# Patient Record
Sex: MALE | Race: BLACK OR AFRICAN AMERICAN | NOT HISPANIC OR LATINO | Employment: UNEMPLOYED | URBAN - METROPOLITAN AREA
[De-identification: names, ages, dates, MRNs, and addresses within clinical notes are randomized per-mention and may not be internally consistent; named-entity substitution may affect disease eponyms.]

---

## 2017-07-11 LAB
CHOLEST SERPL-MSCNC: 223 MG/DL (ref 0–200)
HDLC SERPL-MCNC: 32 MG/DL
LDLC SERPL CALC-MCNC: 162 MG/DL
NON HDL CHOL (CALC): 191
TRIGL SERPL-MCNC: 143 MG/DL

## 2019-02-16 LAB — HEP C VIRUS AB: NORMAL

## 2019-03-04 ENCOUNTER — HOSPITAL ENCOUNTER (EMERGENCY)
Facility: HOSPITAL | Age: 39
Discharge: HOME | End: 2019-03-04
Attending: EMERGENCY MEDICINE
Payer: MEDICAID

## 2019-03-04 VITALS
BODY MASS INDEX: 49.44 KG/M2 | HEIGHT: 67 IN | HEART RATE: 127 BPM | DIASTOLIC BLOOD PRESSURE: 76 MMHG | WEIGHT: 315 LBS | RESPIRATION RATE: 20 BRPM | OXYGEN SATURATION: 95 % | TEMPERATURE: 99 F | SYSTOLIC BLOOD PRESSURE: 119 MMHG

## 2019-03-04 DIAGNOSIS — I50.9 ACUTE CONGESTIVE HEART FAILURE, UNSPECIFIED HEART FAILURE TYPE: Primary | ICD-10-CM

## 2019-03-04 DIAGNOSIS — R06.02 SOB (SHORTNESS OF BREATH): ICD-10-CM

## 2019-03-04 LAB
ANION GAP SERPL CALC-SCNC: 9 MMOL/L
BASOPHILS # BLD AUTO: 0.01 K/UL
BASOPHILS NFR BLD: 0.2 %
BNP SERPL-MCNC: 393 PG/ML
BUN SERPL-MCNC: 31 MG/DL
CALCIUM SERPL-MCNC: 8.6 MG/DL
CHLORIDE SERPL-SCNC: 105 MMOL/L
CO2 SERPL-SCNC: 26 MMOL/L
CREAT SERPL-MCNC: 2.1 MG/DL
DIFFERENTIAL METHOD: ABNORMAL
EOSINOPHIL # BLD AUTO: 0.1 K/UL
EOSINOPHIL NFR BLD: 1.7 %
ERYTHROCYTE [DISTWIDTH] IN BLOOD BY AUTOMATED COUNT: 17.9 %
EST. GFR  (AFRICAN AMERICAN): 44 ML/MIN/1.73 M^2
EST. GFR  (NON AFRICAN AMERICAN): 38 ML/MIN/1.73 M^2
GLUCOSE SERPL-MCNC: 125 MG/DL
HCT VFR BLD AUTO: 36.3 %
HGB BLD-MCNC: 11.7 G/DL
HIV 1+2 AB+HIV1 P24 AG SERPL QL IA: NEGATIVE
LYMPHOCYTES # BLD AUTO: 2 K/UL
LYMPHOCYTES NFR BLD: 31.3 %
MCH RBC QN AUTO: 30.3 PG
MCHC RBC AUTO-ENTMCNC: 32.2 G/DL
MCV RBC AUTO: 94 FL
MONOCYTES # BLD AUTO: 0.5 K/UL
MONOCYTES NFR BLD: 8.3 %
NEUTROPHILS # BLD AUTO: 3.8 K/UL
NEUTROPHILS NFR BLD: 58.5 %
PLATELET # BLD AUTO: 243 K/UL
PMV BLD AUTO: 10.1 FL
POTASSIUM SERPL-SCNC: 3.4 MMOL/L
RBC # BLD AUTO: 3.86 M/UL
SODIUM SERPL-SCNC: 140 MMOL/L
TROPONIN I SERPL DL<=0.01 NG/ML-MCNC: 0.03 NG/ML
WBC # BLD AUTO: 6.51 K/UL

## 2019-03-04 PROCEDURE — 27000190 HC CPAP FULL FACE MASK W/VALVE

## 2019-03-04 PROCEDURE — 94660 CPAP INITIATION&MGMT: CPT

## 2019-03-04 PROCEDURE — 84484 ASSAY OF TROPONIN QUANT: CPT

## 2019-03-04 PROCEDURE — 63600175 PHARM REV CODE 636 W HCPCS: Performed by: EMERGENCY MEDICINE

## 2019-03-04 PROCEDURE — 96374 THER/PROPH/DIAG INJ IV PUSH: CPT

## 2019-03-04 PROCEDURE — 93010 ELECTROCARDIOGRAM REPORT: CPT | Mod: ,,, | Performed by: INTERNAL MEDICINE

## 2019-03-04 PROCEDURE — 83880 ASSAY OF NATRIURETIC PEPTIDE: CPT

## 2019-03-04 PROCEDURE — 86703 HIV-1/HIV-2 1 RESULT ANTBDY: CPT

## 2019-03-04 PROCEDURE — 93010 EKG 12-LEAD: ICD-10-PCS | Mod: ,,, | Performed by: INTERNAL MEDICINE

## 2019-03-04 PROCEDURE — 93005 ELECTROCARDIOGRAM TRACING: CPT

## 2019-03-04 PROCEDURE — 36415 COLL VENOUS BLD VENIPUNCTURE: CPT

## 2019-03-04 PROCEDURE — 99285 EMERGENCY DEPT VISIT HI MDM: CPT | Mod: 25

## 2019-03-04 PROCEDURE — 85025 COMPLETE CBC W/AUTO DIFF WBC: CPT

## 2019-03-04 PROCEDURE — 99900035 HC TECH TIME PER 15 MIN (STAT)

## 2019-03-04 PROCEDURE — 80048 BASIC METABOLIC PNL TOTAL CA: CPT

## 2019-03-04 RX ORDER — FUROSEMIDE 10 MG/ML
100 INJECTION INTRAMUSCULAR; INTRAVENOUS
Status: COMPLETED | OUTPATIENT
Start: 2019-03-04 | End: 2019-03-04

## 2019-03-04 RX ORDER — FUROSEMIDE 80 MG/1
80 TABLET ORAL 2 TIMES DAILY
COMMUNITY
End: 2019-03-04 | Stop reason: SDUPTHER

## 2019-03-04 RX ORDER — AMIODARONE HYDROCHLORIDE 200 MG/1
TABLET ORAL DAILY
COMMUNITY
End: 2019-04-05 | Stop reason: SDUPTHER

## 2019-03-04 RX ORDER — CARVEDILOL 12.5 MG/1
12.5 TABLET ORAL 2 TIMES DAILY WITH MEALS
Status: ON HOLD | COMMUNITY
End: 2019-03-19 | Stop reason: HOSPADM

## 2019-03-04 RX ORDER — ATORVASTATIN CALCIUM 20 MG/1
20 TABLET, FILM COATED ORAL DAILY
COMMUNITY
End: 2019-04-05 | Stop reason: SDUPTHER

## 2019-03-04 RX ORDER — FUROSEMIDE 80 MG/1
80 TABLET ORAL 3 TIMES DAILY
Qty: 21 TABLET | Refills: 0 | Status: ON HOLD | OUTPATIENT
Start: 2019-03-04 | End: 2019-03-19 | Stop reason: HOSPADM

## 2019-03-04 RX ADMIN — FUROSEMIDE 100 MG: 10 INJECTION, SOLUTION INTRAMUSCULAR; INTRAVENOUS at 02:03

## 2019-03-04 NOTE — ED PROVIDER NOTES
SCRIBE #1 NOTE: I, Zainachino Fulton, am scribing for, and in the presence of, Nguyễn Rios MD. I have scribed the entire note.      History      Chief Complaint   Patient presents with    Shortness of Breath       Review of patient's allergies indicates:  No Known Allergies     HPI   HPI    3/4/2019, 1:46 AM   History obtained from the patient      History of Present Illness: Chacha Zendejas is a 38 y.o. male patient with a PMHx of CHF, HTN who presents to the Emergency Department for SOB which onset gradually yesterday. Symptoms are worsening and moderate in severity. No mitigating or exacerbating factors reported. Associated sxs include abd distention. Patient denies any fever, chills, CP, cough, extremity weakness/numbness, abd pain, recent travel/long car rides, and all other sxs at this time. Pt takes 80mg Lasix qd. No further complaints or concerns at this time.       Arrival mode: EMS    PCP: Zara Olson NP       Past Medical History:  Past Medical History:   Diagnosis Date    Cellulitis     RLE    CHF (congestive heart failure)     Hypertension        Past Surgical History:  History reviewed. No pertinent surgical history.      Family History:  History reviewed. No pertinent family history.    Social History:  Social History     Tobacco Use    Smoking status: Former Smoker     Types: Cigarettes    Smokeless tobacco: Never Used   Substance and Sexual Activity    Alcohol use: Yes     Comment: socially    Drug use: No    Sexual activity: unknown       ROS   Review of Systems   Constitutional: Negative for chills, diaphoresis and fever.   HENT: Negative for congestion and sore throat.    Eyes: Negative for visual disturbance.   Respiratory: Positive for shortness of breath. Negative for cough.    Cardiovascular: Positive for leg swelling (BLE). Negative for chest pain and palpitations.   Gastrointestinal: Positive for abdominal distention. Negative for nausea and vomiting.   Genitourinary:  Negative for dysuria.   Musculoskeletal: Negative for back pain and myalgias.   Skin: Negative for rash.   Neurological: Negative for dizziness, weakness and numbness.   Hematological: Does not bruise/bleed easily.   All other systems reviewed and are negative.    Physical Exam      Initial Vitals [03/04/19 0140]   BP Pulse Resp Temp SpO2   130/78 (!) 128 20 98.6 °F (37 °C) 98 %      MAP       --          Physical Exam  Nursing Notes and Vital Signs Reviewed.  Constitutional: Patient is in moderate distress. Well-developed and well-nourished.  Head: Atraumatic. Normocephalic.  Eyes: PERRL. EOM intact. Conjunctivae are not pale. No scleral icterus.  ENT: Mucous membranes are moist. Oropharynx is clear and symmetric.    Neck: Supple. Full ROM. No lymphadenopathy.  Cardiovascular: Tachycardic. Regular rhythm. No murmurs, rubs, or gallops. Distal pulses are 2+ and symmetric.  Pulmonary/Chest: Moderate respiratory distress. Diffuse rales bilaterally. Tachypneic.   Abdominal: Distended.  There is no tenderness.  No rebound, guarding, or rigidity.  Musculoskeletal: Moves all extremities. No obvious deformities.  No calf tenderness. Diffuse anasarca with fluid to BLE and abdomen.   Skin: Warm and dry.  Neurological:  Alert, awake, and appropriate.  Normal speech.  No acute focal neurological deficits are appreciated.  Psychiatric: Normal affect. Good eye contact. Appropriate in content.    ED Course    Critical Care  Date/Time: 3/4/2019 4:43 AM  Performed by: Nguyễn Rios MD  Authorized by: Nguyễn Rios MD   Direct patient critical care time: 15 minutes  Additional history critical care time: 5 minutes  Ordering / reviewing critical care time: 5 minutes  Documentation critical care time: 5 minutes  Total critical care time (exclusive of procedural time) : 30 minutes  Critical care time was exclusive of teaching time and separately billable procedures and treating other patients.  Critical care was necessary to  "treat or prevent imminent or life-threatening deterioration of the following conditions: CHF.  Critical care was time spent personally by me on the following activities: blood draw for specimens, development of treatment plan with patient or surrogate, interpretation of cardiac output measurements, evaluation of patient's response to treatment, examination of patient, obtaining history from patient or surrogate, ordering and performing treatments and interventions, ordering and review of radiographic studies, ordering and review of laboratory studies, re-evaluation of patient's condition, pulse oximetry and review of old charts.        ED Vital Signs:  Vitals:    03/04/19 0140 03/04/19 0151 03/04/19 0200 03/04/19 0314   BP: 130/78  127/86 131/81   Pulse: (!) 128 (!) 127 (!) 126 (!) 119   Resp: 20  (!) 26 (!) 26   Temp: 98.6 °F (37 °C)      TempSrc: Oral      SpO2: 98%  96% 100%   Weight: (!) 215.1 kg (474 lb 4.8 oz)      Height: 5' 7" (1.702 m)       03/04/19 0401 03/04/19 0450   BP: 132/80 119/76   Pulse: (!) 126 (!) 127   Resp: (!) 23 20   Temp: 98.4 °F (36.9 °C) 98.5 °F (36.9 °C)   TempSrc: Oral Oral   SpO2: 95% 95%   Weight:     Height:         Abnormal Lab Results:  Labs Reviewed   CBC W/ AUTO DIFFERENTIAL - Abnormal; Notable for the following components:       Result Value    RBC 3.86 (*)     Hemoglobin 11.7 (*)     Hematocrit 36.3 (*)     RDW 17.9 (*)     All other components within normal limits   B-TYPE NATRIURETIC PEPTIDE - Abnormal; Notable for the following components:     (*)     All other components within normal limits   BASIC METABOLIC PANEL - Abnormal; Notable for the following components:    Potassium 3.4 (*)     Glucose 125 (*)     BUN, Bld 31 (*)     Creatinine 2.1 (*)     Calcium 8.6 (*)     eGFR if  44 (*)     eGFR if non  38 (*)     All other components within normal limits   TROPONIN I   HIV 1 / 2 ANTIBODY        All Lab Results:  Results for orders " placed or performed during the hospital encounter of 03/04/19   CBC auto differential   Result Value Ref Range    WBC 6.51 3.90 - 12.70 K/uL    RBC 3.86 (L) 4.60 - 6.20 M/uL    Hemoglobin 11.7 (L) 14.0 - 18.0 g/dL    Hematocrit 36.3 (L) 40.0 - 54.0 %    MCV 94 82 - 98 fL    MCH 30.3 27.0 - 31.0 pg    MCHC 32.2 32.0 - 36.0 g/dL    RDW 17.9 (H) 11.5 - 14.5 %    Platelets 243 150 - 350 K/uL    MPV 10.1 9.2 - 12.9 fL    Gran # (ANC) 3.8 1.8 - 7.7 K/uL    Lymph # 2.0 1.0 - 4.8 K/uL    Mono # 0.5 0.3 - 1.0 K/uL    Eos # 0.1 0.0 - 0.5 K/uL    Baso # 0.01 0.00 - 0.20 K/uL    Gran% 58.5 38.0 - 73.0 %    Lymph% 31.3 18.0 - 48.0 %    Mono% 8.3 4.0 - 15.0 %    Eosinophil% 1.7 0.0 - 8.0 %    Basophil% 0.2 0.0 - 1.9 %    Differential Method Automated    Troponin I   Result Value Ref Range    Troponin I 0.026 0.000 - 0.026 ng/mL   Brain natriuretic peptide   Result Value Ref Range     (H) 0 - 99 pg/mL   Basic metabolic panel   Result Value Ref Range    Sodium 140 136 - 145 mmol/L    Potassium 3.4 (L) 3.5 - 5.1 mmol/L    Chloride 105 95 - 110 mmol/L    CO2 26 23 - 29 mmol/L    Glucose 125 (H) 70 - 110 mg/dL    BUN, Bld 31 (H) 6 - 20 mg/dL    Creatinine 2.1 (H) 0.5 - 1.4 mg/dL    Calcium 8.6 (L) 8.7 - 10.5 mg/dL    Anion Gap 9 8 - 16 mmol/L    eGFR if African American 44 (A) >60 mL/min/1.73 m^2    eGFR if non African American 38 (A) >60 mL/min/1.73 m^2       Imaging Results:  Imaging Results          X-Ray Chest AP Portable (In process)                4:21 AM: Per ED physician, pt's CXR results show: Mild pulmonary vascular congestion.     The EKG was ordered, reviewed, and independently interpreted by the ED provider.  Interpretation time: 0146  Rate: 129 BPM  Rhythm: atrial flutter with 2:1 AV conduction  Interpretation: RAD. Pulmonary disease pattern. T wave abnormality. No STEMI.         The Emergency Provider reviewed the vital signs and test results, which are outlined above.    ED Discussion     4:38 AM: Reassessed pt  at this time. Offered admission, patient prefers discharge. Discharged to start doing CPAP as prescribed and take Lasix 80 mg PO TID for 1 week. Pt states his condition has improved at this time. Pt is AAOx3, in NAD, and VSS. Discussed with pt all pertinent ED information and results. Discussed pt dx and plan of tx. Gave pt all f/u and return to the ED instructions. All questions and concerns were addressed at this time. Pt expresses understanding of information and instructions, and is comfortable with plan to discharge. Pt is stable for discharge.    I discussed with patient and/or family/caretaker that evaluation in the ED does not suggest any emergent or life threatening medical conditions requiring immediate intervention beyond what was provided in the ED, and I believe patient is safe for discharge.  Regardless, an unremarkable evaluation in the ED does not preclude the development or presence of a serious of life threatening condition. As such, patient was instructed to return immediately for any worsening or change in current symptoms.    ED Medication(s):  Medications   furosemide injection 100 mg (100 mg Intravenous Given 3/4/19 0213)     Discharge Medication List as of 3/4/2019  4:38 AM                  Medical Decision Making    Medical Decision Making:   Clinical Tests:   Lab Tests: Ordered and Reviewed  Radiological Study: Ordered and Reviewed  Medical Tests: Ordered and Reviewed           Scribe Attestation:   Scribe #1: I performed the above scribed service and the documentation accurately describes the services I performed. I attest to the accuracy of the note.    Attending:   Physician Attestation Statement for Scribe #1: I, Nguyễn Rios MD, personally performed the services described in this documentation, as scribed by Zaina Fulton, in my presence, and it is both accurate and complete.          Clinical Impression       ICD-10-CM ICD-9-CM   1. Acute congestive heart failure, unspecified  heart failure type I50.9 428.0   2. SOB (shortness of breath) R06.02 786.05       Disposition:   Disposition: Discharged  Condition: Stable         Nguyễn Rios MD  03/04/19 0717

## 2019-03-13 ENCOUNTER — HOSPITAL ENCOUNTER (INPATIENT)
Facility: HOSPITAL | Age: 39
LOS: 6 days | Discharge: HOME | DRG: 291 | End: 2019-03-19
Attending: EMERGENCY MEDICINE | Admitting: HOSPITALIST
Payer: MEDICAID

## 2019-03-13 DIAGNOSIS — I48.91 AF (ATRIAL FIBRILLATION): ICD-10-CM

## 2019-03-13 DIAGNOSIS — I48.92 ATRIAL FLUTTER: ICD-10-CM

## 2019-03-13 DIAGNOSIS — R79.89 ELEVATED TROPONIN: ICD-10-CM

## 2019-03-13 DIAGNOSIS — I10 ESSENTIAL HYPERTENSION: ICD-10-CM

## 2019-03-13 DIAGNOSIS — R06.02 SHORTNESS OF BREATH: ICD-10-CM

## 2019-03-13 DIAGNOSIS — I48.91 ATRIAL FIBRILLATION STATUS POST CARDIOVERSION: ICD-10-CM

## 2019-03-13 DIAGNOSIS — I50.9 CHF (CONGESTIVE HEART FAILURE): ICD-10-CM

## 2019-03-13 DIAGNOSIS — R07.9 CHEST PAIN: ICD-10-CM

## 2019-03-13 DIAGNOSIS — I48.92 ATRIAL FLUTTER, UNSPECIFIED TYPE: Primary | ICD-10-CM

## 2019-03-13 DIAGNOSIS — I50.9 CHF EXACERBATION: ICD-10-CM

## 2019-03-13 PROCEDURE — 11000001 HC ACUTE MED/SURG PRIVATE ROOM

## 2019-03-13 PROCEDURE — 63600175 PHARM REV CODE 636 W HCPCS: Performed by: EMERGENCY MEDICINE

## 2019-03-13 PROCEDURE — 83735 ASSAY OF MAGNESIUM: CPT

## 2019-03-13 PROCEDURE — 93010 EKG 12-LEAD: ICD-10-PCS | Mod: ,,, | Performed by: INTERNAL MEDICINE

## 2019-03-13 PROCEDURE — 83880 ASSAY OF NATRIURETIC PEPTIDE: CPT

## 2019-03-13 PROCEDURE — 93010 ELECTROCARDIOGRAM REPORT: CPT | Mod: ,,, | Performed by: INTERNAL MEDICINE

## 2019-03-13 PROCEDURE — 84443 ASSAY THYROID STIM HORMONE: CPT

## 2019-03-13 PROCEDURE — 80053 COMPREHEN METABOLIC PANEL: CPT

## 2019-03-13 PROCEDURE — 85025 COMPLETE CBC W/AUTO DIFF WBC: CPT

## 2019-03-13 PROCEDURE — 99291 CRITICAL CARE FIRST HOUR: CPT | Mod: 25

## 2019-03-13 PROCEDURE — 84439 ASSAY OF FREE THYROXINE: CPT

## 2019-03-13 PROCEDURE — 96374 THER/PROPH/DIAG INJ IV PUSH: CPT

## 2019-03-13 PROCEDURE — 84484 ASSAY OF TROPONIN QUANT: CPT

## 2019-03-13 RX ORDER — DILTIAZEM HYDROCHLORIDE 5 MG/ML
20 INJECTION INTRAVENOUS
Status: DISCONTINUED | OUTPATIENT
Start: 2019-03-13 | End: 2019-03-13

## 2019-03-13 RX ORDER — FUROSEMIDE 10 MG/ML
80 INJECTION INTRAMUSCULAR; INTRAVENOUS
Status: COMPLETED | OUTPATIENT
Start: 2019-03-13 | End: 2019-03-13

## 2019-03-13 RX ADMIN — FUROSEMIDE 80 MG: 10 INJECTION, SOLUTION INTRAMUSCULAR; INTRAVENOUS at 10:03

## 2019-03-14 PROBLEM — I48.92 ATRIAL FLUTTER: Status: ACTIVE | Noted: 2019-03-14

## 2019-03-14 PROBLEM — I10 ESSENTIAL HYPERTENSION: Status: ACTIVE | Noted: 2019-03-14

## 2019-03-14 PROBLEM — R74.01 HIGH TRANSAMINASE LEVELS: Status: ACTIVE | Noted: 2019-03-14

## 2019-03-14 PROBLEM — G47.33 OSA ON CPAP: Status: ACTIVE | Noted: 2019-03-14

## 2019-03-14 PROBLEM — N18.30 CKD (CHRONIC KIDNEY DISEASE) STAGE 3, GFR 30-59 ML/MIN: Status: ACTIVE | Noted: 2019-03-14

## 2019-03-14 PROBLEM — I50.9 CHF EXACERBATION: Status: ACTIVE | Noted: 2019-03-14

## 2019-03-14 PROBLEM — I89.0 LYMPHEDEMA: Status: ACTIVE | Noted: 2019-03-14

## 2019-03-14 LAB
ALBUMIN SERPL BCP-MCNC: 2.8 G/DL
ALBUMIN SERPL BCP-MCNC: 2.9 G/DL
ALP SERPL-CCNC: 171 U/L
ALP SERPL-CCNC: 174 U/L
ALT SERPL W/O P-5'-P-CCNC: 59 U/L
ALT SERPL W/O P-5'-P-CCNC: 60 U/L
ANION GAP SERPL CALC-SCNC: 12 MMOL/L
ANION GAP SERPL CALC-SCNC: 13 MMOL/L
AST SERPL-CCNC: 42 U/L
AST SERPL-CCNC: 47 U/L
BACTERIA #/AREA URNS HPF: NORMAL /HPF
BASOPHILS # BLD AUTO: 0.01 K/UL
BASOPHILS # BLD AUTO: 0.03 K/UL
BASOPHILS NFR BLD: 0.1 %
BASOPHILS NFR BLD: 0.4 %
BILIRUB SERPL-MCNC: 1.8 MG/DL
BILIRUB SERPL-MCNC: 2 MG/DL
BILIRUB UR QL STRIP: NEGATIVE
BNP SERPL-MCNC: 445 PG/ML
BUN SERPL-MCNC: 30 MG/DL
BUN SERPL-MCNC: 31 MG/DL
CALCIUM SERPL-MCNC: 8.5 MG/DL
CALCIUM SERPL-MCNC: 8.6 MG/DL
CHLORIDE SERPL-SCNC: 99 MMOL/L
CHLORIDE SERPL-SCNC: 99 MMOL/L
CLARITY UR: CLEAR
CO2 SERPL-SCNC: 24 MMOL/L
CO2 SERPL-SCNC: 27 MMOL/L
COLOR UR: YELLOW
CREAT SERPL-MCNC: 2 MG/DL
CREAT SERPL-MCNC: 2 MG/DL
DIFFERENTIAL METHOD: ABNORMAL
DIFFERENTIAL METHOD: ABNORMAL
EOSINOPHIL # BLD AUTO: 0.1 K/UL
EOSINOPHIL # BLD AUTO: 0.1 K/UL
EOSINOPHIL NFR BLD: 1.4 %
EOSINOPHIL NFR BLD: 1.6 %
ERYTHROCYTE [DISTWIDTH] IN BLOOD BY AUTOMATED COUNT: 17.8 %
ERYTHROCYTE [DISTWIDTH] IN BLOOD BY AUTOMATED COUNT: 17.8 %
EST. GFR  (AFRICAN AMERICAN): 47 ML/MIN/1.73 M^2
EST. GFR  (AFRICAN AMERICAN): 47 ML/MIN/1.73 M^2
EST. GFR  (NON AFRICAN AMERICAN): 41 ML/MIN/1.73 M^2
EST. GFR  (NON AFRICAN AMERICAN): 41 ML/MIN/1.73 M^2
ESTIMATED PA SYSTOLIC PRESSURE: 38.44
GLUCOSE SERPL-MCNC: 123 MG/DL
GLUCOSE SERPL-MCNC: 97 MG/DL
GLUCOSE UR QL STRIP: NEGATIVE
HCT VFR BLD AUTO: 36.2 %
HCT VFR BLD AUTO: 36.2 %
HGB BLD-MCNC: 11.7 G/DL
HGB BLD-MCNC: 11.9 G/DL
HGB UR QL STRIP: NEGATIVE
KETONES UR QL STRIP: NEGATIVE
LEUKOCYTE ESTERASE UR QL STRIP: ABNORMAL
LYMPHOCYTES # BLD AUTO: 2.3 K/UL
LYMPHOCYTES # BLD AUTO: 2.6 K/UL
LYMPHOCYTES NFR BLD: 34.2 %
LYMPHOCYTES NFR BLD: 37 %
MAGNESIUM SERPL-MCNC: 1.5 MG/DL
MAGNESIUM SERPL-MCNC: 1.7 MG/DL
MAGNESIUM SERPL-MCNC: 1.9 MG/DL
MCH RBC QN AUTO: 29.8 PG
MCH RBC QN AUTO: 30.3 PG
MCHC RBC AUTO-ENTMCNC: 32.3 G/DL
MCHC RBC AUTO-ENTMCNC: 32.9 G/DL
MCV RBC AUTO: 92 FL
MCV RBC AUTO: 92 FL
MICROSCOPIC COMMENT: NORMAL
MITRAL VALVE REGURGITATION: ABNORMAL
MONOCYTES # BLD AUTO: 0.9 K/UL
MONOCYTES # BLD AUTO: 0.9 K/UL
MONOCYTES NFR BLD: 12.8 %
MONOCYTES NFR BLD: 12.8 %
NEUTROPHILS # BLD AUTO: 3.4 K/UL
NEUTROPHILS # BLD AUTO: 3.4 K/UL
NEUTROPHILS NFR BLD: 48.4 %
NEUTROPHILS NFR BLD: 51.3 %
NITRITE UR QL STRIP: NEGATIVE
PH UR STRIP: 6 [PH] (ref 5–8)
PHOSPHATE SERPL-MCNC: 4.2 MG/DL
PLATELET # BLD AUTO: 211 K/UL
PLATELET # BLD AUTO: 216 K/UL
PMV BLD AUTO: 10.1 FL
PMV BLD AUTO: 10.3 FL
POTASSIUM SERPL-SCNC: 3.3 MMOL/L
PROT SERPL-MCNC: 7.3 G/DL
PROT SERPL-MCNC: 7.3 G/DL
PROT UR QL STRIP: NEGATIVE
RBC # BLD AUTO: 3.93 M/UL
RBC # BLD AUTO: 3.93 M/UL
RBC #/AREA URNS HPF: 0 /HPF (ref 0–4)
RETIRED EF AND QEF - SEE NOTES: 10 (ref 55–65)
SODIUM SERPL-SCNC: 136 MMOL/L
SODIUM SERPL-SCNC: 138 MMOL/L
SP GR UR STRIP: 1.01 (ref 1–1.03)
SQUAMOUS #/AREA URNS HPF: 0 /HPF
T4 FREE SERPL-MCNC: 1 NG/DL
TRICUSPID VALVE REGURGITATION: ABNORMAL
TROPONIN I SERPL DL<=0.01 NG/ML-MCNC: 0.02 NG/ML
TROPONIN I SERPL DL<=0.01 NG/ML-MCNC: 0.03 NG/ML
TSH SERPL DL<=0.005 MIU/L-ACNC: 5.15 UIU/ML
URN SPEC COLLECT METH UR: ABNORMAL
UROBILINOGEN UR STRIP-ACNC: NEGATIVE EU/DL
WBC # BLD AUTO: 6.7 K/UL
WBC # BLD AUTO: 6.98 K/UL
WBC #/AREA URNS HPF: 4 /HPF (ref 0–5)

## 2019-03-14 PROCEDURE — 21400001 HC TELEMETRY ROOM

## 2019-03-14 PROCEDURE — 63600175 PHARM REV CODE 636 W HCPCS: Performed by: HOSPITALIST

## 2019-03-14 PROCEDURE — 25000003 PHARM REV CODE 250: Performed by: INTERNAL MEDICINE

## 2019-03-14 PROCEDURE — 99233 PR SUBSEQUENT HOSPITAL CARE,LEVL III: ICD-10-PCS | Mod: 25,,, | Performed by: INTERNAL MEDICINE

## 2019-03-14 PROCEDURE — 27000190 HC CPAP FULL FACE MASK W/VALVE

## 2019-03-14 PROCEDURE — 25000003 PHARM REV CODE 250: Performed by: EMERGENCY MEDICINE

## 2019-03-14 PROCEDURE — S0171 BUMETANIDE 0.5 MG: HCPCS | Performed by: INTERNAL MEDICINE

## 2019-03-14 PROCEDURE — 81000 URINALYSIS NONAUTO W/SCOPE: CPT

## 2019-03-14 PROCEDURE — 63600175 PHARM REV CODE 636 W HCPCS: Performed by: INTERNAL MEDICINE

## 2019-03-14 PROCEDURE — 25000003 PHARM REV CODE 250: Performed by: HOSPITALIST

## 2019-03-14 PROCEDURE — 99233 SBSQ HOSP IP/OBS HIGH 50: CPT | Mod: 25,,, | Performed by: INTERNAL MEDICINE

## 2019-03-14 PROCEDURE — 84484 ASSAY OF TROPONIN QUANT: CPT

## 2019-03-14 PROCEDURE — 84132 ASSAY OF SERUM POTASSIUM: CPT

## 2019-03-14 PROCEDURE — 93010 EKG 12-LEAD: ICD-10-PCS | Mod: ,,, | Performed by: INTERNAL MEDICINE

## 2019-03-14 PROCEDURE — 85025 COMPLETE CBC W/AUTO DIFF WBC: CPT

## 2019-03-14 PROCEDURE — 83735 ASSAY OF MAGNESIUM: CPT

## 2019-03-14 PROCEDURE — 93010 ELECTROCARDIOGRAM REPORT: CPT | Mod: ,,, | Performed by: INTERNAL MEDICINE

## 2019-03-14 PROCEDURE — 84100 ASSAY OF PHOSPHORUS: CPT

## 2019-03-14 PROCEDURE — 80053 COMPREHEN METABOLIC PANEL: CPT

## 2019-03-14 PROCEDURE — 25000003 PHARM REV CODE 250: Performed by: NURSE PRACTITIONER

## 2019-03-14 PROCEDURE — 93306 2D ECHO WITH COLOR FLOW DOPPLER: ICD-10-PCS | Mod: 26,,, | Performed by: INTERNAL MEDICINE

## 2019-03-14 PROCEDURE — C8929 TTE W OR WO FOL WCON,DOPPLER: HCPCS

## 2019-03-14 PROCEDURE — 36415 COLL VENOUS BLD VENIPUNCTURE: CPT

## 2019-03-14 PROCEDURE — 99900035 HC TECH TIME PER 15 MIN (STAT)

## 2019-03-14 PROCEDURE — 83735 ASSAY OF MAGNESIUM: CPT | Mod: 91

## 2019-03-14 PROCEDURE — 93306 TTE W/DOPPLER COMPLETE: CPT | Mod: 26,,, | Performed by: INTERNAL MEDICINE

## 2019-03-14 PROCEDURE — 94660 CPAP INITIATION&MGMT: CPT

## 2019-03-14 PROCEDURE — 96376 TX/PRO/DX INJ SAME DRUG ADON: CPT

## 2019-03-14 RX ORDER — FUROSEMIDE 10 MG/ML
80 INJECTION INTRAMUSCULAR; INTRAVENOUS 3 TIMES DAILY
Status: DISCONTINUED | OUTPATIENT
Start: 2019-03-14 | End: 2019-03-14

## 2019-03-14 RX ORDER — BUMETANIDE 0.25 MG/ML
2 INJECTION INTRAMUSCULAR; INTRAVENOUS 3 TIMES DAILY
Status: DISCONTINUED | OUTPATIENT
Start: 2019-03-14 | End: 2019-03-19 | Stop reason: HOSPADM

## 2019-03-14 RX ORDER — AMIODARONE HYDROCHLORIDE 200 MG/1
200 TABLET ORAL DAILY
Status: DISCONTINUED | OUTPATIENT
Start: 2019-03-14 | End: 2019-03-14

## 2019-03-14 RX ORDER — LISINOPRIL 20 MG/1
20 TABLET ORAL DAILY
Status: ON HOLD | COMMUNITY
End: 2019-03-19 | Stop reason: HOSPADM

## 2019-03-14 RX ORDER — POTASSIUM CHLORIDE 20 MEQ/1
40 TABLET, EXTENDED RELEASE ORAL ONCE
Status: COMPLETED | OUTPATIENT
Start: 2019-03-14 | End: 2019-03-14

## 2019-03-14 RX ORDER — METOLAZONE 5 MG/1
5 TABLET ORAL ONCE
Status: COMPLETED | OUTPATIENT
Start: 2019-03-14 | End: 2019-03-14

## 2019-03-14 RX ORDER — DIGOXIN 0.25 MG/ML
250 INJECTION INTRAMUSCULAR; INTRAVENOUS ONCE
Status: COMPLETED | OUTPATIENT
Start: 2019-03-14 | End: 2019-03-14

## 2019-03-14 RX ORDER — METOLAZONE 5 MG/1
5 TABLET ORAL DAILY
Status: DISCONTINUED | OUTPATIENT
Start: 2019-03-15 | End: 2019-03-19 | Stop reason: HOSPADM

## 2019-03-14 RX ORDER — CARVEDILOL 12.5 MG/1
25 TABLET ORAL 2 TIMES DAILY WITH MEALS
Status: DISCONTINUED | OUTPATIENT
Start: 2019-03-14 | End: 2019-03-14

## 2019-03-14 RX ORDER — SPIRONOLACTONE 25 MG/1
25 TABLET ORAL DAILY
Status: DISCONTINUED | OUTPATIENT
Start: 2019-03-14 | End: 2019-03-16

## 2019-03-14 RX ORDER — HYDRALAZINE HYDROCHLORIDE 25 MG/1
25 TABLET, FILM COATED ORAL EVERY 8 HOURS
Status: ON HOLD | COMMUNITY
End: 2019-03-19 | Stop reason: HOSPADM

## 2019-03-14 RX ORDER — CARVEDILOL 12.5 MG/1
12.5 TABLET ORAL ONCE
Status: COMPLETED | OUTPATIENT
Start: 2019-03-14 | End: 2019-03-14

## 2019-03-14 RX ORDER — SPIRONOLACTONE 25 MG/1
25 TABLET ORAL 2 TIMES DAILY
Status: ON HOLD | COMMUNITY
End: 2019-03-19 | Stop reason: HOSPADM

## 2019-03-14 RX ORDER — AMIODARONE HYDROCHLORIDE 200 MG/1
200 TABLET ORAL ONCE
Status: COMPLETED | OUTPATIENT
Start: 2019-03-14 | End: 2019-03-14

## 2019-03-14 RX ORDER — POTASSIUM CHLORIDE 750 MG/1
50 TABLET, EXTENDED RELEASE ORAL ONCE
Status: COMPLETED | OUTPATIENT
Start: 2019-03-14 | End: 2019-03-14

## 2019-03-14 RX ORDER — NITROGLYCERIN 0.4 MG/1
0.4 TABLET SUBLINGUAL EVERY 5 MIN PRN
Status: DISCONTINUED | OUTPATIENT
Start: 2019-03-14 | End: 2019-03-19 | Stop reason: HOSPADM

## 2019-03-14 RX ORDER — POTASSIUM CHLORIDE 20 MEQ/1
40 TABLET, EXTENDED RELEASE ORAL EVERY 4 HOURS
Status: COMPLETED | OUTPATIENT
Start: 2019-03-15 | End: 2019-03-15

## 2019-03-14 RX ORDER — MAGNESIUM SULFATE 1 G/100ML
3 INJECTION INTRAVENOUS ONCE
Status: COMPLETED | OUTPATIENT
Start: 2019-03-15 | End: 2019-03-15

## 2019-03-14 RX ORDER — DIGOXIN 125 MCG
0.12 TABLET ORAL DAILY
Status: DISCONTINUED | OUTPATIENT
Start: 2019-03-15 | End: 2019-03-19 | Stop reason: HOSPADM

## 2019-03-14 RX ORDER — IBUPROFEN 200 MG
24 TABLET ORAL
Status: DISCONTINUED | OUTPATIENT
Start: 2019-03-14 | End: 2019-03-19 | Stop reason: HOSPADM

## 2019-03-14 RX ORDER — DIGOXIN 0.25 MG/ML
500 INJECTION INTRAMUSCULAR; INTRAVENOUS ONCE
Status: COMPLETED | OUTPATIENT
Start: 2019-03-14 | End: 2019-03-14

## 2019-03-14 RX ORDER — AMIODARONE HYDROCHLORIDE 200 MG/1
200 TABLET ORAL 2 TIMES DAILY
Status: DISCONTINUED | OUTPATIENT
Start: 2019-03-14 | End: 2019-03-19 | Stop reason: HOSPADM

## 2019-03-14 RX ORDER — IBUPROFEN 200 MG
16 TABLET ORAL
Status: DISCONTINUED | OUTPATIENT
Start: 2019-03-14 | End: 2019-03-19 | Stop reason: HOSPADM

## 2019-03-14 RX ORDER — ISOSORBIDE DINITRATE 20 MG/1
20 TABLET ORAL 3 TIMES DAILY
Status: ON HOLD | COMMUNITY
End: 2019-03-19 | Stop reason: HOSPADM

## 2019-03-14 RX ORDER — CARVEDILOL 12.5 MG/1
12.5 TABLET ORAL 2 TIMES DAILY WITH MEALS
Status: DISCONTINUED | OUTPATIENT
Start: 2019-03-14 | End: 2019-03-16

## 2019-03-14 RX ORDER — GLUCAGON 1 MG
1 KIT INJECTION
Status: DISCONTINUED | OUTPATIENT
Start: 2019-03-14 | End: 2019-03-19 | Stop reason: HOSPADM

## 2019-03-14 RX ORDER — SODIUM CHLORIDE 0.9 % (FLUSH) 0.9 %
5 SYRINGE (ML) INJECTION
Status: DISCONTINUED | OUTPATIENT
Start: 2019-03-14 | End: 2019-03-19 | Stop reason: HOSPADM

## 2019-03-14 RX ADMIN — BUMETANIDE 2 MG: 0.25 INJECTION INTRAMUSCULAR; INTRAVENOUS at 10:03

## 2019-03-14 RX ADMIN — CARVEDILOL 12.5 MG: 12.5 TABLET, FILM COATED ORAL at 04:03

## 2019-03-14 RX ADMIN — DIGOXIN 500 MCG: 0.25 INJECTION INTRAMUSCULAR; INTRAVENOUS at 09:03

## 2019-03-14 RX ADMIN — AMIODARONE HYDROCHLORIDE 200 MG: 200 TABLET ORAL at 08:03

## 2019-03-14 RX ADMIN — CARVEDILOL 12.5 MG: 12.5 TABLET, FILM COATED ORAL at 01:03

## 2019-03-14 RX ADMIN — DIGOXIN 250 MCG: 0.25 INJECTION INTRAMUSCULAR; INTRAVENOUS at 12:03

## 2019-03-14 RX ADMIN — APIXABAN 5 MG: 2.5 TABLET, FILM COATED ORAL at 08:03

## 2019-03-14 RX ADMIN — FUROSEMIDE 80 MG: 10 INJECTION, SOLUTION INTRAMUSCULAR; INTRAVENOUS at 01:03

## 2019-03-14 RX ADMIN — CARVEDILOL 12.5 MG: 12.5 TABLET, FILM COATED ORAL at 08:03

## 2019-03-14 RX ADMIN — BUMETANIDE 2 MG: 0.25 INJECTION INTRAMUSCULAR; INTRAVENOUS at 04:03

## 2019-03-14 RX ADMIN — MAGNESIUM SULFATE HEPTAHYDRATE 3 G: 1 INJECTION, SOLUTION INTRAVENOUS at 11:03

## 2019-03-14 RX ADMIN — POTASSIUM CHLORIDE 40 MEQ: 1500 TABLET, EXTENDED RELEASE ORAL at 08:03

## 2019-03-14 RX ADMIN — METOLAZONE 5 MG: 5 TABLET ORAL at 09:03

## 2019-03-14 RX ADMIN — AMIODARONE HYDROCHLORIDE 200 MG: 200 TABLET ORAL at 01:03

## 2019-03-14 RX ADMIN — BUMETANIDE 2 MG: 0.25 INJECTION INTRAMUSCULAR; INTRAVENOUS at 08:03

## 2019-03-14 RX ADMIN — APIXABAN 5 MG: 2.5 TABLET, FILM COATED ORAL at 10:03

## 2019-03-14 RX ADMIN — POTASSIUM CHLORIDE 50 MEQ: 750 TABLET, EXTENDED RELEASE ORAL at 09:03

## 2019-03-14 RX ADMIN — SPIRONOLACTONE 25 MG: 25 TABLET ORAL at 09:03

## 2019-03-14 NOTE — ED NOTES
Verified with Dr. Garza about patients heart rate, and was checking to see if there would be any order to medications. Dr. Garza informed me that he would put something in

## 2019-03-14 NOTE — ED NOTES
Patient placed on supplemental O2 at this time because patient reported feeling short of breath at this time. Patient reports the O2 helps a little bit

## 2019-03-14 NOTE — PLAN OF CARE
Assessment completed.  Met with the patient/ family. CM explained and left info in blue transition of care folder regarding Advance Directives, Living Will ( FULL CODE ) ,Pamphlet on D/C planning on admission and Pharmacy bedside delivery.  The role of CM explained for ICU transitions of care/ discharge planning. Per EMR,   h/o HFrEF, atrial flutter s/p ablation, HLD, and CKD presents with pedal edema.  Worse than baseline.  Associated with SOB, CREWS, orthopnea and increased abdominal girth x 2 weeks.  Presented to ER 2 weeks ago and was told to increase lasix 80mg PO BID to TID.  Patient reports compliance with medication change without improvement in symptoms.  Also c/o decrease activity, fatigue, weakness, daytime sleepiness.  Also report being noncompliant with CPAP at night due to discomfort. In ER, O2 sat 92% on 2L.  He was given 80mg IV lasix with 300cc urine output. HR 130s, EKG show atrial flutter 2:1 block.  Patient has not followed up with cardiology outpatient for possible repeat ablation or other options.  Patient has family support and lives with his mother. Patient has Medicaid insurance. Patient plans to return home post d/c.  Patient has no needs at this time. CM to f/u for safe transition    Zara Olson NP   No Pharmacies Listed       03/14/19 1400   Discharge Assessment   Assessment Type Discharge Planning Assessment   Confirmed/corrected address and phone number on facesheet? Yes   Assessment information obtained from? Patient   Communicated expected length of stay with patient/caregiver yes   Prior to hospitilization cognitive status: Alert/Oriented   Prior to hospitalization functional status: Independent   Current cognitive status: Alert/Oriented   Current Functional Status: Independent   Lives With parent(s)   Able to Return to Prior Arrangements other (see comments)   Is patient able to care for self after discharge? Yes   Patient's perception of discharge disposition home or selfcare    Readmission Within the Last 30 Days no previous admission in last 30 days   Patient currently being followed by outpatient case management? No   Patient currently receives any other outside agency services? No   Equipment Currently Used at Home none   Do you have any problems affording any of your prescribed medications? No   Is the patient taking medications as prescribed? yes   Does the patient have transportation home? Yes   Transportation Anticipated family or friend will provide   Does the patient receive services at the [facility]? No   Discharge Plan A Home with family   Discharge Plan B Home with family   DME Needed Upon Discharge  none   Patient/Family in Agreement with Plan yes

## 2019-03-14 NOTE — ED NOTES
Called lab to verify the status on the patients lab work and was told that they were working on it

## 2019-03-14 NOTE — PLAN OF CARE
Problem: Adult Inpatient Plan of Care  Goal: Plan of Care Review  Outcome: Ongoing (interventions implemented as appropriate)  Patient transferred from ICU to telemetry on this shift. Plan of care reviewed with the patient and family, all verbalized understanding. Pt remains free from falls, call light within reach and encouraged to call for assistance ambulating. Patient receiving IV diuretics. Patient reports a decrease in shortness of breath.

## 2019-03-14 NOTE — ED NOTES
Pt lying in bed in NAD, VSS, RR equal and unlabored on O2 by NC. Bed is low, locked, and call light in reach. Side rail up. Pt family at bs. Pt and family updated on POC, no further needs at this time.

## 2019-03-14 NOTE — PROGRESS NOTES
Pt seen ad examined, chart reviewed. Pt placed on Bipap and given IV lasix- he has put out about 3 L of urine and feels much better, sitting up in chair. He came off Bipap this am. He also has Pressure dressing applied to both LE up to the thighs. Orthopnea/ SOB much improved.  Will continue with aggressive diuresis and use BIPAP at night.

## 2019-03-14 NOTE — NURSING TRANSFER
Nursing Transfer Note      3/14/2019     Transfer To: [id number]    Transfer via wheelchair    Transfer with cardiac monitoring    Transported by RAJI Avila RN    Medicines sent: None    Chart send with patient: Yes    Notified: Family at bedside    Bedside report given to Madiha

## 2019-03-14 NOTE — ASSESSMENT & PLAN NOTE
- EKG 2:1 atrial flutter HR 110s-120s  - continue amiodarone, coreg and apixaban at home dose  - TTE in AM  - consult cardiology in AM for possible ablation

## 2019-03-14 NOTE — SUBJECTIVE & OBJECTIVE
Past Medical History:   Diagnosis Date    Atrial flutter     Cellulitis     RLE    CHF (congestive heart failure)     Hypertension        History reviewed. No pertinent surgical history.    Review of patient's allergies indicates:   Allergen Reactions    Iodine and iodide containing products Itching and Swelling       No current facility-administered medications on file prior to encounter.      Current Outpatient Medications on File Prior to Encounter   Medication Sig    amiodarone (PACERONE) 200 MG Tab Take by mouth once daily.    apixaban (ELIQUIS) 5 mg Tab Take 1 tablet (5 mg total) by mouth once daily.    atorvastatin (LIPITOR) 20 MG tablet Take 20 mg by mouth once daily.    carvedilol (COREG) 12.5 MG tablet Take 12.5 mg by mouth 2 (two) times daily with meals.    furosemide (LASIX) 80 MG tablet Take 1 tablet (80 mg total) by mouth 3 (three) times daily. for 7 days    hydrALAZINE (APRESOLINE) 25 MG tablet Take 25 mg by mouth every 8 (eight) hours.    isosorbide dinitrate (ISORDIL) 20 MG tablet Take 20 mg by mouth 3 (three) times daily.    lisinopril (PRINIVIL,ZESTRIL) 20 MG tablet Take 20 mg by mouth once daily.    spironolactone (ALDACTONE) 25 MG tablet Take 25 mg by mouth 2 (two) times daily.     Family History     None        Tobacco Use    Smoking status: Former Smoker     Types: Cigarettes    Smokeless tobacco: Never Used   Substance and Sexual Activity    Alcohol use: Yes     Comment: socially    Drug use: No    Sexual activity: Not on file     Review of Systems   Constitution: Positive for malaise/fatigue and weight gain. Negative for diaphoresis, weakness and weight loss.   HENT: Negative for congestion and nosebleeds.    Cardiovascular: Positive for dyspnea on exertion, leg swelling, orthopnea and paroxysmal nocturnal dyspnea. Negative for chest pain, claudication, cyanosis, irregular heartbeat, near-syncope, palpitations and syncope.   Respiratory: Positive for shortness of breath.  Negative for cough, hemoptysis, sleep disturbances due to breathing, snoring, sputum production and wheezing.         Uses CPAP    Hematologic/Lymphatic: Negative for bleeding problem. Does not bruise/bleed easily.   Skin: Negative for rash.   Musculoskeletal: Negative for arthritis, back pain, falls, joint pain, muscle cramps and muscle weakness.   Gastrointestinal: Negative for abdominal pain, constipation, diarrhea, heartburn, hematemesis, hematochezia, melena and nausea.   Genitourinary: Negative for dysuria, hematuria and nocturia.   Neurological: Negative for excessive daytime sleepiness, dizziness, headaches, light-headedness, loss of balance, numbness and vertigo.     Objective:     Vital Signs (Most Recent):  Temp: 97.6 °F (36.4 °C) (03/14/19 0900)  Pulse: 80 (03/14/19 1200)  Resp: (!) 30 (03/14/19 1200)  BP: (!) 150/101 (03/14/19 1200)  SpO2: 100 % (03/14/19 1200) Vital Signs (24h Range):  Temp:  [97.6 °F (36.4 °C)-98.2 °F (36.8 °C)] 97.6 °F (36.4 °C)  Pulse:  [] 80  Resp:  [18-30] 30  SpO2:  [89 %-100 %] 100 %  BP: (102-157)/() 150/101     Weight: (!) 229.8 kg (506 lb 9.9 oz)  Body mass index is 79.35 kg/m².    SpO2: 100 %  O2 Device (Oxygen Therapy): CPAP      Intake/Output Summary (Last 24 hours) at 3/14/2019 1231  Last data filed at 3/14/2019 1200  Gross per 24 hour   Intake 360 ml   Output 2485 ml   Net -2125 ml       Lines/Drains/Airways     Peripheral Intravenous Line                 Peripheral IV - Single Lumen 03/13/19 2205 Right Antecubital less than 1 day                Physical Exam   Constitutional: He is oriented to person, place, and time. He appears well-developed and well-nourished.   Neck: Neck supple. JVD present.   Cardiovascular: Normal heart sounds and normal pulses. A regularly irregular rhythm present. Tachycardia present. Exam reveals no friction rub.   No murmur heard.  Pulmonary/Chest: Effort normal. No respiratory distress. He has decreased breath sounds. He has no  wheezes. He has rales.   Abdominal: Soft. Bowel sounds are normal. He exhibits no distension.   Morbidly obese      Musculoskeletal: He exhibits edema ( hard edema to bilateral lower extremities that extends to sacrum). He exhibits no tenderness.   Neurological: He is alert and oriented to person, place, and time.   Skin: Skin is warm and dry. No rash noted.   Stasis dermatitis to BLE   Psychiatric: He has a normal mood and affect. His behavior is normal.   Nursing note and vitals reviewed.      Significant Labs:   All pertinent lab results from the last 24 hours have been reviewed. and   Recent Lab Results       03/14/19  0559   03/14/19  0301   03/13/19  2221        Albumin 2.9   2.8     Alkaline Phosphatase 171   174     ALT 59   60     Anion Gap 12   13     Appearance, UA   Clear       AST 42   47     Bacteria, UA   Rare       Baso # 0.03   0.01     Basophil% 0.4   0.1     Bilirubin (UA)   Negative       Total Bilirubin 2.0  Comment:  For infants and newborns, interpretation of results should be based  on gestational age, weight and in agreement with clinical  observations.  Premature Infant recommended reference ranges:  Up to 24 hours.............<8.0 mg/dL  Up to 48 hours............<12.0 mg/dL  3-5 days..................<15.0 mg/dL  6-29 days.................<15.0 mg/dL     1.8  Comment:  For infants and newborns, interpretation of results should be based  on gestational age, weight and in agreement with clinical  observations.  Premature Infant recommended reference ranges:  Up to 24 hours.............<8.0 mg/dL  Up to 48 hours............<12.0 mg/dL  3-5 days..................<15.0 mg/dL  6-29 days.................<15.0 mg/dL       BNP     445  Comment:  Values of less than 100 pg/ml are consistent with non-CHF populations.     BUN, Bld 30   31     Calcium 8.6   8.5     Chloride 99   99     CO2 27   24     Color, UA   Yellow       Creatinine 2.0   2.0     Differential Method Automated   Automated     eGFR  if  47   47     eGFR if non  41  Comment:  Calculation used to obtain the estimated glomerular filtration  rate (eGFR) is the CKD-EPI equation.      41  Comment:  Calculation used to obtain the estimated glomerular filtration  rate (eGFR) is the CKD-EPI equation.        Eos # 0.1   0.1     Eosinophil% 1.4   1.6     Free T4     1.00     Glucose 97   123     Glucose, UA   Negative       Gran # (ANC) 3.4   3.4     Gran% 48.4   51.3     Hematocrit 36.2   36.2     Hemoglobin 11.7   11.9     Ketones, UA   Negative       Leukocytes, UA   Trace       Lymph # 2.6   2.3     Lymph% 37.0   34.2     Magnesium 1.7   1.9     MCH 29.8   30.3     MCHC 32.3   32.9     MCV 92   92     Microscopic Comment   SEE COMMENT  Comment:  Other formed elements not mentioned in the report are not   present in the microscopic examination.          Mono # 0.9   0.9     Mono% 12.8   12.8     MPV 10.1   10.3     Nitrite, UA   Negative       Occult Blood UA   Negative       pH, UA   6.0       Phosphorus 4.2         Platelets 211   216     Potassium 3.3   3.3     Total Protein 7.3   7.3     Protein, UA   Negative  Comment:  Recommend a 24 hour urine protein or a urine   protein/creatinine ratio if globulin induced proteinuria is  clinically suspected.         RBC 3.93   3.93     RBC, UA   0       RDW 17.8   17.8     Sodium 138   136     Specific [place], UA   1.010       Specimen UA   Urine, Clean Catch       Squam Epithel, UA   0       Troponin I     0.029  Comment:  The reference interval for Troponin I represents the 99th percentile   cutoff   for our facility and is consistent with 3rd generation assay   performance.       TSH     5.151     Urobilinogen, UA   Negative       WBC, UA   4       WBC 6.98   6.70           Significant Imaging:

## 2019-03-14 NOTE — SUBJECTIVE & OBJECTIVE
Past Medical History:   Diagnosis Date    Atrial flutter     Cellulitis     RLE    CHF (congestive heart failure)     Hypertension        History reviewed. No pertinent surgical history.    Review of patient's allergies indicates:   Allergen Reactions    Iodine and iodide containing products Itching and Swelling       No current facility-administered medications on file prior to encounter.      Current Outpatient Medications on File Prior to Encounter   Medication Sig    amiodarone (PACERONE) 200 MG Tab Take by mouth once daily.    apixaban (ELIQUIS) 5 mg Tab Take 1 tablet (5 mg total) by mouth once daily.    atorvastatin (LIPITOR) 20 MG tablet Take 20 mg by mouth once daily.    carvedilol (COREG) 12.5 MG tablet Take 12.5 mg by mouth 2 (two) times daily with meals.    furosemide (LASIX) 80 MG tablet Take 1 tablet (80 mg total) by mouth 3 (three) times daily. for 7 days     Family History     None        Tobacco Use    Smoking status: Former Smoker     Types: Cigarettes    Smokeless tobacco: Never Used   Substance and Sexual Activity    Alcohol use: Yes     Comment: socially    Drug use: No    Sexual activity: Not on file     Review of Systems   Constitutional: Positive for fatigue. Negative for activity change, appetite change, chills, diaphoresis and fever.   HENT: Negative for facial swelling, sore throat, tinnitus and trouble swallowing.    Eyes: Negative for photophobia and visual disturbance.   Respiratory: Positive for shortness of breath. Negative for apnea, cough, chest tightness and wheezing.    Cardiovascular: Positive for leg swelling. Negative for chest pain and palpitations.   Gastrointestinal: Positive for abdominal distention. Negative for abdominal pain, constipation, diarrhea, nausea and vomiting.   Endocrine: Negative for polydipsia, polyphagia and polyuria.   Genitourinary: Negative for decreased urine volume, dysuria, flank pain, frequency and hematuria.   Musculoskeletal: Negative  for arthralgias, back pain, joint swelling, myalgias and neck stiffness.   Skin: Negative for pallor and rash.   Allergic/Immunologic: Negative for immunocompromised state.   Neurological: Positive for weakness. Negative for dizziness, seizures, syncope, numbness and headaches.   Psychiatric/Behavioral: Negative for confusion, hallucinations and suicidal ideas. The patient is not nervous/anxious.    All other systems reviewed and are negative.    Objective:     Vital Signs (Most Recent):  Temp: 98.2 °F (36.8 °C) (03/13/19 2313)  Pulse: (!) 124 (03/14/19 0232)  Resp: (!) 26 (03/13/19 2313)  BP: 126/87 (03/14/19 0154)  SpO2: 100 % (03/14/19 0232) Vital Signs (24h Range):  Temp:  [98.2 °F (36.8 °C)] 98.2 °F (36.8 °C)  Pulse:  [114-132] 124  Resp:  [26-28] 26  SpO2:  [91 %-100 %] 100 %  BP: (123-148)/(71-91) 126/87     Weight: (!) 229.8 kg (506 lb 9.9 oz)  Body mass index is 79.35 kg/m².    Physical Exam   Constitutional: He is oriented to person, place, and time. He appears well-developed and well-nourished. He appears distressed.   Appear uncomfortable   HENT:   Head: Normocephalic and atraumatic.   Mouth/Throat: Oropharynx is clear and moist.   Eyes: Conjunctivae and EOM are normal. Pupils are equal, round, and reactive to light. No scleral icterus.   Neck: Normal range of motion. Neck supple. No JVD present. No thyromegaly present.   Unable to visualize JVD with thick neck tissue   Cardiovascular: Regular rhythm. Exam reveals no gallop and no friction rub.   No murmur heard.  tachycardia   Pulmonary/Chest: He is in respiratory distress. He has no wheezes. He has rales.   Abdominal: Soft. Bowel sounds are normal. He exhibits no distension. There is no tenderness. There is no guarding.   Musculoskeletal: Normal range of motion. He exhibits edema.   Stasis dermatitis of both lower extremity, 4+ pitting edema bilateral lower extremity up to mid-thigh/groin   Neurological: He is alert and oriented to person, place, and  time. No cranial nerve deficit.   Skin: Skin is warm. Capillary refill takes less than 2 seconds. He is not diaphoretic. No erythema.   Psychiatric: He has a normal mood and affect.   Nursing note and vitals reviewed.        CRANIAL NERVES     CN III, IV, VI   Pupils are equal, round, and reactive to light.  Extraocular motions are normal.        Significant Labs:   CBC:   Recent Labs   Lab 03/13/19  2221   WBC 6.70   HGB 11.9*   HCT 36.2*        CMP:   Recent Labs   Lab 03/13/19  2221      K 3.3*   CL 99   CO2 24   *   BUN 31*   CREATININE 2.0*   CALCIUM 8.5*   PROT 7.3   ALBUMIN 2.8*   BILITOT 1.8*   ALKPHOS 174*   AST 47*   ALT 60*   ANIONGAP 13   EGFRNONAA 41*     Magnesium:   Recent Labs   Lab 03/13/19  2221   MG 1.9     Troponin:   Recent Labs   Lab 03/13/19  2221   TROPONINI 0.029*     Urine Studies:   Recent Labs   Lab 03/14/19  0301   COLORU Yellow   APPEARANCEUA Clear   PHUR 6.0   SPECGRAV 1.010   PROTEINUA Negative   GLUCUA Negative   KETONESU Negative   BILIRUBINUA Negative   OCCULTUA Negative   NITRITE Negative   UROBILINOGEN Negative   LEUKOCYTESUR Trace*   RBCUA 0   WBCUA 4   BACTERIA Rare   SQUAMEPITHEL 0     All pertinent labs within the past 24 hours have been reviewed.    Significant Imaging: I have reviewed all pertinent imaging results/findings within the past 24 hours.   Imaging Results          X-Ray Chest PA And Lateral (Final result)  Result time 03/13/19 22:36:45    Final result by Ayaan Hooper MD (03/13/19 22:36:45)                 Impression:      Please see above.      Electronically signed by: Ayaan Hooper MD  Date:    03/13/2019  Time:    22:36             Narrative:    EXAMINATION:  XR CHEST PA AND LATERAL    CLINICAL HISTORY  Shortness of breath.,    COMPARISON:  03/04/2019    FINDINGS:  Multiple wire leads overlie the chest.  The cardiac size appears enlarged.  There appears to be vascular prominence.  Possible CHF/pulmonary edema.

## 2019-03-14 NOTE — ED NOTES
Called report to MARIE Arthur on ICU. ICU stated she was getting another pt at this time and stated she would call back when she was ready.

## 2019-03-14 NOTE — ASSESSMENT & PLAN NOTE
Patient needs aggressive diuresis.   Recommend Bumex 2mg TID  Will given 1 dose of Metolazone 5mg and see how he responds  Continue Coreg and Aldactone   Will add Digoxin for rate control and CHF  Check 2D echo Care everywhere tab mentions that patient completed LHC at [facility] in 2018 and has  unknown etiology of his cardiomyopathy.   Heart healthy diet  Limit fluid intake 50-60 oz   Patient has been counseled on importance of med and diet compliance

## 2019-03-14 NOTE — CONSULTS
"Consulted on this 37 y/o M patient due to chronic lymphedema to BLE.  He is found sitting up in cardiac chair at bedside.  Awaiting delivery of bariatric bed for room. BLE assessed.  Edema and chronic lymphedema changes noted, no open ulcerations.  Cleansed with bath wipes.  sween 24 cream applied, then BLE wrapped with 4" and 6" ACE. He needs to be fitted for therapeutic compression after arterial studies completed, and is followed by [facility] wound clinic OP. Recommend remove ACE daily for 30 min - 1 hour, then reapply. Keep BLE elevated as much as possible.  Will follow.   "

## 2019-03-14 NOTE — ASSESSMENT & PLAN NOTE
- h/o combined systolic/diastolic heart failure, left and right sided heart failure, unclear etiology, appears LHC was done at [facility] but no documentation available  - last TTE 2/2019 at [facility] with severe dilated LV with LVEF 20-25% with global hypokinesis, severe concentric LVH, moderately dilated RV with moderate RV systolic dysfunction, elevated CVP  - given 80mg IV lasix in ER    - hold PO lasix, spironolactone, lisinopril  - continue 80mg IV lasix TID  - continue carvedilol 12.5mg PO bid  - strict I/O, daily weights, fluid restriction diet  - check TTE after rate controlled  - consult cardiology in AM for medication optimization, possible AICD/lifevest   Statement Selected

## 2019-03-14 NOTE — NURSING
Pt admitted to ICU 5 via stretcher from ER, mild shortness of breath noted, pt able to walk from stretcher to bed. Pt is very edamatous up to mid back/flanks.  Pt unable to lay in bed because of size- specialty bed ordered.  Pt states his base weigth is 420lbs and has gained this weight over a couple months.

## 2019-03-14 NOTE — HPI
Chacha Zendejas is a morbidly obese 38-year-old -American male with past medical history of paroxysmal A. fib on Eliquis, HTN, BRINDA, HFrEF (30-35% TTE 7/2018), CKD 2. Patient admitted to [facility] in Feb 2019 with decompensated A-flutter 2:1. Also admitted just a few weeks prior for sepsis secondary to right lower extremity cellulitis.   He presented to [facility] ED with complaints of weight gain, SOB, CREWS, orthopnea and increased abrominal girth over the last 2 weeks. He has a history of noncompliance with his low-sodium diet. Started on Amio during admission in Feb.     Hasn't been taking all DC meds from [facility]. Has been taking his Eliquis once daily. He states that his weight is up 80 lbs from his baseline. Has orthopnea,SOB, worsening edema. Admits that he has been taking Lasix 80mg TID and has been compliant with CPAP use. In ER, O2 sat 92% on 2L.  He was given 80mg IV lasix with 300cc urine output. HR 130s, EKG show atrial flutter 2:1 block.  Patient has not followed up with cardiology after leaving AMA from [facility]. Plans were in place to discuss possible ablation. According to care everywhere there is mention of patient undergoing LHC at [facility] in 2018 and has unknown etiology of CHF.

## 2019-03-14 NOTE — HPI
38M h/o HFrEF, atrial flutter s/p ablation, HLD, and CKD presents with pedal edema.  Worse than baseline.  Associated with SOB, CREWS, orthopnea and increased abdominal girth x 2 weeks.  Presented to ER 2 weeks ago and was told to increase lasix 80mg PO BID to TID.  Patient reports compliance with medication change without improvement in symptoms.  Also c/o decrease activity, fatigue, weakness, daytime sleepiness.  Also report being noncompliant with CPAP at night due to discomfort. Denies fevers, chills, chest pain, HA, hemoptysis, long car rides, difficulty/decrease urinating, constipation or diarrhea.  Reports compliance on low sodium diet.  In ER, O2 sat 92% on 2L.  He was given 80mg IV lasix with 300cc urine output. HR 130s, EKG show atrial flutter 2:1 block.  Patient has not followed up with cardiology outpatient for possible repeat ablation or other options.  Patient restarted on home dose of amiodarone and carvedilol with improvement in HR.  Hospital medicine called for admission.

## 2019-03-14 NOTE — ASSESSMENT & PLAN NOTE
- hold lisinopril due to possible LALY  - hold spironolactone/hydralazine/isordil since patient reportedly doesn't take at home and BP currently controlled  - continue coreg 12.5mg BID   - IV diuresis for CHF exacerbation

## 2019-03-14 NOTE — ASSESSMENT & PLAN NOTE
Patient noncompliant on CPAP machine  Consult RT for CPAP mask fitting to improve compliance at home

## 2019-03-14 NOTE — ASSESSMENT & PLAN NOTE
Recommend adding Digoxin for rate control  Increase Amio to 200mg BID  Continue Eliqius 5mg BID, patient has been taking once daily  Resume Coreg    CPAP compliance strongly encouraged to help with A-fib rate control

## 2019-03-14 NOTE — ASSESSMENT & PLAN NOTE
- likely acute on chronic kidney failure due to chf exacerbation  - unclear baseline Cr  - hold lisinopril, spironolactone  - check UA,  IV diuresis for CHF  - monitor I/O  - replete electrolytes prn

## 2019-03-14 NOTE — H&P
[facility] Hospital Medicine  History & Physical    Patient Name: Chacha Zendejas  MRN: [id number]  Admission Date: 3/13/2019  Attending Physician: No att. providers found   Primary Care Provider: Zara Olson NP         Patient information was obtained from patient, relative(s) and ER records.     Subjective:     Principal Problem:CHF exacerbation    Chief Complaint:   Chief Complaint   Patient presents with    Shortness of Breath     shortness of breath and fluid leaking from both legs started several days ago        HPI: 38M h/o HFrEF, atrial flutter s/p ablation, HLD, and CKD presents with pedal edema.  Worse than baseline.  Associated with SOB, CREWS, orthopnea and increased abdominal girth x 2 weeks.  Presented to ER 2 weeks ago and was told to increase lasix 80mg PO BID to TID.  Patient reports compliance with medication change without improvement in symptoms.  Also c/o decrease activity, fatigue, weakness, daytime sleepiness.  Also report being noncompliant with CPAP at night due to discomfort. Denies fevers, chills, chest pain, HA, hemoptysis, long car rides, difficulty/decrease urinating, constipation or diarrhea.  Reports compliance on low sodium diet.  In ER, O2 sat 92% on 2L.  He was given 80mg IV lasix with 300cc urine output. HR 130s, EKG show atrial flutter 2:1 block.  Patient has not followed up with cardiology outpatient for possible repeat ablation or other options.  Patient restarted on home dose of amiodarone and carvedilol with improvement in HR.  Hospital medicine called for admission.         Past Medical History:   Diagnosis Date    Atrial flutter     Cellulitis     RLE    CHF (congestive heart failure)     Hypertension        History reviewed. No pertinent surgical history.    Review of patient's allergies indicates:   Allergen Reactions    Iodine and iodide containing products Itching and Swelling       No current facility-administered medications on file prior  to encounter.      Current Outpatient Medications on File Prior to Encounter   Medication Sig    amiodarone (PACERONE) 200 MG Tab Take by mouth once daily.    apixaban (ELIQUIS) 5 mg Tab Take 1 tablet (5 mg total) by mouth once daily.    atorvastatin (LIPITOR) 20 MG tablet Take 20 mg by mouth once daily.    carvedilol (COREG) 12.5 MG tablet Take 12.5 mg by mouth 2 (two) times daily with meals.    furosemide (LASIX) 80 MG tablet Take 1 tablet (80 mg total) by mouth 3 (three) times daily. for 7 days     Family History     Reviewed and not Pertinent           Tobacco Use    Smoking status: Former Smoker     Types: Cigarettes    Smokeless tobacco: Never Used   Substance and Sexual Activity    Alcohol use: Yes     Comment: socially    Drug use: No    Sexual activity: Not on file     Review of Systems   Constitutional: Positive for fatigue. Negative for activity change, appetite change, chills, diaphoresis and fever.   HENT: Negative for facial swelling, sore throat, tinnitus and trouble swallowing.    Eyes: Negative for photophobia and visual disturbance.   Respiratory: Positive for shortness of breath. Negative for apnea, cough, chest tightness and wheezing.    Cardiovascular: Positive for leg swelling. Negative for chest pain and palpitations.   Gastrointestinal: Positive for abdominal distention. Negative for abdominal pain, constipation, diarrhea, nausea and vomiting.   Endocrine: Negative for polydipsia, polyphagia and polyuria.   Genitourinary: Negative for decreased urine volume, dysuria, flank pain, frequency and hematuria.   Musculoskeletal: Negative for arthralgias, back pain, joint swelling, myalgias and neck stiffness.   Skin: Negative for pallor and rash.   Allergic/Immunologic: Negative for immunocompromised state.   Neurological: Positive for weakness. Negative for dizziness, seizures, syncope, numbness and headaches.   Psychiatric/Behavioral: Negative for confusion, hallucinations and suicidal  ideas. The patient is not nervous/anxious.    All other systems reviewed and are negative.    Objective:     Vital Signs (Most Recent):  Temp: 98.2 °F (36.8 °C) (03/13/19 2313)  Pulse: (!) 124 (03/14/19 0232)  Resp: (!) 26 (03/13/19 2313)  BP: 126/87 (03/14/19 0154)  SpO2: 100 % (03/14/19 0232) Vital Signs (24h Range):  Temp:  [98.2 °F (36.8 °C)] 98.2 °F (36.8 °C)  Pulse:  [114-132] 124  Resp:  [26-28] 26  SpO2:  [91 %-100 %] 100 %  BP: (123-148)/(71-91) 126/87     Weight: (!) 229.8 kg (506 lb 9.9 oz)  Body mass index is 79.35 kg/m².    Physical Exam   Constitutional: He is oriented to person, place, and time. He appears well-developed and well-nourished. He appears distressed.   Appear uncomfortable   HENT:   Head: Normocephalic and atraumatic.   Mouth/Throat: Oropharynx is clear and moist.   Eyes: Conjunctivae and EOM are normal. Pupils are equal, round, and reactive to light. No scleral icterus.   Neck: Normal range of motion. Neck supple. No JVD present. No thyromegaly present.   Unable to visualize JVD with thick neck tissue   Cardiovascular: Regular rhythm. Exam reveals no gallop and no friction rub.   No murmur heard.  tachycardia   Pulmonary/Chest: He is in respiratory distress. He has no wheezes. He has rales.   Abdominal: Soft. Bowel sounds are normal. He exhibits no distension. There is no tenderness. There is no guarding.   Musculoskeletal: Normal range of motion. He exhibits edema.   Stasis dermatitis of both lower extremity, 4+ pitting edema bilateral lower extremity up to mid-thigh/groin   Neurological: He is alert and oriented to person, place, and time. No cranial nerve deficit.   Skin: Skin is warm. Capillary refill takes less than 2 seconds. He is not diaphoretic. No erythema.   Psychiatric: He has a normal mood and affect.   Nursing note and vitals reviewed.        CRANIAL NERVES     CN III, IV, VI   Pupils are equal, round, and reactive to light.  Extraocular motions are normal.         Significant Labs:   CBC:   Recent Labs   Lab 03/13/19  2221   WBC 6.70   HGB 11.9*   HCT 36.2*        CMP:   Recent Labs   Lab 03/13/19  2221      K 3.3*   CL 99   CO2 24   *   BUN 31*   CREATININE 2.0*   CALCIUM 8.5*   PROT 7.3   ALBUMIN 2.8*   BILITOT 1.8*   ALKPHOS 174*   AST 47*   ALT 60*   ANIONGAP 13   EGFRNONAA 41*     Magnesium:   Recent Labs   Lab 03/13/19  2221   MG 1.9     Troponin:   Recent Labs   Lab 03/13/19  2221   TROPONINI 0.029*     Urine Studies:   Recent Labs   Lab 03/14/19  0301   COLORU Yellow   APPEARANCEUA Clear   PHUR 6.0   SPECGRAV 1.010   PROTEINUA Negative   GLUCUA Negative   KETONESU Negative   BILIRUBINUA Negative   OCCULTUA Negative   NITRITE Negative   UROBILINOGEN Negative   LEUKOCYTESUR Trace*   RBCUA 0   WBCUA 4   BACTERIA Rare   SQUAMEPITHEL 0     All pertinent labs within the past 24 hours have been reviewed.    Significant Imaging: I have reviewed all pertinent imaging results/findings within the past 24 hours.   Imaging Results          X-Ray Chest PA And Lateral (Final result)  Result time 03/13/19 22:36:45    Final result by Ayaan Hooper MD (03/13/19 22:36:45)                 Impression:      Please see above.      Electronically signed by: Ayaan Hooper MD  Date:    03/13/2019  Time:    22:36             Narrative:    EXAMINATION:  XR CHEST PA AND LATERAL    CLINICAL HISTORY  Shortness of breath.,    COMPARISON:  03/04/2019    FINDINGS:  Multiple wire leads overlie the chest.  The cardiac size appears enlarged.  There appears to be vascular prominence.  Possible CHF/pulmonary edema.                                  Assessment/Plan:     * CHF exacerbation    - h/o combined systolic/diastolic heart failure, left and right sided heart failure, unclear etiology, appears LHC was done at [facility] but no documentation available  - last TTE 2/2019 at [facility] with severe dilated LV with LVEF 20-25% with global hypokinesis, severe concentric LVH, moderately  dilated RV with moderate RV systolic dysfunction, elevated CVP  - given 80mg IV lasix in ER    - hold PO lasix, spironolactone, lisinopril  - continue 80mg IV lasix TID  - continue carvedilol 12.5mg PO bid  - strict I/O, daily weights, fluid restriction diet  - check TTE after rate controlled  - consult cardiology in AM for medication optimization, possible AICD/lifevest     Atrial flutter    - EKG 2:1 atrial flutter HR 110s-120s  - continue amiodarone, coreg and apixaban at home dose  - TTE in AM  - consult cardiology in AM for possible ablation       BRINDA on CPAP    Patient noncompliant on CPAP machine  Consult RT for CPAP mask fitting to improve compliance at home       High transaminase levels    - hold statin  - likely due to hepatic congestion due to CHF exacerbation  - treat CHF  - monitor daily CMP, if not improved after improvement of exacerbation then will evaluate for other causes         CKD (chronic kidney disease) stage 3, GFR 30-59 ml/min    - likely acute on chronic kidney failure due to chf exacerbation  - unclear baseline Cr  - hold lisinopril, spironolactone  - check UA,  IV diuresis for CHF  - monitor I/O  - replete electrolytes prn       Essential hypertension    - hold lisinopril due to possible LALY  - hold spironolactone/hydralazine/isordil since patient reportedly doesn't take at home and BP currently controlled  - continue coreg 12.5mg BID   - IV diuresis for CHF exacerbation            VTE Risk Mitigation (From admission, onward)        Ordered     apixaban tablet 5 mg  Daily      03/14/19 0230     Place SRUTHI hose  Until discontinued      03/14/19 0140     Place sequential compression device  Until discontinued      03/14/19 0140     Reason for No Pharmacological VTE Prophylaxis  Once      03/14/19 0140     IP VTE HIGH RISK PATIENT  Once      03/14/19 0140             Vance Shaffer MD  Department of Hospital Medicine   [facility]

## 2019-03-14 NOTE — ED PROVIDER NOTES
"SCRIBE #1 NOTE: I, Nilsa Tyler, am scribing for, and in the presence of, Jayy Garza MD. I have scribed the entire note.       History     Chief Complaint   Patient presents with    Shortness of Breath     shortness of breath and fluid leaking from both legs started several days ago     Review of patient's allergies indicates:   Allergen Reactions    Iodine and iodide containing products Itching and Swelling         History of Present Illness     HPI    3/13/2019, 10:24 PM  History obtained from the patient      History of Present Illness: Chacha Zendejas is a 38 y.o. male patient with a PMHx of CHF, cellulitis, atrial flutter, and HTN who presents to the Emergency Department for evaluation of SOB which onset gradually over the past several days. Pt was seen at this ED approximately 2 weeks ago for the same complaint. Pt's Lasix was increased to TID at that time, but pt denies any symptomatic relief. Symptoms are constant and moderate in severity. No mitigating or exacerbating factors reported. Associated sxs include abdominal distention, edema, and "fluid leaking from legs". Pt is on Eliquis for anticoagulation.  Patient is on amiodarone and carvedilol as well for Aflutter.  Patient denies any CP, HA, lightheadedness, dizziness, n/v/d, abdominal pain, recent travel, cough, dysuria, and all other sxs at this time. No further complaints or concerns at this time.     Arrival mode: Personal vehicle      PCP: Zara Olson NP        Past Medical History:  Past Medical History:   Diagnosis Date    Atrial flutter     Cellulitis     RLE    CHF (congestive heart failure)     Hypertension        Past Surgical History:  History reviewed. No pertinent surgical history.    Family History:  History reviewed. No pertinent family history.    Social History:  Social History     Tobacco Use    Smoking status: Former Smoker     Types: Cigarettes    Smokeless tobacco: Never Used   Substance and Sexual Activity    " Alcohol use: Yes     Comment: socially    Drug use: No    Sexual activity: Unknown        Review of Systems     Review of Systems   Constitutional: Positive for unexpected weight change (Increase). Negative for chills and fever.   HENT: Negative for sore throat.    Respiratory: Positive for shortness of breath. Negative for cough.    Cardiovascular: Positive for leg swelling. Negative for chest pain and palpitations.   Gastrointestinal: Positive for abdominal distention. Negative for abdominal pain, diarrhea, nausea and vomiting.   Genitourinary: Negative for dysuria.   Musculoskeletal: Negative for back pain.   Skin: Negative for rash.   Neurological: Negative for dizziness, weakness, light-headedness and headaches.   Hematological: Does not bruise/bleed easily.   All other systems reviewed and are negative.     Physical Exam     Initial Vitals [03/13/19 2135]   BP Pulse Resp Temp SpO2   (!) 148/71 (!) 130 (!) 28 98.2 °F (36.8 °C) (!) 91 %      MAP       --          Physical Exam  Nursing Notes and Vital Signs Reviewed.  Constitutional: Patient is in no apparent distress. Morbidly obese.  Head: Atraumatic. Normocephalic.  Eyes: PERRL. EOM intact. Conjunctivae are not pale. No scleral icterus.  ENT: Mucous membranes are moist. Oropharynx is clear and symmetric.    Neck: Supple. Full ROM. No lymphadenopathy.  Cardiovascular: Tachycardia Regular rhythm. No murmurs, rubs, or gallops. Distal pulses are 2+ and symmetric.  Pulmonary/Chest: No severe respiratory distress. Sitting up in bed for comfort. Auscultation difficult 2/2 body habitus.  Abdominal: Soft and distended.  There is no tenderness.  No rebound, guarding, or rigidity. Good bowel sounds.  Genitourinary: No CVA tenderness  Musculoskeletal: Moves all extremities. No obvious deformities. No calf tenderness. 3+ pitting edema bilaterally.  Skin: Warm and dry.  Neurological:  Alert, awake, and appropriate.  Normal speech.  No acute focal neurological deficits  "are appreciated.  Psychiatric: Normal affect. Good eye contact. Appropriate in content.     ED Course   Critical Care  Date/Time: 3/14/2019 1:48 AM  Performed by: Jayy Garza MD  Authorized by: Jayy Garza MD   Direct patient critical care time: 15 (15) minutes  Additional history critical care time: 5 minutes  Ordering / reviewing critical care time: 5 minutes  Documentation critical care time: 5 minutes  Consulting other physicians critical care time: 5 minutes  Total critical care time (exclusive of procedural time) : 35 minutes  Critical care time was exclusive of separately billable procedures and treating other patients and teaching time.  Critical care was necessary to treat or prevent imminent or life-threatening deterioration of the following conditions: CHF exacerbation requiring IV Lasix and atrial flutter with RVR requiring rate controlling and rhythm controlling medications.  Critical care was time spent personally by me on the following activities: blood draw for specimens, development of treatment plan with patient or surrogate, discussions with consultants, interpretation of cardiac output measurements, evaluation of patient's response to treatment, examination of patient, obtaining history from patient or surrogate, ordering and performing treatments and interventions, ordering and review of laboratory studies, ordering and review of radiographic studies, pulse oximetry, re-evaluation of patient's condition and review of old charts.        ED Vital Signs:  Vitals:    03/13/19 2135 03/13/19 2221 03/13/19 2313   BP: (!) 148/71  123/89   Pulse: (!) 130 (!) 130 (!) 132   Resp: (!) 28  (!) 26   Temp: 98.2 °F (36.8 °C)  98.2 °F (36.8 °C)   TempSrc: Oral  Oral   SpO2: (!) 91%  95%   Weight: (!) 229.8 kg (506 lb 9.9 oz)     Height: 5' 7" (1.702 m)         Abnormal Lab Results:  Labs Reviewed   CBC W/ AUTO DIFFERENTIAL - Abnormal; Notable for the following components:       Result Value    RBC 3.93 " (*)     Hemoglobin 11.9 (*)     Hematocrit 36.2 (*)     RDW 17.8 (*)     All other components within normal limits   COMPREHENSIVE METABOLIC PANEL - Abnormal; Notable for the following components:    Potassium 3.3 (*)     Glucose 123 (*)     BUN, Bld 31 (*)     Creatinine 2.0 (*)     Calcium 8.5 (*)     Albumin 2.8 (*)     Total Bilirubin 1.8 (*)     Alkaline Phosphatase 174 (*)     AST 47 (*)     ALT 60 (*)     eGFR if  47 (*)     eGFR if non  41 (*)     All other components within normal limits   B-TYPE NATRIURETIC PEPTIDE - Abnormal; Notable for the following components:     (*)     All other components within normal limits   TROPONIN I - Abnormal; Notable for the following components:    Troponin I 0.029 (*)     All other components within normal limits   TSH - Abnormal; Notable for the following components:    TSH 5.151 (*)     All other components within normal limits   MAGNESIUM   T4, FREE   URINALYSIS, REFLEX TO URINE CULTURE        All Lab Results:  Results for orders placed or performed during the hospital encounter of 03/13/19   CBC auto differential   Result Value Ref Range    WBC 6.70 3.90 - 12.70 K/uL    RBC 3.93 (L) 4.60 - 6.20 M/uL    Hemoglobin 11.9 (L) 14.0 - 18.0 g/dL    Hematocrit 36.2 (L) 40.0 - 54.0 %    MCV 92 82 - 98 fL    MCH 30.3 27.0 - 31.0 pg    MCHC 32.9 32.0 - 36.0 g/dL    RDW 17.8 (H) 11.5 - 14.5 %    Platelets 216 150 - 350 K/uL    MPV 10.3 9.2 - 12.9 fL    Gran # (ANC) 3.4 1.8 - 7.7 K/uL    Lymph # 2.3 1.0 - 4.8 K/uL    Mono # 0.9 0.3 - 1.0 K/uL    Eos # 0.1 0.0 - 0.5 K/uL    Baso # 0.01 0.00 - 0.20 K/uL    Gran% 51.3 38.0 - 73.0 %    Lymph% 34.2 18.0 - 48.0 %    Mono% 12.8 4.0 - 15.0 %    Eosinophil% 1.6 0.0 - 8.0 %    Basophil% 0.1 0.0 - 1.9 %    Differential Method Automated    Comprehensive metabolic panel   Result Value Ref Range    Sodium 136 136 - 145 mmol/L    Potassium 3.3 (L) 3.5 - 5.1 mmol/L    Chloride 99 95 - 110 mmol/L    CO2 24 23  - 29 mmol/L    Glucose 123 (H) 70 - 110 mg/dL    BUN, Bld 31 (H) 6 - 20 mg/dL    Creatinine 2.0 (H) 0.5 - 1.4 mg/dL    Calcium 8.5 (L) 8.7 - 10.5 mg/dL    Total Protein 7.3 6.0 - 8.4 g/dL    Albumin 2.8 (L) 3.5 - 5.2 g/dL    Total Bilirubin 1.8 (H) 0.1 - 1.0 mg/dL    Alkaline Phosphatase 174 (H) 55 - 135 U/L    AST 47 (H) 10 - 40 U/L    ALT 60 (H) 10 - 44 U/L    Anion Gap 13 8 - 16 mmol/L    eGFR if African American 47 (A) >60 mL/min/1.73 m^2    eGFR if non African American 41 (A) >60 mL/min/1.73 m^2   Brain natriuretic peptide   Result Value Ref Range     (H) 0 - 99 pg/mL   Troponin I   Result Value Ref Range    Troponin I 0.029 (H) 0.000 - 0.026 ng/mL   Magnesium   Result Value Ref Range    Magnesium 1.9 1.6 - 2.6 mg/dL   TSH   Result Value Ref Range    TSH 5.151 (H) 0.400 - 4.000 uIU/mL   T4, free   Result Value Ref Range    Free T4 1.00 0.71 - 1.51 ng/dL         Imaging Results:  Imaging Results          X-Ray Chest PA And Lateral (Final result)  Result time 03/13/19 22:36:45    Final result by Ayaan Hooper MD (03/13/19 22:36:45)                 Impression:      Please see above.      Electronically signed by: Ayaan Hooper MD  Date:    03/13/2019  Time:    22:36             Narrative:    EXAMINATION:  XR CHEST PA AND LATERAL    CLINICAL HISTORY  Shortness of breath.,    COMPARISON:  03/04/2019    FINDINGS:  Multiple wire leads overlie the chest.  The cardiac size appears enlarged.  There appears to be vascular prominence.  Possible CHF/pulmonary edema.                                 The EKG was ordered, reviewed, and independently interpreted by the ED provider.  Interpretation time: 21:46  Rate: 130 BPM  Rhythm: Atrial flutter with 2:1 AV conduction  Interpretation: No significant ST depression. No significant ST elevation. Normal QRS duration. No STEMI.           The Emergency Provider reviewed the vital signs and test results, which are outlined above.     ED Discussion     1:22 AM:  Re-evaluated pt. I have discussed test results, shared treatment plan, and the need for admission with patient and family at bedside. Pt and family express understanding at this time and agree with all information. All questions answered. Pt and family have no further questions or concerns at this time. Pt is ready for admit.    1:22 AM: Discussed case with Dr. Shaffer ([facility]). Dr. Shaffer agrees with current care and management of pt and accepts admission.   Admitting Service: Hospital Medicine  Admitting Physician: Dr. Shaffer  Admit to: Obs-Tele        ED Medication(s):  Medications   amiodarone tablet 200 mg (not administered)   carvedilol tablet 12.5 mg (not administered)   furosemide injection 80 mg (not administered)   sodium chloride 0.9% flush 5 mL (not administered)   glucose chewable tablet 16 g (not administered)   glucose chewable tablet 24 g (not administered)   dextrose 50% injection 12.5 g (not administered)   dextrose 50% injection 25 g (not administered)   glucagon (human recombinant) injection 1 mg (not administered)   furosemide injection 80 mg (80 mg Intravenous Given 3/13/19 2227)       New Prescriptions    No medications on file                 Medical Decision Making:   Clinical Tests:   Lab Tests: Ordered and Reviewed  Radiological Study: Ordered and Reviewed  Medical Tests: Ordered and Reviewed  Patient presenting with 50 lb weight gain, orthopnea, pedal edema. Patient has known history of CHF.  Patient was seen here last week and increased Lasix dosing at home, but has had no relief.  80 mg of Lasix given here in the emergency department with only 300 mls of urinary output.  Chest x-ray suspicious for vascular congestion/pulmonary edema. Not hypoxic or in respiratory distress at this time.  Given that patient has failed increased dosing of Lasix at home, believe patient needs to come in for inpatient diuresis.  Patient also noted to be in Aflutter with RVR.  Consult to Cardiology for  recommendations of treatment of Aflutter with RVR in the setting of acute CHF exacerbation; did not want to give calcium channel blockers or beta blockers in the setting of acute congestive heart failure without for speaking with Cardiology; unable however to get in touch with Cardiology at this time; instead discussed the case with Hospital Medicine Dr. Shaffer, who advised p.o. home meds of carvedilol and amiodarone; troponin was mildly elevated, however patient is on Eliquis and reports compliance; no STEMI in no chest pain; patient placed in observation              Scribe Attestation:   Scribe #1: I performed the above scribed service and the documentation accurately describes the services I performed. I attest to the accuracy of the note.     Attending:   Physician Attestation Statement for Scribe #1: I, Jayy Garza MD, personally performed the services described in this documentation, as scribed by Nilsa Tyler, in my presence, and it is both accurate and complete.           Clinical Impression       ICD-10-CM ICD-9-CM   1. Atrial flutter, unspecified type I48.92 427.32   2. Shortness of breath R06.02 786.05   3. CHF exacerbation I50.9 428.0   4. Elevated troponin R74.8 790.6       Disposition:   Disposition: Placed in Observation  Condition: Fair         Jayy Garza MD  03/14/19 0149

## 2019-03-15 LAB
ALBUMIN SERPL BCP-MCNC: 2.8 G/DL
ALP SERPL-CCNC: 165 U/L
ALT SERPL W/O P-5'-P-CCNC: 50 U/L
ANION GAP SERPL CALC-SCNC: 10 MMOL/L
AST SERPL-CCNC: 34 U/L
BASOPHILS # BLD AUTO: 0.01 K/UL
BASOPHILS NFR BLD: 0.1 %
BILIRUB SERPL-MCNC: 1.7 MG/DL
BUN SERPL-MCNC: 30 MG/DL
CALCIUM SERPL-MCNC: 9.1 MG/DL
CHLORIDE SERPL-SCNC: 96 MMOL/L
CO2 SERPL-SCNC: 33 MMOL/L
CREAT SERPL-MCNC: 1.9 MG/DL
DIFFERENTIAL METHOD: ABNORMAL
EOSINOPHIL # BLD AUTO: 0.2 K/UL
EOSINOPHIL NFR BLD: 2.5 %
ERYTHROCYTE [DISTWIDTH] IN BLOOD BY AUTOMATED COUNT: 17.7 %
EST. GFR  (AFRICAN AMERICAN): 50 ML/MIN/1.73 M^2
EST. GFR  (NON AFRICAN AMERICAN): 43 ML/MIN/1.73 M^2
GLUCOSE SERPL-MCNC: 103 MG/DL
HCT VFR BLD AUTO: 37.7 %
HGB BLD-MCNC: 12 G/DL
LYMPHOCYTES # BLD AUTO: 2.2 K/UL
LYMPHOCYTES NFR BLD: 31.5 %
MAGNESIUM SERPL-MCNC: 2.1 MG/DL
MCH RBC QN AUTO: 29.6 PG
MCHC RBC AUTO-ENTMCNC: 31.8 G/DL
MCV RBC AUTO: 93 FL
MONOCYTES # BLD AUTO: 0.8 K/UL
MONOCYTES NFR BLD: 11.3 %
NEUTROPHILS # BLD AUTO: 3.8 K/UL
NEUTROPHILS NFR BLD: 54.6 %
PHOSPHATE SERPL-MCNC: 4.3 MG/DL
PLATELET # BLD AUTO: 206 K/UL
PMV BLD AUTO: 10.7 FL
POTASSIUM SERPL-SCNC: 3.5 MMOL/L
PROT SERPL-MCNC: 7.2 G/DL
RBC # BLD AUTO: 4.05 M/UL
SODIUM SERPL-SCNC: 139 MMOL/L
WBC # BLD AUTO: 6.91 K/UL

## 2019-03-15 PROCEDURE — 63600175 PHARM REV CODE 636 W HCPCS: Performed by: NURSE PRACTITIONER

## 2019-03-15 PROCEDURE — 21400001 HC TELEMETRY ROOM

## 2019-03-15 PROCEDURE — 99232 SBSQ HOSP IP/OBS MODERATE 35: CPT | Mod: ,,, | Performed by: INTERNAL MEDICINE

## 2019-03-15 PROCEDURE — 80053 COMPREHEN METABOLIC PANEL: CPT

## 2019-03-15 PROCEDURE — 85025 COMPLETE CBC W/AUTO DIFF WBC: CPT

## 2019-03-15 PROCEDURE — 99232 PR SUBSEQUENT HOSPITAL CARE,LEVL II: ICD-10-PCS | Mod: ,,, | Performed by: INTERNAL MEDICINE

## 2019-03-15 PROCEDURE — 25000003 PHARM REV CODE 250: Performed by: NURSE PRACTITIONER

## 2019-03-15 PROCEDURE — 83735 ASSAY OF MAGNESIUM: CPT

## 2019-03-15 PROCEDURE — 25000003 PHARM REV CODE 250: Performed by: EMERGENCY MEDICINE

## 2019-03-15 PROCEDURE — 36415 COLL VENOUS BLD VENIPUNCTURE: CPT

## 2019-03-15 PROCEDURE — 99900035 HC TECH TIME PER 15 MIN (STAT)

## 2019-03-15 PROCEDURE — 84100 ASSAY OF PHOSPHORUS: CPT

## 2019-03-15 PROCEDURE — 25000003 PHARM REV CODE 250: Performed by: HOSPITALIST

## 2019-03-15 PROCEDURE — S0171 BUMETANIDE 0.5 MG: HCPCS | Performed by: INTERNAL MEDICINE

## 2019-03-15 PROCEDURE — 25000003 PHARM REV CODE 250: Performed by: INTERNAL MEDICINE

## 2019-03-15 RX ORDER — LEVOTHYROXINE SODIUM 50 UG/1
50 TABLET ORAL
Status: DISCONTINUED | OUTPATIENT
Start: 2019-03-15 | End: 2019-03-19 | Stop reason: HOSPADM

## 2019-03-15 RX ORDER — LOSARTAN POTASSIUM 25 MG/1
25 TABLET ORAL DAILY
Status: DISCONTINUED | OUTPATIENT
Start: 2019-03-15 | End: 2019-03-17

## 2019-03-15 RX ORDER — ACETAMINOPHEN 325 MG/1
650 TABLET ORAL EVERY 6 HOURS PRN
Status: DISCONTINUED | OUTPATIENT
Start: 2019-03-15 | End: 2019-03-15

## 2019-03-15 RX ORDER — POTASSIUM CHLORIDE 20 MEQ/1
40 TABLET, EXTENDED RELEASE ORAL DAILY
Status: DISCONTINUED | OUTPATIENT
Start: 2019-03-15 | End: 2019-03-16

## 2019-03-15 RX ORDER — ACETAMINOPHEN 325 MG/1
650 TABLET ORAL EVERY 6 HOURS PRN
Status: DISCONTINUED | OUTPATIENT
Start: 2019-03-15 | End: 2019-03-19 | Stop reason: HOSPADM

## 2019-03-15 RX ADMIN — METOLAZONE 5 MG: 5 TABLET ORAL at 08:03

## 2019-03-15 RX ADMIN — BUMETANIDE 2 MG: 0.25 INJECTION INTRAMUSCULAR; INTRAVENOUS at 08:03

## 2019-03-15 RX ADMIN — LOSARTAN POTASSIUM 25 MG: 25 TABLET, FILM COATED ORAL at 03:03

## 2019-03-15 RX ADMIN — CARVEDILOL 12.5 MG: 12.5 TABLET, FILM COATED ORAL at 08:03

## 2019-03-15 RX ADMIN — APIXABAN 5 MG: 2.5 TABLET, FILM COATED ORAL at 08:03

## 2019-03-15 RX ADMIN — BUMETANIDE 2 MG: 0.25 INJECTION INTRAMUSCULAR; INTRAVENOUS at 09:03

## 2019-03-15 RX ADMIN — APIXABAN 5 MG: 2.5 TABLET, FILM COATED ORAL at 09:03

## 2019-03-15 RX ADMIN — AMIODARONE HYDROCHLORIDE 200 MG: 200 TABLET ORAL at 09:03

## 2019-03-15 RX ADMIN — BUMETANIDE 2 MG: 0.25 INJECTION INTRAMUSCULAR; INTRAVENOUS at 03:03

## 2019-03-15 RX ADMIN — SPIRONOLACTONE 25 MG: 25 TABLET ORAL at 08:03

## 2019-03-15 RX ADMIN — CARVEDILOL 12.5 MG: 12.5 TABLET, FILM COATED ORAL at 05:03

## 2019-03-15 RX ADMIN — POTASSIUM CHLORIDE 40 MEQ: 1500 TABLET, EXTENDED RELEASE ORAL at 05:03

## 2019-03-15 RX ADMIN — DIGOXIN 0.12 MG: 125 TABLET ORAL at 08:03

## 2019-03-15 RX ADMIN — ACETAMINOPHEN 650 MG: 325 TABLET ORAL at 10:03

## 2019-03-15 RX ADMIN — AMIODARONE HYDROCHLORIDE 200 MG: 200 TABLET ORAL at 08:03

## 2019-03-15 RX ADMIN — POTASSIUM CHLORIDE 40 MEQ: 1500 TABLET, EXTENDED RELEASE ORAL at 03:03

## 2019-03-15 RX ADMIN — POTASSIUM CHLORIDE 40 MEQ: 1500 TABLET, EXTENDED RELEASE ORAL at 02:03

## 2019-03-15 NOTE — HOSPITAL COURSE
3/15/19- Echo shows BiV failure with LVEF 10%, bi atrial enlargement, PA pressure 38mmHg,moderate to severe MR and TR. Has diuresed 6.2 liters since admit. Transferred to [facility] and feels much better this morning. A-flutter rate controlled. Labs and vitals stable     3/16/19-Patient seen and examined, sitting up in chair. Feeling better. SOB improving. Remains in aflutter with variable rate, meds further adjusted. Labs reviewed.     3/17/19-Patient seen and examined today, sitting up in chair. Continues to feel better. SOB and edema improving. Remains in aflutter. HR controlled at time of exam. Labs reviewed. Creatinine 1.8, K 3.0.    3/18/19-Patient seen and examined today, sitting in chair. Feels well. SOB and edema continue to improve. No chest pain. Remains in aflutter. CAROL/DCCV planned for today. Needs LifeVest prior to d/c.     3/19/19-Patient seen and examined today, resting in bed. Feels well. No complaints. SOB greatly improved. No other complaints. Remains in SR s/p DCCV yesterday. Labs reviewed, stable. LifeVest ordered but did not fit due to patient's body habitus.

## 2019-03-15 NOTE — HOSPITAL COURSE
3/15- looks and feels much better, leg swelling down, has lost about 6.2 L since admit. Remains in C Aflutter 2:1 @ 139, echo shows severe LV Dysfunction with EF 10%. Pt is totally non compliant with diet and meds. He and his family are obviously oblivious of his severe condition and continue too enjoy unlimited amount of salt and calories as well as water intake. This is his 3rd hospital admission in last 2 months and he was also admitted at [facility]. Cardiology plans  - will need to be fit for lifevest when closer to discharge   -Patient reports having LHC at [facility] in Nov 2018 that showed normal coronaries. Will request report  -Dr. Nash plans to cardiovert patient when he is more compensated. Hopefully this will help with his CHF. Will also need OP referral to EP for ICD implant and possible RFA   -Continue Coreg, Losartan and Aldactone, given a dose of Zaroxolyn earlier.    3/16- he was seen having breakfast.     Cardiac echo- 3/14   Severely depressed left ventricular systolic function (EF 10-15%).     6 - Right ventricular enlargement with moderately depressed systolic function.  3/17- family is in the room, he is scheduled for cardioversion in AM, remains on diuresis .  3/18 - pt not in respiratory distress. Has combined respiratory failure. Receiving spironolactone, metolazone and bumex. Being treated for A flutter and severe systolic heart failure. Awaiting CAROL/DCCV and placement of Lifevest per Cardiology. Pt awakens and denies any complaints.   3/19 - CAROL was negative for thrombus. DCCV was performed. A Lifevest could not be fitted due to patient's body habitus. SOB greatly improved. Home oxygen was ordered as patient qualified. Leg swelling decreased. Pt was cleared for discharge by Cardiology. He was seen and examined and determined to be safe and stable for discharge. Pt was advised to follow up with his PCP and Cardiologist.

## 2019-03-15 NOTE — PROGRESS NOTES
[facility] -   Cardiology  Progress Note    Patient Name: Chacha Zendejas  MRN: [id number]  Admission Date: 3/13/2019  Hospital Length of Stay: 1 days  Code Status: Full Code   Attending Physician: Alexia Ray MD   Primary Care Physician: Zara Olson NP  Expected Discharge Date:   Principal Problem:CHF exacerbation    Subjective:   Brief HPI:  Chacha Zendejas is a morbidly obese 38-year-old -American male with past medical history of paroxysmal A. fib on Eliquis, HTN, BRINDA, HFrEF (30-35% TTE 7/2018), CKD 2. Patient admitted to [facility] in Feb 2019 with decompensated A-flutter 2:1. Also admitted just a few weeks prior for sepsis secondary to right lower extremity cellulitis.   [place] [facility] [place] with complaints of weight gain, SOB, CREWS, orthopnea and increased abrominal girth over the last 2 weeks. He has a history of noncompliance with his low-sodium diet. Started on Amio during admission in Feb.     Hasn't been taking all DC meds from [facility]. Has been taking his Eliquis once daily. He states that his weight is up 80 lbs from his baseline. Has orthopnea,SOB, worsening edema. Admits that he has been taking Lasix 80mg TID and has been compliant with CPAP use. In ER, O2 sat 92% on 2L.  He was given 80mg IV lasix with 300cc urine output. HR 130s, EKG show atrial flutter 2:1 block.  Patient has not followed up with cardiology after leaving AMA from [facility]. Plans were in place to discuss possible ablation. According to care everywhere there is mention of patient undergoing LHC at [facility] in 2018 and has unknown etiology of CHF.     Hospital Course:   3/15/19- Echo shows BiV failure with LVEF 10%, bi atrial enlargement, PA pressure 38mmHg,moderate to severe MR and TR. Has diuresed 6.2 liters since admit. Transferred to [facility] and feels much better this morning. A-flutter rate controlled. Labs and vitals stable             Review of Systems   Constitution: Positive for malaise/fatigue and weight gain.  Negative for diaphoresis, weakness and weight loss.   HENT: Negative for congestion and nosebleeds.    Cardiovascular: Positive for dyspnea on exertion ( improving), leg swelling ( improving), orthopnea ( improving) and paroxysmal nocturnal dyspnea. Negative for chest pain, claudication, cyanosis, irregular heartbeat, near-syncope, palpitations and syncope.   Respiratory: Positive for shortness of breath ( improved ). Negative for cough, hemoptysis, sleep disturbances due to breathing, snoring, sputum production and wheezing.         Uses CPAP    Hematologic/Lymphatic: Negative for bleeding problem. Does not bruise/bleed easily.   Skin: Negative for rash.   Musculoskeletal: Negative for arthritis, back pain, falls, joint pain, muscle cramps and muscle weakness.   Gastrointestinal: Negative for abdominal pain, constipation, diarrhea, heartburn, hematemesis, hematochezia, melena and nausea.   Genitourinary: Negative for dysuria, hematuria and nocturia.   Neurological: Negative for excessive daytime sleepiness, dizziness, headaches, light-headedness, loss of balance, numbness and vertigo.     Objective:     Vital Signs (Most Recent):  Temp: 97 °F (36.1 °C) (03/15/19 0745)  Pulse: 104 (03/15/19 1328)  Resp: 18 (03/15/19 1200)  BP: 114/73 (03/15/19 0745)  SpO2: 96 % (03/15/19 1200) Vital Signs (24h Range):  Temp:  [97 °F (36.1 °C)-98.2 °F (36.8 °C)] 97 °F (36.1 °C)  Pulse:  [] 104  Resp:  [18-20] 18  SpO2:  [90 %-96 %] 96 %  BP: (103-144)/(60-89) 114/73     Weight: (!) 229.8 kg (506 lb 9.9 oz)  Body mass index is 79.35 kg/m².     SpO2: 96 %  O2 Device (Oxygen Therapy): room air      Intake/Output Summary (Last 24 hours) at 3/15/2019 1532  Last data filed at 3/15/2019 1512  Gross per 24 hour   Intake 900 ml   Output 4671 ml   Net -3771 ml       Lines/Drains/Airways     Peripheral Intravenous Line                 Peripheral IV - Single Lumen 03/13/19 2205 Right Antecubital 1 day                Physical Exam    Constitutional: He is oriented to person, place, and time. He appears well-developed and well-nourished.   Neck: Neck supple. JVD present.   Cardiovascular: Normal heart sounds and normal pulses. A regularly irregular rhythm present. Tachycardia present. Exam reveals no friction rub.   No murmur heard.  Pulmonary/Chest: Effort normal. No respiratory distress. He has decreased breath sounds. He has no wheezes. He has rales.   Abdominal: Soft. Bowel sounds are normal. He exhibits no distension.   Morbidly obese      Musculoskeletal: He exhibits edema ( hard edema to bilateral lower extremities that extends to sacrum). He exhibits no tenderness.   Neurological: He is alert and oriented to person, place, and time.   Skin: Skin is warm and dry. No rash noted.   Stasis dermatitis to BLE   Psychiatric: He has a normal mood and affect. His behavior is normal.   Nursing note and vitals reviewed.      Significant Labs:   All pertinent lab results from the last 24 hours have been reviewed. and   Recent Lab Results       03/15/19  0358   03/14/19  2101        Albumin 2.8       Alkaline Phosphatase 165       ALT 50       Anion Gap 10       AST 34       Baso # 0.01       Basophil% 0.1       Total Bilirubin 1.7  Comment:  For infants and newborns, interpretation of results should be based  on gestational age, weight and in agreement with clinical  observations.  Premature Infant recommended reference ranges:  Up to 24 hours.............<8.0 mg/dL  Up to 48 hours............<12.0 mg/dL  3-5 days..................<15.0 mg/dL  6-29 days.................<15.0 mg/dL         BUN, Bld 30       Calcium 9.1       Chloride 96       CO2 33       Creatinine 1.9       Differential Method Automated       eGFR if  50       eGFR if non  43  Comment:  Calculation used to obtain the estimated glomerular filtration  rate (eGFR) is the CKD-EPI equation.          Eos # 0.2       Eosinophil% 2.5       Glucose 103        Gran # (ANC) 3.8       Gran% 54.6       Hematocrit 37.7       Hemoglobin 12.0       Lymph # 2.2       Lymph% 31.5       Magnesium 2.1 1.5     MCH 29.6       MCHC 31.8       MCV 93       Mono # 0.8       Mono% 11.3       MPV 10.7       Phosphorus 4.3       Platelets 206       Potassium 3.5 3.3     Total Protein 7.2       RBC 4.05       RDW 17.7       Sodium 139       Troponin I   0.017  Comment:  The reference interval for Troponin I represents the 99th percentile   cutoff   for our facility and is consistent with 3rd generation assay   performance.       WBC 6.91             Significant Imaging: Echocardiogram:   2D echo with color flow doppler:   Results for orders placed or performed during the hospital encounter of 03/13/19   2D echo with color flow doppler   Result Value Ref Range    QEF 10 (A) 55 - 65    Mitral Valve Regurgitation MODERATE TO SEVERE (A)     Est. PA Systolic Pressure 38.44     Tricuspid Valve Regurgitation MILD TO MODERATE     and CXR     Results for orders placed or performed during the hospital encounter of 03/13/19   X-Ray Chest PA And Lateral    Narrative    EXAMINATION:  XR CHEST PA AND LATERAL    CLINICAL HISTORY  Shortness of breath.,    COMPARISON:  03/04/2019    FINDINGS:  Multiple wire leads overlie the chest.  The cardiac size appears enlarged.  There appears to be vascular prominence.  Possible CHF/pulmonary edema.      Impression    Please see above.      Electronically signed by: Ayaan Hooper MD  Date:    03/13/2019  Time:    22:36     Assessment and Plan:       * CHF exacerbation    Patient needs aggressive diuresis.   Recommend Bumex 2mg TID  Will given 1 dose of Metolazone 5mg and see how he responds  Continue Coreg and Aldactone   Will add Digoxin for rate control and CHF  Check 2D echo Care everywhere tab mentions that patient completed LHC at [facility] in 2018 and has  unknown etiology of his cardiomyopathy.   Heart healthy diet  Limit fluid intake 50-60 oz   Patient has been  counseled on importance of med and diet compliance       3/15/19  -Echo shows biventricular failure with EF 10%  -continue aggressive diuresis with Bumex 2mg TID and Metolazone 5mg daily   -Start KCL 40 meq daily   -Keep Mg >2.0  -will need to be fit for lifevest when closer to discharge   -Patient reports having LHC at [facility] in Nov 2018 that showed normal coronaries. Will request report  -Dr. Nash plans to cardiovert patient when he is more compensated. Hopefully this will help with his CHF. Will also need OP referral to EP for ICD implant and possible RFA  -Continue Coreg, Losartan and Aldactone            BRINDA on CPAP    Continue nightly CPAP use      High transaminase levels    Improving with diuresis      Essential hypertension    Continue coreg, Losartan and Aldactone      Atrial flutter    Recommend adding Digoxin for rate control  Increase Amio to 200mg BID  Continue Eliqius 5mg BID, patient has been taking once daily  Resume Coreg    CPAP compliance strongly encouraged to help with A-fib rate control     3/15/19  -Will plan to diurese and cardiovert when more compensated   -continue Amio, Digoxin, Coreg and Eliquis for now          VTE Risk Mitigation (From admission, onward)        Ordered     apixaban tablet 5 mg  2 times daily      03/14/19 1021     Place SRUTHI hose  Until discontinued      03/14/19 0140     Place sequential compression device  Until discontinued      03/14/19 0140     Reason for No Pharmacological VTE Prophylaxis  Once      03/14/19 0140     IP VTE HIGH RISK PATIENT  Once      03/14/19 0140        Chart reviewed. Patient examined by Dr. Nash and agrees with plan that has been outlined.     SUNITAH Bowling  Cardiology  [facility] -

## 2019-03-15 NOTE — ASSESSMENT & PLAN NOTE
- EKG 2:1 atrial flutter HR 110s-120s  - continue amiodarone, coreg and apixaban at home dose  - TTE in AM  - consult cardiology in AM for possible ablation    Persists  Added dig to control the rate

## 2019-03-15 NOTE — SUBJECTIVE & OBJECTIVE
Interval History: looks and feels much better, leg swelling down, has lost about 6.2 L since admit. Remains in C Aflutter 2:1 @ 139, echo shows severe LV Dysfunction with EF 10%. Pt is totally non compliant with diet and meds. He and his family are obviously oblivious of his severe condition and continue too enjoy unlimited amount of salt and calories as well as water intake.    Review of Systems   Constitutional: Positive for fatigue. Negative for activity change, appetite change, chills, diaphoresis and fever.   HENT: Negative for facial swelling, sore throat, tinnitus and trouble swallowing.    Eyes: Negative for photophobia and visual disturbance.   Respiratory: Positive for shortness of breath. Negative for apnea, cough, chest tightness and wheezing.    Cardiovascular: Positive for leg swelling. Negative for chest pain and palpitations.   Gastrointestinal: Positive for abdominal distention. Negative for abdominal pain, constipation, diarrhea, nausea and vomiting.   Endocrine: Negative for polydipsia, polyphagia and polyuria.   Genitourinary: Negative for decreased urine volume, dysuria, flank pain, frequency and hematuria.   Musculoskeletal: Negative for arthralgias, back pain, joint swelling, myalgias and neck stiffness.   Skin: Negative for pallor and rash.   Allergic/Immunologic: Negative for immunocompromised state.   Neurological: Positive for weakness. Negative for dizziness, seizures, syncope, numbness and headaches.   Psychiatric/Behavioral: Negative for confusion, hallucinations and suicidal ideas. The patient is not nervous/anxious.    All other systems reviewed and are negative.    Objective:     Vital Signs (Most Recent):  Temp: 97 °F (36.1 °C) (03/15/19 0745)  Pulse: (!) 122 (03/15/19 1717)  Resp: 18 (03/15/19 1200)  BP: (!) 167/91 (03/15/19 1640)  SpO2: 96 % (03/15/19 1200) Vital Signs (24h Range):  Temp:  [97 °F (36.1 °C)-98.2 °F (36.8 °C)] 97 °F (36.1 °C)  Pulse:  [] 122  Resp:  [18-20]  18  SpO2:  [90 %-96 %] 96 %  BP: (103-167)/(60-91) 167/91     Weight: (!) 229.8 kg (506 lb 9.9 oz)  Body mass index is 79.35 kg/m².    Intake/Output Summary (Last 24 hours) at 3/15/2019 1729  Last data filed at 3/15/2019 1718  Gross per 24 hour   Intake 1406 ml   Output 4851 ml   Net -3445 ml      Physical Exam   Constitutional: He is oriented to person, place, and time. He appears well-developed and well-nourished. He appears distressed.   Appear uncomfortable   HENT:   Head: Normocephalic and atraumatic.   Mouth/Throat: Oropharynx is clear and moist.   Eyes: Conjunctivae and EOM are normal. Pupils are equal, round, and reactive to light. No scleral icterus.   Neck: Normal range of motion. Neck supple. No JVD present. No thyromegaly present.   Unable to visualize JVD with thick neck tissue   Cardiovascular: Regular rhythm. Exam reveals no gallop and no friction rub.   No murmur heard.  tachycardia   Pulmonary/Chest: He is in respiratory distress. He has no wheezes. He has rales.   Abdominal: Soft. Bowel sounds are normal. He exhibits no distension. There is no tenderness. There is no guarding.   Musculoskeletal: Normal range of motion. He exhibits edema.   Stasis dermatitis of both lower extremity, 4+ pitting edema bilateral lower extremity up to mid-thigh/groin   Neurological: He is alert and oriented to person, place, and time. No cranial nerve deficit.   Skin: Skin is warm. Capillary refill takes less than 2 seconds. He is not diaphoretic. No erythema.   Psychiatric: He has a normal mood and affect.   Nursing note and vitals reviewed.      Significant Labs:   BMP:   Recent Labs   Lab 03/15/19  0358         K 3.5   CL 96   CO2 33*   BUN 30*   CREATININE 1.9*   CALCIUM 9.1   MG 2.1     CBC:   Recent Labs   Lab 03/13/19  2221 03/14/19  0559 03/15/19  0358   WBC 6.70 6.98 6.91   HGB 11.9* 11.7* 12.0*   HCT 36.2* 36.2* 37.7*    211 206     CMP:   Recent Labs   Lab 03/13/19  2221 03/14/19  0559  03/14/19  2101 03/15/19  0358    138  --  139   K 3.3* 3.3* 3.3* 3.5   CL 99 99  --  96   CO2 24 27  --  33*   * 97  --  103   BUN 31* 30*  --  30*   CREATININE 2.0* 2.0*  --  1.9*   CALCIUM 8.5* 8.6*  --  9.1   PROT 7.3 7.3  --  7.2   ALBUMIN 2.8* 2.9*  --  2.8*   BILITOT 1.8* 2.0*  --  1.7*   ALKPHOS 174* 171*  --  165*   AST 47* 42*  --  34   ALT 60* 59*  --  50*   ANIONGAP 13 12  --  10   EGFRNONAA 41* 41*  --  43*     Magnesium:   Recent Labs   Lab 03/14/19  0559 03/14/19  2101 03/15/19  0358   MG 1.7 1.5* 2.1     TSH:   Recent Labs   Lab 03/13/19  2221   TSH 5.151*     All pertinent labs within the past 24 hours have been reviewed.    Significant Imaging: I have reviewed all pertinent imaging results/findings within the past 24 hours.

## 2019-03-15 NOTE — PROGRESS NOTES
[facility] Medicine  Progress Note    Patient Name: Chacha Zendejas  MRN: [id number]  Patient Class: IP- Inpatient   Admission Date: 3/13/2019  Length of Stay: 1 days  Attending Physician: Alexia Ray MD  Primary Care Provider: Zara Olson NP        Subjective:     Principal Problem:CHF exacerbation    HPI:  38M h/o HFrEF, atrial flutter s/p ablation, HLD, and CKD presents with pedal edema.  Worse than baseline.  Associated with SOB, CREWS, orthopnea and increased abdominal girth x 2 weeks.  Presented to ER 2 weeks ago and was told to increase lasix 80mg PO BID to TID.  Patient reports compliance with medication change without improvement in symptoms.  Also c/o decrease activity, fatigue, weakness, daytime sleepiness.  Also report being noncompliant with CPAP at night due to discomfort. Denies fevers, chills, chest pain, HA, hemoptysis, long car rides, difficulty/decrease urinating, constipation or diarrhea.  Reports compliance on low sodium diet.  In ER, O2 sat 92% on 2L.  He was given 80mg IV lasix with 300cc urine output. HR 130s, EKG show atrial flutter 2:1 block.  Patient has not followed up with cardiology outpatient for possible repeat ablation or other options.  Patient restarted on home dose of amiodarone and carvedilol with improvement in HR.  Hospital medicine called for admission.         Hospital Course:  3/15- looks and feels much better, leg swelling down, has lost about 6.2 L since admit. Remains in C Aflutter 2:1 @ 139, echo shows severe LV Dysfunction with EF 10%. Pt is totally non compliant with diet and meds. He and his family are obviously oblivious of his severe condition and continue too enjoy unlimited amount of salt and calories as well as water intake. This is his 3rd hospital admission in last 2 months and he was also admitted at [facility]. Cardiology plans  - will need to be fit for lifevest when closer to discharge   -Patient reports having LHC at [facility] [place] in Nov  2018 that showed normal coronaries. Will request report  -Dr. Nash plans to cardiovert patient when he is more compensated. Hopefully this will help with his CHF. Will also need OP referral to EP for ICD implant and possible RFA   -Continue Coreg, Losartan and Aldactone, given a dose of Zaroxolyn earlier.        Interval History: looks and feels much better, leg swelling down, has lost about 6.2 L since admit. Remains in C Aflutter 2:1 @ 139, echo shows severe LV Dysfunction with EF 10%. Pt is totally non compliant with diet and meds. He and his family are obviously oblivious of his severe condition and continue too enjoy unlimited amount of salt and calories as well as water intake.    Review of Systems   Constitutional: Positive for fatigue. Negative for activity change, appetite change, chills, diaphoresis and fever.   HENT: Negative for facial swelling, sore throat, tinnitus and trouble swallowing.    Eyes: Negative for photophobia and visual disturbance.   Respiratory: Positive for shortness of breath. Negative for apnea, cough, chest tightness and wheezing.    Cardiovascular: Positive for leg swelling. Negative for chest pain and palpitations.   Gastrointestinal: Positive for abdominal distention. Negative for abdominal pain, constipation, diarrhea, nausea and vomiting.   Endocrine: Negative for polydipsia, polyphagia and polyuria.   Genitourinary: Negative for decreased urine volume, dysuria, flank pain, frequency and hematuria.   Musculoskeletal: Negative for arthralgias, back pain, joint swelling, myalgias and neck stiffness.   Skin: Negative for pallor and rash.   Allergic/Immunologic: Negative for immunocompromised state.   Neurological: Positive for weakness. Negative for dizziness, seizures, syncope, numbness and headaches.   Psychiatric/Behavioral: Negative for confusion, hallucinations and suicidal ideas. The patient is not nervous/anxious.    All other systems reviewed and are  negative.    Objective:     Vital Signs (Most Recent):  Temp: 97 °F (36.1 °C) (03/15/19 0745)  Pulse: (!) 122 (03/15/19 1717)  Resp: 18 (03/15/19 1200)  BP: (!) 167/91 (03/15/19 1640)  SpO2: 96 % (03/15/19 1200) Vital Signs (24h Range):  Temp:  [97 °F (36.1 °C)-98.2 °F (36.8 °C)] 97 °F (36.1 °C)  Pulse:  [] 122  Resp:  [18-20] 18  SpO2:  [90 %-96 %] 96 %  BP: (103-167)/(60-91) 167/91     Weight: (!) 229.8 kg (506 lb 9.9 oz)  Body mass index is 79.35 kg/m².    Intake/Output Summary (Last 24 hours) at 3/15/2019 1729  Last data filed at 3/15/2019 1718  Gross per 24 hour   Intake 1406 ml   Output 4851 ml   Net -3445 ml      Physical Exam   Constitutional: He is oriented to person, place, and time. He appears well-developed and well-nourished. He appears distressed.   Appear uncomfortable   HENT:   Head: Normocephalic and atraumatic.   Mouth/Throat: Oropharynx is clear and moist.   Eyes: Conjunctivae and EOM are normal. Pupils are equal, round, and reactive to light. No scleral icterus.   Neck: Normal range of motion. Neck supple. No JVD present. No thyromegaly present.   Unable to visualize JVD with thick neck tissue   Cardiovascular: Regular rhythm. Exam reveals no gallop and no friction rub.   No murmur heard.  tachycardia   Pulmonary/Chest: He is in respiratory distress. He has no wheezes. He has rales.   Abdominal: Soft. Bowel sounds are normal. He exhibits no distension. There is no tenderness. There is no guarding.   Musculoskeletal: Normal range of motion. He exhibits edema.   Stasis dermatitis of both lower extremity, 4+ pitting edema bilateral lower extremity up to mid-thigh/groin   Neurological: He is alert and oriented to person, place, and time. No cranial nerve deficit.   Skin: Skin is warm. Capillary refill takes less than 2 seconds. He is not diaphoretic. No erythema.   Psychiatric: He has a normal mood and affect.   Nursing note and vitals reviewed.      Significant Labs:   BMP:   Recent Labs   Lab  03/15/19  0358         K 3.5   CL 96   CO2 33*   BUN 30*   CREATININE 1.9*   CALCIUM 9.1   MG 2.1     CBC:   Recent Labs   Lab 03/13/19 2221 03/14/19  0559 03/15/19  0358   WBC 6.70 6.98 6.91   HGB 11.9* 11.7* 12.0*   HCT 36.2* 36.2* 37.7*    211 206     CMP:   Recent Labs   Lab 03/13/19 2221 03/14/19 0559 03/14/19  2101 03/15/19  0358    138  --  139   K 3.3* 3.3* 3.3* 3.5   CL 99 99  --  96   CO2 24 27  --  33*   * 97  --  103   BUN 31* 30*  --  30*   CREATININE 2.0* 2.0*  --  1.9*   CALCIUM 8.5* 8.6*  --  9.1   PROT 7.3 7.3  --  7.2   ALBUMIN 2.8* 2.9*  --  2.8*   BILITOT 1.8* 2.0*  --  1.7*   ALKPHOS 174* 171*  --  165*   AST 47* 42*  --  34   ALT 60* 59*  --  50*   ANIONGAP 13 12  --  10   EGFRNONAA 41* 41*  --  43*     Magnesium:   Recent Labs   Lab 03/14/19  0559 03/14/19  2101 03/15/19  0358   MG 1.7 1.5* 2.1     TSH:   Recent Labs   Lab 03/13/19 2221   TSH 5.151*     All pertinent labs within the past 24 hours have been reviewed.    Significant Imaging: I have reviewed all pertinent imaging results/findings within the past 24 hours.    Assessment/Plan:      * CHF exacerbation    - h/o combined systolic/diastolic heart failure, left and right sided heart failure, unclear etiology, appears LHC was done at [facility] but no documentation available  - last TTE 2/2019 at [facility] with severe dilated LV with LVEF 20-25% with global hypokinesis, severe concentric LVH, moderately dilated RV with moderate RV systolic dysfunction, elevated CVP  - given 80mg IV lasix in ER    - hold PO lasix, spironolactone, lisinopril  - continue 80mg IV lasix TID  - continue carvedilol 12.5mg PO bid  - strict I/O, daily weights, fluid restriction diet  - check TTE after rate controlled  - consult cardiology in AM for medication optimization, possible AICD/lifevest    - good response to diuresis-- continue diuresis and continue fluid and salt restriction     BRINDA on CPAP    Patient noncompliant on CPAP  machine  Consult RT for CPAP mask fitting to improve compliance at home    Must use CPAP at night     High transaminase levels    - hold statin  - likely due to hepatic congestion due to CHF exacerbation  - treat CHF  - monitor daily CMP, if not improved after improvement of exacerbation then will evaluate for other causes    stable     CKD (chronic kidney disease) stage 3, GFR 30-59 ml/min    - likely acute on chronic kidney failure due to chf exacerbation  - unclear baseline Cr  - hold lisinopril, spironolactone  - check UA,  IV diuresis for CHF  - monitor I/O  - replete electrolytes prn    - stable CKD 3       Essential hypertension    - hold lisinopril due to possible LALY  - hold spironolactone/hydralazine/isordil since patient reportedly doesn't take at home and BP currently controlled  - continue coreg 12.5mg BID   - IV diuresis for CHF exacerbation          Atrial flutter    - EKG 2:1 atrial flutter HR 110s-120s  - continue amiodarone, coreg and apixaban at home dose  - TTE in AM  - consult cardiology in AM for possible ablation    Persists  Added dig to control the rate         VTE Risk Mitigation (From admission, onward)        Ordered     apixaban tablet 5 mg  2 times daily      03/14/19 1021     Place SRUTHI hose  Until discontinued      03/14/19 0140     Place sequential compression device  Until discontinued      03/14/19 0140     Reason for No Pharmacological VTE Prophylaxis  Once      03/14/19 0140     IP VTE HIGH RISK PATIENT  Once      03/14/19 0140              Alexia Ray MD  Department of Hospital Medicine   [facility]

## 2019-03-15 NOTE — ASSESSMENT & PLAN NOTE
- hold statin  - likely due to hepatic congestion due to CHF exacerbation  - treat CHF  - monitor daily CMP, if not improved after improvement of exacerbation then will evaluate for other causes    stable

## 2019-03-15 NOTE — ASSESSMENT & PLAN NOTE
Recommend adding Digoxin for rate control  Increase Amio to 200mg BID  Continue Eliqius 5mg BID, patient has been taking once daily  Resume Coreg    CPAP compliance strongly encouraged to help with A-fib rate control     3/15/19  -Will plan to diurese and cardiovert when more compensated   -continue Amio, Digoxin, Coreg and Eliquis for now

## 2019-03-15 NOTE — ASSESSMENT & PLAN NOTE
Patient noncompliant on CPAP machine  Consult RT for CPAP mask fitting to improve compliance at home    Must use CPAP at night

## 2019-03-15 NOTE — ASSESSMENT & PLAN NOTE
Patient needs aggressive diuresis.   Recommend Bumex 2mg TID  Will given 1 dose of Metolazone 5mg and see how he responds  Continue Coreg and Aldactone   Will add Digoxin for rate control and CHF  Check 2D echo Care everywhere tab mentions that patient completed LHC at [facility] in 2018 and has  unknown etiology of his cardiomyopathy.   Heart healthy diet  Limit fluid intake 50-60 oz   Patient has been counseled on importance of med and diet compliance       3/15/19  -Echo shows biventricular failure with EF 10%  -continue aggressive diuresis with Bumex 2mg TID and Metolazone 5mg daily   -Start KCL 40 meq daily   -Keep Mg >2.0  -will need to be fit for lifevest when closer to discharge   -Patient reports having LHC at [facility] in Nov 2018 that showed normal coronaries. Will request report  -Dr. Nash plans to cardiovert patient when he is more compensated. Hopefully this will help with his CHF. Will also need OP referral to EP for ICD implant and possible RFA  -Continue Coreg, Losartan and Aldactone

## 2019-03-15 NOTE — SUBJECTIVE & OBJECTIVE
Review of Systems   Constitution: Positive for malaise/fatigue and weight gain. Negative for diaphoresis, weakness and weight loss.   HENT: Negative for congestion and nosebleeds.    Cardiovascular: Positive for dyspnea on exertion ( improving), leg swelling ( improving), orthopnea ( improving) and paroxysmal nocturnal dyspnea. Negative for chest pain, claudication, cyanosis, irregular heartbeat, near-syncope, palpitations and syncope.   Respiratory: Positive for shortness of breath ( improved ). Negative for cough, hemoptysis, sleep disturbances due to breathing, snoring, sputum production and wheezing.         Uses CPAP    Hematologic/Lymphatic: Negative for bleeding problem. Does not bruise/bleed easily.   Skin: Negative for rash.   Musculoskeletal: Negative for arthritis, back pain, falls, joint pain, muscle cramps and muscle weakness.   Gastrointestinal: Negative for abdominal pain, constipation, diarrhea, heartburn, hematemesis, hematochezia, melena and nausea.   Genitourinary: Negative for dysuria, hematuria and nocturia.   Neurological: Negative for excessive daytime sleepiness, dizziness, headaches, light-headedness, loss of balance, numbness and vertigo.     Objective:     Vital Signs (Most Recent):  Temp: 97 °F (36.1 °C) (03/15/19 0745)  Pulse: 104 (03/15/19 1328)  Resp: 18 (03/15/19 1200)  BP: 114/73 (03/15/19 0745)  SpO2: 96 % (03/15/19 1200) Vital Signs (24h Range):  Temp:  [97 °F (36.1 °C)-98.2 °F (36.8 °C)] 97 °F (36.1 °C)  Pulse:  [] 104  Resp:  [18-20] 18  SpO2:  [90 %-96 %] 96 %  BP: (103-144)/(60-89) 114/73     Weight: (!) 229.8 kg (506 lb 9.9 oz)  Body mass index is 79.35 kg/m².     SpO2: 96 %  O2 Device (Oxygen Therapy): room air      Intake/Output Summary (Last 24 hours) at 3/15/2019 1532  Last data filed at 3/15/2019 1512  Gross per 24 hour   Intake 900 ml   Output 4671 ml   Net -3771 ml       Lines/Drains/Airways     Peripheral Intravenous Line                 Peripheral IV -  Single Lumen 03/13/19 2205 Right Antecubital 1 day                Physical Exam   Constitutional: He is oriented to person, place, and time. He appears well-developed and well-nourished.   Neck: Neck supple. JVD present.   Cardiovascular: Normal heart sounds and normal pulses. A regularly irregular rhythm present. Tachycardia present. Exam reveals no friction rub.   No murmur heard.  Pulmonary/Chest: Effort normal. No respiratory distress. He has decreased breath sounds. He has no wheezes. He has rales.   Abdominal: Soft. Bowel sounds are normal. He exhibits no distension.   Morbidly obese      Musculoskeletal: He exhibits edema ( hard edema to bilateral lower extremities that extends to sacrum). He exhibits no tenderness.   Neurological: He is alert and oriented to person, place, and time.   Skin: Skin is warm and dry. No rash noted.   Stasis dermatitis to BLE   Psychiatric: He has a normal mood and affect. His behavior is normal.   Nursing note and vitals reviewed.      Significant Labs:   All pertinent lab results from the last 24 hours have been reviewed. and   Recent Lab Results       03/15/19  0358   03/14/19  2101        Albumin 2.8       Alkaline Phosphatase 165       ALT 50       Anion Gap 10       AST 34       Baso # 0.01       Basophil% 0.1       Total Bilirubin 1.7  Comment:  For infants and newborns, interpretation of results should be based  on gestational age, weight and in agreement with clinical  observations.  Premature Infant recommended reference ranges:  Up to 24 hours.............<8.0 mg/dL  Up to 48 hours............<12.0 mg/dL  3-5 days..................<15.0 mg/dL  6-29 days.................<15.0 mg/dL         BUN, Bld 30       Calcium 9.1       Chloride 96       CO2 33       Creatinine 1.9       Differential Method Automated       eGFR if  50       eGFR if non  43  Comment:  Calculation used to obtain the estimated glomerular filtration  rate (eGFR) is the  CKD-EPI equation.          Eos # 0.2       Eosinophil% 2.5       Glucose 103       Gran # (ANC) 3.8       Gran% 54.6       Hematocrit 37.7       Hemoglobin 12.0       Lymph # 2.2       Lymph% 31.5       Magnesium 2.1 1.5     MCH 29.6       MCHC 31.8       MCV 93       Mono # 0.8       Mono% 11.3       MPV 10.7       Phosphorus 4.3       Platelets 206       Potassium 3.5 3.3     Total Protein 7.2       RBC 4.05       RDW 17.7       Sodium 139       Troponin I   0.017  Comment:  The reference interval for Troponin I represents the 99th percentile   cutoff   for our facility and is consistent with 3rd generation assay   performance.       WBC 6.91             Significant Imaging: Echocardiogram:   2D echo with color flow doppler:   Results for orders placed or performed during the hospital encounter of 03/13/19   2D echo with color flow doppler   Result Value Ref Range    QEF 10 (A) 55 - 65    Mitral Valve Regurgitation MODERATE TO SEVERE (A)     Est. PA Systolic Pressure 38.44     Tricuspid Valve Regurgitation MILD TO MODERATE     and CXR     Results for orders placed or performed during the hospital encounter of 03/13/19   X-Ray Chest PA And Lateral    Narrative    EXAMINATION:  XR CHEST PA AND LATERAL    CLINICAL HISTORY  Shortness of breath.,    COMPARISON:  03/04/2019    FINDINGS:  Multiple wire leads overlie the chest.  The cardiac size appears enlarged.  There appears to be vascular prominence.  Possible CHF/pulmonary edema.      Impression    Please see above.      Electronically signed by: Ayaan Hooper MD  Date:    03/13/2019  Time:    22:36

## 2019-03-15 NOTE — PLAN OF CARE
Problem: Adult Inpatient Plan of Care  Goal: Plan of Care Review  Outcome: Ongoing (interventions implemented as appropriate)  Plan of care reviewed with patient and family, all verbalized understanding. Pt remains free from falls, call light in reach and encouraged to call for assistance. Pt receiving IV diuretics and encouraged to measure urine output and maintain 1500 mL fluid restriction. Pt remains on telemetry in A-Flutter and room air.

## 2019-03-15 NOTE — PLAN OF CARE
Problem: Adult Inpatient Plan of Care  Goal: Patient-Specific Goal (Individualization)  Outcome: Ongoing (interventions implemented as appropriate)  Pt AAOX4.POC reviewed with pt and family. verbalized understanding.  A-flutter noted on tele monitor. VSS. Iv intact.  monitoring labs .K+-3.3. Potassium tablets administered Q4 per MD ordered.  Mag 1.5. Mag. Sulfate Administered IVPB per ordered  Pt states bed was uncomfortable and wasn't able to breathe .  C/o tightness of chest no pain.EKG ordered. results NSR.  Nitro PRN .   Request to sleep in the recliner.  Family at bedside.  Fall precautions in place.  Instructed to call for assistance.  Will  Continue to monitor.

## 2019-03-15 NOTE — ASSESSMENT & PLAN NOTE
- likely acute on chronic kidney failure due to chf exacerbation  - unclear baseline Cr  - hold lisinopril, spironolactone  - check UA,  IV diuresis for CHF  - monitor I/O  - replete electrolytes prn    - stable CKD 3

## 2019-03-15 NOTE — ASSESSMENT & PLAN NOTE
- h/o combined systolic/diastolic heart failure, left and right sided heart failure, unclear etiology, appears LHC was done at [facility] but no documentation available  - last TTE 2/2019 at [facility] with severe dilated LV with LVEF 20-25% with global hypokinesis, severe concentric LVH, moderately dilated RV with moderate RV systolic dysfunction, elevated CVP  - given 80mg IV lasix in ER    - hold PO lasix, spironolactone, lisinopril  - continue 80mg IV lasix TID  - continue carvedilol 12.5mg PO bid  - strict I/O, daily weights, fluid restriction diet  - check TTE after rate controlled  - consult cardiology in AM for medication optimization, possible AICD/lifevest    - good response to diuresis-- continue diuresis and continue fluid and salt restriction

## 2019-03-16 LAB
ALBUMIN SERPL BCP-MCNC: 3 G/DL
ALP SERPL-CCNC: 157 U/L
ALT SERPL W/O P-5'-P-CCNC: 50 U/L
ANION GAP SERPL CALC-SCNC: 12 MMOL/L
AST SERPL-CCNC: 30 U/L
BASOPHILS # BLD AUTO: 0.02 K/UL
BASOPHILS NFR BLD: 0.3 %
BILIRUB SERPL-MCNC: 1.8 MG/DL
BUN SERPL-MCNC: 31 MG/DL
CALCIUM SERPL-MCNC: 9.3 MG/DL
CHLORIDE SERPL-SCNC: 94 MMOL/L
CO2 SERPL-SCNC: 35 MMOL/L
CREAT SERPL-MCNC: 2.1 MG/DL
DIFFERENTIAL METHOD: ABNORMAL
EOSINOPHIL # BLD AUTO: 0.2 K/UL
EOSINOPHIL NFR BLD: 3.2 %
ERYTHROCYTE [DISTWIDTH] IN BLOOD BY AUTOMATED COUNT: 17.7 %
EST. GFR  (AFRICAN AMERICAN): 44 ML/MIN/1.73 M^2
EST. GFR  (NON AFRICAN AMERICAN): 38 ML/MIN/1.73 M^2
GLUCOSE SERPL-MCNC: 103 MG/DL
HCT VFR BLD AUTO: 39.7 %
HGB BLD-MCNC: 12.6 G/DL
LYMPHOCYTES # BLD AUTO: 2.4 K/UL
LYMPHOCYTES NFR BLD: 33.1 %
MAGNESIUM SERPL-MCNC: 1.7 MG/DL
MCH RBC QN AUTO: 29.6 PG
MCHC RBC AUTO-ENTMCNC: 31.7 G/DL
MCV RBC AUTO: 93 FL
MONOCYTES # BLD AUTO: 0.9 K/UL
MONOCYTES NFR BLD: 11.9 %
NEUTROPHILS # BLD AUTO: 3.7 K/UL
NEUTROPHILS NFR BLD: 51.5 %
PHOSPHATE SERPL-MCNC: 4 MG/DL
PLATELET # BLD AUTO: 224 K/UL
PMV BLD AUTO: 10.3 FL
POTASSIUM SERPL-SCNC: 3.3 MMOL/L
PROT SERPL-MCNC: 7.7 G/DL
RBC # BLD AUTO: 4.26 M/UL
SODIUM SERPL-SCNC: 141 MMOL/L
WBC # BLD AUTO: 7.13 K/UL

## 2019-03-16 PROCEDURE — 85025 COMPLETE CBC W/AUTO DIFF WBC: CPT

## 2019-03-16 PROCEDURE — 80053 COMPREHEN METABOLIC PANEL: CPT

## 2019-03-16 PROCEDURE — 99232 PR SUBSEQUENT HOSPITAL CARE,LEVL II: ICD-10-PCS | Mod: ,,, | Performed by: INTERNAL MEDICINE

## 2019-03-16 PROCEDURE — 25000003 PHARM REV CODE 250: Performed by: HOSPITALIST

## 2019-03-16 PROCEDURE — 83735 ASSAY OF MAGNESIUM: CPT

## 2019-03-16 PROCEDURE — 25000003 PHARM REV CODE 250: Performed by: PHYSICIAN ASSISTANT

## 2019-03-16 PROCEDURE — 25000003 PHARM REV CODE 250: Performed by: INTERNAL MEDICINE

## 2019-03-16 PROCEDURE — 36415 COLL VENOUS BLD VENIPUNCTURE: CPT

## 2019-03-16 PROCEDURE — 21400001 HC TELEMETRY ROOM

## 2019-03-16 PROCEDURE — 99232 SBSQ HOSP IP/OBS MODERATE 35: CPT | Mod: ,,, | Performed by: INTERNAL MEDICINE

## 2019-03-16 PROCEDURE — 25000003 PHARM REV CODE 250: Performed by: EMERGENCY MEDICINE

## 2019-03-16 PROCEDURE — 84100 ASSAY OF PHOSPHORUS: CPT

## 2019-03-16 PROCEDURE — S0171 BUMETANIDE 0.5 MG: HCPCS | Performed by: INTERNAL MEDICINE

## 2019-03-16 RX ORDER — POTASSIUM CHLORIDE 20 MEQ/1
40 TABLET, EXTENDED RELEASE ORAL 2 TIMES DAILY
Status: DISCONTINUED | OUTPATIENT
Start: 2019-03-16 | End: 2019-03-19 | Stop reason: HOSPADM

## 2019-03-16 RX ORDER — SPIRONOLACTONE 25 MG/1
50 TABLET ORAL DAILY
Status: DISCONTINUED | OUTPATIENT
Start: 2019-03-17 | End: 2019-03-19 | Stop reason: HOSPADM

## 2019-03-16 RX ORDER — CARVEDILOL 12.5 MG/1
25 TABLET ORAL 2 TIMES DAILY WITH MEALS
Status: DISCONTINUED | OUTPATIENT
Start: 2019-03-16 | End: 2019-03-19 | Stop reason: HOSPADM

## 2019-03-16 RX ADMIN — BUMETANIDE 2 MG: 0.25 INJECTION INTRAMUSCULAR; INTRAVENOUS at 08:03

## 2019-03-16 RX ADMIN — BUMETANIDE 2 MG: 0.25 INJECTION INTRAMUSCULAR; INTRAVENOUS at 02:03

## 2019-03-16 RX ADMIN — DIGOXIN 0.12 MG: 125 TABLET ORAL at 08:03

## 2019-03-16 RX ADMIN — POTASSIUM CHLORIDE 40 MEQ: 1500 TABLET, EXTENDED RELEASE ORAL at 08:03

## 2019-03-16 RX ADMIN — CARVEDILOL 25 MG: 12.5 TABLET, FILM COATED ORAL at 05:03

## 2019-03-16 RX ADMIN — CARVEDILOL 12.5 MG: 12.5 TABLET, FILM COATED ORAL at 08:03

## 2019-03-16 RX ADMIN — SPIRONOLACTONE 25 MG: 25 TABLET ORAL at 08:03

## 2019-03-16 RX ADMIN — LEVOTHYROXINE SODIUM 50 MCG: 50 TABLET ORAL at 06:03

## 2019-03-16 RX ADMIN — APIXABAN 5 MG: 2.5 TABLET, FILM COATED ORAL at 08:03

## 2019-03-16 RX ADMIN — METOLAZONE 5 MG: 5 TABLET ORAL at 08:03

## 2019-03-16 RX ADMIN — LOSARTAN POTASSIUM 25 MG: 25 TABLET, FILM COATED ORAL at 08:03

## 2019-03-16 RX ADMIN — AMIODARONE HYDROCHLORIDE 200 MG: 200 TABLET ORAL at 08:03

## 2019-03-16 NOTE — NURSING
Reinforced fluid restriction education to patient and family, all verbalized understanding. Explained importance of strictly monitoring pt intake and encouraged family not to drink pts fluids.

## 2019-03-16 NOTE — ASSESSMENT & PLAN NOTE
- EKG 2:1 atrial flutter HR 110s-120s  - continue amiodarone, coreg and apixaban at home dose  - TTE in AM  - consult cardiology in AM for possible ablation    Persists  Added dig to control the rate  3/16- will continue digoxin,amiodarone, eliquis

## 2019-03-16 NOTE — ASSESSMENT & PLAN NOTE
-Creatinine stable  -K low, Aldactone increased  -Increase KCl to 40 mEQ BID for today only, will re-adjust in AM  -Continue to montior

## 2019-03-16 NOTE — PLAN OF CARE
Problem: Adult Inpatient Plan of Care  Goal: Plan of Care Review  Outcome: Ongoing (interventions implemented as appropriate)  Plan of care reviewed with patient and family, all verbalized understanding. Pt remains on telemetry in flutter. Pt remains free from falls, call light in reach and pt encouraged to call for assistance. Pt remains on IV diuretics and 1500 mL fluid restriction.

## 2019-03-16 NOTE — PROGRESS NOTES
[facility] Medicine  Progress Note    Patient Name: Chacha Zendejas  MRN: [id number]  Patient Class: IP- Inpatient   Admission Date: 3/13/2019  Length of Stay: 2 days  Attending Physician: Wallace Melissa MD  Primary Care Provider: Zara Olson NP        Subjective:     Principal Problem:CHF exacerbation    HPI:  38M h/o HFrEF, atrial flutter s/p ablation, HLD, and CKD presents with pedal edema.  Worse than baseline.  Associated with SOB, CREWS, orthopnea and increased abdominal girth x 2 weeks.  Presented to ER 2 weeks ago and was told to increase lasix 80mg PO BID to TID.  Patient reports compliance with medication change without improvement in symptoms.  Also c/o decrease activity, fatigue, weakness, daytime sleepiness.  Also report being noncompliant with CPAP at night due to discomfort. Denies fevers, chills, chest pain, HA, hemoptysis, long car rides, difficulty/decrease urinating, constipation or diarrhea.  Reports compliance on low sodium diet.  In ER, O2 sat 92% on 2L.  He was given 80mg IV lasix with 300cc urine output. HR 130s, EKG show atrial flutter 2:1 block.  Patient has not followed up with cardiology outpatient for possible repeat ablation or other options.  Patient restarted on home dose of amiodarone and carvedilol with improvement in HR.  Hospital medicine called for admission.         Hospital Course:  3/15- looks and feels much better, leg swelling down, has lost about 6.2 L since admit. Remains in C Aflutter 2:1 @ 139, echo shows severe LV Dysfunction with EF 10%. Pt is totally non compliant with diet and meds. He and his family are obviously oblivious of his severe condition and continue too enjoy unlimited amount of salt and calories as well as water intake. This is his 3rd hospital admission in last 2 months and he was also admitted at [facility]. Cardiology plans  - will need to be fit for lifevest when closer to discharge   -Patient reports having LHC at [facility] in Nov  2018 that showed normal coronaries. Will request report  -Dr. Nash plans to cardiovert patient when he is more compensated. Hopefully this will help with his CHF. Will also need OP referral to EP for ICD implant and possible RFA   -Continue Coreg, Losartan and Aldactone, given a dose of Zaroxolyn earlier.    3/16- he was seen having breakfast.     Cardiac echo- 3/14   Severely depressed left ventricular systolic function (EF 10-15%).     6 - Right ventricular enlargement with moderately depressed systolic function.      Interval History: looks and feels much better, leg swelling down, has lost about 6.2 L since admit. Remains in C Aflutter 2:1 @ 139, echo shows severe LV Dysfunction with EF 10%. Pt is totally non compliant with diet and meds. He and his family are obviously oblivious of his severe condition and continue too enjoy unlimited amount of salt and calories as well as water intake.  3/16- feels better ,   Cardiac echo-No wall motion abnormalities.     5 - Severely depressed left ventricular systolic function (EF 10-15%).     6 - Right ventricular enlargement with moderately depressed systolic function  Review of Systems   Constitutional: Positive for fatigue. Negative for activity change, appetite change, chills, diaphoresis and fever.   HENT: Negative for facial swelling, sore throat, tinnitus and trouble swallowing.    Eyes: Negative for photophobia and visual disturbance.   Respiratory: Positive for shortness of breath. Negative for apnea, cough, chest tightness and wheezing.    Cardiovascular: Positive for leg swelling. Negative for chest pain and palpitations.   Gastrointestinal: Positive for abdominal distention. Negative for abdominal pain, constipation, diarrhea, nausea and vomiting.   Endocrine: Negative for polydipsia, polyphagia and polyuria.   Genitourinary: Negative for decreased urine volume, dysuria, flank pain, frequency and hematuria.   Musculoskeletal: Negative for arthralgias, back  pain, joint swelling, myalgias and neck stiffness.   Skin: Negative for pallor and rash.   Allergic/Immunologic: Negative for immunocompromised state.   Neurological: Positive for weakness. Negative for dizziness, seizures, syncope, numbness and headaches.   Psychiatric/Behavioral: Negative for confusion, hallucinations and suicidal ideas. The patient is not nervous/anxious.    All other systems reviewed and are negative.    Objective:     Vital Signs (Most Recent):  Temp: 98.4 °F (36.9 °C) (03/16/19 1550)  Pulse: 91 (03/16/19 1715)  Resp: 18 (03/16/19 1550)  BP: 118/68 (03/16/19 1550)  SpO2: (!) 91 % (03/16/19 1550) Vital Signs (24h Range):  Temp:  [96.8 °F (36 °C)-98.8 °F (37.1 °C)] 98.4 °F (36.9 °C)  Pulse:  [] 91  Resp:  [18-20] 18  SpO2:  [91 %-98 %] 91 %  BP: (109-141)/(63-89) 118/68     Weight: (!) 218.8 kg (482 lb 4.1 oz)  Body mass index is 75.53 kg/m².    Intake/Output Summary (Last 24 hours) at 3/16/2019 1829  Last data filed at 3/16/2019 1813  Gross per 24 hour   Intake 1610 ml   Output 6690 ml   Net -5080 ml      Physical Exam   Constitutional: He is oriented to person, place, and time. He appears well-developed and well-nourished. He appears distressed.   Appear uncomfortable   HENT:   Head: Normocephalic and atraumatic.   Mouth/Throat: Oropharynx is clear and moist.   Eyes: Conjunctivae and EOM are normal. Pupils are equal, round, and reactive to light. No scleral icterus.   Neck: Normal range of motion. Neck supple. No JVD present. No thyromegaly present.   Unable to visualize JVD with thick neck tissue   Cardiovascular: Regular rhythm. Exam reveals no gallop and no friction rub.   No murmur heard.  tachycardia   Pulmonary/Chest: He is in respiratory distress. He has no wheezes. He has rales.   Abdominal: Soft. Bowel sounds are normal. He exhibits no distension. There is no tenderness. There is no guarding.   Musculoskeletal: Normal range of motion. He exhibits edema.   Stasis dermatitis of  both lower extremity, 4+ pitting edema bilateral lower extremity up to mid-thigh/groin   Neurological: He is alert and oriented to person, place, and time. No cranial nerve deficit.   Skin: Skin is warm. Capillary refill takes less than 2 seconds. He is not diaphoretic. No erythema.   Psychiatric: He has a normal mood and affect.   Nursing note and vitals reviewed.      Significant Labs:   BMP:   Recent Labs   Lab 03/16/19 0448         K 3.3*   CL 94*   CO2 35*   BUN 31*   CREATININE 2.1*   CALCIUM 9.3   MG 1.7     CBC:   Recent Labs   Lab 03/15/19  0358 03/16/19  0448   WBC 6.91 7.13   HGB 12.0* 12.6*   HCT 37.7* 39.7*    224     CMP:   Recent Labs   Lab 03/14/19  2101 03/15/19  0358 03/16/19  0448   NA  --  139 141   K 3.3* 3.5 3.3*   CL  --  96 94*   CO2  --  33* 35*   GLU  --  103 103   BUN  --  30* 31*   CREATININE  --  1.9* 2.1*   CALCIUM  --  9.1 9.3   PROT  --  7.2 7.7   ALBUMIN  --  2.8* 3.0*   BILITOT  --  1.7* 1.8*   ALKPHOS  --  165* 157*   AST  --  34 30   ALT  --  50* 50*   ANIONGAP  --  10 12   EGFRNONAA  --  43* 38*     Magnesium:   Recent Labs   Lab 03/14/19  2101 03/15/19  0358 03/16/19  0448   MG 1.5* 2.1 1.7     TSH:   Recent Labs   Lab 03/13/19  2221   TSH 5.151*     All pertinent labs within the past 24 hours have been reviewed.    Significant Imaging: I have reviewed all pertinent imaging results/findings within the past 24 hours.    Assessment/Plan:      * CHF exacerbation    - h/o combined systolic/diastolic heart failure, left and right sided heart failure, unclear etiology, appears LHC was done at [facility] but no documentation available  - last TTE 2/2019 at [facility] with severe dilated LV with LVEF 20-25% with global hypokinesis, severe concentric LVH, moderately dilated RV with moderate RV systolic dysfunction, elevated CVP  - given 80mg IV lasix in ER    - hold PO lasix, spironolactone, lisinopril  - continue 80mg IV lasix TID  - continue carvedilol 12.5mg PO bid  - strict  I/O, daily weights, fluid restriction diet  - check TTE after rate controlled  - consult cardiology in AM for medication optimization, possible AICD/lifevest    - good response to diuresis-- continue diuresis and continue fluid and salt restriction    3/16- cardiology follow up appreciated, continue coreg, losartan,continue bumex, spironolactone, metolazone, will need life zest      BRINDA on CPAP    Patient noncompliant on CPAP machine  Consult RT for CPAP mask fitting to improve compliance at home    Must use CPAP at night    3/16- will continue CPAP and will monitor closely     High transaminase levels    - hold statin  - likely due to hepatic congestion due to CHF exacerbation  - treat CHF  - monitor daily CMP, if not improved after improvement of exacerbation then will evaluate for other causes    stable     CKD (chronic kidney disease) stage 3, GFR 30-59 ml/min    - likely acute on chronic kidney failure due to chf exacerbation  - unclear baseline Cr  - hold lisinopril, spironolactone  - check UA,  IV diuresis for CHF  - monitor I/O  - replete electrolytes prn    - stable CKD 3  3/16- will monitor closely, serum creatinine is 2.1, will continue losartan.       Essential hypertension    - hold lisinopril due to possible LALY  - hold spironolactone/hydralazine/isordil since patient reportedly doesn't take at home and BP currently controlled  - continue coreg 12.5mg BID   - IV diuresis for CHF exacerbation     3/16- will continue coreg ,losartan          Atrial flutter    - EKG 2:1 atrial flutter HR 110s-120s  - continue amiodarone, coreg and apixaban at home dose  - TTE in AM  - consult cardiology in AM for possible ablation    Persists  Added dig to control the rate  3/16- will continue digoxin,amiodarone, eliquis       VTE Risk Mitigation (From admission, onward)        Ordered     apixaban tablet 5 mg  2 times daily      03/14/19 1021     Place SRUTHI leonardoe  Until discontinued      03/14/19 0140     Place sequential  compression device  Until discontinued      03/14/19 0140     Reason for No Pharmacological VTE Prophylaxis  Once      03/14/19 0140     IP VTE HIGH RISK PATIENT  Once      03/14/19 0140              Wallace Melissa MD  Department of Hospital Medicine   [facility]

## 2019-03-16 NOTE — ASSESSMENT & PLAN NOTE
- h/o combined systolic/diastolic heart failure, left and right sided heart failure, unclear etiology, appears LHC was done at [facility] but no documentation available  - last TTE 2/2019 at [facility] with severe dilated LV with LVEF 20-25% with global hypokinesis, severe concentric LVH, moderately dilated RV with moderate RV systolic dysfunction, elevated CVP  - given 80mg IV lasix in ER    - hold PO lasix, spironolactone, lisinopril  - continue 80mg IV lasix TID  - continue carvedilol 12.5mg PO bid  - strict I/O, daily weights, fluid restriction diet  - check TTE after rate controlled  - consult cardiology in AM for medication optimization, possible AICD/lifevest    - good response to diuresis-- continue diuresis and continue fluid and salt restriction    3/16- cardiology follow up appreciated, continue coreg, losartan,continue bumex, spironolactone, metolazone, will need life zest

## 2019-03-16 NOTE — ASSESSMENT & PLAN NOTE
Patient noncompliant on CPAP machine  Consult RT for CPAP mask fitting to improve compliance at home    Must use CPAP at night    3/16- will continue CPAP and will monitor closely

## 2019-03-16 NOTE — SUBJECTIVE & OBJECTIVE
Interval History: looks and feels much better, leg swelling down, has lost about 6.2 L since admit. Remains in C Aflutter 2:1 @ 139, echo shows severe LV Dysfunction with EF 10%. Pt is totally non compliant with diet and meds. He and his family are obviously oblivious of his severe condition and continue too enjoy unlimited amount of salt and calories as well as water intake.  3/16- feels better ,   Cardiac echo-No wall motion abnormalities.     5 - Severely depressed left ventricular systolic function (EF 10-15%).     6 - Right ventricular enlargement with moderately depressed systolic function  Review of Systems   Constitutional: Positive for fatigue. Negative for activity change, appetite change, chills, diaphoresis and fever.   HENT: Negative for facial swelling, sore throat, tinnitus and trouble swallowing.    Eyes: Negative for photophobia and visual disturbance.   Respiratory: Positive for shortness of breath. Negative for apnea, cough, chest tightness and wheezing.    Cardiovascular: Positive for leg swelling. Negative for chest pain and palpitations.   Gastrointestinal: Positive for abdominal distention. Negative for abdominal pain, constipation, diarrhea, nausea and vomiting.   Endocrine: Negative for polydipsia, polyphagia and polyuria.   Genitourinary: Negative for decreased urine volume, dysuria, flank pain, frequency and hematuria.   Musculoskeletal: Negative for arthralgias, back pain, joint swelling, myalgias and neck stiffness.   Skin: Negative for pallor and rash.   Allergic/Immunologic: Negative for immunocompromised state.   Neurological: Positive for weakness. Negative for dizziness, seizures, syncope, numbness and headaches.   Psychiatric/Behavioral: Negative for confusion, hallucinations and suicidal ideas. The patient is not nervous/anxious.    All other systems reviewed and are negative.    Objective:     Vital Signs (Most Recent):  Temp: 98.4 °F (36.9 °C) (03/16/19 1550)  Pulse: 91  (03/16/19 1715)  Resp: 18 (03/16/19 1550)  BP: 118/68 (03/16/19 1550)  SpO2: (!) 91 % (03/16/19 1550) Vital Signs (24h Range):  Temp:  [96.8 °F (36 °C)-98.8 °F (37.1 °C)] 98.4 °F (36.9 °C)  Pulse:  [] 91  Resp:  [18-20] 18  SpO2:  [91 %-98 %] 91 %  BP: (109-141)/(63-89) 118/68     Weight: (!) 218.8 kg (482 lb 4.1 oz)  Body mass index is 75.53 kg/m².    Intake/Output Summary (Last 24 hours) at 3/16/2019 1829  Last data filed at 3/16/2019 1813  Gross per 24 hour   Intake 1610 ml   Output 6690 ml   Net -5080 ml      Physical Exam   Constitutional: He is oriented to person, place, and time. He appears well-developed and well-nourished. He appears distressed.   Appear uncomfortable   HENT:   Head: Normocephalic and atraumatic.   Mouth/Throat: Oropharynx is clear and moist.   Eyes: Conjunctivae and EOM are normal. Pupils are equal, round, and reactive to light. No scleral icterus.   Neck: Normal range of motion. Neck supple. No JVD present. No thyromegaly present.   Unable to visualize JVD with thick neck tissue   Cardiovascular: Regular rhythm. Exam reveals no gallop and no friction rub.   No murmur heard.  tachycardia   Pulmonary/Chest: He is in respiratory distress. He has no wheezes. He has rales.   Abdominal: Soft. Bowel sounds are normal. He exhibits no distension. There is no tenderness. There is no guarding.   Musculoskeletal: Normal range of motion. He exhibits edema.   Stasis dermatitis of both lower extremity, 4+ pitting edema bilateral lower extremity up to mid-thigh/groin   Neurological: He is alert and oriented to person, place, and time. No cranial nerve deficit.   Skin: Skin is warm. Capillary refill takes less than 2 seconds. He is not diaphoretic. No erythema.   Psychiatric: He has a normal mood and affect.   Nursing note and vitals reviewed.      Significant Labs:   BMP:   Recent Labs   Lab 03/16/19  0448         K 3.3*   CL 94*   CO2 35*   BUN 31*   CREATININE 2.1*   CALCIUM 9.3   MG  1.7     CBC:   Recent Labs   Lab 03/15/19  0358 03/16/19  0448   WBC 6.91 7.13   HGB 12.0* 12.6*   HCT 37.7* 39.7*    224     CMP:   Recent Labs   Lab 03/14/19  2101 03/15/19  0358 03/16/19  0448   NA  --  139 141   K 3.3* 3.5 3.3*   CL  --  96 94*   CO2  --  33* 35*   GLU  --  103 103   BUN  --  30* 31*   CREATININE  --  1.9* 2.1*   CALCIUM  --  9.1 9.3   PROT  --  7.2 7.7   ALBUMIN  --  2.8* 3.0*   BILITOT  --  1.7* 1.8*   ALKPHOS  --  165* 157*   AST  --  34 30   ALT  --  50* 50*   ANIONGAP  --  10 12   EGFRNONAA  --  43* 38*     Magnesium:   Recent Labs   Lab 03/14/19  2101 03/15/19  0358 03/16/19  0448   MG 1.5* 2.1 1.7     TSH:   Recent Labs   Lab 03/13/19  2221   TSH 5.151*     All pertinent labs within the past 24 hours have been reviewed.    Significant Imaging: I have reviewed all pertinent imaging results/findings within the past 24 hours.

## 2019-03-16 NOTE — ASSESSMENT & PLAN NOTE
- likely acute on chronic kidney failure due to chf exacerbation  - unclear baseline Cr  - hold lisinopril, spironolactone  - check UA,  IV diuresis for CHF  - monitor I/O  - replete electrolytes prn    - stable CKD 3  3/16- will monitor closely, serum creatinine is 2.1, will continue losartan.

## 2019-03-16 NOTE — ASSESSMENT & PLAN NOTE
- hold lisinopril due to possible LALY  - hold spironolactone/hydralazine/isordil since patient reportedly doesn't take at home and BP currently controlled  - continue coreg 12.5mg BID   - IV diuresis for CHF exacerbation     3/16- will continue coreg ,losartan

## 2019-03-16 NOTE — ASSESSMENT & PLAN NOTE
Recommend adding Digoxin for rate control  Increase Amio to 200mg BID  Continue Eliqius 5mg BID, patient has been taking once daily  Resume Coreg    CPAP compliance strongly encouraged to help with A-fib rate control     3/15/19  -Will plan to diurese and cardiovert when more compensated   -continue Amio, Digoxin, Coreg and Eliquis for now     3/16/19  -Remains in aflutter with variable rate  -Increase Coreg to 25 mg BID  -Continue amiodarone, digoxin  -Monitor overnight  -Will need CAROL/DCCV once more compensated if he fails to convert

## 2019-03-16 NOTE — PROGRESS NOTES
[facility] -   Cardiology  Progress Note    Patient Name: Chacha Zendejas  MRN: [id number]  Admission Date: 3/13/2019  Hospital Length of Stay: 2 days  Code Status: Full Code   Attending Physician: Wallace Melissa MD   Primary Care Physician: Zara Olson NP  Expected Discharge Date:   Principal Problem:CHF exacerbation    Subjective:   HPI:  Chacha Zendejas is a morbidly obese 38-year-old -American male with past medical history of paroxysmal A. fib on Eliquis, HTN, BRINDA, HFrEF (30-35% TTE 7/2018), CKD 2. Patient admitted to [facility] in Feb 2019 with decompensated A-flutter 2:1. Also admitted just a few weeks prior for sepsis secondary to right lower extremity cellulitis.   [place] [facility] [place] complaints of weight gain, SOB, CREWS, orthopnea and increased abrominal girth over the last 2 weeks. He has a history of noncompliance with his low-sodium diet. Started on Amio during admission in Feb.     Hasn't been taking all DC meds from [facility]. Has been taking his Eliquis once daily. He states that his weight is up 80 lbs from his baseline. Has orthopnea,SOB, worsening edema. Admits that he has been taking Lasix 80mg TID and has been compliant with CPAP use. In ER, O2 sat 92% on 2L.  He was given 80mg IV lasix with 300cc urine output. HR 130s, EKG show atrial flutter 2:1 block.  Patient has not followed up with cardiology after leaving AMA from [facility]. Plans were in place to discuss possible ablation. According to care everywhere there is mention of patient undergoing LHC at [facility] in 2018 and has unknown etiology of CHF.     Hospital Course:   3/15/19- Echo shows BiV failure with LVEF 10%, bi atrial enlargement, PA pressure 38mmHg,moderate to severe MR and TR. Has diuresed 6.2 liters since admit. Transferred to [facility] and feels much better this morning. A-flutter rate controlled. Labs and vitals stable     3/16/19-Patient seen and examined, sitting up in chair. Feeling better. SOB improving. Remains  in aflutter with variable rate, meds further adjusted. Labs reviewed.         Review of Systems   Constitution: Positive for weakness and malaise/fatigue.   HENT: Negative.    Eyes: Negative.    Cardiovascular: Positive for dyspnea on exertion (improving), leg swelling (improving) and palpitations.   Respiratory: Negative.    Endocrine: Negative.    Hematologic/Lymphatic: Negative.    Skin: Negative.    Musculoskeletal: Negative.    Gastrointestinal: Negative.    Genitourinary: Negative.    Psychiatric/Behavioral: Negative.    Allergic/Immunologic: Negative.      Objective:     Vital Signs (Most Recent):  Temp: 98 °F (36.7 °C) (03/16/19 1119)  Pulse: 96 (03/16/19 1119)  Resp: 20 (03/16/19 1119)  BP: (!) 141/89 (03/16/19 1119)  SpO2: (!) 91 % (03/16/19 1119) Vital Signs (24h Range):  Temp:  [96.8 °F (36 °C)-98.8 °F (37.1 °C)] 98 °F (36.7 °C)  Pulse:  [] 96  Resp:  [18-20] 20  SpO2:  [91 %-98 %] 91 %  BP: (109-167)/(63-91) 141/89     Weight: (!) 218.8 kg (482 lb 4.1 oz)  Body mass index is 75.53 kg/m².     SpO2: (!) 91 %  O2 Device (Oxygen Therapy): room air      Intake/Output Summary (Last 24 hours) at 3/16/2019 1205  Last data filed at 3/16/2019 1051  Gross per 24 hour   Intake 660 ml   Output 5600 ml   Net -4940 ml       Lines/Drains/Airways     Peripheral Intravenous Line                 Peripheral IV - Single Lumen 03/13/19 2205 Right Antecubital 2 days                Physical Exam   Constitutional: He is oriented to person, place, and time. He appears well-developed and well-nourished. No distress.   HENT:   Head: Normocephalic and atraumatic.   Eyes: Pupils are equal, round, and reactive to light. Right eye exhibits no discharge. Left eye exhibits no discharge.   Neck: Neck supple. JVD present.   Cardiovascular: S1 normal, S2 normal and normal heart sounds. An irregularly irregular rhythm present. Tachycardia present.   No murmur heard.  Pulmonary/Chest: Effort normal. No respiratory distress. He has no  wheezes. He has rales.   Abdominal: Soft. He exhibits no distension.   Obese   Musculoskeletal: He exhibits edema (BLE).   Neurological: He is alert and oriented to person, place, and time.   Skin: Skin is warm and dry. He is not diaphoretic. No erythema.   Psychiatric: He has a normal mood and affect. His behavior is normal. Thought content normal.   Nursing note and vitals reviewed.      Significant Labs:   CMP   Recent Labs   Lab 03/14/19  2101 03/15/19  0358 03/16/19  0448   NA  --  139 141   K 3.3* 3.5 3.3*   CL  --  96 94*   CO2  --  33* 35*   GLU  --  103 103   BUN  --  30* 31*   CREATININE  --  1.9* 2.1*   CALCIUM  --  9.1 9.3   PROT  --  7.2 7.7   ALBUMIN  --  2.8* 3.0*   BILITOT  --  1.7* 1.8*   ALKPHOS  --  165* 157*   AST  --  34 30   ALT  --  50* 50*   ANIONGAP  --  10 12   ESTGFRAFRICA  --  50* 44*   EGFRNONAA  --  43* 38*   , CBC   Recent Labs   Lab 03/15/19  0358 03/16/19  0448   WBC 6.91 7.13   HGB 12.0* 12.6*   HCT 37.7* 39.7*    224   , Troponin   Recent Labs   Lab 03/14/19 2101   TROPONINI 0.017    and All pertinent lab results from the last 24 hours have been reviewed.    Significant Imaging: Echocardiogram:   2D echo with color flow doppler:   Results for orders placed or performed during the hospital encounter of 03/13/19   2D echo with color flow doppler   Result Value Ref Range    QEF 10 (A) 55 - 65    Mitral Valve Regurgitation MODERATE TO SEVERE (A)     Est. PA Systolic Pressure 38.44     Tricuspid Valve Regurgitation MILD TO MODERATE     and X-Ray: CXR: X-Ray Chest 1 View (CXR): No results found for this visit on 03/13/19. and X-Ray Chest PA and Lateral (CXR):   Results for orders placed or performed during the hospital encounter of 03/13/19   X-Ray Chest PA And Lateral    Narrative    EXAMINATION:  XR CHEST PA AND LATERAL    CLINICAL HISTORY  Shortness of breath.,    COMPARISON:  03/04/2019    FINDINGS:  Multiple wire leads overlie the chest.  The cardiac size appears enlarged.   There appears to be vascular prominence.  Possible CHF/pulmonary edema.      Impression    Please see above.      Electronically signed by: Ayaan Hooper MD  Date:    03/13/2019  Time:    22:36     Assessment and Plan:   Patient who presents with atrial flutter and Bi-ventricular failure. HR remains variable, meds adjusted. Continue diuresis. Will plan on CAROL/DCCV once better compensated. Needs LifeVest prior to d/c.    * CHF exacerbation    Patient needs aggressive diuresis.   Recommend Bumex 2mg TID  Will given 1 dose of Metolazone 5mg and see how he responds  Continue Coreg and Aldactone   Will add Digoxin for rate control and CHF  Check 2D echo Care everywhere tab mentions that patient completed LHC at [facility] in 2018 and has  unknown etiology of his cardiomyopathy.   Heart healthy diet  Limit fluid intake 50-60 oz   Patient has been counseled on importance of med and diet compliance       3/15/19  -Echo shows biventricular failure with EF 10%  -continue aggressive diuresis with Bumex 2mg TID and Metolazone 5mg daily   -Start KCL 40 meq daily   -Keep Mg >2.0  -will need to be fit for lifevest when closer to discharge   -Patient reports having LHC at [facility] in Nov 2018 that showed normal coronaries. Will request report  -Dr. Nash plans to cardiovert patient when he is more compensated. Hopefully this will help with his CHF. Will also need OP referral to EP for ICD implant and possible RFA  -Continue Coreg, Losartan and Aldactone     3/16/19  -Patient still volume overloaded but improving  -Continue diuresis with Bumex and Zaroxolyn  -K low, replete, KCl increased to 40 mEQ BID  -Coreg increased to 25 mg BID  -Spironolactone increased to 50 mg daily  -Continue Losartan, digxoin  -Needs LifeVest prior to d/c       BRINDA on CPAP    -Continue nightly CPAP use      High transaminase levels    Improving with diuresis      CKD (chronic kidney disease) stage 3, GFR 30-59 ml/min    -Creatinine stable  -K low, Aldactone  increased  -Increase KCl to 40 mEQ BID for today only, will re-adjust in AM  -Continue to montior     Essential hypertension    -Meds further adjusted  -Assess response     Atrial flutter    Recommend adding Digoxin for rate control  Increase Amio to 200mg BID  Continue Eliqius 5mg BID, patient has been taking once daily  Resume Coreg    CPAP compliance strongly encouraged to help with A-fib rate control     3/15/19  -Will plan to diurese and cardiovert when more compensated   -continue Amio, Digoxin, Coreg and Eliquis for now     3/16/19  -Remains in aflutter with variable rate  -Increase Coreg to 25 mg BID  -Continue amiodarone, digoxin  -Continue Eliquis 5 mg BID  -Monitor overnight  -Will need CAROL/DCCV once more compensated if he fails to convert         VTE Risk Mitigation (From admission, onward)        Ordered     apixaban tablet 5 mg  2 times daily      03/14/19 1021     Place SRUTHI hose  Until discontinued      03/14/19 0140     Place sequential compression device  Until discontinued      03/14/19 0140     Reason for No Pharmacological VTE Prophylaxis  Once      03/14/19 0140     IP VTE HIGH RISK PATIENT  Once      03/14/19 0140          Sunita Stephenson PA-C  Cardiology  [facility]    Chart reviewed. Dr. Nash examined patient and agrees with plan as outlined above.

## 2019-03-16 NOTE — SUBJECTIVE & OBJECTIVE
Review of Systems   Constitution: Positive for weakness and malaise/fatigue.   HENT: Negative.    Eyes: Negative.    Cardiovascular: Positive for dyspnea on exertion (improving), leg swelling (improving) and palpitations.   Respiratory: Negative.    Endocrine: Negative.    Hematologic/Lymphatic: Negative.    Skin: Negative.    Musculoskeletal: Negative.    Gastrointestinal: Negative.    Genitourinary: Negative.    Psychiatric/Behavioral: Negative.    Allergic/Immunologic: Negative.      Objective:     Vital Signs (Most Recent):  Temp: 98 °F (36.7 °C) (03/16/19 1119)  Pulse: 96 (03/16/19 1119)  Resp: 20 (03/16/19 1119)  BP: (!) 141/89 (03/16/19 1119)  SpO2: (!) 91 % (03/16/19 1119) Vital Signs (24h Range):  Temp:  [96.8 °F (36 °C)-98.8 °F (37.1 °C)] 98 °F (36.7 °C)  Pulse:  [] 96  Resp:  [18-20] 20  SpO2:  [91 %-98 %] 91 %  BP: (109-167)/(63-91) 141/89     Weight: (!) 218.8 kg (482 lb 4.1 oz)  Body mass index is 75.53 kg/m².     SpO2: (!) 91 %  O2 Device (Oxygen Therapy): room air      Intake/Output Summary (Last 24 hours) at 3/16/2019 1205  Last data filed at 3/16/2019 1051  Gross per 24 hour   Intake 660 ml   Output 5600 ml   Net -4940 ml       Lines/Drains/Airways     Peripheral Intravenous Line                 Peripheral IV - Single Lumen 03/13/19 2205 Right Antecubital 2 days                Physical Exam   Constitutional: He is oriented to person, place, and time. He appears well-developed and well-nourished. No distress.   HENT:   Head: Normocephalic and atraumatic.   Eyes: Pupils are equal, round, and reactive to light. Right eye exhibits no discharge. Left eye exhibits no discharge.   Neck: Neck supple. JVD present.   Cardiovascular: S1 normal, S2 normal and normal heart sounds. An irregularly irregular rhythm present. Tachycardia present.   No murmur heard.  Pulmonary/Chest: Effort normal. No respiratory distress. He has no wheezes. He has rales.   Abdominal: Soft. He exhibits no distension.    Obese   Musculoskeletal: He exhibits edema (BLE).   Neurological: He is alert and oriented to person, place, and time.   Skin: Skin is warm and dry. He is not diaphoretic. No erythema.   Psychiatric: He has a normal mood and affect. His behavior is normal. Thought content normal.   Nursing note and vitals reviewed.      Significant Labs:   CMP   Recent Labs   Lab 03/14/19  2101 03/15/19  0358 03/16/19  0448   NA  --  139 141   K 3.3* 3.5 3.3*   CL  --  96 94*   CO2  --  33* 35*   GLU  --  103 103   BUN  --  30* 31*   CREATININE  --  1.9* 2.1*   CALCIUM  --  9.1 9.3   PROT  --  7.2 7.7   ALBUMIN  --  2.8* 3.0*   BILITOT  --  1.7* 1.8*   ALKPHOS  --  165* 157*   AST  --  34 30   ALT  --  50* 50*   ANIONGAP  --  10 12   ESTGFRAFRICA  --  50* 44*   EGFRNONAA  --  43* 38*   , CBC   Recent Labs   Lab 03/15/19  0358 03/16/19  0448   WBC 6.91 7.13   HGB 12.0* 12.6*   HCT 37.7* 39.7*    224   , Troponin   Recent Labs   Lab 03/14/19 2101   TROPONINI 0.017    and All pertinent lab results from the last 24 hours have been reviewed.    Significant Imaging: Echocardiogram:   2D echo with color flow doppler:   Results for orders placed or performed during the hospital encounter of 03/13/19   2D echo with color flow doppler   Result Value Ref Range    QEF 10 (A) 55 - 65    Mitral Valve Regurgitation MODERATE TO SEVERE (A)     Est. PA Systolic Pressure 38.44     Tricuspid Valve Regurgitation MILD TO MODERATE     and X-Ray: CXR: X-Ray Chest 1 View (CXR): No results found for this visit on 03/13/19. and X-Ray Chest PA and Lateral (CXR):   Results for orders placed or performed during the hospital encounter of 03/13/19   X-Ray Chest PA And Lateral    Narrative    EXAMINATION:  XR CHEST PA AND LATERAL    CLINICAL HISTORY  Shortness of breath.,    COMPARISON:  03/04/2019    FINDINGS:  Multiple wire leads overlie the chest.  The cardiac size appears enlarged.  There appears to be vascular prominence.  Possible CHF/pulmonary  edema.      Impression    Please see above.      Electronically signed by: Ayaan Hooper MD  Date:    03/13/2019  Time:    22:36

## 2019-03-17 LAB
ALBUMIN SERPL BCP-MCNC: 3 G/DL
ALP SERPL-CCNC: 153 U/L
ALT SERPL W/O P-5'-P-CCNC: 48 U/L
ANION GAP SERPL CALC-SCNC: 13 MMOL/L
AST SERPL-CCNC: 31 U/L
BASOPHILS # BLD AUTO: 0.02 K/UL
BASOPHILS NFR BLD: 0.3 %
BILIRUB SERPL-MCNC: 1.5 MG/DL
BUN SERPL-MCNC: 28 MG/DL
CALCIUM SERPL-MCNC: 9.4 MG/DL
CHLORIDE SERPL-SCNC: 91 MMOL/L
CO2 SERPL-SCNC: 36 MMOL/L
CREAT SERPL-MCNC: 1.8 MG/DL
DIFFERENTIAL METHOD: ABNORMAL
EOSINOPHIL # BLD AUTO: 0.3 K/UL
EOSINOPHIL NFR BLD: 3.6 %
ERYTHROCYTE [DISTWIDTH] IN BLOOD BY AUTOMATED COUNT: 17.6 %
EST. GFR  (AFRICAN AMERICAN): 53 ML/MIN/1.73 M^2
EST. GFR  (NON AFRICAN AMERICAN): 46 ML/MIN/1.73 M^2
GLUCOSE SERPL-MCNC: 90 MG/DL
HCT VFR BLD AUTO: 39.7 %
HGB BLD-MCNC: 12.6 G/DL
LYMPHOCYTES # BLD AUTO: 2.2 K/UL
LYMPHOCYTES NFR BLD: 30.3 %
MAGNESIUM SERPL-MCNC: 1.5 MG/DL
MCH RBC QN AUTO: 29.4 PG
MCHC RBC AUTO-ENTMCNC: 31.7 G/DL
MCV RBC AUTO: 93 FL
MONOCYTES # BLD AUTO: 0.8 K/UL
MONOCYTES NFR BLD: 10.4 %
NEUTROPHILS # BLD AUTO: 4.1 K/UL
NEUTROPHILS NFR BLD: 55.4 %
PHOSPHATE SERPL-MCNC: 3.9 MG/DL
PLATELET # BLD AUTO: 235 K/UL
PMV BLD AUTO: 10.4 FL
POTASSIUM SERPL-SCNC: 3 MMOL/L
PROT SERPL-MCNC: 7.6 G/DL
RBC # BLD AUTO: 4.28 M/UL
SODIUM SERPL-SCNC: 140 MMOL/L
WBC # BLD AUTO: 7.4 K/UL

## 2019-03-17 PROCEDURE — 25000003 PHARM REV CODE 250: Performed by: HOSPITALIST

## 2019-03-17 PROCEDURE — 99232 SBSQ HOSP IP/OBS MODERATE 35: CPT | Mod: ,,, | Performed by: INTERNAL MEDICINE

## 2019-03-17 PROCEDURE — 80053 COMPREHEN METABOLIC PANEL: CPT

## 2019-03-17 PROCEDURE — 21400001 HC TELEMETRY ROOM

## 2019-03-17 PROCEDURE — 85025 COMPLETE CBC W/AUTO DIFF WBC: CPT

## 2019-03-17 PROCEDURE — 94660 CPAP INITIATION&MGMT: CPT

## 2019-03-17 PROCEDURE — 25000003 PHARM REV CODE 250: Performed by: INTERNAL MEDICINE

## 2019-03-17 PROCEDURE — 25000003 PHARM REV CODE 250: Performed by: PHYSICIAN ASSISTANT

## 2019-03-17 PROCEDURE — S0171 BUMETANIDE 0.5 MG: HCPCS | Performed by: INTERNAL MEDICINE

## 2019-03-17 PROCEDURE — 84100 ASSAY OF PHOSPHORUS: CPT

## 2019-03-17 PROCEDURE — 99232 PR SUBSEQUENT HOSPITAL CARE,LEVL II: ICD-10-PCS | Mod: ,,, | Performed by: INTERNAL MEDICINE

## 2019-03-17 PROCEDURE — 36415 COLL VENOUS BLD VENIPUNCTURE: CPT

## 2019-03-17 PROCEDURE — 83735 ASSAY OF MAGNESIUM: CPT

## 2019-03-17 PROCEDURE — 25000003 PHARM REV CODE 250: Performed by: EMERGENCY MEDICINE

## 2019-03-17 RX ORDER — POTASSIUM CHLORIDE 20 MEQ/1
40 TABLET, EXTENDED RELEASE ORAL ONCE
Status: COMPLETED | OUTPATIENT
Start: 2019-03-17 | End: 2019-03-17

## 2019-03-17 RX ORDER — LOSARTAN POTASSIUM 50 MG/1
50 TABLET ORAL DAILY
Status: DISCONTINUED | OUTPATIENT
Start: 2019-03-18 | End: 2019-03-19 | Stop reason: HOSPADM

## 2019-03-17 RX ADMIN — LEVOTHYROXINE SODIUM 50 MCG: 50 TABLET ORAL at 05:03

## 2019-03-17 RX ADMIN — CARVEDILOL 25 MG: 12.5 TABLET, FILM COATED ORAL at 09:03

## 2019-03-17 RX ADMIN — DIGOXIN 0.12 MG: 125 TABLET ORAL at 09:03

## 2019-03-17 RX ADMIN — POTASSIUM CHLORIDE 40 MEQ: 1500 TABLET, EXTENDED RELEASE ORAL at 08:03

## 2019-03-17 RX ADMIN — AMIODARONE HYDROCHLORIDE 200 MG: 200 TABLET ORAL at 08:03

## 2019-03-17 RX ADMIN — METOLAZONE 5 MG: 5 TABLET ORAL at 09:03

## 2019-03-17 RX ADMIN — LOSARTAN POTASSIUM 25 MG: 25 TABLET, FILM COATED ORAL at 09:03

## 2019-03-17 RX ADMIN — APIXABAN 5 MG: 2.5 TABLET, FILM COATED ORAL at 09:03

## 2019-03-17 RX ADMIN — BUMETANIDE 2 MG: 0.25 INJECTION INTRAMUSCULAR; INTRAVENOUS at 02:03

## 2019-03-17 RX ADMIN — CARVEDILOL 25 MG: 12.5 TABLET, FILM COATED ORAL at 06:03

## 2019-03-17 RX ADMIN — BUMETANIDE 2 MG: 0.25 INJECTION INTRAMUSCULAR; INTRAVENOUS at 10:03

## 2019-03-17 RX ADMIN — POTASSIUM CHLORIDE 40 MEQ: 1500 TABLET, EXTENDED RELEASE ORAL at 09:03

## 2019-03-17 RX ADMIN — BUMETANIDE 2 MG: 0.25 INJECTION INTRAMUSCULAR; INTRAVENOUS at 08:03

## 2019-03-17 RX ADMIN — AMIODARONE HYDROCHLORIDE 200 MG: 200 TABLET ORAL at 09:03

## 2019-03-17 RX ADMIN — POTASSIUM CHLORIDE 40 MEQ: 1500 TABLET, EXTENDED RELEASE ORAL at 02:03

## 2019-03-17 RX ADMIN — APIXABAN 5 MG: 2.5 TABLET, FILM COATED ORAL at 08:03

## 2019-03-17 RX ADMIN — SPIRONOLACTONE 50 MG: 25 TABLET ORAL at 09:03

## 2019-03-17 NOTE — ASSESSMENT & PLAN NOTE
Patient needs aggressive diuresis.   Recommend Bumex 2mg TID  Will given 1 dose of Metolazone 5mg and see how he responds  Continue Coreg and Aldactone   Will add Digoxin for rate control and CHF  Check 2D echo Care everywhere tab mentions that patient completed LHC at [facility] in 2018 and has  unknown etiology of his cardiomyopathy.   Heart healthy diet  Limit fluid intake 50-60 oz   Patient has been counseled on importance of med and diet compliance       3/15/19  -Echo shows biventricular failure with EF 10%  -continue aggressive diuresis with Bumex 2mg TID and Metolazone 5mg daily   -Start KCL 40 meq daily   -Keep Mg >2.0  -will need to be fit for lifevest when closer to discharge   -Patient reports having LHC at [facility] in Nov 2018 that showed normal coronaries. Will request report  -Dr. Nash plans to cardiovert patient when he is more compensated. Hopefully this will help with his CHF. Will also need OP referral to EP for ICD implant and possible RFA  -Continue Coreg, Losartan and Aldactone     3/16/19  -Patient still volume overloaded but improving  -Continue diuresis with Bumex and Zaroxolyn  -K low, replete, KCl increased to 40 mEQ BID  -Coreg increased to 25 mg BID  -Spironolactone increased to 50 mg daily  -Continue Losartan, digxoin  -Needs LifeVest prior to d/c      3/17/19  -Volume status continues to improve  -Continue IV Bumex, Zaroxolyn  -Continue Coreg, Aldactone  -Increase Losartan to 50 mg daily  -K low; give extra 40 MEQ x 1 dose; continue 40 mEQ BID  -Repeat labs in AM  -Counseled extensively on dietary and fluid restrictions

## 2019-03-17 NOTE — SUBJECTIVE & OBJECTIVE
Review of Systems   Constitution: Positive for weakness and malaise/fatigue.   HENT: Negative.    Eyes: Negative.    Cardiovascular: Positive for dyspnea on exertion, leg swelling and palpitations.   Respiratory: Positive for shortness of breath.    Endocrine: Negative.    Hematologic/Lymphatic: Negative.    Skin: Negative.    Musculoskeletal: Negative.    Gastrointestinal: Negative.    Genitourinary: Negative.    Psychiatric/Behavioral: Negative.    Allergic/Immunologic: Negative.      Objective:     Vital Signs (Most Recent):  Temp: 97.7 °F (36.5 °C) (03/17/19 1121)  Pulse: (!) 112 (03/17/19 1121)  Resp: (!) 28 (03/17/19 0835)  BP: 124/63 (03/17/19 1121)  SpO2: (!) 93 % (03/17/19 1121) Vital Signs (24h Range):  Temp:  [97.5 °F (36.4 °C)-98.4 °F (36.9 °C)] 97.7 °F (36.5 °C)  Pulse:  [] 112  Resp:  [16-28] 28  SpO2:  [90 %-95 %] 93 %  BP: (114-152)/(63-90) 124/63     Weight: (!) 212.9 kg (469 lb 5.8 oz)  Body mass index is 73.51 kg/m².     SpO2: (!) 93 %  O2 Device (Oxygen Therapy): room air      Intake/Output Summary (Last 24 hours) at 3/17/2019 1245  Last data filed at 3/17/2019 0835  Gross per 24 hour   Intake 2768 ml   Output 6140 ml   Net -3372 ml       Lines/Drains/Airways     Peripheral Intravenous Line                 Peripheral IV - Single Lumen 03/13/19 2205 Right Antecubital 3 days                Physical Exam   Constitutional: He is oriented to person, place, and time. He appears well-developed and well-nourished. No distress.   HENT:   Head: Normocephalic and atraumatic.   Eyes: Pupils are equal, round, and reactive to light. Right eye exhibits no discharge. Left eye exhibits no discharge.   Neck: Neck supple. JVD present.   Cardiovascular: Normal rate, S1 normal, S2 normal and normal heart sounds. An irregularly irregular rhythm present. Exam reveals no S4.   No murmur heard.  Pulmonary/Chest: Effort normal. No respiratory distress. He has rales.   Abdominal: Soft. He exhibits no distension.  There is no tenderness.   Obese   Musculoskeletal: He exhibits edema (BLE hard edema).   Neurological: He is alert and oriented to person, place, and time.   Skin: Skin is warm and dry. He is not diaphoretic. No erythema.   Psychiatric: He has a normal mood and affect. His behavior is normal. Thought content normal.   Nursing note and vitals reviewed.      Significant Labs:   CMP   Recent Labs   Lab 03/16/19 0448 03/17/19  0415    140   K 3.3* 3.0*   CL 94* 91*   CO2 35* 36*    90   BUN 31* 28*   CREATININE 2.1* 1.8*   CALCIUM 9.3 9.4   PROT 7.7 7.6   ALBUMIN 3.0* 3.0*   BILITOT 1.8* 1.5*   ALKPHOS 157* 153*   AST 30 31   ALT 50* 48*   ANIONGAP 12 13   ESTGFRAFRICA 44* 53*   EGFRNONAA 38* 46*   , CBC   Recent Labs   Lab 03/16/19 0448 03/17/19  0415   WBC 7.13 7.40   HGB 12.6* 12.6*   HCT 39.7* 39.7*    235   , Troponin No results for input(s): TROPONINI in the last 48 hours. and All pertinent lab results from the last 24 hours have been reviewed.    Significant Imaging: Echocardiogram:   2D echo with color flow doppler:   Results for orders placed or performed during the hospital encounter of 03/13/19   2D echo with color flow doppler   Result Value Ref Range    QEF 10 (A) 55 - 65    Mitral Valve Regurgitation MODERATE TO SEVERE (A)     Est. PA Systolic Pressure 38.44     Tricuspid Valve Regurgitation MILD TO MODERATE     and X-Ray: CXR: X-Ray Chest 1 View (CXR): No results found for this visit on 03/13/19. and X-Ray Chest PA and Lateral (CXR):   Results for orders placed or performed during the hospital encounter of 03/13/19   X-Ray Chest PA And Lateral    Narrative    EXAMINATION:  XR CHEST PA AND LATERAL    CLINICAL HISTORY  Shortness of breath.,    COMPARISON:  03/04/2019    FINDINGS:  Multiple wire leads overlie the chest.  The cardiac size appears enlarged.  There appears to be vascular prominence.  Possible CHF/pulmonary edema.      Impression    Please see above.      Electronically  signed by: Ayaan Hooper MD  Date:    03/13/2019  Time:    22:36

## 2019-03-17 NOTE — ASSESSMENT & PLAN NOTE
Recommend adding Digoxin for rate control  Increase Amio to 200mg BID  Continue Eliqius 5mg BID, patient has been taking once daily  Resume Coreg    CPAP compliance strongly encouraged to help with A-fib rate control     3/15/19  -Will plan to diurese and cardiovert when more compensated   -continue Amio, Digoxin, Coreg and Eliquis for now     3/16/19  -Remains in aflutter with variable rate  -Increase Coreg to 25 mg BID  -Continue amiodarone, digoxin  -Monitor overnight  -Will need CAROL/DCCV once more compensated if he fails to convert    3/17/19  -Remains in aflutter  -Continue Coreg, digoxin, amiodarone  -NPO after MN for CAROL/DCCV in AM. All risks, benefits, and treatment alternatives explained to patient in detail. All questions answered. He has agreed to proceed.

## 2019-03-17 NOTE — PLAN OF CARE
Problem: Adult Inpatient Plan of Care  Goal: Plan of Care Review  Outcome: Ongoing (interventions implemented as appropriate)  Pt is wearing cpap at night.

## 2019-03-17 NOTE — PLAN OF CARE
Problem: Adult Inpatient Plan of Care  Goal: Plan of Care Review  Shift assessment completed.    Patient updated on POC for the day, denies pain, sob.   IV saline locked and dressing changed.   Pt continues to be non-compliant with  Diet. (performed education.)  CVR: Continuous telemetry monitoring maintained, patient in atrial flutter throughout shift, HR= fluctuating from 90s to low 100s   Resp:O2 @ CPAP on partial shift and off. Sats= remained >90  Skin: skin on feet dry and cracked.   Reviewed fall precautions with patient and bed alarm on.

## 2019-03-17 NOTE — PROGRESS NOTES
[facility] -   Cardiology  Progress Note    Patient Name: Chacha Zendejas  MRN: [id number]  Admission Date: 3/13/2019  Hospital Length of Stay: 3 days  Code Status: Full Code   Attending Physician: Wallace Melissa MD   Primary Care Physician: Zara Olson NP  Expected Discharge Date:   Principal Problem:CHF exacerbation    Subjective:   HPI:  Chacha Zendejas is a morbidly obese 38-year-old -American male with past medical history of paroxysmal A. fib on Eliquis, HTN, BRINDA, HFrEF (30-35% TTE 7/2018), CKD 2. Patient admitted to [facility] in Feb 2019 with decompensated A-flutter 2:1. Also admitted just a few weeks prior for sepsis secondary to right lower extremity cellulitis.   [place] [facility] [place] complaints of weight gain, SOB, CREWS, orthopnea and increased abrominal girth over the last 2 weeks. He has a history of noncompliance with his low-sodium diet. Started on Amio during admission in Feb.     Hasn't been taking all DC meds from [facility]. Has been taking his Eliquis once daily. He states that his weight is up 80 lbs from his baseline. Has orthopnea,SOB, worsening edema. Admits that he has been taking Lasix 80mg TID and has been compliant with CPAP use. In ER, O2 sat 92% on 2L.  He was given 80mg IV lasix with 300cc urine output. HR 130s, EKG show atrial flutter 2:1 block.  Patient has not followed up with cardiology after leaving AMA from [facility]. Plans were in place to discuss possible ablation. According to care everywhere there is mention of patient undergoing LHC at [facility] in 2018 and has unknown etiology of CHF.     Hospital Course:   3/15/19- Echo shows BiV failure with LVEF 10%, bi atrial enlargement, PA pressure 38mmHg,moderate to severe MR and TR. Has diuresed 6.2 liters since admit. Transferred to [facility] and feels much better this morning. A-flutter rate controlled. Labs and vitals stable     3/16/19-Patient seen and examined, sitting up in chair. Feeling better. SOB improving. Remains  in aflutter with variable rate, meds further adjusted. Labs reviewed.     3/17/19-Patient seen and examined today, sitting up in chair. Continues to feel better. SOB and edema improving. Remains in aflutter. HR controlled at time of exam. Labs reviewed. Creatinine 1.8, K 3.0.        Review of Systems   Constitution: Positive for weakness and malaise/fatigue.   HENT: Negative.    Eyes: Negative.    Cardiovascular: Positive for dyspnea on exertion, leg swelling and palpitations.   Respiratory: Positive for shortness of breath.    Endocrine: Negative.    Hematologic/Lymphatic: Negative.    Skin: Negative.    Musculoskeletal: Negative.    Gastrointestinal: Negative.    Genitourinary: Negative.    Psychiatric/Behavioral: Negative.    Allergic/Immunologic: Negative.      Objective:     Vital Signs (Most Recent):  Temp: 97.7 °F (36.5 °C) (03/17/19 1121)  Pulse: (!) 112 (03/17/19 1121)  Resp: (!) 28 (03/17/19 0835)  BP: 124/63 (03/17/19 1121)  SpO2: (!) 93 % (03/17/19 1121) Vital Signs (24h Range):  Temp:  [97.5 °F (36.4 °C)-98.4 °F (36.9 °C)] 97.7 °F (36.5 °C)  Pulse:  [] 112  Resp:  [16-28] 28  SpO2:  [90 %-95 %] 93 %  BP: (114-152)/(63-90) 124/63     Weight: (!) 212.9 kg (469 lb 5.8 oz)  Body mass index is 73.51 kg/m².     SpO2: (!) 93 %  O2 Device (Oxygen Therapy): room air      Intake/Output Summary (Last 24 hours) at 3/17/2019 1245  Last data filed at 3/17/2019 0835  Gross per 24 hour   Intake 2768 ml   Output 6140 ml   Net -3372 ml       Lines/Drains/Airways     Peripheral Intravenous Line                 Peripheral IV - Single Lumen 03/13/19 2205 Right Antecubital 3 days                Physical Exam   Constitutional: He is oriented to person, place, and time. He appears well-developed and well-nourished. No distress.   HENT:   Head: Normocephalic and atraumatic.   Eyes: Pupils are equal, round, and reactive to light. Right eye exhibits no discharge. Left eye exhibits no discharge.   Neck: Neck supple. JVD  present.   Cardiovascular: Normal rate, S1 normal, S2 normal and normal heart sounds. An irregularly irregular rhythm present. Exam reveals no S4.   No murmur heard.  Pulmonary/Chest: Effort normal. No respiratory distress. He has rales.   Abdominal: Soft. He exhibits no distension. There is no tenderness.   Obese   Musculoskeletal: He exhibits edema (BLE hard edema).   Neurological: He is alert and oriented to person, place, and time.   Skin: Skin is warm and dry. He is not diaphoretic. No erythema.   Psychiatric: He has a normal mood and affect. His behavior is normal. Thought content normal.   Nursing note and vitals reviewed.      Significant Labs:   CMP   Recent Labs   Lab 03/16/19  0448 03/17/19  0415    140   K 3.3* 3.0*   CL 94* 91*   CO2 35* 36*    90   BUN 31* 28*   CREATININE 2.1* 1.8*   CALCIUM 9.3 9.4   PROT 7.7 7.6   ALBUMIN 3.0* 3.0*   BILITOT 1.8* 1.5*   ALKPHOS 157* 153*   AST 30 31   ALT 50* 48*   ANIONGAP 12 13   ESTGFRAFRICA 44* 53*   EGFRNONAA 38* 46*   , CBC   Recent Labs   Lab 03/16/19  0448 03/17/19  0415   WBC 7.13 7.40   HGB 12.6* 12.6*   HCT 39.7* 39.7*    235   , Troponin No results for input(s): TROPONINI in the last 48 hours. and All pertinent lab results from the last 24 hours have been reviewed.    Significant Imaging: Echocardiogram:   2D echo with color flow doppler:   Results for orders placed or performed during the hospital encounter of 03/13/19   2D echo with color flow doppler   Result Value Ref Range    QEF 10 (A) 55 - 65    Mitral Valve Regurgitation MODERATE TO SEVERE (A)     Est. PA Systolic Pressure 38.44     Tricuspid Valve Regurgitation MILD TO MODERATE     and X-Ray: CXR: X-Ray Chest 1 View (CXR): No results found for this visit on 03/13/19. and X-Ray Chest PA and Lateral (CXR):   Results for orders placed or performed during the hospital encounter of 03/13/19   X-Ray Chest PA And Lateral    Narrative    EXAMINATION:  XR CHEST PA AND  LATERAL    CLINICAL HISTORY  Shortness of breath.,    COMPARISON:  03/04/2019    FINDINGS:  Multiple wire leads overlie the chest.  The cardiac size appears enlarged.  There appears to be vascular prominence.  Possible CHF/pulmonary edema.      Impression    Please see above.      Electronically signed by: Ayaan Hooper MD  Date:    03/13/2019  Time:    22:36     Assessment and Plan:   Patient who presents with atrial flutter and bi-ventricular failure. Volume status continues to improve with aggressive diuresis. Continue same mgmt. Plans for CAROL/DCCV in AM. NPO after MN.    * CHF exacerbation    Patient needs aggressive diuresis.   Recommend Bumex 2mg TID  Will given 1 dose of Metolazone 5mg and see how he responds  Continue Coreg and Aldactone   Will add Digoxin for rate control and CHF  Check 2D echo Care everywhere tab mentions that patient completed LHC at [facility] in 2018 and has  unknown etiology of his cardiomyopathy.   Heart healthy diet  Limit fluid intake 50-60 oz   Patient has been counseled on importance of med and diet compliance       3/15/19  -Echo shows biventricular failure with EF 10%  -continue aggressive diuresis with Bumex 2mg TID and Metolazone 5mg daily   -Start KCL 40 meq daily   -Keep Mg >2.0  -will need to be fit for lifevest when closer to discharge   -Patient reports having LHC at [facility] in Nov 2018 that showed normal coronaries. Will request report  -Dr. Nash plans to cardiovert patient when he is more compensated. Hopefully this will help with his CHF. Will also need OP referral to EP for ICD implant and possible RFA  -Continue Coreg, Losartan and Aldactone     3/16/19  -Patient still volume overloaded but improving  -Continue diuresis with Bumex and Zaroxolyn  -K low, replete, KCl increased to 40 mEQ BID  -Coreg increased to 25 mg BID  -Spironolactone increased to 50 mg daily  -Continue Losartan, digxoin  -Needs LifeVest prior to d/c      3/17/19  -Volume status continues to  improve  -Continue IV Bumex, Zaroxolyn  -Continue Coreg, Aldactone  -Increase Losartan to 50 mg daily  -K low; give extra 40 MEQ x 1 dose; continue 40 mEQ BID  -Repeat labs in AM  -Counseled extensively on dietary and fluid restrictions     BRINDA on CPAP    -Continue nightly CPAP use      High transaminase levels    Improving with diuresis      CKD (chronic kidney disease) stage 3, GFR 30-59 ml/min    -Creatinine stable  -K low,replete     Essential hypertension    -Meds further adjusted  -Assess response     Atrial flutter    Recommend adding Digoxin for rate control  Increase Amio to 200mg BID  Continue Eliqius 5mg BID, patient has been taking once daily  Resume Coreg    CPAP compliance strongly encouraged to help with A-fib rate control     3/15/19  -Will plan to diurese and cardiovert when more compensated   -continue Amio, Digoxin, Coreg and Eliquis for now     3/16/19  -Remains in aflutter with variable rate  -Increase Coreg to 25 mg BID  -Continue amiodarone, digoxin  -Monitor overnight  -Will need CAROL/DCCV once more compensated if he fails to convert    3/17/19  -Remains in aflutter  -Continue Coreg, digoxin, amiodarone  -NPO after MN for CAROL/DCCV in AM. All risks, benefits, and treatment alternatives explained to patient in detail. All questions answered. He has agreed to proceed.          VTE Risk Mitigation (From admission, onward)        Ordered     apixaban tablet 5 mg  2 times daily      03/14/19 1021     Place SRUTHI hose  Until discontinued      03/14/19 0140     Place sequential compression device  Until discontinued      03/14/19 0140     Reason for No Pharmacological VTE Prophylaxis  Once      03/14/19 0140     IP VTE HIGH RISK PATIENT  Once      03/14/19 0140          Sunita Stephenson PA-C  Cardiology  [facility]    Chart reviewed. Dr. Nash examined patient and agrees with plan as outlined above.

## 2019-03-18 ENCOUNTER — ANESTHESIA EVENT (OUTPATIENT)
Dept: CARDIOLOGY | Facility: HOSPITAL | Age: 39
DRG: 291 | End: 2019-03-18
Payer: MEDICAID

## 2019-03-18 ENCOUNTER — ANESTHESIA (OUTPATIENT)
Dept: CARDIOLOGY | Facility: HOSPITAL | Age: 39
DRG: 291 | End: 2019-03-18
Payer: MEDICAID

## 2019-03-18 PROBLEM — E66.01 MORBID OBESITY: Status: ACTIVE | Noted: 2019-03-18

## 2019-03-18 PROBLEM — E87.6 HYPOKALEMIA: Status: ACTIVE | Noted: 2019-03-18

## 2019-03-18 PROBLEM — I50.43 ACUTE ON CHRONIC COMBINED SYSTOLIC AND DIASTOLIC CONGESTIVE HEART FAILURE: Status: ACTIVE | Noted: 2019-03-14

## 2019-03-18 PROBLEM — I87.2 STASIS DERMATITIS OF BOTH LEGS: Status: ACTIVE | Noted: 2019-03-18

## 2019-03-18 LAB
ALBUMIN SERPL BCP-MCNC: 3 G/DL
ALLENS TEST: ABNORMAL
ALP SERPL-CCNC: 137 U/L
ALT SERPL W/O P-5'-P-CCNC: 44 U/L
ANION GAP SERPL CALC-SCNC: 11 MMOL/L
AST SERPL-CCNC: 27 U/L
BASOPHILS # BLD AUTO: 0.01 K/UL
BASOPHILS NFR BLD: 0.1 %
BILIRUB SERPL-MCNC: 1.6 MG/DL
BUN SERPL-MCNC: 28 MG/DL
CALCIUM SERPL-MCNC: 10.1 MG/DL
CHLORIDE SERPL-SCNC: 89 MMOL/L
CO2 SERPL-SCNC: 41 MMOL/L
CREAT SERPL-MCNC: 1.7 MG/DL
DELSYS: ABNORMAL
DIFFERENTIAL METHOD: ABNORMAL
EOSINOPHIL # BLD AUTO: 0.2 K/UL
EOSINOPHIL NFR BLD: 2.2 %
ERYTHROCYTE [DISTWIDTH] IN BLOOD BY AUTOMATED COUNT: 17.6 %
EST. GFR  (AFRICAN AMERICAN): 57 ML/MIN/1.73 M^2
EST. GFR  (NON AFRICAN AMERICAN): 49 ML/MIN/1.73 M^2
FIO2: 21
GLUCOSE SERPL-MCNC: 95 MG/DL
HCO3 UR-SCNC: 44 MMOL/L (ref 24–28)
HCT VFR BLD AUTO: 40.6 %
HGB BLD-MCNC: 13 G/DL
LYMPHOCYTES # BLD AUTO: 2.2 K/UL
LYMPHOCYTES NFR BLD: 30.3 %
MAGNESIUM SERPL-MCNC: 1.4 MG/DL
MCH RBC QN AUTO: 29.5 PG
MCHC RBC AUTO-ENTMCNC: 32 G/DL
MCV RBC AUTO: 92 FL
MODE: ABNORMAL
MONOCYTES # BLD AUTO: 0.9 K/UL
MONOCYTES NFR BLD: 11.9 %
NEUTROPHILS # BLD AUTO: 4 K/UL
NEUTROPHILS NFR BLD: 55.5 %
PCO2 BLDA: 55.1 MMHG (ref 35–45)
PH SMN: 7.51 [PH] (ref 7.35–7.45)
PHOSPHATE SERPL-MCNC: 4.9 MG/DL
PLATELET # BLD AUTO: 222 K/UL
PMV BLD AUTO: 10.1 FL
PO2 BLDA: 50 MMHG (ref 80–100)
POC BE: 21 MMOL/L
POC SATURATED O2: 87 % (ref 95–100)
POTASSIUM SERPL-SCNC: 3.4 MMOL/L
PROT SERPL-MCNC: 7.6 G/DL
RBC # BLD AUTO: 4.4 M/UL
SAMPLE: ABNORMAL
SITE: ABNORMAL
SODIUM SERPL-SCNC: 141 MMOL/L
WBC # BLD AUTO: 7.14 K/UL

## 2019-03-18 PROCEDURE — 93010 EKG 12-LEAD: ICD-10-PCS | Mod: ,,, | Performed by: INTERNAL MEDICINE

## 2019-03-18 PROCEDURE — 93325 DOPPLER ECHO COLOR FLOW MAPG: CPT | Mod: 26,,, | Performed by: INTERNAL MEDICINE

## 2019-03-18 PROCEDURE — 93320 DOPPLER ECHO COMPLETE: CPT | Mod: 26,,, | Performed by: INTERNAL MEDICINE

## 2019-03-18 PROCEDURE — 80053 COMPREHEN METABOLIC PANEL: CPT

## 2019-03-18 PROCEDURE — 92960 CARDIOVERSION ELECTRIC EXT: CPT

## 2019-03-18 PROCEDURE — 21400001 HC TELEMETRY ROOM

## 2019-03-18 PROCEDURE — 93312 ECHO TRANSESOPHAGEAL: CPT | Mod: 26,,, | Performed by: INTERNAL MEDICINE

## 2019-03-18 PROCEDURE — 93320 DOPPLER ECHO COMPLETE: CPT | Performed by: INTERNAL MEDICINE

## 2019-03-18 PROCEDURE — 83735 ASSAY OF MAGNESIUM: CPT

## 2019-03-18 PROCEDURE — 25000003 PHARM REV CODE 250: Performed by: NURSE ANESTHETIST, CERTIFIED REGISTERED

## 2019-03-18 PROCEDURE — 92960 CARDIOVERSION ELECTRIC EXT: CPT | Mod: ,,, | Performed by: INTERNAL MEDICINE

## 2019-03-18 PROCEDURE — 37000009 HC ANESTHESIA EA ADD 15 MINS: Performed by: INTERNAL MEDICINE

## 2019-03-18 PROCEDURE — 36600 WITHDRAWAL OF ARTERIAL BLOOD: CPT

## 2019-03-18 PROCEDURE — 25000003 PHARM REV CODE 250: Performed by: EMERGENCY MEDICINE

## 2019-03-18 PROCEDURE — 93325 DOPPLER ECHO COLOR FLOW MAPG: CPT | Performed by: INTERNAL MEDICINE

## 2019-03-18 PROCEDURE — 84100 ASSAY OF PHOSPHORUS: CPT

## 2019-03-18 PROCEDURE — 93041 RHYTHM ECG TRACING: CPT

## 2019-03-18 PROCEDURE — 93320 TRANSESOPHAGEAL ECHO: ICD-10-PCS | Mod: 26,,, | Performed by: INTERNAL MEDICINE

## 2019-03-18 PROCEDURE — 63600175 PHARM REV CODE 636 W HCPCS: Performed by: NURSE ANESTHETIST, CERTIFIED REGISTERED

## 2019-03-18 PROCEDURE — 92960 CARDIOVERSION (DCCV): ICD-10-PCS | Mod: ,,, | Performed by: INTERNAL MEDICINE

## 2019-03-18 PROCEDURE — 25000003 PHARM REV CODE 250: Performed by: HOSPITALIST

## 2019-03-18 PROCEDURE — 25000003 PHARM REV CODE 250: Performed by: INTERNAL MEDICINE

## 2019-03-18 PROCEDURE — 25000003 PHARM REV CODE 250: Performed by: PHYSICIAN ASSISTANT

## 2019-03-18 PROCEDURE — 36415 COLL VENOUS BLD VENIPUNCTURE: CPT

## 2019-03-18 PROCEDURE — 93312 ECHO TRANSESOPHAGEAL: CPT | Performed by: INTERNAL MEDICINE

## 2019-03-18 PROCEDURE — 85025 COMPLETE CBC W/AUTO DIFF WBC: CPT

## 2019-03-18 PROCEDURE — 93010 ELECTROCARDIOGRAM REPORT: CPT | Mod: ,,, | Performed by: INTERNAL MEDICINE

## 2019-03-18 PROCEDURE — 82800 BLOOD PH: CPT

## 2019-03-18 PROCEDURE — 93325 TRANSESOPHAGEAL ECHO: ICD-10-PCS | Mod: 26,,, | Performed by: INTERNAL MEDICINE

## 2019-03-18 PROCEDURE — S0171 BUMETANIDE 0.5 MG: HCPCS | Performed by: INTERNAL MEDICINE

## 2019-03-18 PROCEDURE — 25000003 PHARM REV CODE 250: Performed by: NURSE PRACTITIONER

## 2019-03-18 PROCEDURE — 99232 PR SUBSEQUENT HOSPITAL CARE,LEVL II: ICD-10-PCS | Mod: 25,,, | Performed by: INTERNAL MEDICINE

## 2019-03-18 PROCEDURE — 99232 SBSQ HOSP IP/OBS MODERATE 35: CPT | Mod: 25,,, | Performed by: INTERNAL MEDICINE

## 2019-03-18 PROCEDURE — 37000008 HC ANESTHESIA 1ST 15 MINUTES: Performed by: INTERNAL MEDICINE

## 2019-03-18 PROCEDURE — 63600175 PHARM REV CODE 636 W HCPCS: Performed by: NURSE PRACTITIONER

## 2019-03-18 PROCEDURE — 99900035 HC TECH TIME PER 15 MIN (STAT)

## 2019-03-18 PROCEDURE — 93312 TRANSESOPHAGEAL ECHO: ICD-10-PCS | Mod: 26,,, | Performed by: INTERNAL MEDICINE

## 2019-03-18 PROCEDURE — 82803 BLOOD GASES ANY COMBINATION: CPT

## 2019-03-18 RX ORDER — PROPOFOL 10 MG/ML
VIAL (ML) INTRAVENOUS
Status: DISCONTINUED | OUTPATIENT
Start: 2019-03-18 | End: 2019-03-18

## 2019-03-18 RX ORDER — POTASSIUM CHLORIDE 20 MEQ/1
40 TABLET, EXTENDED RELEASE ORAL ONCE
Status: COMPLETED | OUTPATIENT
Start: 2019-03-18 | End: 2019-03-18

## 2019-03-18 RX ORDER — SODIUM CHLORIDE 9 MG/ML
INJECTION, SOLUTION INTRAVENOUS CONTINUOUS PRN
Status: DISCONTINUED | OUTPATIENT
Start: 2019-03-18 | End: 2019-03-18

## 2019-03-18 RX ORDER — MAGNESIUM SULFATE HEPTAHYDRATE 40 MG/ML
4 INJECTION, SOLUTION INTRAVENOUS ONCE
Status: COMPLETED | OUTPATIENT
Start: 2019-03-18 | End: 2019-03-18

## 2019-03-18 RX ORDER — DEXMEDETOMIDINE HYDROCHLORIDE 100 UG/ML
INJECTION, SOLUTION INTRAVENOUS
Status: DISCONTINUED | OUTPATIENT
Start: 2019-03-18 | End: 2019-03-18

## 2019-03-18 RX ORDER — MIDAZOLAM HYDROCHLORIDE 1 MG/ML
INJECTION, SOLUTION INTRAMUSCULAR; INTRAVENOUS
Status: DISCONTINUED | OUTPATIENT
Start: 2019-03-18 | End: 2019-03-18

## 2019-03-18 RX ORDER — KETAMINE HYDROCHLORIDE 50 MG/ML
1 INJECTION, SOLUTION INTRAMUSCULAR; INTRAVENOUS ONCE
Status: DISCONTINUED | OUTPATIENT
Start: 2019-03-18 | End: 2019-03-18

## 2019-03-18 RX ADMIN — BUMETANIDE 2 MG: 0.25 INJECTION INTRAMUSCULAR; INTRAVENOUS at 10:03

## 2019-03-18 RX ADMIN — DEXMEDETOMIDINE HYDROCHLORIDE 100 MCG: 100 INJECTION, SOLUTION, CONCENTRATE INTRAVENOUS at 03:03

## 2019-03-18 RX ADMIN — AMIODARONE HYDROCHLORIDE 200 MG: 200 TABLET ORAL at 08:03

## 2019-03-18 RX ADMIN — SPIRONOLACTONE 50 MG: 25 TABLET ORAL at 08:03

## 2019-03-18 RX ADMIN — APIXABAN 5 MG: 2.5 TABLET, FILM COATED ORAL at 10:03

## 2019-03-18 RX ADMIN — DIGOXIN 0.12 MG: 125 TABLET ORAL at 08:03

## 2019-03-18 RX ADMIN — AMIODARONE HYDROCHLORIDE 200 MG: 200 TABLET ORAL at 10:03

## 2019-03-18 RX ADMIN — PROPOFOL 50 MG: 10 INJECTION, EMULSION INTRAVENOUS at 03:03

## 2019-03-18 RX ADMIN — POTASSIUM CHLORIDE 40 MEQ: 1500 TABLET, EXTENDED RELEASE ORAL at 06:03

## 2019-03-18 RX ADMIN — LOSARTAN POTASSIUM 50 MG: 50 TABLET, FILM COATED ORAL at 08:03

## 2019-03-18 RX ADMIN — MIDAZOLAM HYDROCHLORIDE 2 MG: 1 INJECTION, SOLUTION INTRAMUSCULAR; INTRAVENOUS at 03:03

## 2019-03-18 RX ADMIN — POTASSIUM CHLORIDE 40 MEQ: 1500 TABLET, EXTENDED RELEASE ORAL at 10:03

## 2019-03-18 RX ADMIN — BUMETANIDE 2 MG: 0.25 INJECTION INTRAMUSCULAR; INTRAVENOUS at 08:03

## 2019-03-18 RX ADMIN — CARVEDILOL 25 MG: 12.5 TABLET, FILM COATED ORAL at 07:03

## 2019-03-18 RX ADMIN — APIXABAN 5 MG: 2.5 TABLET, FILM COATED ORAL at 08:03

## 2019-03-18 RX ADMIN — SODIUM CHLORIDE: 9 INJECTION, SOLUTION INTRAVENOUS at 03:03

## 2019-03-18 RX ADMIN — MAGNESIUM SULFATE IN WATER 4 G: 40 INJECTION, SOLUTION INTRAVENOUS at 06:03

## 2019-03-18 RX ADMIN — METOLAZONE 5 MG: 5 TABLET ORAL at 08:03

## 2019-03-18 RX ADMIN — POTASSIUM CHLORIDE 40 MEQ: 1500 TABLET, EXTENDED RELEASE ORAL at 08:03

## 2019-03-18 RX ADMIN — LEVOTHYROXINE SODIUM 50 MCG: 50 TABLET ORAL at 05:03

## 2019-03-18 NOTE — ASSESSMENT & PLAN NOTE
- EKG 2:1 atrial flutter HR 110s-120s  - continue amiodarone, coreg and apixaban at home dose  - TTE in AM  - consult cardiology in AM for possible ablation    Persists  Added dig to control the rate  3/16- will continue digoxin,amiodarone, eliquis  3/17- for cardioversion in AM as per cardiology , will follow closely

## 2019-03-18 NOTE — SUBJECTIVE & OBJECTIVE
Interval History: Morbidly obese, severe leg swelling.     Review of Systems   Constitutional: Positive for fatigue. Negative for activity change, appetite change, chills, diaphoresis and fever.   HENT: Negative for facial swelling, sore throat, tinnitus and trouble swallowing.    Eyes: Negative for photophobia and visual disturbance.   Respiratory: Positive for shortness of breath. Negative for apnea, cough, chest tightness and wheezing.    Cardiovascular: Positive for leg swelling. Negative for chest pain and palpitations.   Gastrointestinal: Positive for abdominal distention. Negative for abdominal pain, constipation, diarrhea, nausea and vomiting.   Endocrine: Negative for polydipsia, polyphagia and polyuria.   Genitourinary: Negative for decreased urine volume, dysuria, flank pain, frequency and hematuria.   Musculoskeletal: Negative for arthralgias, back pain, joint swelling, myalgias and neck stiffness.   Skin: Negative for pallor and rash.   Allergic/Immunologic: Negative for immunocompromised state.   Neurological: Positive for weakness. Negative for dizziness, seizures, syncope, numbness and headaches.   Psychiatric/Behavioral: Negative for confusion, hallucinations and suicidal ideas. The patient is not nervous/anxious.    All other systems reviewed and are negative.    Objective:     Vital Signs (Most Recent):  Temp: 97.7 °F (36.5 °C) (03/18/19 1223)  Pulse: 82 (03/18/19 1223)  Resp: 20 (03/18/19 1223)  BP: 136/62 (03/18/19 1223)  SpO2: (!) 94 % (03/18/19 1223) Vital Signs (24h Range):  Temp:  [97.7 °F (36.5 °C)-98.8 °F (37.1 °C)] 97.7 °F (36.5 °C)  Pulse:  [] 82  Resp:  [17-20] 20  SpO2:  [88 %-100 %] 94 %  BP: (122-151)/(62-97) 136/62     Weight: (!) 203.6 kg (448 lb 13.7 oz)  Body mass index is 70.3 kg/m².    Intake/Output Summary (Last 24 hours) at 3/18/2019 1353  Last data filed at 3/18/2019 0636  Gross per 24 hour   Intake 1062 ml   Output 7850 ml   Net -6788 ml      Physical Exam    Constitutional: He is oriented to person, place, and time. He appears well-developed and well-nourished. No distress.   HENT:   Head: Normocephalic and atraumatic.   Mouth/Throat: Oropharynx is clear and moist.   Eyes: Conjunctivae and EOM are normal. Pupils are equal, round, and reactive to light. No scleral icterus.   Neck: Normal range of motion. Neck supple. No JVD present. No thyromegaly present.   Unable to visualize JVD with thick neck tissue   Cardiovascular: Regular rhythm. Exam reveals no gallop and no friction rub.   No murmur heard.  tachycardia   Pulmonary/Chest: No respiratory distress. He has decreased breath sounds. He has no wheezes.   Abdominal: Soft. Bowel sounds are normal. He exhibits no distension. There is no tenderness. There is no guarding.   obese   Musculoskeletal: Normal range of motion. He exhibits edema.   Stasis dermatitis of both lower extremity, 4+ pitting edema bilateral lower extremity up to mid-thigh/groin   Neurological: He is alert and oriented to person, place, and time. No cranial nerve deficit.   Skin: Skin is warm. Capillary refill takes less than 2 seconds. He is not diaphoretic. No erythema.   Psychiatric: He has a normal mood and affect.   Nursing note and vitals reviewed.      Significant Labs:   CBC:   Recent Labs   Lab 03/17/19 0415 03/18/19  0458   WBC 7.40 7.14   HGB 12.6* 13.0*   HCT 39.7* 40.6    222     CMP:   Recent Labs   Lab 03/17/19 0415 03/18/19  0458    141   K 3.0* 3.4*   CL 91* 89*   CO2 36* 41*   GLU 90 95   BUN 28* 28*   CREATININE 1.8* 1.7*   CALCIUM 9.4 10.1   PROT 7.6 7.6   ALBUMIN 3.0* 3.0*   BILITOT 1.5* 1.6*   ALKPHOS 153* 137*   AST 31 27   ALT 48* 44   ANIONGAP 13 11   EGFRNONAA 46* 49*     All pertinent labs within the past 24 hours have been reviewed.    Significant Imaging: I have reviewed all pertinent imaging results/findings within the past 24 hours.

## 2019-03-18 NOTE — SUBJECTIVE & OBJECTIVE
Review of Systems   Constitution: Negative.   HENT: Negative.    Eyes: Negative.    Cardiovascular: Positive for leg swelling.   Respiratory: Positive for shortness of breath and snoring.    Endocrine: Negative.    Hematologic/Lymphatic: Negative.    Skin: Negative.    Musculoskeletal: Negative.    Gastrointestinal: Negative.    Genitourinary: Negative.    Neurological: Negative.    Psychiatric/Behavioral: Negative.    Allergic/Immunologic: Negative.      Objective:     Vital Signs (Most Recent):  Temp: 97.7 °F (36.5 °C) (03/18/19 0729)  Pulse: 80 (03/18/19 1112)  Resp: 20 (03/18/19 0729)  BP: (!) 148/86 (03/18/19 0729)  SpO2: (!) 93 % (03/18/19 0734) Vital Signs (24h Range):  Temp:  [97.7 °F (36.5 °C)-98.8 °F (37.1 °C)] 97.7 °F (36.5 °C)  Pulse:  [] 80  Resp:  [17-20] 20  SpO2:  [88 %-100 %] 93 %  BP: (122-151)/(63-97) 148/86     Weight: (!) 203.6 kg (448 lb 13.7 oz)  Body mass index is 70.3 kg/m².     SpO2: (!) 93 %  O2 Device (Oxygen Therapy): nasal cannula      Intake/Output Summary (Last 24 hours) at 3/18/2019 1114  Last data filed at 3/18/2019 0636  Gross per 24 hour   Intake 1302 ml   Output 8550 ml   Net -7248 ml       Lines/Drains/Airways     Peripheral Intravenous Line                 Peripheral IV - Single Lumen 03/13/19 2205 Right Antecubital 4 days                Physical Exam   Constitutional: He is oriented to person, place, and time. He appears well-developed and well-nourished. No distress.   HENT:   Head: Normocephalic and atraumatic.   Eyes: Pupils are equal, round, and reactive to light. Right eye exhibits no discharge. Left eye exhibits no discharge.   Neck: Neck supple. JVD present.   Cardiovascular: Normal rate, S1 normal, S2 normal and normal heart sounds. An irregularly irregular rhythm present.   No murmur heard.  Pulmonary/Chest:   Diminished BS at bases   Abdominal: Soft. He exhibits no distension.   Obese   Musculoskeletal: He exhibits edema (BLE (improving)).   Neurological:  He is alert and oriented to person, place, and time.   Skin: Skin is warm and dry. He is not diaphoretic. No erythema.   Psychiatric: He has a normal mood and affect. His behavior is normal. Thought content normal.   Nursing note and vitals reviewed.      Significant Labs:   CMP   Recent Labs   Lab 03/17/19 0415 03/18/19  0458    141   K 3.0* 3.4*   CL 91* 89*   CO2 36* 41*   GLU 90 95   BUN 28* 28*   CREATININE 1.8* 1.7*   CALCIUM 9.4 10.1   PROT 7.6 7.6   ALBUMIN 3.0* 3.0*   BILITOT 1.5* 1.6*   ALKPHOS 153* 137*   AST 31 27   ALT 48* 44   ANIONGAP 13 11   ESTGFRAFRICA 53* 57*   EGFRNONAA 46* 49*   , CBC   Recent Labs   Lab 03/17/19  0415 03/18/19  0458   WBC 7.40 7.14   HGB 12.6* 13.0*   HCT 39.7* 40.6    222   , Troponin No results for input(s): TROPONINI in the last 48 hours. and All pertinent lab results from the last 24 hours have been reviewed.    Significant Imaging: Echocardiogram:   2D echo with color flow doppler:   Results for orders placed or performed during the hospital encounter of 03/13/19   2D echo with color flow doppler   Result Value Ref Range    QEF 10 (A) 55 - 65    Mitral Valve Regurgitation MODERATE TO SEVERE (A)     Est. PA Systolic Pressure 38.44     Tricuspid Valve Regurgitation MILD TO MODERATE     and X-Ray: CXR: X-Ray Chest 1 View (CXR): No results found for this visit on 03/13/19. and X-Ray Chest PA and Lateral (CXR):   Results for orders placed or performed during the hospital encounter of 03/13/19   X-Ray Chest PA And Lateral    Narrative    EXAMINATION:  XR CHEST PA AND LATERAL    CLINICAL HISTORY  Shortness of breath.,    COMPARISON:  03/04/2019    FINDINGS:  Multiple wire leads overlie the chest.  The cardiac size appears enlarged.  There appears to be vascular prominence.  Possible CHF/pulmonary edema.      Impression    Please see above.      Electronically signed by: Ayaan Hooper MD  Date:    03/13/2019  Time:    22:36

## 2019-03-18 NOTE — PLAN OF CARE
Problem: Adult Inpatient Plan of Care  Goal: Plan of Care Review  Shift assessment completed.    Patient updated on POC for the day and verbalized understanding that he is to be NPO at midnight, denies pain, sob.   IV Bumex given per doctor order, pt diuresing throughout night.    CVR: Continuous telemetry monitoring maintained, patient stayed in  Atrial flutter throughout shift, HR= bouncing from 90s-120s  Skin: feet cracked and legs edematous.   Reviewed fall precautions with patient and bed alarm on.

## 2019-03-18 NOTE — NURSING
REESE Payan notified of patient's blood pressure upon returning to floor from procedure.  Sunita ordered to hold 1500 dose of Bumex.

## 2019-03-18 NOTE — ASSESSMENT & PLAN NOTE
Patient needs aggressive diuresis.   Recommend Bumex 2mg TID  Will given 1 dose of Metolazone 5mg and see how he responds  Continue Coreg and Aldactone   Will add Digoxin for rate control and CHF  Check 2D echo Care everywhere tab mentions that patient completed LHC at [facility] in 2018 and has  unknown etiology of his cardiomyopathy.   Heart healthy diet  Limit fluid intake 50-60 oz   Patient has been counseled on importance of med and diet compliance       3/15/19  -Echo shows biventricular failure with EF 10%  -continue aggressive diuresis with Bumex 2mg TID and Metolazone 5mg daily   -Start KCL 40 meq daily   -Keep Mg >2.0  -will need to be fit for lifevest when closer to discharge   -Patient reports having LHC at [facility] in Nov 2018 that showed normal coronaries. Will request report  -Dr. Nash plans to cardiovert patient when he is more compensated. Hopefully this will help with his CHF. Will also need OP referral to EP for ICD implant and possible RFA  -Continue Coreg, Losartan and Aldactone     3/16/19  -Patient still volume overloaded but improving  -Continue diuresis with Bumex and Zaroxolyn  -K low, replete, KCl increased to 40 mEQ BID  -Coreg increased to 25 mg BID  -Spironolactone increased to 50 mg daily  -Continue Losartan, digxoin  -Needs LifeVest prior to d/c      3/17/19  -Volume status continues to improve  -Continue IV Bumex, Zaroxolyn  -Continue Coreg, Aldactone  -Increase Losartan to 50 mg daily  -K low; give extra 40 MEQ x 1 dose; continue 40 mEQ BID  -Repeat labs in AM  -Counseled extensively on dietary and fluid restrictions    3/18/19  -Good diuresis overnight, creatinine stable  -Continue Coreg, Aldactone, Losartan  -Replete K  -Continue IV diuresis and Zaroxolyn  -LifeVest prior to d/c

## 2019-03-18 NOTE — BRIEF OP NOTE
<[facility]  Surgery Department  Operative Note    SUMMARY     Date of Procedure: 3/18/2019     Procedure: Procedure(s) (LRB):  ECHOCARDIOGRAM,TRANSESOPHAGEAL (N/A)  CARDIOVERSION OR DEFIBRILLATION (N/A)     Surgeon(s) and Role:     * Talib Ratliff MD - Primary    Assisting Surgeon: None    Pre-Operative Diagnosis: AF (atrial fibrillation) [I48.91]    Post-Operative Diagnosis: Post-Op Diagnosis Codes:     * AF (atrial fibrillation) [I48.91]    Anesthesia: Monitor Anesthesia Care    Technical Procedures Used: CAROL/DCCV    Description of the Findings of the Procedure: CAROL/DCCV, DX:AFIB, FINDINGS: CAROL NO CLOT, DCCV 200 J TO NSR    Significant Surgical Tasks Conducted by the Assistant(s), if Applicable: NONE    Complications: No    Estimated Blood Loss (EBL): < 50 cc           Implants: * No implants in log *    Specimens:   Specimen (12h ago, onward)    None                  Condition: Good    Disposition: PACU - hemodynamically stable.    Attestation: I was present and scrubbed for the entire procedure.

## 2019-03-18 NOTE — PLAN OF CARE
CM discussed d/c planning. Patient wants [facility]. CM called and they currently do not have any available slots for new patients with Medicaid as the insurance.  CM did notify the patient.  Dr Nash wants the patient to have a Life Vest. GEORGE sent all clinical to [facility].  1630 -  was contacted by Dahiana and the patient has been approved for the life vest and will be fitted tonight around 630p. GEORGE notified Tele Staff RN and Serena RN     03/18/19 1196   Discharge Reassessment   Assessment Type Discharge Planning Reassessment   Provided patient/caregiver education on the expected discharge date and the discharge plan No   Do you have any problems affording any of your prescribed medications? No   Discharge Plan A Home with family   Discharge Plan B Home with family   DME Needed Upon Discharge  none   Anticipated Discharge Disposition Home-Health   Can the patient answer the patient profile reliably? Yes, cognitively intact   How does the patient rate their overall health at the present time? Fair   Describe the patient's ability to walk at the present time. Minor restrictions or changes   How often would a person be available to care for the patient? Whenever needed   Number of comorbid conditions (as recorded on the chart) Four   During the past month, has the patient often been bothered by feeling down, depressed or hopeless? No   During the past month, has the patient often been bothered by little interest or pleasure in doing things? No

## 2019-03-18 NOTE — ANESTHESIA PREPROCEDURE EVALUATION
03/18/2019  Chacha Zendejas is a 38 y.o., male.    Anesthesia Evaluation    I have reviewed the Patient Summary Reports.    I have reviewed the Nursing Notes.   I have reviewed the Medications.     Review of Systems  Anesthesia Hx:  No problems with previous Anesthesia    Social:  Former Smoker    Cardiovascular:   Hypertension Dysrhythmias atrial fibrillation CHF  Congestive Heart Failure (CHF)  Hypertension, Essential Hypertension    Pulmonary:   Sleep Apnea, CPAP  Obstructive Sleep Apnea (BRINDA), CPAP used.   Renal/:   Chronic Renal Disease  Kidney Function/Disease, Chronic Kidney Disease (CKD)    Endocrine:  Metabolic Disorders, Morbid Obesity / BMI > 40      Physical Exam  General:  Morbid Obesity    Airway/Jaw/Neck:  Airway Findings: Mouth Opening: Normal Tongue: Normal  General Airway Assessment: Adult  Mallampati: III  Improves to II with phonation.  TM Distance: Normal, at least 6 cm      Dental:  Dental Findings: In tact   Chest/Lungs:  Chest/Lungs Findings: Normal Respiratory Rate     Heart/Vascular:  Heart Findings: Rate: Normal             Anesthesia Plan  Type of Anesthesia, risks & benefits discussed:  Anesthesia Type:  MAC  Patient's Preference:   Intra-op Monitoring Plan: standard ASA monitors  Intra-op Monitoring Plan Comments:   Post Op Pain Control Plan:   Post Op Pain Control Plan Comments:   Induction:   IV  Beta Blocker:  Patient is on a Beta-Blocker and has received one dose within the past 24 hours (No further documentation required).       Informed Consent: Patient understands risks and agrees with Anesthesia plan.  Questions answered. Anesthesia consent signed with patient.  ASA Score: 4     Day of Surgery Review of History & Physical: I have interviewed and examined the patient. I have reviewed the patient's H&P dated:  There are no significant changes.          Ready For Surgery  From Anesthesia Perspective.

## 2019-03-18 NOTE — ASSESSMENT & PLAN NOTE
Patient noncompliant on CPAP machine  Consult RT for CPAP mask fitting to improve compliance at home    Must use CPAP at night    3/16- will continue CPAP and will monitor closely  3/18- will continue CAP at bedtime

## 2019-03-18 NOTE — ASSESSMENT & PLAN NOTE
- h/o combined systolic/diastolic heart failure, left and right sided heart failure, unclear etiology, appears LHC was done at [facility] but no documentation available  - last TTE 2/2019 at [facility] with severe dilated LV with LVEF 20-25% with global hypokinesis, severe concentric LVH, moderately dilated RV with moderate RV systolic dysfunction, elevated CVP  Cardiology following  Losartan, spironolactone, Bumex and metolazone in progress  - continue carvedilol 12.5mg PO bid  - strict I/O, daily weights, fluid restriction diet  - check TTE after rate controlled - pending for 3/18  LIFEVEST placement per CARDs

## 2019-03-18 NOTE — ASSESSMENT & PLAN NOTE
Patient noncompliant on CPAP machine  Consult RT for CPAP mask fitting to improve compliance at home    Must use CPAP at night    3/16- will continue CPAP and will monitor closely  3/17- will continue CAP at bedtime

## 2019-03-18 NOTE — SUBJECTIVE & OBJECTIVE
Interval History: looks and feels much better, leg swelling down, has lost about 6.2 L since admit. Remains in C Aflutter 2:1 @ 139, echo shows severe LV Dysfunction with EF 10%. Pt is totally non compliant with diet and meds. He and his family are obviously oblivious of his severe condition and continue too enjoy unlimited amount of salt and calories as well as water intake.  3/16- feels better ,   Cardiac echo-No wall motion abnormalities.     5 - Severely depressed left ventricular systolic function (EF 10-15%).     6 - Right ventricular enlargement with moderately depressed systolic function  3/17- family is in the room, he is scheduled for cardioversion in AM, remains on diuresis .  Review of Systems   Constitutional: Positive for fatigue. Negative for activity change, appetite change, chills, diaphoresis and fever.   HENT: Negative for facial swelling, sore throat, tinnitus and trouble swallowing.    Eyes: Negative for photophobia and visual disturbance.   Respiratory: Positive for shortness of breath. Negative for apnea, cough, chest tightness and wheezing.    Cardiovascular: Positive for leg swelling. Negative for chest pain and palpitations.   Gastrointestinal: Positive for abdominal distention. Negative for abdominal pain, constipation, diarrhea, nausea and vomiting.   Endocrine: Negative for polydipsia, polyphagia and polyuria.   Genitourinary: Negative for decreased urine volume, dysuria, flank pain, frequency and hematuria.   Musculoskeletal: Negative for arthralgias, back pain, joint swelling, myalgias and neck stiffness.   Skin: Negative for pallor and rash.   Allergic/Immunologic: Negative for immunocompromised state.   Neurological: Positive for weakness. Negative for dizziness, seizures, syncope, numbness and headaches.   Psychiatric/Behavioral: Negative for confusion, hallucinations and suicidal ideas. The patient is not nervous/anxious.    All other systems reviewed and are  negative.    Objective:     Vital Signs (Most Recent):  Temp: 98.5 °F (36.9 °C) (03/17/19 2337)  Pulse: 98 (03/18/19 0103)  Resp: 18 (03/17/19 2337)  BP: (!) 146/76 (03/17/19 2337)  SpO2: (!) 92 % (03/17/19 2337) Vital Signs (24h Range):  Temp:  [97.7 °F (36.5 °C)-98.8 °F (37.1 °C)] 98.5 °F (36.9 °C)  Pulse:  [] 98  Resp:  [18-28] 18  SpO2:  [91 %-97 %] 92 %  BP: (114-152)/(63-97) 146/76     Weight: (!) 212.9 kg (469 lb 5.8 oz)  Body mass index is 73.51 kg/m².    Intake/Output Summary (Last 24 hours) at 3/18/2019 0243  Last data filed at 3/17/2019 2350  Gross per 24 hour   Intake 2010 ml   Output 7650 ml   Net -5640 ml      Physical Exam   Constitutional: He is oriented to person, place, and time. He appears well-developed and well-nourished. He appears distressed.   Appear uncomfortable   HENT:   Head: Normocephalic and atraumatic.   Mouth/Throat: Oropharynx is clear and moist.   Eyes: Conjunctivae and EOM are normal. Pupils are equal, round, and reactive to light. No scleral icterus.   Neck: Normal range of motion. Neck supple. No JVD present. No thyromegaly present.   Unable to visualize JVD with thick neck tissue   Cardiovascular: Regular rhythm. Exam reveals no gallop and no friction rub.   No murmur heard.  tachycardia   Pulmonary/Chest: He is in respiratory distress. He has no wheezes. He has rales.   Abdominal: Soft. Bowel sounds are normal. He exhibits no distension. There is no tenderness. There is no guarding.   Musculoskeletal: Normal range of motion. He exhibits edema.   Stasis dermatitis of both lower extremity, 4+ pitting edema bilateral lower extremity up to mid-thigh/groin   Neurological: He is alert and oriented to person, place, and time. No cranial nerve deficit.   Skin: Skin is warm. Capillary refill takes less than 2 seconds. He is not diaphoretic. No erythema.   Psychiatric: He has a normal mood and affect.   Nursing note and vitals reviewed.      Significant Labs:   BMP:   Recent Labs    Lab 03/17/19  0415   GLU 90      K 3.0*   CL 91*   CO2 36*   BUN 28*   CREATININE 1.8*   CALCIUM 9.4   MG 1.5*     CBC:   Recent Labs   Lab 03/16/19 0448 03/17/19  0415   WBC 7.13 7.40   HGB 12.6* 12.6*   HCT 39.7* 39.7*    235     CMP:   Recent Labs   Lab 03/16/19 0448 03/17/19  0415    140   K 3.3* 3.0*   CL 94* 91*   CO2 35* 36*    90   BUN 31* 28*   CREATININE 2.1* 1.8*   CALCIUM 9.3 9.4   PROT 7.7 7.6   ALBUMIN 3.0* 3.0*   BILITOT 1.8* 1.5*   ALKPHOS 157* 153*   AST 30 31   ALT 50* 48*   ANIONGAP 12 13   EGFRNONAA 38* 46*     Magnesium:   Recent Labs   Lab 03/16/19 0448 03/17/19  0415   MG 1.7 1.5*     TSH:   Recent Labs   Lab 03/13/19  2221   TSH 5.151*     All pertinent labs within the past 24 hours have been reviewed.    Significant Imaging: I have reviewed all pertinent imaging results/findings within the past 24 hours.

## 2019-03-18 NOTE — PROGRESS NOTES
[facility] Hospital Medicine  Progress Note    Patient Name: Chacha Zendejas  MRN: [id number]  Patient Class: IP- Inpatient   Admission Date: 3/13/2019  Length of Stay: 4 days  Attending Physician: Wallace Melissa MD  Primary Care Provider: Zara Olson NP        Subjective:     Principal Problem:Acute on chronic combined systolic and diastolic congestive heart failure    HPI:  38M h/o HFrEF, atrial flutter s/p ablation, HLD, and CKD presents with pedal edema.  Worse than baseline.  Associated with SOB, CREWS, orthopnea and increased abdominal girth x 2 weeks.  Presented to ER 2 weeks ago and was told to increase lasix 80mg PO BID to TID.  Patient reports compliance with medication change without improvement in symptoms.  Also c/o decrease activity, fatigue, weakness, daytime sleepiness.  Also report being noncompliant with CPAP at night due to discomfort. Denies fevers, chills, chest pain, HA, hemoptysis, long car rides, difficulty/decrease urinating, constipation or diarrhea.  Reports compliance on low sodium diet.  In ER, O2 sat 92% on 2L.  He was given 80mg IV lasix with 300cc urine output. HR 130s, EKG show atrial flutter 2:1 block.  Patient has not followed up with cardiology outpatient for possible repeat ablation or other options.  Patient restarted on home dose of amiodarone and carvedilol with improvement in HR.  Hospital medicine called for admission.         Hospital Course:  3/15- looks and feels much better, leg swelling down, has lost about 6.2 L since admit. Remains in C Aflutter 2:1 @ 139, echo shows severe LV Dysfunction with EF 10%. Pt is totally non compliant with diet and meds. He and his family are obviously oblivious of his severe condition and continue too enjoy unlimited amount of salt and calories as well as water intake. This is his 3rd hospital admission in last 2 months and he was also admitted at [facility]. Cardiology plans  - will need to be fit for lifevest when closer  to discharge   -Patient reports having LHC at [facility] in Nov 2018 that showed normal coronaries. Will request report  -Dr. Nash plans to cardiovert patient when he is more compensated. Hopefully this will help with his CHF. Will also need OP referral to EP for ICD implant and possible RFA   -Continue Coreg, Losartan and Aldactone, given a dose of Zaroxolyn earlier.    3/16- he was seen having breakfast.     Cardiac echo- 3/14   Severely depressed left ventricular systolic function (EF 10-15%).     6 - Right ventricular enlargement with moderately depressed systolic function.  3/17- family is in the room, he is scheduled for cardioversion in AM, remains on diuresis .  3/18 - pt not in respiratory distress. Has combined respiratory failure. Receiving spironolactone, metolazone and bumex. Being treated for A flutter and severe systolic heart failure. Awaiting [facility]/DCCV and placement of Lifevest per Cardiology. Pt awakens and denies any complaints.     Interval History: Morbidly obese, severe leg swelling.     Review of Systems   Constitutional: Positive for fatigue. Negative for activity change, appetite change, chills, diaphoresis and fever.   HENT: Negative for facial swelling, sore throat, tinnitus and trouble swallowing.    Eyes: Negative for photophobia and visual disturbance.   Respiratory: Positive for shortness of breath. Negative for apnea, cough, chest tightness and wheezing.    Cardiovascular: Positive for leg swelling. Negative for chest pain and palpitations.   Gastrointestinal: Positive for abdominal distention. Negative for abdominal pain, constipation, diarrhea, nausea and vomiting.   Endocrine: Negative for polydipsia, polyphagia and polyuria.   Genitourinary: Negative for decreased urine volume, dysuria, flank pain, frequency and hematuria.   Musculoskeletal: Negative for arthralgias, back pain, joint swelling, myalgias and neck stiffness.   Skin: Negative for pallor and rash.   Allergic/Immunologic:  Negative for immunocompromised state.   Neurological: Positive for weakness. Negative for dizziness, seizures, syncope, numbness and headaches.   Psychiatric/Behavioral: Negative for confusion, hallucinations and suicidal ideas. The patient is not nervous/anxious.    All other systems reviewed and are negative.    Objective:     Vital Signs (Most Recent):  Temp: 97.7 °F (36.5 °C) (03/18/19 1223)  Pulse: 82 (03/18/19 1223)  Resp: 20 (03/18/19 1223)  BP: 136/62 (03/18/19 1223)  SpO2: (!) 94 % (03/18/19 1223) Vital Signs (24h Range):  Temp:  [97.7 °F (36.5 °C)-98.8 °F (37.1 °C)] 97.7 °F (36.5 °C)  Pulse:  [] 82  Resp:  [17-20] 20  SpO2:  [88 %-100 %] 94 %  BP: (122-151)/(62-97) 136/62     Weight: (!) 203.6 kg (448 lb 13.7 oz)  Body mass index is 70.3 kg/m².    Intake/Output Summary (Last 24 hours) at 3/18/2019 1353  Last data filed at 3/18/2019 0636  Gross per 24 hour   Intake 1062 ml   Output 7850 ml   Net -6788 ml      Physical Exam   Constitutional: He is oriented to person, place, and time. He appears well-developed and well-nourished. No distress.   HENT:   Head: Normocephalic and atraumatic.   Mouth/Throat: Oropharynx is clear and moist.   Eyes: Conjunctivae and EOM are normal. Pupils are equal, round, and reactive to light. No scleral icterus.   Neck: Normal range of motion. Neck supple. No JVD present. No thyromegaly present.   Unable to visualize JVD with thick neck tissue   Cardiovascular: Regular rhythm. Exam reveals no gallop and no friction rub.   No murmur heard.  tachycardia   Pulmonary/Chest: No respiratory distress. He has decreased breath sounds. He has no wheezes.   Abdominal: Soft. Bowel sounds are normal. He exhibits no distension. There is no tenderness. There is no guarding.   obese   Musculoskeletal: Normal range of motion. He exhibits edema.   Stasis dermatitis of both lower extremity, 4+ pitting edema bilateral lower extremity up to mid-thigh/groin   Neurological: He is alert and  oriented to person, place, and time. No cranial nerve deficit.   Skin: Skin is warm. Capillary refill takes less than 2 seconds. He is not diaphoretic. No erythema.   Psychiatric: He has a normal mood and affect.   Nursing note and vitals reviewed.      Significant Labs:   CBC:   Recent Labs   Lab 03/17/19 0415 03/18/19 0458   WBC 7.40 7.14   HGB 12.6* 13.0*   HCT 39.7* 40.6    222     CMP:   Recent Labs   Lab 03/17/19 0415 03/18/19 0458    141   K 3.0* 3.4*   CL 91* 89*   CO2 36* 41*   GLU 90 95   BUN 28* 28*   CREATININE 1.8* 1.7*   CALCIUM 9.4 10.1   PROT 7.6 7.6   ALBUMIN 3.0* 3.0*   BILITOT 1.5* 1.6*   ALKPHOS 153* 137*   AST 31 27   ALT 48* 44   ANIONGAP 13 11   EGFRNONAA 46* 49*     All pertinent labs within the past 24 hours have been reviewed.    Significant Imaging: I have reviewed all pertinent imaging results/findings within the past 24 hours.    Assessment/Plan:      * Acute on chronic combined systolic and diastolic congestive heart failure    - h/o combined systolic/diastolic heart failure, left and right sided heart failure, unclear etiology, appears LHC was done at [facility] but no documentation available  - last TTE 2/2019 at [facility] with severe dilated LV with LVEF 20-25% with global hypokinesis, severe concentric LVH, moderately dilated RV with moderate RV systolic dysfunction, elevated CVP  Cardiology following  Losartan, spironolactone, Bumex and metolazone in progress  - continue carvedilol 12.5mg PO bid  - strict I/O, daily weights, fluid restriction diet  - check TTE after rate controlled - pending for 3/18  LIFEVEST placement per CARDs       Hypokalemia    3/18 - (3.4) replete and monitor       Stasis dermatitis of both legs      Will follow up vascular team on discharge for compression stockings     Morbid obesity      Out patient follow up for structured weight loss program     BRINDA on CPAP    Patient noncompliant on CPAP machine  Consult RT for CPAP mask fitting to improve  compliance at home    Must use CPAP at night    3/16- will continue CPAP and will monitor closely  3/18- will continue CAP at bedtime     High transaminase levels    - hold statin  - likely due to hepatic congestion due to CHF exacerbation  - treat CHF  - monitor daily CMP, if not improved after improvement of exacerbation then will evaluate for other causes    Stable  3/18- now resolved , will monitor      CKD (chronic kidney disease) stage 3, GFR 30-59 ml/min    - likely acute on chronic kidney failure due to chf exacerbation  - unclear baseline Cr  - hold lisinopril, spironolactone  - check UA,  IV diuresis for CHF  - monitor I/O  - replete electrolytes prn    - stable CKD 3  3/16- will monitor closely, serum creatinine is 2.1, will continue losartan.  3/17- serum creatinine is 1.8- will continue to monitor renal function  3/18 - creatinine 1.7       Essential hypertension    - hold lisinopril due to possible LALY  - hold spironolactone/hydralazine/isordil since patient reportedly doesn't take at home and BP currently controlled  - continue coreg 12.5mg BID   - IV diuresis for CHF exacerbation     3/16- will continue coreg ,losartan          Atrial flutter    - EKG 2:1 atrial flutter HR 110s-120s  - continue amiodarone, coreg, digoxin and apixaban at home dose  - TTE in AM 3/18  - consult cardiology for possible ablation           VTE Risk Mitigation (From admission, onward)        Ordered     apixaban tablet 5 mg  2 times daily      03/14/19 1021     Place SRUTHI hose  Until discontinued      03/14/19 0140     Place sequential compression device  Until discontinued      03/14/19 0140     Reason for No Pharmacological VTE Prophylaxis  Once      03/14/19 0140     IP VTE HIGH RISK PATIENT  Once      03/14/19 0140              Emily Maloney NP  Department of Hospital Medicine   [facility]

## 2019-03-18 NOTE — TRANSFER OF CARE
"Anesthesia Transfer of Care Note    Patient: Chacha Zendejas    Procedure(s) Performed: Procedure(s) (LRB):  ECHOCARDIOGRAM,TRANSESOPHAGEAL (N/A)  CARDIOVERSION OR DEFIBRILLATION (N/A)    Patient location: [facility].    Anesthesia Type: MAC    Transport from OR: Transported from OR on room air with adequate spontaneous ventilation    Post pain: adequate analgesia    Post assessment: no apparent anesthetic complications    Post vital signs: stable    Level of consciousness: sedated    Nausea/Vomiting: no nausea/vomiting    Complications: none    Transfer of care protocol was followed      Last vitals:   Visit Vitals  /62 (BP Location: Left arm, Patient Position: Sitting)   Pulse 107   Temp 36.5 °C (97.7 °F) (Oral)   Resp 20   Ht 5' 7" (1.702 m)   Wt (!) 203.6 kg (448 lb 13.7 oz)   SpO2 (!) 94%   BMI 70.30 kg/m²     "

## 2019-03-18 NOTE — ASSESSMENT & PLAN NOTE
- hold statin  - likely due to hepatic congestion due to CHF exacerbation  - treat CHF  - monitor daily CMP, if not improved after improvement of exacerbation then will evaluate for other causes    Stable  3/18- now resolved , will monitor

## 2019-03-18 NOTE — ASSESSMENT & PLAN NOTE
- likely acute on chronic kidney failure due to chf exacerbation  - unclear baseline Cr  - hold lisinopril, spironolactone  - check UA,  IV diuresis for CHF  - monitor I/O  - replete electrolytes prn    - stable CKD 3  3/16- will monitor closely, serum creatinine is 2.1, will continue losartan.  3/17- serum creatinine is 1.8- will continue to monitor renal function

## 2019-03-18 NOTE — PROGRESS NOTES
[facility] Medicine  Progress Note    Patient Name: Chacha Zendejas  MRN: [id number]  Patient Class: IP- Inpatient   Admission Date: 3/13/2019  Length of Stay: 4 days  Attending Physician: Wallace Melissa MD  Primary Care Provider: Zaar Olson NP        Subjective:     Principal Problem:CHF exacerbation    HPI:  38M h/o HFrEF, atrial flutter s/p ablation, HLD, and CKD presents with pedal edema.  Worse than baseline.  Associated with SOB, CREWS, orthopnea and increased abdominal girth x 2 weeks.  Presented to ER 2 weeks ago and was told to increase lasix 80mg PO BID to TID.  Patient reports compliance with medication change without improvement in symptoms.  Also c/o decrease activity, fatigue, weakness, daytime sleepiness.  Also report being noncompliant with CPAP at night due to discomfort. Denies fevers, chills, chest pain, HA, hemoptysis, long car rides, difficulty/decrease urinating, constipation or diarrhea.  Reports compliance on low sodium diet.  In ER, O2 sat 92% on 2L.  He was given 80mg IV lasix with 300cc urine output. HR 130s, EKG show atrial flutter 2:1 block.  Patient has not followed up with cardiology outpatient for possible repeat ablation or other options.  Patient restarted on home dose of amiodarone and carvedilol with improvement in HR.  Hospital medicine called for admission.         Hospital Course:  3/15- looks and feels much better, leg swelling down, has lost about 6.2 L since admit. Remains in C Aflutter 2:1 @ 139, echo shows severe LV Dysfunction with EF 10%. Pt is totally non compliant with diet and meds. He and his family are obviously oblivious of his severe condition and continue too enjoy unlimited amount of salt and calories as well as water intake. This is his 3rd hospital admission in last 2 months and he was also admitted at [facility]. Cardiology plans  - will need to be fit for lifevest when closer to discharge   -Patient reports having LHC at [facility] in Nov  2018 that showed normal coronaries. Will request report  -Dr. Nash plans to cardiovert patient when he is more compensated. Hopefully this will help with his CHF. Will also need OP referral to EP for ICD implant and possible RFA   -Continue Coreg, Losartan and Aldactone, given a dose of Zaroxolyn earlier.    3/16- he was seen having breakfast.     Cardiac echo- 3/14   Severely depressed left ventricular systolic function (EF 10-15%).     6 - Right ventricular enlargement with moderately depressed systolic function.  3/17- family is in the room, he is scheduled for cardioversion in AM, remains on diuresis .      Interval History: looks and feels much better, leg swelling down, has lost about 6.2 L since admit. Remains in C Aflutter 2:1 @ 139, echo shows severe LV Dysfunction with EF 10%. Pt is totally non compliant with diet and meds. He and his family are obviously oblivious of his severe condition and continue too enjoy unlimited amount of salt and calories as well as water intake.  3/16- feels better ,   Cardiac echo-No wall motion abnormalities.     5 - Severely depressed left ventricular systolic function (EF 10-15%).     6 - Right ventricular enlargement with moderately depressed systolic function  3/17- family is in the room, he is scheduled for cardioversion in AM, remains on diuresis .  Review of Systems   Constitutional: Positive for fatigue. Negative for activity change, appetite change, chills, diaphoresis and fever.   HENT: Negative for facial swelling, sore throat, tinnitus and trouble swallowing.    Eyes: Negative for photophobia and visual disturbance.   Respiratory: Positive for shortness of breath. Negative for apnea, cough, chest tightness and wheezing.    Cardiovascular: Positive for leg swelling. Negative for chest pain and palpitations.   Gastrointestinal: Positive for abdominal distention. Negative for abdominal pain, constipation, diarrhea, nausea and vomiting.   Endocrine: Negative for  polydipsia, polyphagia and polyuria.   Genitourinary: Negative for decreased urine volume, dysuria, flank pain, frequency and hematuria.   Musculoskeletal: Negative for arthralgias, back pain, joint swelling, myalgias and neck stiffness.   Skin: Negative for pallor and rash.   Allergic/Immunologic: Negative for immunocompromised state.   Neurological: Positive for weakness. Negative for dizziness, seizures, syncope, numbness and headaches.   Psychiatric/Behavioral: Negative for confusion, hallucinations and suicidal ideas. The patient is not nervous/anxious.    All other systems reviewed and are negative.    Objective:     Vital Signs (Most Recent):  Temp: 98.5 °F (36.9 °C) (03/17/19 2337)  Pulse: 98 (03/18/19 0103)  Resp: 18 (03/17/19 2337)  BP: (!) 146/76 (03/17/19 2337)  SpO2: (!) 92 % (03/17/19 2337) Vital Signs (24h Range):  Temp:  [97.7 °F (36.5 °C)-98.8 °F (37.1 °C)] 98.5 °F (36.9 °C)  Pulse:  [] 98  Resp:  [18-28] 18  SpO2:  [91 %-97 %] 92 %  BP: (114-152)/(63-97) 146/76     Weight: (!) 212.9 kg (469 lb 5.8 oz)  Body mass index is 73.51 kg/m².    Intake/Output Summary (Last 24 hours) at 3/18/2019 0243  Last data filed at 3/17/2019 2350  Gross per 24 hour   Intake 2010 ml   Output 7650 ml   Net -5640 ml      Physical Exam   Constitutional: He is oriented to person, place, and time. He appears well-developed and well-nourished. He appears distressed.   Appear uncomfortable   HENT:   Head: Normocephalic and atraumatic.   Mouth/Throat: Oropharynx is clear and moist.   Eyes: Conjunctivae and EOM are normal. Pupils are equal, round, and reactive to light. No scleral icterus.   Neck: Normal range of motion. Neck supple. No JVD present. No thyromegaly present.   Unable to visualize JVD with thick neck tissue   Cardiovascular: Regular rhythm. Exam reveals no gallop and no friction rub.   No murmur heard.  tachycardia   Pulmonary/Chest: He is in respiratory distress. He has no wheezes. He has rales.   Abdominal:  Soft. Bowel sounds are normal. He exhibits no distension. There is no tenderness. There is no guarding.   Musculoskeletal: Normal range of motion. He exhibits edema.   Stasis dermatitis of both lower extremity, 4+ pitting edema bilateral lower extremity up to mid-thigh/groin   Neurological: He is alert and oriented to person, place, and time. No cranial nerve deficit.   Skin: Skin is warm. Capillary refill takes less than 2 seconds. He is not diaphoretic. No erythema.   Psychiatric: He has a normal mood and affect.   Nursing note and vitals reviewed.      Significant Labs:   BMP:   Recent Labs   Lab 03/17/19 0415   GLU 90      K 3.0*   CL 91*   CO2 36*   BUN 28*   CREATININE 1.8*   CALCIUM 9.4   MG 1.5*     CBC:   Recent Labs   Lab 03/16/19 0448 03/17/19  0415   WBC 7.13 7.40   HGB 12.6* 12.6*   HCT 39.7* 39.7*    235     CMP:   Recent Labs   Lab 03/16/19 0448 03/17/19 0415    140   K 3.3* 3.0*   CL 94* 91*   CO2 35* 36*    90   BUN 31* 28*   CREATININE 2.1* 1.8*   CALCIUM 9.3 9.4   PROT 7.7 7.6   ALBUMIN 3.0* 3.0*   BILITOT 1.8* 1.5*   ALKPHOS 157* 153*   AST 30 31   ALT 50* 48*   ANIONGAP 12 13   EGFRNONAA 38* 46*     Magnesium:   Recent Labs   Lab 03/16/19 0448 03/17/19  0415   MG 1.7 1.5*     TSH:   Recent Labs   Lab 03/13/19  2221   TSH 5.151*     All pertinent labs within the past 24 hours have been reviewed.    Significant Imaging: I have reviewed all pertinent imaging results/findings within the past 24 hours.    Assessment/Plan:      * CHF exacerbation    - h/o combined systolic/diastolic heart failure, left and right sided heart failure, unclear etiology, appears LHC was done at [facility] but no documentation available  - last TTE 2/2019 at [facility] with severe dilated LV with LVEF 20-25% with global hypokinesis, severe concentric LVH, moderately dilated RV with moderate RV systolic dysfunction, elevated CVP  - given 80mg IV lasix in ER    - hold PO lasix, spironolactone, lisinopril  -  continue 80mg IV lasix TID  - continue carvedilol 12.5mg PO bid  - strict I/O, daily weights, fluid restriction diet  - check TTE after rate controlled  - consult cardiology in AM for medication optimization, possible AICD/lifevest    - good response to diuresis-- continue diuresis and continue fluid and salt restriction    3/16- cardiology follow up appreciated, continue coreg, losartan,continue bumex, spironolactone, metolazone, will need life zest   3/17- will continue aggressive diuresis , continue coreg, losartan,bumex, cardiology follow up     Stasis dermatitis of both legs      Will follow up vascular team on discharge for compression stockings     Morbid obesity      Out patient follow up for structured weight loss program     BRINDA on CPAP    Patient noncompliant on CPAP machine  Consult RT for CPAP mask fitting to improve compliance at home    Must use CPAP at night    3/16- will continue CPAP and will monitor closely  3/17- will continue CAP at bedtime     High transaminase levels    - hold statin  - likely due to hepatic congestion due to CHF exacerbation  - treat CHF  - monitor daily CMP, if not improved after improvement of exacerbation then will evaluate for other causes    Stable  3/17- now resolved , will monitor      CKD (chronic kidney disease) stage 3, GFR 30-59 ml/min    - likely acute on chronic kidney failure due to chf exacerbation  - unclear baseline Cr  - hold lisinopril, spironolactone  - check UA,  IV diuresis for CHF  - monitor I/O  - replete electrolytes prn    - stable CKD 3  3/16- will monitor closely, serum creatinine is 2.1, will continue losartan.  3/17- serum creatinine is 1.8- will continue to monitor renal function       Essential hypertension    - hold lisinopril due to possible LALY  - hold spironolactone/hydralazine/isordil since patient reportedly doesn't take at home and BP currently controlled  - continue coreg 12.5mg BID   - IV diuresis for CHF exacerbation     3/16- will  continue coreg ,losartan          Atrial flutter    - EKG 2:1 atrial flutter HR 110s-120s  - continue amiodarone, coreg and apixaban at home dose  - TTE in AM  - consult cardiology in AM for possible ablation    Persists  Added dig to control the rate  3/16- will continue digoxin,amiodarone, eliquis  3/17- for cardioversion in AM as per cardiology , will follow closely       VTE Risk Mitigation (From admission, onward)        Ordered     apixaban tablet 5 mg  2 times daily      03/14/19 1021     Place SRUTHI hose  Until discontinued      03/14/19 0140     Place sequential compression device  Until discontinued      03/14/19 0140     Reason for No Pharmacological VTE Prophylaxis  Once      03/14/19 0140     IP VTE HIGH RISK PATIENT  Once      03/14/19 0140              Wallace Melissa MD  Department of Hospital Medicine   [facility]

## 2019-03-18 NOTE — ASSESSMENT & PLAN NOTE
Recommend adding Digoxin for rate control  Increase Amio to 200mg BID  Continue Eliqius 5mg BID, patient has been taking once daily  Resume Coreg    CPAP compliance strongly encouraged to help with A-fib rate control     3/15/19  -Will plan to diurese and cardiovert when more compensated   -continue Amio, Digoxin, Coreg and Eliquis for now     3/16/19  -Remains in aflutter with variable rate  -Increase Coreg to 25 mg BID  -Continue amiodarone, digoxin  -Monitor overnight  -Will need CAROL/DCCV once more compensated if he fails to convert    3/17/19  -Remains in aflutter  -Continue Coreg, digoxin, amiodarone  -NPO after MN for CAROL/DCCV in AM. All risks, benefits, and treatment alternatives explained to patient in detail. All questions answered. He has agreed to proceed.     3/18/19  -Still in aflutter   -Continue Coreg, digoxin, amiodarone  -CAROL/DCCV today. All risks, benefits, and treatment alternatives explained to patient in detail. All questions answered. He has agreed to proceed  -Continue Eliquis for CVA prophylaxis

## 2019-03-18 NOTE — NURSING
Pt requiring 2L NC to maintain sats >92%.  No c/o dyspnea or SOB.  Notified SVITLANA Diaz.  Will continue to monitor.

## 2019-03-18 NOTE — PROGRESS NOTES
[facility] -   Cardiology  Progress Note    Patient Name: Chacha Zendejas  MRN: [id number]  Admission Date: 3/13/2019  Hospital Length of Stay: 4 days  Code Status: Full Code   Attending Physician: Wallace Melissa MD   Primary Care Physician: Zara Olson NP  Expected Discharge Date:   Principal Problem:CHF exacerbation    Subjective:   HPI:  Chacha Zendejas is a morbidly obese 38-year-old -American male with past medical history of paroxysmal A. fib on Eliquis, HTN, BRINDA, HFrEF (30-35% TTE 7/2018), CKD 2. Patient admitted to [facility] in Feb 2019 with decompensated A-flutter 2:1. Also admitted just a few weeks prior for sepsis secondary to right lower extremity cellulitis.   He presented to [facility] ED with complaints of weight gain, SOB, CREWS, orthopnea and increased abrominal girth over the last 2 weeks. He has a history of noncompliance with his low-sodium diet. Started on Amio during admission in Feb.     Hasn't been taking all DC meds from [facility]. Has been taking his Eliquis once daily. He states that his weight is up 80 lbs from his baseline. Has orthopnea,SOB, worsening edema. Admits that he has been taking Lasix 80mg TID and has been compliant with CPAP use. In ER, O2 sat 92% on 2L.  He was given 80mg IV lasix with 300cc urine output. HR 130s, EKG show atrial flutter 2:1 block.  Patient has not followed up with cardiology after leaving AMA from [facility]. Plans were in place to discuss possible ablation. According to care everywhere there is mention of patient undergoing LHC at [facility] in 2018 and has unknown etiology of CHF.     Hospital Course:   3/15/19- Echo shows BiV failure with LVEF 10%, bi atrial enlargement, PA pressure 38mmHg,moderate to severe MR and TR. Has diuresed 6.2 liters since admit. Transferred to [facility] and feels much better this morning. A-flutter rate controlled. Labs and vitals stable     3/16/19-Patient seen and examined, sitting up in chair. Feeling better. SOB improving. Remains  in aflutter with variable rate, meds further adjusted. Labs reviewed.     3/17/19-Patient seen and examined today, sitting up in chair. Continues to feel better. SOB and edema improving. Remains in aflutter. HR controlled at time of exam. Labs reviewed. Creatinine 1.8, K 3.0.    3/18/19-Patient seen and examined today, sitting in chair. Feels well. SOB and edema continue to improve. No chest pain. Remains in aflutter. CAROL/DCCV planned for today. Needs LifeVest prior to d/c.         Review of Systems   Constitution: Negative.   HENT: Negative.    Eyes: Negative.    Cardiovascular: Positive for leg swelling.   Respiratory: Positive for shortness of breath and snoring.    Endocrine: Negative.    Hematologic/Lymphatic: Negative.    Skin: Negative.    Musculoskeletal: Negative.    Gastrointestinal: Negative.    Genitourinary: Negative.    Neurological: Negative.    Psychiatric/Behavioral: Negative.    Allergic/Immunologic: Negative.      Objective:     Vital Signs (Most Recent):  Temp: 97.7 °F (36.5 °C) (03/18/19 0729)  Pulse: 80 (03/18/19 1112)  Resp: 20 (03/18/19 0729)  BP: (!) 148/86 (03/18/19 0729)  SpO2: (!) 93 % (03/18/19 0734) Vital Signs (24h Range):  Temp:  [97.7 °F (36.5 °C)-98.8 °F (37.1 °C)] 97.7 °F (36.5 °C)  Pulse:  [] 80  Resp:  [17-20] 20  SpO2:  [88 %-100 %] 93 %  BP: (122-151)/(63-97) 148/86     Weight: (!) 203.6 kg (448 lb 13.7 oz)  Body mass index is 70.3 kg/m².     SpO2: (!) 93 %  O2 Device (Oxygen Therapy): nasal cannula      Intake/Output Summary (Last 24 hours) at 3/18/2019 1114  Last data filed at 3/18/2019 0636  Gross per 24 hour   Intake 1302 ml   Output 8550 ml   Net -7248 ml       Lines/Drains/Airways     Peripheral Intravenous Line                 Peripheral IV - Single Lumen 03/13/19 2205 Right Antecubital 4 days                Physical Exam   Constitutional: He is oriented to person, place, and time. He appears well-developed and well-nourished. No distress.   HENT:   Head:  Normocephalic and atraumatic.   Eyes: Pupils are equal, round, and reactive to light. Right eye exhibits no discharge. Left eye exhibits no discharge.   Neck: Neck supple. JVD present.   Cardiovascular: Normal rate, S1 normal, S2 normal and normal heart sounds. An irregularly irregular rhythm present.   No murmur heard.  Pulmonary/Chest:   Diminished BS at bases   Abdominal: Soft. He exhibits no distension.   Obese   Musculoskeletal: He exhibits edema (BLE (improving)).   Neurological: He is alert and oriented to person, place, and time.   Skin: Skin is warm and dry. He is not diaphoretic. No erythema.   Psychiatric: He has a normal mood and affect. His behavior is normal. Thought content normal.   Nursing note and vitals reviewed.      Significant Labs:   CMP   Recent Labs   Lab 03/17/19 0415 03/18/19 0458    141   K 3.0* 3.4*   CL 91* 89*   CO2 36* 41*   GLU 90 95   BUN 28* 28*   CREATININE 1.8* 1.7*   CALCIUM 9.4 10.1   PROT 7.6 7.6   ALBUMIN 3.0* 3.0*   BILITOT 1.5* 1.6*   ALKPHOS 153* 137*   AST 31 27   ALT 48* 44   ANIONGAP 13 11   ESTGFRAFRICA 53* 57*   EGFRNONAA 46* 49*   , CBC   Recent Labs   Lab 03/17/19 0415 03/18/19 0458   WBC 7.40 7.14   HGB 12.6* 13.0*   HCT 39.7* 40.6    222   , Troponin No results for input(s): TROPONINI in the last 48 hours. and All pertinent lab results from the last 24 hours have been reviewed.    Significant Imaging: Echocardiogram:   2D echo with color flow doppler:   Results for orders placed or performed during the hospital encounter of 03/13/19   2D echo with color flow doppler   Result Value Ref Range    QEF 10 (A) 55 - 65    Mitral Valve Regurgitation MODERATE TO SEVERE (A)     Est. PA Systolic Pressure 38.44     Tricuspid Valve Regurgitation MILD TO MODERATE     and X-Ray: CXR: X-Ray Chest 1 View (CXR): No results found for this visit on 03/13/19. and X-Ray Chest PA and Lateral (CXR):   Results for orders placed or performed during the hospital encounter  of 03/13/19   X-Ray Chest PA And Lateral    Narrative    EXAMINATION:  XR CHEST PA AND LATERAL    CLINICAL HISTORY  Shortness of breath.,    COMPARISON:  03/04/2019    FINDINGS:  Multiple wire leads overlie the chest.  The cardiac size appears enlarged.  There appears to be vascular prominence.  Possible CHF/pulmonary edema.      Impression    Please see above.      Electronically signed by: Ayaan Hooper MD  Date:    03/13/2019  Time:    22:36     Assessment and Plan:   Patient who presents with aflutter and decompensated combined CHF. Clinically improving. Continue same mgmt. CAROL/DCCV today. Needs LifeVest prior to discharge.     * CHF exacerbation    Patient needs aggressive diuresis.   Recommend Bumex 2mg TID  Will given 1 dose of Metolazone 5mg and see how he responds  Continue Coreg and Aldactone   Will add Digoxin for rate control and CHF  Check 2D echo Care everywhere tab mentions that patient completed LHC at [facility] in 2018 and has  unknown etiology of his cardiomyopathy.   Heart healthy diet  Limit fluid intake 50-60 oz   Patient has been counseled on importance of med and diet compliance       3/15/19  -Echo shows biventricular failure with EF 10%  -continue aggressive diuresis with Bumex 2mg TID and Metolazone 5mg daily   -Start KCL 40 meq daily   -Keep Mg >2.0  -will need to be fit for lifevest when closer to discharge   -Patient reports having LHC at [facility] in Nov 2018 that showed normal coronaries. Will request report  -Dr. Nash plans to cardiovert patient when he is more compensated. Hopefully this will help with his CHF. Will also need OP referral to EP for ICD implant and possible RFA  -Continue Coreg, Losartan and Aldactone     3/16/19  -Patient still volume overloaded but improving  -Continue diuresis with Bumex and Zaroxolyn  -K low, replete, KCl increased to 40 mEQ BID  -Coreg increased to 25 mg BID  -Spironolactone increased to 50 mg daily  -Continue Losartan, digxoin  -Needs LifeVest prior to  d/c      3/17/19  -Volume status continues to improve  -Continue IV Bumex, Zaroxolyn  -Continue Coreg, Aldactone  -Increase Losartan to 50 mg daily  -K low; give extra 40 MEQ x 1 dose; continue 40 mEQ BID  -Repeat labs in AM  -Counseled extensively on dietary and fluid restrictions    3/18/19  -Good diuresis overnight, creatinine stable  -Continue Coreg, Aldactone, Losartan  -Replete K  -Continue IV diuresis and Zaroxolyn  -LifeVest prior to d/c     Morbid obesity    -Weight loss/risk factor modification     BRINDA on CPAP    -Continue nightly CPAP use      High transaminase levels    Improving with diuresis      CKD (chronic kidney disease) stage 3, GFR 30-59 ml/min    -Creatinine stable  -K low,replete     Essential hypertension    -Meds further adjusted  -Assess response     Atrial flutter    Recommend adding Digoxin for rate control  Increase Amio to 200mg BID  Continue Eliqius 5mg BID, patient has been taking once daily  Resume Coreg    CPAP compliance strongly encouraged to help with A-fib rate control     3/15/19  -Will plan to diurese and cardiovert when more compensated   -continue Amio, Digoxin, Coreg and Eliquis for now     3/16/19  -Remains in aflutter with variable rate  -Increase Coreg to 25 mg BID  -Continue amiodarone, digoxin  -Monitor overnight  -Will need CAROL/DCCV once more compensated if he fails to convert    3/17/19  -Remains in aflutter  -Continue Coreg, digoxin, amiodarone  -NPO after MN for CAROL/DCCV in AM. All risks, benefits, and treatment alternatives explained to patient in detail. All questions answered. He has agreed to proceed.     3/18/19  -Still in aflutter   -Continue Coreg, digoxin, amiodarone  -CAROL/DCCV today. All risks, benefits, and treatment alternatives explained to patient in detail. All questions answered. He has agreed to proceed  -Continue Eliquis for CVA prophylaxis         VTE Risk Mitigation (From admission, onward)        Ordered     apixaban tablet 5 mg  2 times daily       03/14/19 1021     Place SRUTHI hose  Until discontinued      03/14/19 0140     Place sequential compression device  Until discontinued      03/14/19 0140     Reason for No Pharmacological VTE Prophylaxis  Once      03/14/19 0140     IP VTE HIGH RISK PATIENT  Once      03/14/19 0140          Sunita Stephenson PA-C  Cardiology  [facility] -     Chart reviewed. Dr. Nash examined patient and agrees with plan as outlined above.

## 2019-03-18 NOTE — ASSESSMENT & PLAN NOTE
- hold statin  - likely due to hepatic congestion due to CHF exacerbation  - treat CHF  - monitor daily CMP, if not improved after improvement of exacerbation then will evaluate for other causes    Stable  3/17- now resolved , will monitor

## 2019-03-18 NOTE — ANESTHESIA POSTPROCEDURE EVALUATION
"Anesthesia Post Evaluation    Patient: Chacha Zendejas    Procedure(s) Performed: Procedure(s) (LRB):  ECHOCARDIOGRAM,TRANSESOPHAGEAL (N/A)  CARDIOVERSION OR DEFIBRILLATION (N/A)    Final Anesthesia Type: MAC  Patient location: [facility].  Patient participation: Yes- Able to Participate  Level of consciousness: awake and alert and oriented  Post-procedure vital signs: reviewed and stable  Pain management: adequate  Airway patency: patent  PONV status at discharge: No PONV  Anesthetic complications: no      Cardiovascular status: blood pressure returned to baseline and hemodynamically stable  Respiratory status: unassisted  Hydration status: euvolemic  Follow-up not needed.        Visit Vitals  /62 (BP Location: Left arm, Patient Position: Sitting)   Pulse 107   Temp 36.5 °C (97.7 °F) (Oral)   Resp 20   Ht 5' 7" (1.702 m)   Wt (!) 203.6 kg (448 lb 13.7 oz)   SpO2 (!) 94%   BMI 70.30 kg/m²       Pain/Ernesto Score: No Data Recorded      "

## 2019-03-18 NOTE — ASSESSMENT & PLAN NOTE
- h/o combined systolic/diastolic heart failure, left and right sided heart failure, unclear etiology, appears LHC was done at [facility] but no documentation available  - last TTE 2/2019 at [facility] with severe dilated LV with LVEF 20-25% with global hypokinesis, severe concentric LVH, moderately dilated RV with moderate RV systolic dysfunction, elevated CVP  - given 80mg IV lasix in ER    - hold PO lasix, spironolactone, lisinopril  - continue 80mg IV lasix TID  - continue carvedilol 12.5mg PO bid  - strict I/O, daily weights, fluid restriction diet  - check TTE after rate controlled  - consult cardiology in AM for medication optimization, possible AICD/lifevest    - good response to diuresis-- continue diuresis and continue fluid and salt restriction    3/16- cardiology follow up appreciated, continue coreg, losartan,continue bumex, spironolactone, metolazone, will need life zest   3/17- will continue aggressive diuresis , continue coreg, losartan,bumex, cardiology follow up

## 2019-03-18 NOTE — ASSESSMENT & PLAN NOTE
- likely acute on chronic kidney failure due to chf exacerbation  - unclear baseline Cr  - hold lisinopril, spironolactone  - check UA,  IV diuresis for CHF  - monitor I/O  - replete electrolytes prn    - stable CKD 3  3/16- will monitor closely, serum creatinine is 2.1, will continue losartan.  3/17- serum creatinine is 1.8- will continue to monitor renal function  3/18 - creatinine 1.7

## 2019-03-18 NOTE — ASSESSMENT & PLAN NOTE
- EKG 2:1 atrial flutter HR 110s-120s  - continue amiodarone, coreg, digoxin and apixaban at home dose  - TTE in AM 3/18  - consult cardiology for possible ablation

## 2019-03-18 NOTE — ANESTHESIA RELEASE NOTE
"Anesthesia Release from PACU Note    Patient: Chacha Zendejas    Procedure(s) Performed: Procedure(s) (LRB):  ECHOCARDIOGRAM,TRANSESOPHAGEAL (N/A)  CARDIOVERSION OR DEFIBRILLATION (N/A)    Anesthesia type: MAC    Post pain: Adequate analgesia    Post assessment: no apparent anesthetic complications, tolerated procedure well and no evidence of recall    Last Vitals:   Visit Vitals  /62 (BP Location: Left arm, Patient Position: Sitting)   Pulse 107   Temp 36.5 °C (97.7 °F) (Oral)   Resp 20   Ht 5' 7" (1.702 m)   Wt (!) 203.6 kg (448 lb 13.7 oz)   SpO2 (!) 94%   BMI 70.30 kg/m²       Post vital signs: stable    Level of consciousness: sedated    Nausea/Vomiting: no nausea/no vomiting    Complications: none    Airway Patency: patent    Respiratory: unassisted, spontaneous ventilation, nasal cannula    Cardiovascular: stable and blood pressure at baseline    Hydration: euvolemic  "

## 2019-03-19 VITALS
RESPIRATION RATE: 18 BRPM | DIASTOLIC BLOOD PRESSURE: 90 MMHG | BODY MASS INDEX: 49.44 KG/M2 | TEMPERATURE: 98 F | WEIGHT: 315 LBS | HEIGHT: 67 IN | HEART RATE: 86 BPM | SYSTOLIC BLOOD PRESSURE: 122 MMHG | OXYGEN SATURATION: 96 %

## 2019-03-19 PROBLEM — E87.6 HYPOKALEMIA: Status: RESOLVED | Noted: 2019-03-18 | Resolved: 2019-03-19

## 2019-03-19 PROBLEM — R74.01 HIGH TRANSAMINASE LEVELS: Status: RESOLVED | Noted: 2019-03-14 | Resolved: 2019-03-19

## 2019-03-19 LAB
ALBUMIN SERPL BCP-MCNC: 3.1 G/DL
ALP SERPL-CCNC: 137 U/L
ALT SERPL W/O P-5'-P-CCNC: 42 U/L
ANION GAP SERPL CALC-SCNC: 9 MMOL/L
AST SERPL-CCNC: 26 U/L
BASOPHILS # BLD AUTO: 0.01 K/UL
BASOPHILS NFR BLD: 0.2 %
BILIRUB SERPL-MCNC: 1.8 MG/DL
BUN SERPL-MCNC: 32 MG/DL
CALCIUM SERPL-MCNC: 10.1 MG/DL
CHLORIDE SERPL-SCNC: 88 MMOL/L
CO2 SERPL-SCNC: 43 MMOL/L
CREAT SERPL-MCNC: 1.9 MG/DL
DIFFERENTIAL METHOD: ABNORMAL
EOSINOPHIL # BLD AUTO: 0.2 K/UL
EOSINOPHIL NFR BLD: 2.3 %
ERYTHROCYTE [DISTWIDTH] IN BLOOD BY AUTOMATED COUNT: 17.7 %
EST. GFR  (AFRICAN AMERICAN): 50 ML/MIN/1.73 M^2
EST. GFR  (NON AFRICAN AMERICAN): 43 ML/MIN/1.73 M^2
GLUCOSE SERPL-MCNC: 95 MG/DL
HCT VFR BLD AUTO: 41.3 %
HGB BLD-MCNC: 13.2 G/DL
LYMPHOCYTES # BLD AUTO: 1.9 K/UL
LYMPHOCYTES NFR BLD: 29.1 %
MAGNESIUM SERPL-MCNC: 1.6 MG/DL
MCH RBC QN AUTO: 29.9 PG
MCHC RBC AUTO-ENTMCNC: 32 G/DL
MCV RBC AUTO: 94 FL
MITRAL VALVE REGURGITATION: ABNORMAL
MONOCYTES # BLD AUTO: 0.9 K/UL
MONOCYTES NFR BLD: 14.4 %
NEUTROPHILS # BLD AUTO: 3.5 K/UL
NEUTROPHILS NFR BLD: 54 %
PHOSPHATE SERPL-MCNC: 4.9 MG/DL
PLATELET # BLD AUTO: 248 K/UL
PMV BLD AUTO: 10.7 FL
POTASSIUM SERPL-SCNC: 3.7 MMOL/L
PROT SERPL-MCNC: 7.9 G/DL
RBC # BLD AUTO: 4.41 M/UL
RETIRED EF AND QEF - SEE NOTES: 20 (ref 55–65)
SODIUM SERPL-SCNC: 140 MMOL/L
TRICUSPID VALVE REGURGITATION: ABNORMAL
WBC # BLD AUTO: 6.39 K/UL

## 2019-03-19 PROCEDURE — 25000003 PHARM REV CODE 250: Performed by: EMERGENCY MEDICINE

## 2019-03-19 PROCEDURE — 80053 COMPREHEN METABOLIC PANEL: CPT

## 2019-03-19 PROCEDURE — 25000003 PHARM REV CODE 250: Performed by: INTERNAL MEDICINE

## 2019-03-19 PROCEDURE — 83735 ASSAY OF MAGNESIUM: CPT

## 2019-03-19 PROCEDURE — 25000003 PHARM REV CODE 250: Performed by: PHYSICIAN ASSISTANT

## 2019-03-19 PROCEDURE — 94761 N-INVAS EAR/PLS OXIMETRY MLT: CPT

## 2019-03-19 PROCEDURE — 25000003 PHARM REV CODE 250: Performed by: HOSPITALIST

## 2019-03-19 PROCEDURE — 85025 COMPLETE CBC W/AUTO DIFF WBC: CPT

## 2019-03-19 PROCEDURE — 99233 SBSQ HOSP IP/OBS HIGH 50: CPT | Mod: ,,, | Performed by: INTERNAL MEDICINE

## 2019-03-19 PROCEDURE — 36415 COLL VENOUS BLD VENIPUNCTURE: CPT

## 2019-03-19 PROCEDURE — 84100 ASSAY OF PHOSPHORUS: CPT

## 2019-03-19 PROCEDURE — 99233 PR SUBSEQUENT HOSPITAL CARE,LEVL III: ICD-10-PCS | Mod: ,,, | Performed by: INTERNAL MEDICINE

## 2019-03-19 PROCEDURE — S0171 BUMETANIDE 0.5 MG: HCPCS | Performed by: INTERNAL MEDICINE

## 2019-03-19 RX ORDER — DIGOXIN 125 MCG
0.12 TABLET ORAL DAILY
Qty: 30 TABLET | Refills: 1 | Status: SHIPPED | OUTPATIENT
Start: 2019-03-20 | End: 2019-04-05 | Stop reason: SDUPTHER

## 2019-03-19 RX ORDER — METOLAZONE 5 MG/1
5 TABLET ORAL
Qty: 30 TABLET | Refills: 1 | Status: SHIPPED | OUTPATIENT
Start: 2019-03-20 | End: 2019-04-05 | Stop reason: SDUPTHER

## 2019-03-19 RX ORDER — LOSARTAN POTASSIUM 50 MG/1
50 TABLET ORAL DAILY
Qty: 90 TABLET | Refills: 3 | Status: SHIPPED | OUTPATIENT
Start: 2019-03-20 | End: 2019-04-05 | Stop reason: SDUPTHER

## 2019-03-19 RX ORDER — CARVEDILOL 25 MG/1
25 TABLET ORAL 2 TIMES DAILY WITH MEALS
Qty: 60 TABLET | Refills: 1 | Status: SHIPPED | OUTPATIENT
Start: 2019-03-19 | End: 2019-04-05 | Stop reason: SDUPTHER

## 2019-03-19 RX ORDER — LEVOTHYROXINE SODIUM 50 UG/1
50 TABLET ORAL
Qty: 30 TABLET | Refills: 1 | Status: SHIPPED | OUTPATIENT
Start: 2019-03-20 | End: 2019-04-05 | Stop reason: SDUPTHER

## 2019-03-19 RX ORDER — SPIRONOLACTONE 50 MG/1
50 TABLET, FILM COATED ORAL DAILY
Qty: 30 TABLET | Refills: 1 | Status: SHIPPED | OUTPATIENT
Start: 2019-03-20 | End: 2019-04-05 | Stop reason: SDUPTHER

## 2019-03-19 RX ORDER — BUMETANIDE 2 MG/1
2 TABLET ORAL 2 TIMES DAILY
Qty: 60 TABLET | Refills: 0 | Status: SHIPPED | OUTPATIENT
Start: 2019-03-19 | End: 2019-04-05 | Stop reason: SDUPTHER

## 2019-03-19 RX ORDER — POTASSIUM CHLORIDE 20 MEQ/1
40 TABLET, EXTENDED RELEASE ORAL 2 TIMES DAILY
Qty: 120 TABLET | Refills: 1 | Status: SHIPPED | OUTPATIENT
Start: 2019-03-19 | End: 2019-04-05 | Stop reason: SDUPTHER

## 2019-03-19 RX ADMIN — CARVEDILOL 25 MG: 12.5 TABLET, FILM COATED ORAL at 09:03

## 2019-03-19 RX ADMIN — BUMETANIDE 2 MG: 0.25 INJECTION INTRAMUSCULAR; INTRAVENOUS at 08:03

## 2019-03-19 RX ADMIN — AMIODARONE HYDROCHLORIDE 200 MG: 200 TABLET ORAL at 09:03

## 2019-03-19 RX ADMIN — DIGOXIN 0.12 MG: 125 TABLET ORAL at 09:03

## 2019-03-19 RX ADMIN — LOSARTAN POTASSIUM 50 MG: 50 TABLET, FILM COATED ORAL at 09:03

## 2019-03-19 RX ADMIN — METOLAZONE 5 MG: 5 TABLET ORAL at 09:03

## 2019-03-19 RX ADMIN — SPIRONOLACTONE 50 MG: 25 TABLET ORAL at 09:03

## 2019-03-19 RX ADMIN — LEVOTHYROXINE SODIUM 50 MCG: 50 TABLET ORAL at 06:03

## 2019-03-19 RX ADMIN — POTASSIUM CHLORIDE 40 MEQ: 1500 TABLET, EXTENDED RELEASE ORAL at 09:03

## 2019-03-19 RX ADMIN — APIXABAN 5 MG: 2.5 TABLET, FILM COATED ORAL at 09:03

## 2019-03-19 NOTE — NURSING
Follow up with Adalgisa Boyer on home O2.  Adalgisa stated the patient currently uses [employer] and they will deliver the oxygen to the patient's bedside within 2 hours.

## 2019-03-19 NOTE — DISCHARGE SUMMARY
[facility] Hospital Medicine  Discharge Summary      Patient Name: Chacha Zendejas  MRN: [id number]  Admission Date: 3/13/2019  Hospital Length of Stay: 5 days  Discharge Date and Time:  03/19/2019 3:24 PM  Attending Physician: Wallace Melissa MD   Discharging Provider: Emily Maloney NP  Primary Care Provider: Zara Olson NP      HPI:   38M h/o HFrEF, atrial flutter s/p ablation, HLD, and CKD presents with pedal edema.  Worse than baseline.  Associated with SOB, CREWS, orthopnea and increased abdominal girth x 2 weeks.  Presented to ER 2 weeks ago and was told to increase lasix 80mg PO BID to TID.  Patient reports compliance with medication change without improvement in symptoms.  Also c/o decrease activity, fatigue, weakness, daytime sleepiness.  Also report being noncompliant with CPAP at night due to discomfort. Denies fevers, chills, chest pain, HA, hemoptysis, long car rides, difficulty/decrease urinating, constipation or diarrhea.  Reports compliance on low sodium diet.  In ER, O2 sat 92% on 2L.  He was given 80mg IV lasix with 300cc urine output. HR 130s, EKG show atrial flutter 2:1 block.  Patient has not followed up with cardiology outpatient for possible repeat ablation or other options.  Patient restarted on home dose of amiodarone and carvedilol with improvement in HR.  Hospital medicine called for admission.         Procedure(s) (LRB):  ECHOCARDIOGRAM,TRANSESOPHAGEAL (N/A)  CARDIOVERSION OR DEFIBRILLATION (N/A)      Hospital Course:   3/15- looks and feels much better, leg swelling down, has lost about 6.2 L since admit. Remains in C Aflutter 2:1 @ 139, echo shows severe LV Dysfunction with EF 10%. Pt is totally non compliant with diet and meds. He and his family are obviously oblivious of his severe condition and continue too enjoy unlimited amount of salt and calories as well as water intake. This is his 3rd hospital admission in last 2 months and he was also admitted at [facility].  Cardiology plans  - will need to be fit for lifevest when closer to discharge   -Patient reports having LHC at [facility] in Nov 2018 that showed normal coronaries. Will request report  -Dr. Nash plans to cardiovert patient when he is more compensated. Hopefully this will help with his CHF. Will also need OP referral to EP for ICD implant and possible RFA   -Continue Coreg, Losartan and Aldactone, given a dose of Zaroxolyn earlier.    3/16- he was seen having breakfast.     Cardiac echo- 3/14   Severely depressed left ventricular systolic function (EF 10-15%).     6 - Right ventricular enlargement with moderately depressed systolic function.  3/17- family is in the room, he is scheduled for cardioversion in AM, remains on diuresis .  3/18 - pt not in respiratory distress. Has combined respiratory failure. Receiving spironolactone, metolazone and bumex. Being treated for A flutter and severe systolic heart failure. Awaiting CAROL/DCCV and placement of Lifevest per Cardiology. Pt awakens and denies any complaints.   3/19 - CAROL was negative for thrombus. DCCV was performed. A Lifevest could not be fitted due to patient's body habitus. SOB greatly improved. Home oxygen was ordered as patient qualified. Leg swelling decreased. Pt was cleared for discharge by Cardiology. He was seen and examined and determined to be safe and stable for discharge. Pt was advised to follow up with his PCP and Cardiologist.      Consults:   Consults (From admission, onward)        Status Ordering Provider     Inpatient consult to Cardiology  Once     Provider:  Heath Nash MD    Completed DINO FUENTES     Inpatient consult to Respiratory Care  Once     Provider:  (Not yet assigned)    Acknowledged DINO FUENTES     Inpatient consult to Social Work  Once     Provider:  (Not yet assigned)    Completed CHANI LATHAM          No new Assessment & Plan notes have been filed under this hospital service since the last note was  "generated.  Service: Hospital Medicine    Final Active Diagnoses:    Diagnosis Date Noted POA    PRINCIPAL PROBLEM:  Acute on chronic combined systolic and diastolic congestive heart failure [I50.43] 03/14/2019 Yes    Morbid obesity [E66.01] 03/18/2019 Yes    Stasis dermatitis of both legs [I87.2] 03/18/2019 Yes    Atrial flutter [I48.92] 03/14/2019 Yes    Essential hypertension [I10] 03/14/2019 Yes    CKD (chronic kidney disease) stage 3, GFR 30-59 ml/min [N18.3] 03/14/2019 Yes    BRINDA on CPAP [G47.33, Z99.89] 03/14/2019 Not Applicable      Problems Resolved During this Admission:    Diagnosis Date Noted Date Resolved POA    Hypokalemia [E87.6] 03/18/2019 03/19/2019 Yes    High transaminase levels [R74.0] 03/14/2019 03/19/2019 Yes       Discharged Condition: stable    Disposition: Home or Self Care    Follow Up:  Follow-up Information     Zara Olson NP In 3 days.    Specialty:  Internal Medicine  Why:  Hospital Follow Up - Congestive Heart Failure and Atrial Flutter  Contact information:  [address]  [facility]  [facility] [address]  [phone]             Mca Carbone MD In 1 week.    Specialty:  Cardiology  Why:  Hospital Follow Up - Severe Congestive Heart Failure, Atrial Flutter  Contact information:  [address] [place] [address]  [phone]                 Patient Instructions:      OXYGEN FOR HOME USE     Order Specific Question Answer Comments   Liter Flow 2    Duration Continuous    Qualifying SpO2: 82    Testing done at: Exercise/Activity    Route nasal cannula    Portable mode: pulse dose acceptable    Device home concentrator with portable unit    Length of need (in months): 99 mos    Patient condition with qualifying saturation CHF    Height: 5' 7" (1.702 m)    Weight: 203.6 kg (448 lb 13.7 oz)    Does patient have medical equipment at home? none    Alternative treatment measures have been tried or considered and deemed clinically ineffective. Yes      Notify your " health care provider if you experience any of the following:  temperature >100.4     Notify your health care provider if you experience any of the following:  difficulty breathing or increased cough     Notify your health care provider if you experience any of the following:  increased confusion or weakness     Activity as tolerated       Significant Diagnostic Studies: Labs:   CMP   Recent Labs   Lab 03/18/19  0458 03/19/19  0443    140   K 3.4* 3.7   CL 89* 88*   CO2 41* 43*   GLU 95 95   BUN 28* 32*   CREATININE 1.7* 1.9*   CALCIUM 10.1 10.1   PROT 7.6 7.9   ALBUMIN 3.0* 3.1*   BILITOT 1.6* 1.8*   ALKPHOS 137* 137*   AST 27 26   ALT 44 42   ANIONGAP 11 9   ESTGFRAFRICA 57* 50*   EGFRNONAA 49* 43*    and CBC   Recent Labs   Lab 03/18/19  0458 03/19/19  0443   WBC 7.14 6.39   HGB 13.0* 13.2*   HCT 40.6 41.3    248       Pending Diagnostic Studies:     None         Medications:  Reconciled Home Medications:      Medication List      START taking these medications    bumetanide 2 MG tablet  Commonly known as:  BUMEX  Take 1 tablet (2 mg total) by mouth 2 (two) times daily.     digoxin 125 mcg tablet  Commonly known as:  LANOXIN  Take 1 tablet (0.125 mg total) by mouth once daily.  Start taking on:  3/20/2019     levothyroxine 50 MCG tablet  Commonly known as:  SYNTHROID  Take 1 tablet (50 mcg total) by mouth before breakfast.  Start taking on:  3/20/2019     losartan 50 MG tablet  Commonly known as:  COZAAR  Take 1 tablet (50 mg total) by mouth once daily.  Start taking on:  3/20/2019     metOLazone 5 MG tablet  Commonly known as:  ZAROXOLYN  Take 1 tablet (5 mg total) by mouth 3 (three) times a week. Take 30 minutes before Bumex  Start taking on:  3/20/2019     potassium chloride SA 20 MEQ tablet  Commonly known as:  K-DUR,KLOR-CON  Take 2 tablets (40 mEq total) by mouth 2 (two) times daily.        CHANGE how you take these medications    carvedilol 25 MG tablet  Commonly known as:  COREG  Take 1 tablet  (25 mg total) by mouth 2 (two) times daily with meals.  What changed:    · medication strength  · how much to take     spironolactone 50 MG tablet  Commonly known as:  ALDACTONE  Take 1 tablet (50 mg total) by mouth once daily.  Start taking on:  3/20/2019  What changed:    · medication strength  · how much to take  · when to take this        CONTINUE taking these medications    amiodarone 200 MG Tab  Commonly known as:  PACERONE  Take by mouth once daily.     apixaban 5 mg Tab  Commonly known as:  ELIQUIS  Take 1 tablet (5 mg total) by mouth once daily.     atorvastatin 20 MG tablet  Commonly known as:  LIPITOR  Take 20 mg by mouth once daily.        STOP taking these medications    furosemide 80 MG tablet  Commonly known as:  LASIX     hydrALAZINE 25 MG tablet  Commonly known as:  APRESOLINE     isosorbide dinitrate 20 MG tablet  Commonly known as:  ISORDIL     lisinopril 20 MG tablet  Commonly known as:  PRINIVIL,ZESTRIL            Indwelling Lines/Drains at time of discharge:   Lines/Drains/Airways          None          Time spent on the discharge of patient: > 30 minutes  Patient was seen and examined on the date of discharge and determined to be suitable for discharge.         Emily Maloney NP  Department of Hospital Medicine  [facility]

## 2019-03-19 NOTE — PLAN OF CARE
Patient d/c home. He has CPAP with [employer].  CM awaiting O2 to be delivered. Patient cardiovertered yesterday and will not need a Life Vest.      03/19/19 1522   Final Note   Assessment Type Final Discharge Note   Anticipated Discharge Disposition Home   Discharge plans and expectations educations in teach back method with documentation complete? Yes   Right Care Referral Info   Post Acute Recommendation No Care

## 2019-03-19 NOTE — NURSING
Discharge instructions reviewed with patient and family, all verbalized understanding. Waiting for delivery of  Home O2 before patient can be discharged.

## 2019-03-19 NOTE — SUBJECTIVE & OBJECTIVE
Review of Systems   Constitution: Negative.   HENT: Negative.    Eyes: Negative.    Cardiovascular: Positive for dyspnea on exertion and leg swelling.   Respiratory: Positive for shortness of breath (greatly improved).    Endocrine: Negative.    Hematologic/Lymphatic: Negative.    Skin: Negative.    Musculoskeletal: Negative.    Gastrointestinal: Negative.    Genitourinary: Negative.    Neurological: Negative.    Psychiatric/Behavioral: Negative.    Allergic/Immunologic: Negative.      Objective:     Vital Signs (Most Recent):  Temp: 98.1 °F (36.7 °C) (03/19/19 0724)  Pulse: 99 (03/19/19 0905)  Resp: 18 (03/19/19 0724)  BP: 134/80 (03/19/19 0724)  SpO2: 96 % (03/19/19 0724) Vital Signs (24h Range):  Temp:  [97.4 °F (36.3 °C)-98.6 °F (37 °C)] 98.1 °F (36.7 °C)  Pulse:  [] 99  Resp:  [16-20] 18  SpO2:  [90 %-100 %] 96 %  BP: ()/(52-80) 134/80     Weight: (!) 203.6 kg (448 lb 13.7 oz)  Body mass index is 70.3 kg/m².     SpO2: 96 %  O2 Device (Oxygen Therapy): nasal cannula      Intake/Output Summary (Last 24 hours) at 3/19/2019 1132  Last data filed at 3/19/2019 0724  Gross per 24 hour   Intake 538 ml   Output 2950 ml   Net -2412 ml       Lines/Drains/Airways     Peripheral Intravenous Line                 Peripheral IV - Single Lumen 03/19/19 0840 Anterior;Left Forearm less than 1 day                Physical Exam   Constitutional: He is oriented to person, place, and time. He appears well-developed and well-nourished. No distress.   HENT:   Head: Normocephalic and atraumatic.   Eyes: Pupils are equal, round, and reactive to light. Right eye exhibits no discharge. Left eye exhibits no discharge.   Neck: Neck supple. No JVD present.   Cardiovascular: Normal rate, regular rhythm, S1 normal, S2 normal and normal heart sounds.   No murmur heard.  Pulmonary/Chest: Effort normal and breath sounds normal. No respiratory distress. He has no wheezes. He has no rales.   Abdominal: Soft. He exhibits no distension.    Obese abdomen     Musculoskeletal: He exhibits edema (hard BLE (improved)).   Neurological: He is alert and oriented to person, place, and time.   Skin: Skin is warm and dry. He is not diaphoretic. No erythema.   Psychiatric: He has a normal mood and affect. His behavior is normal. Thought content normal.   Nursing note and vitals reviewed.      Significant Labs:   CMP   Recent Labs   Lab 03/18/19 0458 03/19/19  0443    140   K 3.4* 3.7   CL 89* 88*   CO2 41* 43*   GLU 95 95   BUN 28* 32*   CREATININE 1.7* 1.9*   CALCIUM 10.1 10.1   PROT 7.6 7.9   ALBUMIN 3.0* 3.1*   BILITOT 1.6* 1.8*   ALKPHOS 137* 137*   AST 27 26   ALT 44 42   ANIONGAP 11 9   ESTGFRAFRICA 57* 50*   EGFRNONAA 49* 43*   , CBC   Recent Labs   Lab 03/18/19 0458 03/19/19  0443   WBC 7.14 6.39   HGB 13.0* 13.2*   HCT 40.6 41.3    248   , Troponin No results for input(s): TROPONINI in the last 48 hours. and All pertinent lab results from the last 24 hours have been reviewed.    Significant Imaging: Echocardiogram:   2D echo with color flow doppler:   Results for orders placed or performed during the hospital encounter of 03/13/19   2D echo with color flow doppler   Result Value Ref Range    QEF 10 (A) 55 - 65    Mitral Valve Regurgitation MODERATE TO SEVERE (A)     Est. PA Systolic Pressure 38.44     Tricuspid Valve Regurgitation MILD TO MODERATE    , EKG: Reviewed and X-Ray: CXR: X-Ray Chest 1 View (CXR): No results found for this visit on 03/13/19. and X-Ray Chest PA and Lateral (CXR):   Results for orders placed or performed during the hospital encounter of 03/13/19   X-Ray Chest PA And Lateral    Narrative    EXAMINATION:  XR CHEST PA AND LATERAL    CLINICAL HISTORY  Shortness of breath.,    COMPARISON:  03/04/2019    FINDINGS:  Multiple wire leads overlie the chest.  The cardiac size appears enlarged.  There appears to be vascular prominence.  Possible CHF/pulmonary edema.      Impression    Please see above.      Electronically  signed by: Ayaan Hooper MD  Date:    03/13/2019  Time:    22:36

## 2019-03-19 NOTE — PROGRESS NOTES
[facility]  Cardiology  Progress Note    Patient Name: Chacha Zendejas  MRN: [id number]  Admission Date: 3/13/2019  Hospital Length of Stay: 5 days  Code Status: Full Code   Attending Physician: Wallace Melissa MD   Primary Care Physician: Zara Olson NP  Expected Discharge Date: 3/19/2019  Principal Problem:Acute on chronic combined systolic and diastolic congestive heart failure    Subjective:   HPI:  He presented to [facility] ED with complaints of weight gain, SOB, CREWS, orthopnea and increased abrominal girth over the last 2 weeks. He has a history of noncompliance with his low-sodium diet. Started on Amio during admission in Feb.     Hasn't been taking all DC meds from [facility]. Has been taking his Eliquis once daily. He states that his weight is up 80 lbs from his baseline. Has orthopnea,SOB, worsening edema. Admits that he has been taking Lasix 80mg TID and has been compliant with CPAP use. In ER, O2 sat 92% on 2L.  He was given 80mg IV lasix with 300cc urine output. HR 130s, EKG show atrial flutter 2:1 block.  Patient has not followed up with cardiology after leaving AMA from [facility]. Plans were in place to discuss possible ablation. According to care everywhere there is mention of patient undergoing LHC at [facility] in 2018 and has unknown etiology of CHF.     Hospital Course:   3/15/19- Echo shows BiV failure with LVEF 10%, bi atrial enlargement, PA pressure 38mmHg,moderate to severe MR and TR. Has diuresed 6.2 liters since admit. Transferred to [facility] and feels much better this morning. A-flutter rate controlled. Labs and vitals stable     3/16/19-Patient seen and examined, sitting up in chair. Feeling better. SOB improving. Remains in aflutter with variable rate, meds further adjusted. Labs reviewed.     3/17/19-Patient seen and examined today, sitting up in chair. Continues to feel better. SOB and edema improving. Remains in aflutter. HR controlled at time of exam. Labs reviewed. Creatinine 1.8, K  3.0.    3/18/19-Patient seen and examined today, sitting in chair. Feels well. SOB and edema continue to improve. No chest pain. Remains in aflutter. CAROL/DCCV planned for today. Needs LifeVest prior to d/c.     3/19/19-Patient seen and examined today, resting in bed. Feels well. No complaints. SOB greatly improved. No other complaints. Remains in SR s/p DCCV yesterday. Labs reviewed, stable. LifeVest ordered but did not fit due to patient's body habitus.         Review of Systems   Constitution: Negative.   HENT: Negative.    Eyes: Negative.    Cardiovascular: Positive for dyspnea on exertion and leg swelling.   Respiratory: Positive for shortness of breath (greatly improved).    Endocrine: Negative.    Hematologic/Lymphatic: Negative.    Skin: Negative.    Musculoskeletal: Negative.    Gastrointestinal: Negative.    Genitourinary: Negative.    Neurological: Negative.    Psychiatric/Behavioral: Negative.    Allergic/Immunologic: Negative.      Objective:     Vital Signs (Most Recent):  Temp: 98.1 °F (36.7 °C) (03/19/19 0724)  Pulse: 99 (03/19/19 0905)  Resp: 18 (03/19/19 0724)  BP: 134/80 (03/19/19 0724)  SpO2: 96 % (03/19/19 0724) Vital Signs (24h Range):  Temp:  [97.4 °F (36.3 °C)-98.6 °F (37 °C)] 98.1 °F (36.7 °C)  Pulse:  [] 99  Resp:  [16-20] 18  SpO2:  [90 %-100 %] 96 %  BP: ()/(52-80) 134/80     Weight: (!) 203.6 kg (448 lb 13.7 oz)  Body mass index is 70.3 kg/m².     SpO2: 96 %  O2 Device (Oxygen Therapy): nasal cannula      Intake/Output Summary (Last 24 hours) at 3/19/2019 1132  Last data filed at 3/19/2019 0724  Gross per 24 hour   Intake 538 ml   Output 2950 ml   Net -2412 ml       Lines/Drains/Airways     Peripheral Intravenous Line                 Peripheral IV - Single Lumen 03/19/19 0840 Anterior;Left Forearm less than 1 day                Physical Exam   Constitutional: He is oriented to person, place, and time. He appears well-developed and well-nourished. No distress.   HENT:   Head:  Normocephalic and atraumatic.   Eyes: Pupils are equal, round, and reactive to light. Right eye exhibits no discharge. Left eye exhibits no discharge.   Neck: Neck supple. No JVD present.   Cardiovascular: Normal rate, regular rhythm, S1 normal, S2 normal and normal heart sounds.   No murmur heard.  Pulmonary/Chest: Effort normal and breath sounds normal. No respiratory distress. He has no wheezes. He has no rales.   Abdominal: Soft. He exhibits no distension.   Obese abdomen     Musculoskeletal: He exhibits edema (hard BLE (improved)).   Neurological: He is alert and oriented to person, place, and time.   Skin: Skin is warm and dry. He is not diaphoretic. No erythema.   Psychiatric: He has a normal mood and affect. His behavior is normal. Thought content normal.   Nursing note and vitals reviewed.      Significant Labs:   CMP   Recent Labs   Lab 03/18/19 0458 03/19/19  0443    140   K 3.4* 3.7   CL 89* 88*   CO2 41* 43*   GLU 95 95   BUN 28* 32*   CREATININE 1.7* 1.9*   CALCIUM 10.1 10.1   PROT 7.6 7.9   ALBUMIN 3.0* 3.1*   BILITOT 1.6* 1.8*   ALKPHOS 137* 137*   AST 27 26   ALT 44 42   ANIONGAP 11 9   ESTGFRAFRICA 57* 50*   EGFRNONAA 49* 43*   , CBC   Recent Labs   Lab 03/18/19  0458 03/19/19  0443   WBC 7.14 6.39   HGB 13.0* 13.2*   HCT 40.6 41.3    248   , Troponin No results for input(s): TROPONINI in the last 48 hours. and All pertinent lab results from the last 24 hours have been reviewed.    Significant Imaging: Echocardiogram:   2D echo with color flow doppler:   Results for orders placed or performed during the hospital encounter of 03/13/19   2D echo with color flow doppler   Result Value Ref Range    QEF 10 (A) 55 - 65    Mitral Valve Regurgitation MODERATE TO SEVERE (A)     Est. PA Systolic Pressure 38.44     Tricuspid Valve Regurgitation MILD TO MODERATE    , EKG: Reviewed and X-Ray: CXR: X-Ray Chest 1 View (CXR): No results found for this visit on 03/13/19. and X-Ray Chest PA and Lateral  (CXR):   Results for orders placed or performed during the hospital encounter of 03/13/19   X-Ray Chest PA And Lateral    Narrative    EXAMINATION:  XR CHEST PA AND LATERAL    CLINICAL HISTORY  Shortness of breath.,    COMPARISON:  03/04/2019    FINDINGS:  Multiple wire leads overlie the chest.  The cardiac size appears enlarged.  There appears to be vascular prominence.  Possible CHF/pulmonary edema.      Impression    Please see above.      Electronically signed by: Ayaan Hooper MD  Date:    03/13/2019  Time:    22:36     Assessment and Plan:   Patient who presents with combined CHF/CMPY and atrial flutter. Remains in SR s/p DCCV yesterday. Meds adjusted. Needs close f/u in clinic.     * Acute on chronic combined systolic and diastolic congestive heart failure    Patient needs aggressive diuresis.   Recommend Bumex 2mg TID  Will given 1 dose of Metolazone 5mg and see how he responds  Continue Coreg and Aldactone   Will add Digoxin for rate control and CHF  Check 2D echo Care everywhere tab mentions that patient completed LHC at [facility] in 2018 and has  unknown etiology of his cardiomyopathy.   Heart healthy diet  Limit fluid intake 50-60 oz   Patient has been counseled on importance of med and diet compliance       3/15/19  -Echo shows biventricular failure with EF 10%  -continue aggressive diuresis with Bumex 2mg TID and Metolazone 5mg daily   -Start KCL 40 meq daily   -Keep Mg >2.0  -will need to be fit for lifevest when closer to discharge   -Patient reports having LHC at [facility] in Nov 2018 that showed normal coronaries. Will request report  -Dr. Nash plans to cardiovert patient when he is more compensated. Hopefully this will help with his CHF. Will also need OP referral to EP for ICD implant and possible RFA  -Continue Coreg, Losartan and Aldactone     3/16/19  -Patient still volume overloaded but improving  -Continue diuresis with Bumex and Zaroxolyn  -K low, replete, KCl increased to 40 mEQ BID  -Coreg  increased to 25 mg BID  -Spironolactone increased to 50 mg daily  -Continue Losartan, digxoin  -Needs LifeVest prior to d/c      3/17/19  -Volume status continues to improve  -Continue IV Bumex, Zaroxolyn  -Continue Coreg, Aldactone  -Increase Losartan to 50 mg daily  -K low; give extra 40 MEQ x 1 dose; continue 40 mEQ BID  -Repeat labs in AM  -Counseled extensively on dietary and fluid restrictions    3/18/19  -Good diuresis overnight, creatinine stable  -Continue Coreg, Aldactone, Losartan  -Replete K  -Continue IV diuresis and Zaroxolyn  -LifeVest prior to d/c    3/19/19  -Continues to improve  -Feels back to baseline  -Cath report from [facility] in 9/18 reviewed; showed normal coronaries   -Switch Bumex to po-2 mg BID  -Continue Coreg, Aldactone, Losartan  -Recommend Zaroxolyn 3x weekly  -Can send home on low dose K supplement 20 mEQ daily with close lab work and f/u in clinic this week  -Patient counseled extensively on dietary restrictions and need for compliance      Morbid obesity    -Weight loss/risk factor modification     BRINDA on CPAP    -Continue nightly CPAP use      High transaminase levels    Improving with diuresis      CKD (chronic kidney disease) stage 3, GFR 30-59 ml/min    -Creatinine stable       Essential hypertension    -Meds further adjusted  -Assess response     Atrial flutter    Recommend adding Digoxin for rate control  Increase Amio to 200mg BID  Continue Eliqius 5mg BID, patient has been taking once daily  Resume Coreg    CPAP compliance strongly encouraged to help with A-fib rate control     3/15/19  -Will plan to diurese and cardiovert when more compensated   -continue Amio, Digoxin, Coreg and Eliquis for now     3/16/19  -Remains in aflutter with variable rate  -Increase Coreg to 25 mg BID  -Continue amiodarone, digoxin  -Monitor overnight  -Will need CAROL/DCCV once more compensated if he fails to convert    3/17/19  -Remains in aflutter  -Continue Coreg, digoxin, amiodarone  -NPO  after MN for CAROL/DCCV in AM. All risks, benefits, and treatment alternatives explained to patient in detail. All questions answered. He has agreed to proceed.     3/18/19  -Still in aflutter   -Continue Coreg, digoxin, amiodarone  -CAROL/DCCV today. All risks, benefits, and treatment alternatives explained to patient in detail. All questions answered. He has agreed to proceed  -Continue Eliquis for CVA prophylaxis    3/19/19  -Converted to SR  -Continue Coreg, digoxin, amiodarone  -Switch amiodarone to 200 mg daily in 1 week  -Continue Eliquis for CVA prophylaxis          VTE Risk Mitigation (From admission, onward)        Ordered     apixaban tablet 5 mg  2 times daily      03/14/19 1021     Place SRUTHI hose  Until discontinued      03/14/19 0140     Place sequential compression device  Until discontinued      03/14/19 0140     Reason for No Pharmacological VTE Prophylaxis  Once      03/14/19 0140     IP VTE HIGH RISK PATIENT  Once      03/14/19 0140          Sunita Stephenson PA-C  Cardiology  [facility]     Chart reviewed. Dr. Mayberry examined patient and agrees with  plan as outlined above.

## 2019-03-19 NOTE — ASSESSMENT & PLAN NOTE
Patient needs aggressive diuresis.   Recommend Bumex 2mg TID  Will given 1 dose of Metolazone 5mg and see how he responds  Continue Coreg and Aldactone   Will add Digoxin for rate control and CHF  Check 2D echo Care everywhere tab mentions that patient completed LHC at [facility] in 2018 and has  unknown etiology of his cardiomyopathy.   Heart healthy diet  Limit fluid intake 50-60 oz   Patient has been counseled on importance of med and diet compliance       3/15/19  -Echo shows biventricular failure with EF 10%  -continue aggressive diuresis with Bumex 2mg TID and Metolazone 5mg daily   -Start KCL 40 meq daily   -Keep Mg >2.0  -will need to be fit for lifevest when closer to discharge   -Patient reports having LHC at [facility] in Nov 2018 that showed normal coronaries. Will request report  -Dr. Nash plans to cardiovert patient when he is more compensated. Hopefully this will help with his CHF. Will also need OP referral to EP for ICD implant and possible RFA  -Continue Coreg, Losartan and Aldactone     3/16/19  -Patient still volume overloaded but improving  -Continue diuresis with Bumex and Zaroxolyn  -K low, replete, KCl increased to 40 mEQ BID  -Coreg increased to 25 mg BID  -Spironolactone increased to 50 mg daily  -Continue Losartan, digxoin  -Needs LifeVest prior to d/c      3/17/19  -Volume status continues to improve  -Continue IV Bumex, Zaroxolyn  -Continue Coreg, Aldactone  -Increase Losartan to 50 mg daily  -K low; give extra 40 MEQ x 1 dose; continue 40 mEQ BID  -Repeat labs in AM  -Counseled extensively on dietary and fluid restrictions    3/18/19  -Good diuresis overnight, creatinine stable  -Continue Coreg, Aldactone, Losartan  -Replete K  -Continue IV diuresis and Zaroxolyn  -LifeVest prior to d/c    3/19/19  -Continues to improve  -Feels back to baseline  -Switch Bumex to po-2 mg BID  -Continue Coreg, Aldactone, Losartan  -Recommend Zaroxolyn 3x weekly  -Can send home on low dose K supplement 20 mEQ daily  with close lab work and f/u in clinic this week  -Patient counseled extensively on dietary restrictions and need for compliance

## 2019-03-19 NOTE — PLAN OF CARE
Problem: Adult Inpatient Plan of Care  Goal: Plan of Care Review  Outcome: Ongoing (interventions implemented as appropriate)  Plan of care reviewed with patient and family, all verbalized understanding. Pt remains free from falls, call light within reach and pt encouraged to call for assistance. Pt received cardioversion on this shift and now remains in SR. Pt remains on 1500 mL fluid restriction and encouraged to use urinal to measure intake and output. Pt remains on 2L of O2 via NC.

## 2019-03-19 NOTE — NURSING
Pt still awaiting home O2 but refused to wait for portable tank. Pt currently sitting in private vehicle. The pts mother is sitting in telemetry waiting room, awaiting O2.

## 2019-03-19 NOTE — PROGRESS NOTES
Home Oxygen Evaluation    Date Performed: 3/19/2019    1) Patient's Home O2 Sat on room air, while at rest 92        If O2 sats on room air at rest are 88% or below, patient qualifies. No additional testing needed. Document N/A in steps 2 and 3. If 89% or above, complete steps 2.      2) Patient's O2 Sat on room air while exercisin        If O2 sats on room air while exercising remain 89% or above patient does not qualify, no further testing needed Document N/A in step 3. If O2 sats on room air while exercising are 88% or below, continue to step 3.      3) Patient's O2 Sat while exercising on O2: 92 at 2 LPM         (Must show improvement from #2 for patients to qualify)    If O2 sats improve on oxygen, patient qualifies for portable oxygen. If not, the patient does not qualify.

## 2019-03-19 NOTE — ASSESSMENT & PLAN NOTE
Recommend adding Digoxin for rate control  Increase Amio to 200mg BID  Continue Eliqius 5mg BID, patient has been taking once daily  Resume Coreg    CPAP compliance strongly encouraged to help with A-fib rate control     3/15/19  -Will plan to diurese and cardiovert when more compensated   -continue Amio, Digoxin, Coreg and Eliquis for now     3/16/19  -Remains in aflutter with variable rate  -Increase Coreg to 25 mg BID  -Continue amiodarone, digoxin  -Monitor overnight  -Will need CAROL/DCCV once more compensated if he fails to convert    3/17/19  -Remains in aflutter  -Continue Coreg, digoxin, amiodarone  -NPO after MN for CAROL/DCCV in AM. All risks, benefits, and treatment alternatives explained to patient in detail. All questions answered. He has agreed to proceed.     3/18/19  -Still in aflutter   -Continue Coreg, digoxin, amiodarone  -CAROL/DCCV today. All risks, benefits, and treatment alternatives explained to patient in detail. All questions answered. He has agreed to proceed  -Continue Eliquis for CVA prophylaxis    3/19/19  -Converted to SR  -Continue Coreg, digoxin, amiodarone  -Switch amiodarone to 200 mg daily in 1 week  -Continue Eliquis for CVA prophylaxis

## 2019-03-19 NOTE — PHYSICIAN QUERY
PT Name: Chacha Zendejas  MR #: [id number]    Physician Query Form - Atrial Flutter Specificity Clarification     CDS/: Viridiana Jacobo               Contact information:[email or web address]  This form is a permanent document in the medical record.     Query Date: March 19, 2019    By submitting this query, we are merely seeking further clarification of documentation. Please utilize your independent clinical judgment when addressing the question(s) below.    The medical record contains the following:   Indicators     Supporting Clinical Findings Location in Medical Record   x Atrial Flutter Hx of Atrial flutter   H&P 3-14   x EKG results  EKG show atrial flutter 2:1 block.     H&P 3-14   x Medication Restarted on home dose of amiodarone and carvedilol  H&P 3-14    Treatment     x Other HR 130s, EKG show atrial flutter 2:1 block.  Patient has not followed up with cardiology outpatient for possible repeat ablation or other options.  Patient  restarted on home dose of amiodarone and carvedilol with improvement in HR   H&P 3-14     Provider, please further specify the Atrial Flutter diagnosis.    [   ] Atypical   [   ] Type I   [   ] Type II   [   x] Typical   [   ] Other (please specify):   [  ] Clinically Undetermined         Please document in your progress notes daily for the duration of treatment until resolved, and include in your discharge summary.

## 2019-03-19 NOTE — NURSING
Patient has redness and irritation to previous IV site to right AC.  Emily Maloney NP notified and encouraged application of warm pack to site.  Warm pack placed.

## 2019-03-19 NOTE — NURSING
Adalgisa Boyer contacted for update on home O2.  Adalgisa stated she was working on arranging home o2 for the patient.

## 2019-03-20 ENCOUNTER — TELEPHONE (OUTPATIENT)
Dept: CARDIOLOGY | Facility: HOSPITAL | Age: 39
End: 2019-03-20

## 2019-03-20 NOTE — PHYSICIAN QUERY
PT Name: Chacha Zendejas  MR #: [id number]    Physician Query Form - Respiratory Condition Clarification      CDS/: Viridiana Jacobo               Contact information:[email or web address]     This form is a permanent document in the medical record.    Query Date: March 20, 2019    By submitting this query, we are merely seeking further clarification of documentation. Please utilize your independent clinical judgment when addressing the question(s) below.    The Medical record contains the following   Indicators   Supporting Clinical Findings Location in Medical Record   X   SOB, CREWS, Wheezing, Productive Cough, Use of Accessory Muscles, etc. SOB,CREWS H&P 3-14   X   Acute/Chronic Illness BRINDA on CPAP  CHF exacerbation    - h/o combined systolic/diastolic heart failure, left and right sided heart failure    H&P 3-14      Radiology Findings     X   Respiratory Distress or Failure He is in respiratory distress   H&P      Hypoxia or Hypercapnia     X   RR         ABGs         O2 sat Resp:  [17-20]   SpO2:  [88 %-100 %]     ABG  Ph=7.510  PCO2=55  PO2=50  O2 sat=87% Cardiology pn 3-18      ABG on room air 3-18      BiPAP/Intubation     X   Supplemental O2 2LNC Vs record 3-18 to 3-19   X   Home O2, Oxygen Dependence Patient noncompliant on CPAP machine   Consult RT for CPAP mask fitting to improve compliance at home     H&P 3-14      Treatment     X   Other 3/18 - pt not in respiratory distress. Has combined respiratory failure. Receiving spironolactone, metolazone and bumex   Discharge summary     Respiratory failure can be acute, chronic or both. It is generally further specified as hypoxic, hypercapnic or both. Lastly, it is important to identify an etiology, if known or suspected.   References::  https://www.acphospitalist.org/archives/2013/10/coding.htm http://[email or web address]/acute-respiratory-failure-know     The clinical guidelines noted below are only system guidelines, and do not replace the  providers clinical judgment.    Provider, please specify diagnosis or diagnoses associated with above clinical findings.     Provider please specify the acuity of the respiratory diagnosis.      [  x ] Acute Respiratory Failure with Hypoxia and Hypercapnia - Hypoxia: ABG pO2 < 60 mmHg or O2 sat of 88% on RA and Hypercapnia: pCO2 > 50 mmHg with pH < 7.35 and respiratory symptoms documented   [   ] Acute and (on) Chronic Respiratory Failure with Hypoxia and Hypercapnia - Hypoxic: pO2 >10 mmHg below baseline OR SpO2 < 91% on usual home O2 OR O2 > 2L/min over baseline home O2 and Hypercapnic:  pCO2 >10 mmHg over baseline and pH < 7.35    [   ] Chronic Respiratory Failure with Hypoxia and Hypercapnia - Continuous home oxygen and Normal pH with high pCO2 (chronic hypercapnia) (common example: COPD)   [   ] Other Respiratory Diagnosis (please specify): _________________________________   [   ]  Clinically Undetermined       Please document in your progress notes daily for the duration of treatment until resolved and include in your discharge summary.

## 2019-03-21 ENCOUNTER — TELEPHONE (OUTPATIENT)
Dept: CARDIOLOGY | Facility: HOSPITAL | Age: 39
End: 2019-03-21

## 2019-03-21 ENCOUNTER — PATIENT OUTREACH (OUTPATIENT)
Dept: ADMINISTRATIVE | Facility: CLINIC | Age: 39
End: 2019-03-21

## 2019-03-21 ENCOUNTER — TELEPHONE (OUTPATIENT)
Dept: CARDIOLOGY | Facility: CLINIC | Age: 39
End: 2019-03-21

## 2019-03-21 DIAGNOSIS — I50.43 ACUTE ON CHRONIC COMBINED SYSTOLIC AND DIASTOLIC CONGESTIVE HEART FAILURE: Primary | ICD-10-CM

## 2019-03-21 NOTE — PATIENT INSTRUCTIONS
Discharge Instructions for Cardioversion  Your healthcare provider performed a procedure called cardioversion. Your healthcare provider used a controlled electric shock or a medicine to briefly stop all electrical activity in your heart. This helped restore your hearts normal rhythm. Here are some instructions to follow while you recover.  Home care  · Because cardioversion typically requires sedation, you won't be able to drive home. You will need a ride. Wait at least 24 hours before driving a car or operating heavy machinery after receiving sedating medicines.  · Dont be alarmed if the skin on your chest is irritated or feels like it is sunburned. Your healthcare provider may prescribe a soothing lotion to relieve this discomfort. These minor symptoms will go away in a few days.  · Ask your healthcare provider about medicines to keep your heart rhythm steady.  · If you were prescribed medicine, take it as instructed by your healthcare provider. Dont skip doses or take double doses. Cardioversion requires blood thinners for at least 4 weeks to prevent a delayed risk of stroke when treating atrial fibrillation or atrial flutter. Be sure you discuss which medicine you are taking to prevent stroke. Ask when you need to have your medicine levels checked, and whether you may be able to stop taking it in the future or whether it is recommended that you take it for life. Some of these blood-thinning medicines will have the dose adjusted, and interact with other medicines or foods. Your healthcare team will give you full instructions on what to watch out for. Report bleeding or symptoms of stroke immediately to your healthcare team and seek emergency medical attention.  · Learn to take your own pulse. Keep a record of your results. Ask your healthcare provider when you should seek emergency medical attention. He or she will tell you which pulse rate reading is dangerous.   · Keep in mind this procedure may need to be  repeated if the abnormal heart rhythm returns. After the procedure, your healthcare provider will tell you if the treatment worked or if you will need further treatments or medication.  Follow-up care  Make a follow-up appointment, or as directed by our staff.     When to call your healthcare provider  Call 911 right away if you have:  · Chest pain  · Shortness of breath  · Loss of vision, speech, or strength or coordination in any body part  Otherwise, call your healthcare provider immediately if you have:  · Fainting, dizzy, or lightheaded  · Chest pain with increased activity  · Irregular heartbeat or fast pulse  · Bleeding issues from blood-thinning medicines   Date Last Reviewed: 3/1/2017  © 9997-9817 [employer]. [address] [place] [address], [place] [address]. All rights reserved. This information is not intended as a substitute for professional medical care. Always follow your healthcare professional's instructions.

## 2019-03-21 NOTE — TELEPHONE ENCOUNTER
I have called patient's contact number and left voicemail today for patient to return phone call to Cardiology clinic today.     I have attempted to call patient's emergency contact number but unable to leave voicemail.     Will attempt to reach out to patient again later today.   Patient needs repeat labs and to be seen in clinic this week.     JAK Grewal

## 2019-03-21 NOTE — TELEPHONE ENCOUNTER
I called and left message again with pts eli. Please have pt call us back as soon as possible. Left her my direct number

## 2019-03-21 NOTE — PROGRESS NOTES
C3 nurse attempted to contact patient. No answer. The following message was left for the patient to return the call:  Good afternoon  I am a nurse calling on behalf of [facility] from the [facility].  This is a Transitional Care Call for Chacha Zendejas. When you have a moment please contact us at [phone] or [phone] Monday through Friday, between the hours of 8 am to 4 pm. We look forward to speaking with you. On behalf of [facility] have a nice day.    The patient has a scheduled CARDIO FU appointment with Viridiana Leon on 3/22/19 @ 0930hrs. No HOSPFU appt with PCP / unable to route message to staff / PCP listed is a non [facility] provider.

## 2019-03-21 NOTE — TELEPHONE ENCOUNTER
Yes please phone him before you leave. We need to decrease the amount of potassium supplement to 20 mEQ daily until we can check his labs.

## 2019-03-21 NOTE — TELEPHONE ENCOUNTER
I was able to get in contact with patient's niece. Arranged appointment for patient tmw at 9:30 AM at [facility]. Directions provided.    I also explained that patient needed to call me back ASAP as we needed to adjust his medications. They were given CHF clinic's direct number.    Please put patient on schedule tmw. And please call him back at his listed number this afternoon if you do not hear from him.     Emergency # [phone]; [phone]

## 2019-03-22 ENCOUNTER — OFFICE VISIT (OUTPATIENT)
Dept: CARDIOLOGY | Facility: CLINIC | Age: 39
End: 2019-03-22
Payer: MEDICAID

## 2019-03-22 ENCOUNTER — LAB VISIT (OUTPATIENT)
Dept: LAB | Facility: HOSPITAL | Age: 39
End: 2019-03-22
Payer: MEDICAID

## 2019-03-22 ENCOUNTER — TELEPHONE (OUTPATIENT)
Dept: CARDIOLOGY | Facility: CLINIC | Age: 39
End: 2019-03-22

## 2019-03-22 VITALS
WEIGHT: 315 LBS | BODY MASS INDEX: 49.44 KG/M2 | HEART RATE: 82 BPM | HEIGHT: 67 IN | SYSTOLIC BLOOD PRESSURE: 134 MMHG | DIASTOLIC BLOOD PRESSURE: 76 MMHG

## 2019-03-22 DIAGNOSIS — E66.01 MORBID OBESITY: ICD-10-CM

## 2019-03-22 DIAGNOSIS — N18.30 CKD (CHRONIC KIDNEY DISEASE) STAGE 3, GFR 30-59 ML/MIN: ICD-10-CM

## 2019-03-22 DIAGNOSIS — I10 ESSENTIAL HYPERTENSION: Primary | ICD-10-CM

## 2019-03-22 DIAGNOSIS — G47.33 OSA ON CPAP: ICD-10-CM

## 2019-03-22 DIAGNOSIS — I50.43 ACUTE ON CHRONIC COMBINED SYSTOLIC AND DIASTOLIC CONGESTIVE HEART FAILURE: ICD-10-CM

## 2019-03-22 DIAGNOSIS — I48.3 TYPICAL ATRIAL FLUTTER: ICD-10-CM

## 2019-03-22 DIAGNOSIS — I50.20 HEART FAILURE WITH REDUCED EJECTION FRACTION, NYHA CLASS III: ICD-10-CM

## 2019-03-22 LAB
ANION GAP SERPL CALC-SCNC: 13 MMOL/L
BNP SERPL-MCNC: 158 PG/ML
BUN SERPL-MCNC: 22 MG/DL
CALCIUM SERPL-MCNC: 9.9 MG/DL
CHLORIDE SERPL-SCNC: 95 MMOL/L
CO2 SERPL-SCNC: 31 MMOL/L
CREAT SERPL-MCNC: 1.7 MG/DL
EST. GFR  (AFRICAN AMERICAN): 57 ML/MIN/1.73 M^2
EST. GFR  (NON AFRICAN AMERICAN): 49 ML/MIN/1.73 M^2
GLUCOSE SERPL-MCNC: 128 MG/DL
POTASSIUM SERPL-SCNC: 3.7 MMOL/L
SODIUM SERPL-SCNC: 139 MMOL/L

## 2019-03-22 PROCEDURE — 99215 OFFICE O/P EST HI 40 MIN: CPT | Mod: PBBFAC,PN | Performed by: NURSE PRACTITIONER

## 2019-03-22 PROCEDURE — 99214 PR OFFICE/OUTPT VISIT, EST, LEVL IV, 30-39 MIN: ICD-10-PCS | Mod: S$PBB,,, | Performed by: NURSE PRACTITIONER

## 2019-03-22 PROCEDURE — 80048 BASIC METABOLIC PNL TOTAL CA: CPT

## 2019-03-22 PROCEDURE — 99999 PR PBB SHADOW E&M-EST. PATIENT-LVL V: ICD-10-PCS | Mod: PBBFAC,,, | Performed by: NURSE PRACTITIONER

## 2019-03-22 PROCEDURE — 83880 ASSAY OF NATRIURETIC PEPTIDE: CPT

## 2019-03-22 PROCEDURE — 99999 PR PBB SHADOW E&M-EST. PATIENT-LVL V: CPT | Mod: PBBFAC,,, | Performed by: NURSE PRACTITIONER

## 2019-03-22 PROCEDURE — 36415 COLL VENOUS BLD VENIPUNCTURE: CPT

## 2019-03-22 PROCEDURE — 99214 OFFICE O/P EST MOD 30 MIN: CPT | Mod: S$PBB,,, | Performed by: NURSE PRACTITIONER

## 2019-03-22 NOTE — PATIENT INSTRUCTIONS
Eating Heart-Healthy Food: Using the DASH Plan    Eating for your heart doesnt have to be hard or boring. You just need to know how to make healthier choices. The DASH eating plan has been developed to help you do just that. DASH stands for Dietary Approaches to Stop Hypertension. It is a plan that has been proven to be healthier for your heart and to lower your risk for high blood pressure. It can also help lower your risk for cancer, heart disease, osteoporosis, and diabetes.  Choosing from each food group  Choose foods from each of the food groups below each day. Try to get the recommended number of servings for each food group. The serving numbers are based on a diet of 2,000 calories a day. Talk to your doctor if youre unsure about your calorie needs. Along with getting the correct servings, the DASH plan also recommends a sodium intake less than 2,300 mg per day.        Grains  Servings: 6 to 8 a day  A serving is:  · 1 slice bread  · 1 ounce dry cereal  · Half a cup cooked rice, pasta or cereal  Best choices: Whole grains and any grains high in fiber. Vegetables  Servings: 4 to 5 a day  A serving is:  · 1 cup raw leafy vegetable  · Half a cup cut-up raw or cooked vegetable  · Half a cup vegetable juice  Best choices: Fresh or frozen vegetables prepared without added salt or fat.   Fruits  Servings: 4 to 5 a day  A serving is:  · 1 medium fruit  · One-quarter cup dried fruit  · Half a cup fresh, frozen, or canned fruit  · Half a cup of 100% fruit juices  Best choices: A variety of fresh fruits of different colors. Whole fruits are a better choice than fruit juices. Low-fat or fat-free dairy  Servings: 2 to 3 a day  A serving is:  · 1 cup milk  · 1 cup yogurt  · One and a half ounces cheese  Best choices: Skim or 1% milk, low-fat or fat-free yogurt or buttermilk, and low-fat cheeses.         Lean meats, poultry, fish  Servings: 6 or fewer a day  A serving is:  · 1 ounce cooked meats, poultry, or fish  · 1  egg  Best choices: Lean poultry and fish. Trim away visible fat. Broil, grill, roast, or boil instead of frying. Remove skin from poultry before eating. Limit how much red meat you eat.  Nuts, seeds, beans  Servings: 4 to 5 a week  A serving is:  · One-third cup nuts (one and a half ounces)  · 2 tablespoons nut butter or seeds  · Half a cup cooked dry beans or legumes  Best choices: Dry roasted nuts with no salt added, lentils, kidney beans, garbanzo beans, and whole marte beans.   Fats and oils  Servings: 2 to 3 a day  A serving is:  · 1 teaspoon vegetable oil  · 1 teaspoon soft margarine  · 1 tablespoon mayonnaise  · 2 tablespoons salad dressing  Best choices: Nut and vegetable oils (nontropical vegetable oils), such as olive and canola oil. Sweets  Servings: 5 a week or fewer  A serving is:  · 1 tablespoon sugar, maple syrup, or honey  · 1 tablespoon jam or jelly  · 1 half-ounce jelly beans (about 15)  · 1 cup lemonade  Best choices: Dried fruit can be a satisfying sweet. Choose low-fat sweets. And watch your serving sizes!      For more on the DASH eating plan, visit:  www.nhlbi.nih.gov/health/health-topics/topics/dash   Date Last Reviewed: 6/1/2016  © 6408-3445 [employer]. [address] [place] [address], [place] [address]. All rights reserved. This information is not intended as a substitute for professional medical care. Always follow your healthcare professional's instructions.

## 2019-03-22 NOTE — TELEPHONE ENCOUNTER
----- Message from Viridiana Leon NP sent at 3/22/2019 11:14 AM CDT -----  Please call patient    Kidney function has slightly improved.   Continue Bumex 2 mg twice a day for now  Continue Metolazone three times a week (Monday, Wednesday and Friday) for now  Continue Digoxin daily for now  Increase Potassium to 40meq daily until next appointment with labs.   Continue Losartan as prescribed.     Very important to be compliant with diet (low sodium, heart healthy) and fluid restriction of 60 ounces per day.   Keep follow up in 2 weeks with labs.     Thanks  Viridiana

## 2019-03-22 NOTE — PROGRESS NOTES
Subjective:   Patient ID:  Chacha Zendejas is a 38 y.o. male who presents for evaluation of Hospital Follow Up; Congestive Heart Failure; Irregular Heart Beat; Palpitations; and Risk Factor Management For Atherosclerosis      HPI     Mr. Zendejas is a 38 year old male who presents to clinic for hospital follow up.  His current medical conditions include HFrEF 10-15%, Combined CHF FC II, Atrial flutter on eliquis, HTN, Morbid obesity, CKD, BRINDA on CPAP.   He was recently admitted to [facility] for decompensated CHF and atrial flutter. He underwent CAROL/DCCV with successful conversion to SR.   He was fitted for LifeVest but unable to fit LifeVest and was discharged home.   He returns today and states he is doing ok.   Denies chest pains or anginal equivalents.   No shortness of breath, Estrella or palpitations.   Reports 2 pillow orthopnea which is much improved since hospital admission.  He is trying to stay physically active today and denies any issues.  He was able to wash his car yesterday without any issues.  He continues to have LE edema today but is improving.   Denies abdominal bloating or PND.  He reports compliance with CPAP.  Reports compliance with medications and dietary restrictions.  He has been compliant with fluid restriction since hospital discharge.   Labs pending from today.  No CNS complaints to suggest TIA or CVA today.   No signs of abnormal bleeding on Eliquis.   He has lost 40 + lbs since earlier this month.         Past Medical History:   Diagnosis Date    Atrial flutter     Cellulitis     RLE    CHF (congestive heart failure)     Hypertension        History reviewed. No pertinent surgical history.    Social History     Tobacco Use    Smoking status: Former Smoker     Types: Cigarettes    Smokeless tobacco: Never Used   Substance Use Topics    Alcohol use: Yes     Comment: socially    Drug use: No       History reviewed. No pertinent family history.  Wt Readings from Last 3 Encounters:    03/22/19 (!) 196.9 kg (434 lb 1.4 oz)   03/18/19 (!) 203.6 kg (448 lb 13.7 oz)   03/04/19 (!) 215.1 kg (474 lb 4.8 oz)     Temp Readings from Last 3 Encounters:   03/19/19 98.2 °F (36.8 °C) (Oral)   03/04/19 98.5 °F (36.9 °C) (Oral)     BP Readings from Last 3 Encounters:   03/22/19 134/76   03/19/19 (!) 122/90   03/04/19 119/76     Pulse Readings from Last 3 Encounters:   03/22/19 82   03/19/19 86   03/04/19 (!) 127     Current Outpatient Medications on File Prior to Visit   Medication Sig Dispense Refill    amiodarone (PACERONE) 200 MG Tab Take by mouth once daily.      apixaban (ELIQUIS) 5 mg Tab Take 1 tablet (5 mg total) by mouth once daily. 30 tablet 0    atorvastatin (LIPITOR) 20 MG tablet Take 20 mg by mouth once daily.      bumetanide (BUMEX) 2 MG tablet Take 1 tablet (2 mg total) by mouth 2 (two) times daily. 60 tablet 0    carvedilol (COREG) 25 MG tablet Take 1 tablet (25 mg total) by mouth 2 (two) times daily with meals. 60 tablet 1    digoxin (LANOXIN) 125 mcg tablet Take 1 tablet (0.125 mg total) by mouth once daily. 30 tablet 1    levothyroxine (SYNTHROID) 50 MCG tablet Take 1 tablet (50 mcg total) by mouth before breakfast. 30 tablet 1    losartan (COZAAR) 50 MG tablet Take 1 tablet (50 mg total) by mouth once daily. 90 tablet 3    metOLazone (ZAROXOLYN) 5 MG tablet Take 1 tablet (5 mg total) by mouth 3 (three) times a week. Take 30 minutes before Bumex 30 tablet 1    potassium chloride SA (K-DUR,KLOR-CON) 20 MEQ tablet Take 2 tablets (40 mEq total) by mouth 2 (two) times daily. 120 tablet 1    spironolactone (ALDACTONE) 50 MG tablet Take 1 tablet (50 mg total) by mouth once daily. 30 tablet 1     No current facility-administered medications on file prior to visit.        Review of Systems   Constitution: Positive for weight loss (40 lbs with diuresis). Negative for weakness and malaise/fatigue.   HENT: Negative for hearing loss and hoarse voice.    Eyes: Negative for blurred vision and  "visual disturbance.   Cardiovascular: Positive for orthopnea (2 pillow). Negative for chest pain, claudication, dyspnea on exertion, irregular heartbeat, leg swelling, near-syncope, palpitations, paroxysmal nocturnal dyspnea and syncope.   Respiratory: Negative for cough, hemoptysis, shortness of breath, sleep disturbances due to breathing, snoring and wheezing.         +BRINDA on CPAP   Endocrine: Negative for cold intolerance and heat intolerance.   Hematologic/Lymphatic: Bruises/bleeds easily (on eliquis).   Skin: Negative for color change, dry skin and nail changes.   Musculoskeletal: Positive for arthritis and back pain. Negative for joint pain and myalgias.   Gastrointestinal: Negative for bloating, abdominal pain, constipation, nausea and vomiting.   Genitourinary: Negative for dysuria, flank pain, hematuria and hesitancy.   Neurological: Negative for headaches, light-headedness, loss of balance, numbness and paresthesias.   Psychiatric/Behavioral: Negative for altered mental status.   Allergic/Immunologic: Negative for environmental allergies.     /76 (BP Location: Right arm, Patient Position: Sitting, BP Method: Large (Manual))   Pulse 82   Ht 5' 7" (1.702 m)   Wt (!) 196.9 kg (434 lb 1.4 oz)   BMI 67.99 kg/m²     Objective:   Physical Exam   Constitutional: He is oriented to person, place, and time. He appears well-developed and well-nourished. No distress.   HENT:   Head: Normocephalic and atraumatic.   Eyes: Pupils are equal, round, and reactive to light.   Neck: Normal range of motion and full passive range of motion without pain. Neck supple. No JVD present.   Cardiovascular: Normal rate, regular rhythm, S1 normal, S2 normal and intact distal pulses.  Occasional extrasystoles are present. PMI is not displaced. Exam reveals no distant heart sounds.   No murmur heard.  Pulses:       Radial pulses are 1+ on the right side, and 1+ on the left side.        Dorsalis pedis pulses are 1+ on the right " side, and 1+ on the left side.   Remains in SR since recent Cardioversion    Unable to be fitted with LifeVest due to body habitus.   Pulmonary/Chest: Effort normal. No accessory muscle usage. No respiratory distress. He has decreased breath sounds in the right lower field and the left lower field. He has no wheezes. He has no rales.   +CPaP   Abdominal: Soft. Bowel sounds are normal. He exhibits no distension. There is no tenderness.   +morbidly obese   Musculoskeletal: Normal range of motion. He exhibits edema (Nonpitting BLE).        Right ankle: He exhibits swelling.        Left ankle: He exhibits swelling.   Neurological: He is alert and oriented to person, place, and time.   Skin: Skin is warm and dry. He is not diaphoretic. No cyanosis. Nails show no clubbing.   Psychiatric: He has a normal mood and affect. His speech is normal and behavior is normal. Judgment and thought content normal. Cognition and memory are normal.   Nursing note and vitals reviewed.          Chemistry        Component Value Date/Time     03/19/2019 0443    K 3.7 03/19/2019 0443    CL 88 (L) 03/19/2019 0443    CO2 43 (HH) 03/19/2019 0443    BUN 32 (H) 03/19/2019 0443    CREATININE 1.9 (H) 03/19/2019 0443    GLU 95 03/19/2019 0443        Component Value Date/Time    CALCIUM 10.1 03/19/2019 0443    ALKPHOS 137 (H) 03/19/2019 0443    AST 26 03/19/2019 0443    ALT 42 03/19/2019 0443    BILITOT 1.8 (H) 03/19/2019 0443    ESTGFRAFRICA 50 (A) 03/19/2019 0443    EGFRNONAA 43 (A) 03/19/2019 0443          Lab Results   Component Value Date    TSH 5.151 (H) 03/13/2019     No results found for: INR, PROTIME  Lab Results   Component Value Date    WBC 6.39 03/19/2019    HGB 13.2 (L) 03/19/2019    HCT 41.3 03/19/2019    MCV 94 03/19/2019     03/19/2019      2D ECHO TEST DESCRIPTION   Technical Quality: This is a portable study performed at the patient's bedside. This is a technically challenging study. There is poor endocardial definition.  This study was performed in conjunction with a 3ml intravenous injection of Optison contrast   agent.     Aorta: The aortic root is normal in size, measuring 2.8 cm at sinotubular junction and 3.0 cm at Sinuses of Valsalva. The proximal ascending aorta is normal in size, measuring 2.8 cm across.     Left Atrium: The left atrial volume index is mildly enlarged, measuring 34.75 cc/m2.     Left Ventricle: The left ventricle is moderately to severely enlarged, with an end-diastolic diameter of 6.7 cm, and an end-systolic diameter of 6.4 cm. LV wall thickness is normal, with the septum measuring 2.2 cm and the posterior wall measuring 2.3 cm   across. Relative wall thickness was increased at 0.69, and the LV mass index was increased at 378.2 g/m2 consistent with concentric left ventricular hypertrophy. There are no regional wall motion abnormalities. Left ventricular systolic function appears   severely depressed. Visually estimated ejection fraction is 10-15%. The LV Doppler derived stroke volume equals 47.0 ccs.         Right Atrium: The right atrium is moderately enlarged, measuring 6.2 cm in length and 5.2 cm in width in the apical view.     Right Ventricle: The right ventricle is moderately enlarged in size. Global right ventricular systolic function appears moderately depressed. The estimated PA systolic pressure is greater than 38 mmHg.     Aortic Valve:  Aortic valve is normal in structure with normal leaflet mobility. The peak gradient obtained across the aortic valve is 7 mmHg, with a mean gradient of 4 mmHg. Using a left ventricular outflow tract diameter of 2.2 cm, a left ventricular   outflow tract velocity time integral of 13 cm, and a peak instantaneous transvalvular velocity time integral of 17 cm, the calculated aortic valve area is 2.79 cm2(AVAi is 0.93 cm2/m2).     Mitral Valve:  Mitral valve is normal in structure with normal leaflet mobility. There is moderate to severe mitral regurgitation.      Tricuspid Valve:  Tricuspid valve is normal in structure with normal leaflet mobility. There is mild to moderate tricuspid regurgitation.     Pulmonary Valve:  Pulmonary valve is normal in structure with normal leaflet mobility.     IVC: The IVC is not visualized.     Atrial Septum: The atrial septum is intact.     Intracavitary: There is no evidence of pericardial effusion, intracavity mass, thrombi, or vegetation.         CONCLUSIONS     1 - Biatrial enlargement.     2 - Moderate left ventricular enlargement.     3 - Concentric hypertrophy.     4 - No wall motion abnormalities.     5 - Severely depressed left ventricular systolic function (EF 10-15%).     6 - Right ventricular enlargement with moderately depressed systolic function.     7 - The estimated PA systolic pressure is greater than 38 mmHg.     8 - Moderate to severe mitral regurgitation.     9 - Mild to moderate tricuspid regurgitation.             This document has been electronically    SIGNED BY: Heath Nash MD On: 03/14/2019 13:26  Assessment:      1. Essential hypertension    2. Typical atrial flutter    3. Acute on chronic combined systolic and diastolic congestive heart failure    4. Heart failure with reduced ejection fraction, NYHA class III    5. BRINDA on CPAP    6. CKD (chronic kidney disease) stage 3, GFR 30-59 ml/min    7. Morbid obesity      Patient presents to clinic for hospital follow up and is doing well today.   Denies any new cardiac issues today.  [facility] in November 2018 reviewed per DR. Nash in hospital with normal coronaries  Denies chest pain, SOB, CREWS or palpitations today.  Feels great today on exam.  Has been very physically active since hospital discharge without any issues today.   Has lost 40+lbs since earlier this month and feels better because of it.  Trying to be more compliant with dietary and fluid restrictions since hospital discharge.  Reports compliance with medications and diet.   No CNS complaints to suggest TIA  or CVA today.  Plan:     Continue same CV meds for now  Discussed with patient the importance of compliance with medications, diet and recommendations given severely reduced EF   All questions answered in detail and to patient's understanding today.   BMP and BNP today pending, phone review  Further recs pending labs today  Referral to EP, recurrent Atrial flutter and HFrEF 10-15%, possible ICD  Dash diet, 2 gm sodium restriction  55-60 ozs fluid restriction  Weight Loss emphasized  Encourage regular physical activity as tolerated  Continue CPAP nightly  Continue home oxygen, he did not bring supplemental oxygen to clinic today  Follow up in 2 weeks with BMP and BNP  Please call if there are any questions between now and next visit.  Needs to F/U with PCP for ongoing issues with BLE from recent cellulitis infection.    JAK Grewal

## 2019-04-05 ENCOUNTER — LAB VISIT (OUTPATIENT)
Dept: LAB | Facility: HOSPITAL | Age: 39
End: 2019-04-05
Attending: NURSE PRACTITIONER
Payer: MEDICAID

## 2019-04-05 ENCOUNTER — TELEPHONE (OUTPATIENT)
Dept: CARDIOLOGY | Facility: CLINIC | Age: 39
End: 2019-04-05

## 2019-04-05 ENCOUNTER — OFFICE VISIT (OUTPATIENT)
Dept: CARDIOLOGY | Facility: CLINIC | Age: 39
End: 2019-04-05
Payer: MEDICAID

## 2019-04-05 VITALS
DIASTOLIC BLOOD PRESSURE: 78 MMHG | SYSTOLIC BLOOD PRESSURE: 132 MMHG | HEIGHT: 67 IN | HEART RATE: 80 BPM | BODY MASS INDEX: 49.44 KG/M2 | WEIGHT: 315 LBS

## 2019-04-05 DIAGNOSIS — I51.7 RIGHT VENTRICULAR ENLARGEMENT: ICD-10-CM

## 2019-04-05 DIAGNOSIS — E66.01 MORBID OBESITY: ICD-10-CM

## 2019-04-05 DIAGNOSIS — I27.20 PULMONARY HTN: ICD-10-CM

## 2019-04-05 DIAGNOSIS — I50.43 ACUTE ON CHRONIC COMBINED SYSTOLIC AND DIASTOLIC CONGESTIVE HEART FAILURE: ICD-10-CM

## 2019-04-05 DIAGNOSIS — I34.0 MODERATE MITRAL REGURGITATION: ICD-10-CM

## 2019-04-05 DIAGNOSIS — N18.30 CKD (CHRONIC KIDNEY DISEASE) STAGE 3, GFR 30-59 ML/MIN: ICD-10-CM

## 2019-04-05 DIAGNOSIS — I48.3 TYPICAL ATRIAL FLUTTER: ICD-10-CM

## 2019-04-05 DIAGNOSIS — I10 ESSENTIAL HYPERTENSION: Primary | ICD-10-CM

## 2019-04-05 DIAGNOSIS — I07.1 MILD TRICUSPID VALVE REGURGITATION: ICD-10-CM

## 2019-04-05 DIAGNOSIS — I50.20 HEART FAILURE WITH REDUCED EJECTION FRACTION, NYHA CLASS III: ICD-10-CM

## 2019-04-05 DIAGNOSIS — G47.33 OSA ON CPAP: ICD-10-CM

## 2019-04-05 LAB
ANION GAP SERPL CALC-SCNC: 14 MMOL/L (ref 8–16)
BNP SERPL-MCNC: 93 PG/ML (ref 0–99)
BUN SERPL-MCNC: 58 MG/DL (ref 6–20)
CALCIUM SERPL-MCNC: 10 MG/DL (ref 8.7–10.5)
CHLORIDE SERPL-SCNC: 96 MMOL/L (ref 95–110)
CO2 SERPL-SCNC: 27 MMOL/L (ref 23–29)
CREAT SERPL-MCNC: 2.3 MG/DL (ref 0.5–1.4)
EST. GFR  (AFRICAN AMERICAN): 40 ML/MIN/1.73 M^2
EST. GFR  (NON AFRICAN AMERICAN): 34 ML/MIN/1.73 M^2
GLUCOSE SERPL-MCNC: 177 MG/DL (ref 70–110)
POTASSIUM SERPL-SCNC: 3 MMOL/L (ref 3.5–5.1)
SODIUM SERPL-SCNC: 137 MMOL/L (ref 136–145)

## 2019-04-05 PROCEDURE — 99215 PR OFFICE/OUTPT VISIT, EST, LEVL V, 40-54 MIN: ICD-10-PCS | Mod: S$PBB,,, | Performed by: NURSE PRACTITIONER

## 2019-04-05 PROCEDURE — 99213 OFFICE O/P EST LOW 20 MIN: CPT | Mod: PBBFAC,PN | Performed by: NURSE PRACTITIONER

## 2019-04-05 PROCEDURE — 99999 PR PBB SHADOW E&M-EST. PATIENT-LVL III: CPT | Mod: PBBFAC,,, | Performed by: NURSE PRACTITIONER

## 2019-04-05 PROCEDURE — 99215 OFFICE O/P EST HI 40 MIN: CPT | Mod: S$PBB,,, | Performed by: NURSE PRACTITIONER

## 2019-04-05 PROCEDURE — 36415 COLL VENOUS BLD VENIPUNCTURE: CPT

## 2019-04-05 PROCEDURE — 83880 ASSAY OF NATRIURETIC PEPTIDE: CPT

## 2019-04-05 PROCEDURE — 99999 PR PBB SHADOW E&M-EST. PATIENT-LVL III: ICD-10-PCS | Mod: PBBFAC,,, | Performed by: NURSE PRACTITIONER

## 2019-04-05 PROCEDURE — 80048 BASIC METABOLIC PNL TOTAL CA: CPT

## 2019-04-05 RX ORDER — CARVEDILOL 25 MG/1
25 TABLET ORAL 2 TIMES DAILY WITH MEALS
Qty: 180 TABLET | Refills: 3 | Status: SHIPPED | OUTPATIENT
Start: 2019-04-05 | End: 2019-05-03 | Stop reason: SDUPTHER

## 2019-04-05 RX ORDER — DIGOXIN 0.06 MG/1
62.5 TABLET ORAL DAILY
Qty: 30 TABLET | Refills: 6 | Status: SHIPPED | OUTPATIENT
Start: 2019-04-05 | End: 2019-04-18

## 2019-04-05 RX ORDER — SPIRONOLACTONE 50 MG/1
50 TABLET, FILM COATED ORAL DAILY
Qty: 30 TABLET | Refills: 11 | Status: SHIPPED | OUTPATIENT
Start: 2019-04-05 | End: 2019-05-03 | Stop reason: SDUPTHER

## 2019-04-05 RX ORDER — ATORVASTATIN CALCIUM 20 MG/1
20 TABLET, FILM COATED ORAL DAILY
Qty: 90 TABLET | Refills: 3 | Status: SHIPPED | OUTPATIENT
Start: 2019-04-05 | End: 2019-05-31 | Stop reason: SDUPTHER

## 2019-04-05 RX ORDER — LEVOTHYROXINE SODIUM 50 UG/1
50 TABLET ORAL
Qty: 90 TABLET | Refills: 3 | Status: SHIPPED | OUTPATIENT
Start: 2019-04-05 | End: 2020-01-01

## 2019-04-05 RX ORDER — METOLAZONE 5 MG/1
5 TABLET ORAL
Qty: 30 TABLET | Refills: 11 | Status: SHIPPED | OUTPATIENT
Start: 2019-04-05 | End: 2019-05-03 | Stop reason: SDUPTHER

## 2019-04-05 RX ORDER — AMIODARONE HYDROCHLORIDE 200 MG/1
200 TABLET ORAL DAILY
Qty: 90 TABLET | Refills: 3 | Status: SHIPPED | OUTPATIENT
Start: 2019-04-05 | End: 2019-05-31 | Stop reason: SDUPTHER

## 2019-04-05 RX ORDER — LOSARTAN POTASSIUM 50 MG/1
50 TABLET ORAL DAILY
Qty: 90 TABLET | Refills: 3 | Status: SHIPPED | OUTPATIENT
Start: 2019-04-05 | End: 2019-05-31 | Stop reason: SDUPTHER

## 2019-04-05 RX ORDER — BUMETANIDE 2 MG/1
2 TABLET ORAL 2 TIMES DAILY
Qty: 60 TABLET | Refills: 6 | Status: SHIPPED | OUTPATIENT
Start: 2019-04-05 | End: 2019-05-03 | Stop reason: SDUPTHER

## 2019-04-05 RX ORDER — DIGOXIN 125 MCG
0.12 TABLET ORAL DAILY
Qty: 30 TABLET | Refills: 6 | Status: SHIPPED | OUTPATIENT
Start: 2019-04-05 | End: 2019-04-05 | Stop reason: SDUPTHER

## 2019-04-05 RX ORDER — POTASSIUM CHLORIDE 20 MEQ/1
40 TABLET, EXTENDED RELEASE ORAL DAILY
Qty: 30 TABLET | Refills: 6 | Status: SHIPPED | OUTPATIENT
Start: 2019-04-05 | End: 2019-05-05

## 2019-04-05 NOTE — PATIENT INSTRUCTIONS
Eating Heart-Healthy Food: Using the DASH Plan    Eating for your heart doesnt have to be hard or boring. You just need to know how to make healthier choices. The DASH eating plan has been developed to help you do just that. DASH stands for Dietary Approaches to Stop Hypertension. It is a plan that has been proven to be healthier for your heart and to lower your risk for high blood pressure. It can also help lower your risk for cancer, heart disease, osteoporosis, and diabetes.  Choosing from each food group  Choose foods from each of the food groups below each day. Try to get the recommended number of servings for each food group. The serving numbers are based on a diet of 2,000 calories a day. Talk to your doctor if youre unsure about your calorie needs. Along with getting the correct servings, the DASH plan also recommends a sodium intake less than 2,300 mg per day.        Grains  Servings: 6 to 8 a day  A serving is:  · 1 slice bread  · 1 ounce dry cereal  · Half a cup cooked rice, pasta or cereal  Best choices: Whole grains and any grains high in fiber. Vegetables  Servings: 4 to 5 a day  A serving is:  · 1 cup raw leafy vegetable  · Half a cup cut-up raw or cooked vegetable  · Half a cup vegetable juice  Best choices: Fresh or frozen vegetables prepared without added salt or fat.   Fruits  Servings: 4 to 5 a day  A serving is:  · 1 medium fruit  · One-quarter cup dried fruit  · Half a cup fresh, frozen, or canned fruit  · Half a cup of 100% fruit juices  Best choices: A variety of fresh fruits of different colors. Whole fruits are a better choice than fruit juices. Low-fat or fat-free dairy  Servings: 2 to 3 a day  A serving is:  · 1 cup milk  · 1 cup yogurt  · One and a half ounces cheese  Best choices: Skim or 1% milk, low-fat or fat-free yogurt or buttermilk, and low-fat cheeses.         Lean meats, poultry, fish  Servings: 6 or fewer a day  A serving is:  · 1 ounce cooked meats, poultry, or fish  · 1  egg  Best choices: Lean poultry and fish. Trim away visible fat. Broil, grill, roast, or boil instead of frying. Remove skin from poultry before eating. Limit how much red meat you eat.  Nuts, seeds, beans  Servings: 4 to 5 a week  A serving is:  · One-third cup nuts (one and a half ounces)  · 2 tablespoons nut butter or seeds  · Half a cup cooked dry beans or legumes  Best choices: Dry roasted nuts with no salt added, lentils, kidney beans, garbanzo beans, and whole marte beans.   Fats and oils  Servings: 2 to 3 a day  A serving is:  · 1 teaspoon vegetable oil  · 1 teaspoon soft margarine  · 1 tablespoon mayonnaise  · 2 tablespoons salad dressing  Best choices: Nut and vegetable oils (nontropical vegetable oils), such as olive and canola oil. Sweets  Servings: 5 a week or fewer  A serving is:  · 1 tablespoon sugar, maple syrup, or honey  · 1 tablespoon jam or jelly  · 1 half-ounce jelly beans (about 15)  · 1 cup lemonade  Best choices: Dried fruit can be a satisfying sweet. Choose low-fat sweets. And watch your serving sizes!      For more on the DASH eating plan, visit:  www.nhlbi.nih.gov/health/health-topics/topics/dash   Date Last Reviewed: 6/1/2016  © 6199-8576 [employer]. [address] [place] [address], [place] [address]. All rights reserved. This information is not intended as a substitute for professional medical care. Always follow your healthcare professional's instructions.

## 2019-04-05 NOTE — TELEPHONE ENCOUNTER
----- Message from Hope Carrington sent at 4/5/2019  3:27 PM CDT -----  Contact: Pt   Pt is calling .Type:  Patient Returning Call    Who Called:Pt   Who Left Message for Patient: nurse   Does the patient know what this is regarding?: Unsure about what call may be regarding   Would the patient rather a call back or a response via [facility]? Call back   Best Call Back Number:.[phone] (home)         .Thank You  Adriana Carrington

## 2019-04-05 NOTE — PROGRESS NOTES
Subjective:   Patient ID:  Chacha Zendejas is a 38 y.o. male who presents for evaluation of Congestive Heart Failure; Atrial Flutter; and Hypertension      HPI     Mr. Zendejas is a 38 year old male who presents to clinic for CHF follow up.   His current medical conditions include HTN, HLP, Morbid Obesity, Chronic combined CHF, HFrEF 10-15%, AFL on Eliquis, CKD, BRINDA on CPAP, PHTN, RVE, moderately depressed RV systolic fn, MR, TR, PA systolic >38 mmHg.   He returns today for 2 week follow up and is doing well today.   Denies chest pain or anginal equivalents today.  He has lost 9 lbs since last visit with me (2 weeks ago).  Denies shortness of breath, CREWS or palpitations.   No episodes of heart fluttering or racing feeling since hospital discharge.   Still has some LE edema today but is slowly improving.   Denies recent falls, syncope or near syncopal events.   He is walking daily for exercise without any complaints per report today.  He is drinking 40 ounces of fluid intake per day.  He reports compliance with medications and dietary restrictions since last visit with myself.  He is trying to lose weight and has been reducing his portions and feeling better.   No CNS complaints to suggest TIA or CVA today.  No signs of abnormal bleeding on Eliquis.     BMP and BNP pending today.     Past Medical History:   Diagnosis Date    Atrial flutter     Cellulitis     RLE    CHF (congestive heart failure)     Hypertension        Past Surgical History:   Procedure Laterality Date    CARDIOVERSION OR DEFIBRILLATION N/A 3/18/2019    Performed by Talib Ratliff MD at [facility] CATH LAB    ECHOCARDIOGRAM,TRANSESOPHAGEAL N/A 3/18/2019    Performed by Talib Ratliff MD at [facility] CATH LAB       Social History     Tobacco Use    Smoking status: Former Smoker     Types: Cigarettes    Smokeless tobacco: Never Used   Substance Use Topics    Alcohol use: Yes     Comment: socially    Drug use: No       History reviewed. No  pertinent family history.  Wt Readings from Last 3 Encounters:   04/05/19 (!) 192.8 kg (425 lb 0.8 oz)   03/22/19 (!) 196.9 kg (434 lb 1.4 oz)   03/18/19 (!) 203.6 kg (448 lb 13.7 oz)     Temp Readings from Last 3 Encounters:   03/19/19 98.2 °F (36.8 °C) (Oral)   03/04/19 98.5 °F (36.9 °C) (Oral)     BP Readings from Last 3 Encounters:   04/05/19 132/78   03/22/19 134/76   03/19/19 (!) 122/90     Pulse Readings from Last 3 Encounters:   04/05/19 80   03/22/19 82   03/19/19 86     Current Outpatient Medications on File Prior to Visit   Medication Sig Dispense Refill    [DISCONTINUED] amiodarone (PACERONE) 200 MG Tab Take by mouth once daily.      [DISCONTINUED] atorvastatin (LIPITOR) 20 MG tablet Take 20 mg by mouth once daily.      [DISCONTINUED] bumetanide (BUMEX) 2 MG tablet Take 1 tablet (2 mg total) by mouth 2 (two) times daily. 60 tablet 0    [DISCONTINUED] carvedilol (COREG) 25 MG tablet Take 1 tablet (25 mg total) by mouth 2 (two) times daily with meals. 60 tablet 1    [DISCONTINUED] digoxin (LANOXIN) 125 mcg tablet Take 1 tablet (0.125 mg total) by mouth once daily. 30 tablet 1    [DISCONTINUED] levothyroxine (SYNTHROID) 50 MCG tablet Take 1 tablet (50 mcg total) by mouth before breakfast. 30 tablet 1    [DISCONTINUED] losartan (COZAAR) 50 MG tablet Take 1 tablet (50 mg total) by mouth once daily. 90 tablet 3    [DISCONTINUED] metOLazone (ZAROXOLYN) 5 MG tablet Take 1 tablet (5 mg total) by mouth 3 (three) times a week. Take 30 minutes before Bumex 30 tablet 1    [DISCONTINUED] potassium chloride SA (K-DUR,KLOR-CON) 20 MEQ tablet Take 2 tablets (40 mEq total) by mouth 2 (two) times daily. (Patient taking differently: Take 40 mEq by mouth once daily. ) 120 tablet 1    [DISCONTINUED] spironolactone (ALDACTONE) 50 MG tablet Take 1 tablet (50 mg total) by mouth once daily. 30 tablet 1     No current facility-administered medications on file prior to visit.        Review of Systems   Constitution:  Negative for malaise/fatigue.        Has lost 9 lbs since last visit.   HENT: Negative for hearing loss and hoarse voice.    Eyes: Negative for blurred vision and visual disturbance.   Cardiovascular: Positive for leg swelling and orthopnea (2 pillow, improving). Negative for chest pain, claudication, dyspnea on exertion, irregular heartbeat, near-syncope, palpitations, paroxysmal nocturnal dyspnea and syncope.   Respiratory: Negative for cough, hemoptysis, shortness of breath, sleep disturbances due to breathing, snoring and wheezing.    Endocrine: Negative for cold intolerance and heat intolerance.   Hematologic/Lymphatic: Bruises/bleeds easily (on eliquis).   Skin: Negative for color change, dry skin and nail changes.   Musculoskeletal: Positive for arthritis and back pain. Negative for joint pain and myalgias.   Gastrointestinal: Negative for bloating, abdominal pain, constipation, nausea and vomiting.   Genitourinary: Negative for dysuria, flank pain, hematuria and hesitancy.   Neurological: Negative for headaches, light-headedness, loss of balance, numbness, paresthesias and weakness.   Psychiatric/Behavioral: Negative for altered mental status.   Allergic/Immunologic: Negative for environmental allergies.       Objective:   Physical Exam   Constitutional: He is oriented to person, place, and time. He appears well-developed and well-nourished. No distress.   HENT:   Head: Normocephalic and atraumatic.   Eyes: Pupils are equal, round, and reactive to light.   Neck: Normal range of motion and full passive range of motion without pain. Neck supple. No JVD present.   Cardiovascular: Normal rate, regular rhythm, S1 normal, S2 normal and intact distal pulses. PMI is not displaced. Exam reveals no distant heart sounds.   No murmur heard.  Pulses:       Radial pulses are 2+ on the right side, and 2+ on the left side.        Dorsalis pedis pulses are 2+ on the right side, and 2+ on the left side.   Pulmonary/Chest:  Effort normal. No accessory muscle usage. No respiratory distress. He has decreased breath sounds in the right lower field and the left lower field. He has no wheezes. He has no rales.   +Orthopnea, improving  +BRINDA on CPAP   Abdominal: Soft. Bowel sounds are normal. He exhibits no distension. There is no tenderness.   +morbidly obese   Musculoskeletal: Normal range of motion. He exhibits edema (BLE, improving).        Right ankle: He exhibits swelling.        Left ankle: He exhibits swelling.   Neurological: He is alert and oriented to person, place, and time.   Skin: Skin is warm and dry. He is not diaphoretic. No cyanosis. Nails show no clubbing.   Psychiatric: He has a normal mood and affect. His speech is normal and behavior is normal. Judgment and thought content normal. Cognition and memory are normal.   Nursing note and vitals reviewed.      No results found for: CHOL  No results found for: HDL  No results found for: LDLCALC  No results found for: TRIG  No results found for: CHOLHDL    Chemistry        Component Value Date/Time     03/22/2019 1030    K 3.7 03/22/2019 1030    CL 95 03/22/2019 1030    CO2 31 (H) 03/22/2019 1030    BUN 22 (H) 03/22/2019 1030    CREATININE 1.7 (H) 03/22/2019 1030     (H) 03/22/2019 1030        Component Value Date/Time    CALCIUM 9.9 03/22/2019 1030    ALKPHOS 137 (H) 03/19/2019 0443    AST 26 03/19/2019 0443    ALT 42 03/19/2019 0443    BILITOT 1.8 (H) 03/19/2019 0443    ESTGFRAFRICA 57 (A) 03/22/2019 1030    EGFRNONAA 49 (A) 03/22/2019 1030          Lab Results   Component Value Date    TSH 5.151 (H) 03/13/2019     No results found for: INR, PROTIME  Lab Results   Component Value Date    WBC 6.39 03/19/2019    HGB 13.2 (L) 03/19/2019    HCT 41.3 03/19/2019    MCV 94 03/19/2019     03/19/2019     TEST DESCRIPTION   Technical Quality: This is a portable study performed at the patient's bedside. This is a technically challenging study. There is poor endocardial  definition. This study was performed in conjunction with a 3ml intravenous injection of Optison contrast   agent.     Aorta: The aortic root is normal in size, measuring 2.8 cm at sinotubular junction and 3.0 cm at Sinuses of Valsalva. The proximal ascending aorta is normal in size, measuring 2.8 cm across.     Left Atrium: The left atrial volume index is mildly enlarged, measuring 34.75 cc/m2.     Left Ventricle: The left ventricle is moderately to severely enlarged, with an end-diastolic diameter of 6.7 cm, and an end-systolic diameter of 6.4 cm. LV wall thickness is normal, with the septum measuring 2.2 cm and the posterior wall measuring 2.3 cm   across. Relative wall thickness was increased at 0.69, and the LV mass index was increased at 378.2 g/m2 consistent with concentric left ventricular hypertrophy. There are no regional wall motion abnormalities. Left ventricular systolic function appears   severely depressed. Visually estimated ejection fraction is 10-15%. The LV Doppler derived stroke volume equals 47.0 ccs.         Right Atrium: The right atrium is moderately enlarged, measuring 6.2 cm in length and 5.2 cm in width in the apical view.     Right Ventricle: The right ventricle is moderately enlarged in size. Global right ventricular systolic function appears moderately depressed. The estimated PA systolic pressure is greater than 38 mmHg.     Aortic Valve:  Aortic valve is normal in structure with normal leaflet mobility. The peak gradient obtained across the aortic valve is 7 mmHg, with a mean gradient of 4 mmHg. Using a left ventricular outflow tract diameter of 2.2 cm, a left ventricular   outflow tract velocity time integral of 13 cm, and a peak instantaneous transvalvular velocity time integral of 17 cm, the calculated aortic valve area is 2.79 cm2(AVAi is 0.93 cm2/m2).     Mitral Valve:  Mitral valve is normal in structure with normal leaflet mobility. There is moderate to severe mitral  regurgitation.     Tricuspid Valve:  Tricuspid valve is normal in structure with normal leaflet mobility. There is mild to moderate tricuspid regurgitation.     Pulmonary Valve:  Pulmonary valve is normal in structure with normal leaflet mobility.     IVC: The IVC is not visualized.     Atrial Septum: The atrial septum is intact.     Intracavitary: There is no evidence of pericardial effusion, intracavity mass, thrombi, or vegetation.         CONCLUSIONS     1 - Biatrial enlargement.     2 - Moderate left ventricular enlargement.     3 - Concentric hypertrophy.     4 - No wall motion abnormalities.     5 - Severely depressed left ventricular systolic function (EF 10-15%).     6 - Right ventricular enlargement with moderately depressed systolic function.     7 - The estimated PA systolic pressure is greater than 38 mmHg.     8 - Moderate to severe mitral regurgitation.     9 - Mild to moderate tricuspid regurgitation.             This document has been electronically    SIGNED BY: Heath Nash MD On: 03/14/2019 13:26  Assessment:      1. Essential hypertension    2. Heart failure with reduced ejection fraction, NYHA class III    3. Typical atrial flutter    4. Acute on chronic combined systolic and diastolic congestive heart failure    5. CKD (chronic kidney disease) stage 3, GFR 30-59 ml/min    6. Morbid obesity    7. BRINDA on CPAP    8. Right ventricular enlargement    9. Moderate mitral regurgitation    10. Mild tricuspid valve regurgitation    11. Pulmonary HTN      Patient presents to clinic for 2 week CHF follow up.  He is doing ok today without any new cardiac complaints.  Continues to lose weight and is working to gain persistent compliance with medical regimen today.  No chest pain, SOB, or CREWS  Continues to endorse orthopnea but only 2 pillows today.   Reports compliance with CPAP therapy.  Reports compliance with medications, dietary and fluid restrictions.   Plan:     Continue same CV meds for now  Continue  Eliquis for CVA prophylaxis  BMP and BNP pending, will call with results once resulted  MPI stress test for further evaluation of reduced EF of 10-15%, phone review  Encourage weight loss  Encourage daily walking for weight loss/physical activity  Dash diet, 2 gm sodium restriction  55-60 ozs fluid restriction  Monitor Bp at home  Bring BP log to next visit  BP well controlled today in office  Needs to schedule EP appointment  Follow up in 4 weeks with BMp and BNP or sooner if needed.     ANN GrewalC

## 2019-04-05 NOTE — ASSESSMENT & PLAN NOTE
2D ECHO conclusions:  Biatrial enlargement  Moderate Left Ventricular Enlargement  Concentric Hypertrophy  No WMA's  Severely depressed Left Ventricular systolic function (10-15%)  Right ventricular enlargement with moderately depressed systolic function  PA systolic pressure is >38 mmHg  Moderate to Severe Mitral Regurgitation  Mild to Moderate Tricuspid Regurgitation

## 2019-04-05 NOTE — TELEPHONE ENCOUNTER
----- Message from Viridiana Leon NP sent at 4/5/2019 11:14 AM CDT -----  Please call patient    Kidney function has slightly worsened today.   His potassium level is on the low side    He should take Bumex 2 mg daily for now  Please verify that he is taking Potassium 40 meq daily    I have reduced the dose of his digoxin so he should take 62.5 mcg daily instead of previous dose due to kidney function. We will recheck his kidney function at the next visit as well.     I have sent refill for all of his medications. Important that he picks the new dose of digoxin up today and he is compliant with medications and dietary restrictions.     Thank you  Viridiana

## 2019-04-11 ENCOUNTER — TELEPHONE (OUTPATIENT)
Dept: CARDIOLOGY | Facility: CLINIC | Age: 39
End: 2019-04-11

## 2019-04-11 NOTE — TELEPHONE ENCOUNTER
Spoke with pt stress test has been rescheduled for next week 4/17/19 [facility].

## 2019-04-11 NOTE — TELEPHONE ENCOUNTER
----- Message from Viridiana Leon NP sent at 4/11/2019 11:30 AM CDT -----  Please call patient    It is very important that he gets stress test rescheduled, he missed appointment this week.     He needs to get this done ASAP!    Viridiana

## 2019-04-17 ENCOUNTER — HOSPITAL ENCOUNTER (OUTPATIENT)
Dept: PULMONOLOGY | Facility: HOSPITAL | Age: 39
Discharge: HOME | End: 2019-04-17
Attending: NURSE PRACTITIONER
Payer: MEDICAID

## 2019-04-17 ENCOUNTER — HOSPITAL ENCOUNTER (OUTPATIENT)
Dept: RADIOLOGY | Facility: HOSPITAL | Age: 39
Discharge: HOME | End: 2019-04-17
Attending: NURSE PRACTITIONER
Payer: MEDICAID

## 2019-04-17 DIAGNOSIS — I51.7 RIGHT VENTRICULAR ENLARGEMENT: ICD-10-CM

## 2019-04-17 DIAGNOSIS — I50.20 HEART FAILURE WITH REDUCED EJECTION FRACTION, NYHA CLASS III: ICD-10-CM

## 2019-04-17 DIAGNOSIS — I50.43 ACUTE ON CHRONIC COMBINED SYSTOLIC AND DIASTOLIC CONGESTIVE HEART FAILURE: ICD-10-CM

## 2019-04-17 DIAGNOSIS — N18.30 CKD (CHRONIC KIDNEY DISEASE) STAGE 3, GFR 30-59 ML/MIN: ICD-10-CM

## 2019-04-17 PROCEDURE — 93018 NM MULTI PHARM STRESS CARDIAC COMPONENT: ICD-10-PCS | Mod: ,,, | Performed by: INTERNAL MEDICINE

## 2019-04-17 PROCEDURE — A9502 TC99M TETROFOSMIN: HCPCS

## 2019-04-17 PROCEDURE — 78452 HT MUSCLE IMAGE SPECT MULT: CPT | Mod: 26,,, | Performed by: INTERNAL MEDICINE

## 2019-04-17 PROCEDURE — 93016 NM MULTI PHARM STRESS CARDIAC COMPONENT: ICD-10-PCS | Mod: ,,, | Performed by: INTERNAL MEDICINE

## 2019-04-17 PROCEDURE — 78452 NM MULTI PHARM STRESS CARDIAC COMPONENT: ICD-10-PCS | Mod: 26,,, | Performed by: INTERNAL MEDICINE

## 2019-04-17 PROCEDURE — 93016 CV STRESS TEST SUPVJ ONLY: CPT | Mod: ,,, | Performed by: INTERNAL MEDICINE

## 2019-04-17 PROCEDURE — 93017 CV STRESS TEST TRACING ONLY: CPT

## 2019-04-17 PROCEDURE — 93018 CV STRESS TEST I&R ONLY: CPT | Mod: ,,, | Performed by: INTERNAL MEDICINE

## 2019-04-17 PROCEDURE — 63600175 PHARM REV CODE 636 W HCPCS: Performed by: NURSE PRACTITIONER

## 2019-04-17 RX ORDER — REGADENOSON 0.08 MG/ML
0.4 INJECTION, SOLUTION INTRAVENOUS ONCE
Status: COMPLETED | OUTPATIENT
Start: 2019-04-17 | End: 2019-04-17

## 2019-04-17 RX ADMIN — REGADENOSON 0.4 MG: 0.08 INJECTION, SOLUTION INTRAVENOUS at 04:04

## 2019-04-18 DIAGNOSIS — I50.43 ACUTE ON CHRONIC COMBINED SYSTOLIC AND DIASTOLIC CONGESTIVE HEART FAILURE: ICD-10-CM

## 2019-04-18 DIAGNOSIS — I27.20 PULMONARY HTN: ICD-10-CM

## 2019-04-18 DIAGNOSIS — N18.30 CKD (CHRONIC KIDNEY DISEASE) STAGE 3, GFR 30-59 ML/MIN: ICD-10-CM

## 2019-04-18 DIAGNOSIS — I48.3 TYPICAL ATRIAL FLUTTER: ICD-10-CM

## 2019-04-18 DIAGNOSIS — I50.20 HEART FAILURE WITH REDUCED EJECTION FRACTION, NYHA CLASS III: ICD-10-CM

## 2019-04-18 LAB — DIASTOLIC DYSFUNCTION: NO

## 2019-04-18 RX ORDER — DIGOXIN 125 MCG
62.5 TABLET ORAL DAILY
Qty: 15 TABLET | Refills: 6 | Status: SHIPPED | OUTPATIENT
Start: 2019-04-18 | End: 2019-05-14 | Stop reason: SDUPTHER

## 2019-04-22 ENCOUNTER — TELEPHONE (OUTPATIENT)
Dept: CARDIOLOGY | Facility: CLINIC | Age: 39
End: 2019-04-22

## 2019-04-22 NOTE — TELEPHONE ENCOUNTER
----- Message from Viridiana Leon NP sent at 4/22/2019  7:32 AM CDT -----  Please call patient    Stress test negative    Please keep follow up.    Thanks  Viridiana

## 2019-04-23 RX ORDER — CARVEDILOL 25 MG/1
25 TABLET ORAL 2 TIMES DAILY WITH MEALS
Qty: 60 TABLET | Refills: 1 | Status: CANCELLED | OUTPATIENT
Start: 2019-04-23 | End: 2020-01-01

## 2019-04-23 RX ORDER — LEVOTHYROXINE SODIUM 50 UG/1
50 TABLET ORAL
Qty: 30 TABLET | Refills: 1 | Status: CANCELLED | OUTPATIENT
Start: 2019-04-23 | End: 2020-01-01

## 2019-04-23 RX ORDER — METOLAZONE 5 MG/1
5 TABLET ORAL
Qty: 30 TABLET | Refills: 1 | Status: CANCELLED | OUTPATIENT
Start: 2019-04-24 | End: 2020-01-01

## 2019-04-23 RX ORDER — SPIRONOLACTONE 50 MG/1
50 TABLET, FILM COATED ORAL DAILY
Qty: 30 TABLET | Refills: 1 | Status: CANCELLED | OUTPATIENT
Start: 2019-04-23 | End: 2020-01-01

## 2019-04-23 RX ORDER — BUMETANIDE 2 MG/1
2 TABLET ORAL 2 TIMES DAILY
Qty: 60 TABLET | Refills: 0 | Status: CANCELLED | OUTPATIENT
Start: 2019-04-23 | End: 2020-01-01

## 2019-05-03 DIAGNOSIS — I27.20 PULMONARY HTN: ICD-10-CM

## 2019-05-03 DIAGNOSIS — I50.43 ACUTE ON CHRONIC COMBINED SYSTOLIC AND DIASTOLIC CONGESTIVE HEART FAILURE: ICD-10-CM

## 2019-05-03 DIAGNOSIS — I50.20 HEART FAILURE WITH REDUCED EJECTION FRACTION, NYHA CLASS III: ICD-10-CM

## 2019-05-03 DIAGNOSIS — N18.30 CKD (CHRONIC KIDNEY DISEASE) STAGE 3, GFR 30-59 ML/MIN: ICD-10-CM

## 2019-05-03 DIAGNOSIS — I48.3 TYPICAL ATRIAL FLUTTER: ICD-10-CM

## 2019-05-03 RX ORDER — BUMETANIDE 2 MG/1
2 TABLET ORAL 2 TIMES DAILY
Qty: 60 TABLET | Refills: 11 | Status: SHIPPED | OUTPATIENT
Start: 2019-05-03 | End: 2019-05-31 | Stop reason: SDUPTHER

## 2019-05-03 RX ORDER — CARVEDILOL 25 MG/1
25 TABLET ORAL 2 TIMES DAILY WITH MEALS
Qty: 60 TABLET | Refills: 11 | Status: SHIPPED | OUTPATIENT
Start: 2019-05-03 | End: 2019-05-31 | Stop reason: SDUPTHER

## 2019-05-03 RX ORDER — METOLAZONE 5 MG/1
5 TABLET ORAL
Qty: 90 TABLET | Refills: 11 | Status: SHIPPED | OUTPATIENT
Start: 2019-05-03 | End: 2019-05-31 | Stop reason: SDUPTHER

## 2019-05-03 RX ORDER — SPIRONOLACTONE 50 MG/1
50 TABLET, FILM COATED ORAL DAILY
Qty: 30 TABLET | Refills: 11 | Status: SHIPPED | OUTPATIENT
Start: 2019-05-03 | End: 2019-05-31 | Stop reason: SDUPTHER

## 2019-05-03 NOTE — TELEPHONE ENCOUNTER
----- Message from Trinh Lui sent at 5/3/2019 12:19 PM CDT -----  Contact: Patient  Type:  RX Refill Request    Who Called:  [place]  Refill or New Rx: refill  RX Name and Strength: Spironolactone 50 mg, Carvedilol 25 mg, Metolazone 5 mg, Levothyrozine 50 mg, and Bumetanide 2 mg  How is the patient currently taking it? (ex. 1XDay): n/a  Is this a 30 day or 90 day RX 90 day  Preferred Pharmacy with phone number:   [facility]/pharm[facility] #[id number] - [place] - [address] [facility]  [address]  [place] [address]  Phone: [phone] Fax: [phone]    Local or Mail Order: local  Ordering Provider: Viridiana Leon  Would the patient rather a call back or a response via [facility]?  Call back  Best Call Back Number: [phone]  Additional Information: n/a    Trinh Olivera

## 2019-05-14 ENCOUNTER — LAB VISIT (OUTPATIENT)
Dept: LAB | Facility: HOSPITAL | Age: 39
End: 2019-05-14
Payer: MEDICAID

## 2019-05-14 ENCOUNTER — OFFICE VISIT (OUTPATIENT)
Dept: CARDIOLOGY | Facility: CLINIC | Age: 39
End: 2019-05-14
Payer: MEDICAID

## 2019-05-14 VITALS
WEIGHT: 315 LBS | BODY MASS INDEX: 49.44 KG/M2 | HEIGHT: 67 IN | HEART RATE: 84 BPM | SYSTOLIC BLOOD PRESSURE: 140 MMHG | DIASTOLIC BLOOD PRESSURE: 76 MMHG

## 2019-05-14 DIAGNOSIS — N18.30 CKD (CHRONIC KIDNEY DISEASE) STAGE 3, GFR 30-59 ML/MIN: ICD-10-CM

## 2019-05-14 DIAGNOSIS — I50.20 HEART FAILURE WITH REDUCED EJECTION FRACTION, NYHA CLASS III: ICD-10-CM

## 2019-05-14 DIAGNOSIS — I34.0 MODERATE MITRAL REGURGITATION: ICD-10-CM

## 2019-05-14 DIAGNOSIS — E66.01 MORBID OBESITY: ICD-10-CM

## 2019-05-14 DIAGNOSIS — I51.7 RIGHT VENTRICULAR ENLARGEMENT: ICD-10-CM

## 2019-05-14 DIAGNOSIS — I48.3 TYPICAL ATRIAL FLUTTER: ICD-10-CM

## 2019-05-14 DIAGNOSIS — I10 ESSENTIAL HYPERTENSION: ICD-10-CM

## 2019-05-14 DIAGNOSIS — I07.1 MILD TRICUSPID VALVE REGURGITATION: ICD-10-CM

## 2019-05-14 DIAGNOSIS — I50.43 ACUTE ON CHRONIC COMBINED SYSTOLIC AND DIASTOLIC CONGESTIVE HEART FAILURE: Primary | ICD-10-CM

## 2019-05-14 DIAGNOSIS — I50.43 ACUTE ON CHRONIC COMBINED SYSTOLIC AND DIASTOLIC CONGESTIVE HEART FAILURE: ICD-10-CM

## 2019-05-14 DIAGNOSIS — I27.20 PULMONARY HTN: ICD-10-CM

## 2019-05-14 DIAGNOSIS — G47.33 OSA ON CPAP: ICD-10-CM

## 2019-05-14 LAB
ANION GAP SERPL CALC-SCNC: 11 MMOL/L (ref 8–16)
BNP SERPL-MCNC: 22 PG/ML (ref 0–99)
BUN SERPL-MCNC: 25 MG/DL (ref 6–20)
CALCIUM SERPL-MCNC: 9.7 MG/DL (ref 8.7–10.5)
CHLORIDE SERPL-SCNC: 99 MMOL/L (ref 95–110)
CO2 SERPL-SCNC: 28 MMOL/L (ref 23–29)
CREAT SERPL-MCNC: 1.5 MG/DL (ref 0.5–1.4)
EST. GFR  (AFRICAN AMERICAN): >60 ML/MIN/1.73 M^2
EST. GFR  (NON AFRICAN AMERICAN): 57 ML/MIN/1.73 M^2
GLUCOSE SERPL-MCNC: 178 MG/DL (ref 70–110)
POTASSIUM SERPL-SCNC: 3.6 MMOL/L (ref 3.5–5.1)
SODIUM SERPL-SCNC: 138 MMOL/L (ref 136–145)

## 2019-05-14 PROCEDURE — 36415 COLL VENOUS BLD VENIPUNCTURE: CPT

## 2019-05-14 PROCEDURE — 83880 ASSAY OF NATRIURETIC PEPTIDE: CPT

## 2019-05-14 PROCEDURE — 99999 PR PBB SHADOW E&M-EST. PATIENT-LVL III: ICD-10-PCS | Mod: PBBFAC,,, | Performed by: NURSE PRACTITIONER

## 2019-05-14 PROCEDURE — 99214 PR OFFICE/OUTPT VISIT, EST, LEVL IV, 30-39 MIN: ICD-10-PCS | Mod: S$PBB,,, | Performed by: NURSE PRACTITIONER

## 2019-05-14 PROCEDURE — 99999 PR PBB SHADOW E&M-EST. PATIENT-LVL III: CPT | Mod: PBBFAC,,, | Performed by: NURSE PRACTITIONER

## 2019-05-14 PROCEDURE — 99214 OFFICE O/P EST MOD 30 MIN: CPT | Mod: S$PBB,,, | Performed by: NURSE PRACTITIONER

## 2019-05-14 PROCEDURE — 99213 OFFICE O/P EST LOW 20 MIN: CPT | Mod: PBBFAC | Performed by: NURSE PRACTITIONER

## 2019-05-14 PROCEDURE — 80048 BASIC METABOLIC PNL TOTAL CA: CPT

## 2019-05-14 RX ORDER — ISOSORBIDE DINITRATE AND HYDRALAZINE HYDROCHLORIDE 37.5; 2 MG/1; MG/1
1 TABLET ORAL 2 TIMES DAILY
Qty: 60 TABLET | Refills: 11 | Status: SHIPPED | OUTPATIENT
Start: 2019-05-14 | End: 2019-05-17 | Stop reason: SDUPTHER

## 2019-05-14 RX ORDER — DIGOXIN 125 MCG
62.5 TABLET ORAL DAILY
Qty: 15 TABLET | Refills: 11 | Status: SHIPPED | OUTPATIENT
Start: 2019-05-14 | End: 2019-05-31 | Stop reason: SDUPTHER

## 2019-05-14 RX ORDER — POTASSIUM CHLORIDE 20 MEQ/1
20 TABLET, EXTENDED RELEASE ORAL 2 TIMES DAILY
COMMUNITY
Start: 2019-03-19 | End: 2019-05-31 | Stop reason: SDUPTHER

## 2019-05-14 NOTE — PROGRESS NOTES
Subjective:   Patient ID:  Chacha Zendejas is a 39 y.o. male who presents for evaluation of Congestive Heart Failure      HPI     Mr. Zendejas is a 39 year old male who presents to clinic for CHF follow up.   His current medical conditions include HTN, HLP, Chronic combined Systolic and diastolic CHF, HFrEF of 10-15%, NYHA FC III, AFL on Eliquis, CKD, BRINDA on CPAP, PHTN, RVE, Moderately depressed RV systolic fn, MR, TR, PA Systolic >38 mmHg.   He returns today and states he is doing ok.   He recently underwent stress test which was negative for myocardial ischemia.   Denies chest pain or anginal equivalents.   No shortness of breath or palpitations today. He does have some degree of CREWS issues but is stable and slowly improving.   He endorses excess salt intake over the weekend  Chronic LE edema today in office.   BP on higher side today, will adjust medical regimen and reassess in 4 weeks.   Reports compliance with CPAP nightly.  He endorses more energy than previously and he feels good today.   He is trying to stay compliant with fluid restriction.  No recent falls, syncope or near syncopal events.   He is trying to stay active and walk daily.   Reports compliance with medications and dietary restrictions.   No CNS complaints to suggest TIA or CVA today.  No signs of abnormal bleeding on Eliquis.     Given his fluctuating renal function, will continue renal dose of Digoxin for now. Further recommendations per DR. Marcos at next weeks visit.     Past Medical History:   Diagnosis Date    Atrial flutter     Cellulitis     RLE    CHF (congestive heart failure)     Hypertension        Past Surgical History:   Procedure Laterality Date    CARDIOVERSION OR DEFIBRILLATION N/A 3/18/2019    Performed by [facility]ib [facility] at [facility] CATH LAB    ECHOCARDIOGRAM,TRANSESOPHAGEAL N/A 3/18/2019    Performed by Talib Rat[facility]iff MD at [facility] CATH LAB       Social History     Tobacco Use    Smoking status: Former  Smoker     Types: Cigarettes    Smokeless tobacco: Never Used   Substance Use Topics    Alcohol use: Yes     Comment: socially    Drug use: No       History reviewed. No pertinent family history.  Wt Readings from Last 3 Encounters:   05/14/19 (!) 196.4 kg (432 lb 15.7 oz)   04/05/19 (!) 192.8 kg (425 lb 0.8 oz)   03/22/19 (!) 196.9 kg (434 lb 1.4 oz)     Temp Readings from Last 3 Encounters:   03/19/19 98.2 °F (36.8 °C) (Oral)   03/04/19 98.5 °F (36.9 °C) (Oral)     BP Readings from Last 3 Encounters:   05/14/19 (!) 140/76   04/05/19 132/78   03/22/19 134/76     Pulse Readings from Last 3 Encounters:   05/14/19 84   04/05/19 80   03/22/19 82     Current Outpatient Medications on File Prior to Visit   Medication Sig Dispense Refill    amiodarone (PACERONE) 200 MG Tab Take 1 tablet (200 mg total) by mouth once daily. 90 tablet 3    apixaban (ELIQUIS) 5 mg Tab Take 1 tablet (5 mg total) by mouth 2 (two) times daily. 60 tablet 11    atorvastatin (LIPITOR) 20 MG tablet Take 1 tablet (20 mg total) by mouth once daily. 90 tablet 3    bumetanide (BUMEX) 2 MG tablet Take 1 tablet (2 mg total) by mouth 2 (two) times daily. 60 tablet 11    carvedilol (COREG) 25 MG tablet Take 1 tablet (25 mg total) by mouth 2 (two) times daily with meals. 60 tablet 11    levothyroxine (SYNTHROID) 50 MCG tablet Take 1 tablet (50 mcg total) by mouth before breakfast. 90 tablet 3    losartan (COZAAR) 50 MG tablet Take 1 tablet (50 mg total) by mouth once daily. 90 tablet 3    metOLazone (ZAROXOLYN) 5 MG tablet Take 1 tablet (5 mg total) by mouth 3 (three) times a week. Take 30 minutes before Bumex 90 tablet 11    potassium chloride SA (K-DUR,KLOR-CON) 20 MEQ tablet Take 20 mEq by mouth 2 (two) times daily.      spironolactone (ALDACTONE) 50 MG tablet Take 1 tablet (50 mg total) by mouth once daily. 30 tablet 11    [DISCONTINUED] digoxin (LANOXIN) 125 mcg tablet Take 0.5 tablets (62.5 mcg total) by mouth once daily. 15 tablet 6  "    No current facility-administered medications on file prior to visit.        Review of Systems   Constitution: Positive for malaise/fatigue.   HENT: Negative for hearing loss and hoarse voice.    Eyes: Negative for blurred vision and visual disturbance.   Cardiovascular: Positive for dyspnea on exertion and leg swelling (trace BLE). Negative for chest pain, claudication, irregular heartbeat, near-syncope, orthopnea, palpitations, paroxysmal nocturnal dyspnea and syncope.   Respiratory: Negative for cough, hemoptysis, shortness of breath, sleep disturbances due to breathing, snoring and wheezing.         +BRINDA on CPAP   Endocrine: Negative for cold intolerance and heat intolerance.   Hematologic/Lymphatic: Bruises/bleeds easily (on eliquis).   Skin: Negative for color change, dry skin and nail changes.   Musculoskeletal: Positive for arthritis and back pain. Negative for joint pain and myalgias.   Gastrointestinal: Negative for bloating, abdominal pain, constipation, nausea and vomiting.   Genitourinary: Negative for dysuria, flank pain, hematuria and hesitancy.   Neurological: Negative for headaches, light-headedness, loss of balance, numbness, paresthesias and weakness.   Psychiatric/Behavioral: Negative for altered mental status.   Allergic/Immunologic: Negative for environmental allergies.     BP (!) 140/76 (BP Location: Left arm, Patient Position: Sitting, BP Method: Large (Manual))   Pulse 84   Ht 5' 7" (1.702 m)   Wt (!) 196.4 kg (432 lb 15.7 oz)   BMI 67.81 kg/m²     Objective:   Physical Exam   Constitutional: He is oriented to person, place, and time. He appears well-developed and well-nourished. No distress.   HENT:   Head: Normocephalic and atraumatic.   Eyes: Pupils are equal, round, and reactive to light.   Neck: Normal range of motion and full passive range of motion without pain. Neck supple. No JVD present.   Cardiovascular: Normal rate, regular rhythm, S1 normal, S2 normal and intact distal " pulses.  Occasional extrasystoles are present. PMI is not displaced. Exam reveals no distant heart sounds.   No murmur heard.  Pulses:       Radial pulses are 2+ on the right side, and 2+ on the left side.        Dorsalis pedis pulses are 1+ on the right side, and 1+ on the left side.   Remains in SR today on exam     Pulmonary/Chest: Effort normal. No accessory muscle usage. No respiratory distress. He has decreased breath sounds in the right lower field and the left lower field. He has no wheezes. He has no rales.   +BRINDA on CPAP  Some degree of CREWS but improving   Abdominal: Soft. Bowel sounds are normal. He exhibits no distension. There is no tenderness.   +morbidly obese   Musculoskeletal: Normal range of motion. He exhibits edema (trace BLE, Stable).        Right ankle: He exhibits swelling.        Left ankle: He exhibits swelling.   Neurological: He is alert and oriented to person, place, and time.   Skin: Skin is warm and dry. He is not diaphoretic. No cyanosis. Nails show no clubbing.   Psychiatric: He has a normal mood and affect. His speech is normal and behavior is normal. Judgment and thought content normal. Cognition and memory are normal.   Nursing note and vitals reviewed.      No results found for: CHOL  No results found for: HDL  No results found for: LDLCALC  No results found for: TRIG  No results found for: CHOLHDL    Chemistry        Component Value Date/Time     05/14/2019 0743    K 3.6 05/14/2019 0743    CL 99 05/14/2019 0743    CO2 28 05/14/2019 0743    BUN 25 (H) 05/14/2019 0743    CREATININE 1.5 (H) 05/14/2019 0743     (H) 05/14/2019 0743        Component Value Date/Time    CALCIUM 9.7 05/14/2019 0743    ALKPHOS 137 (H) 03/19/2019 0443    AST 26 03/19/2019 0443    ALT 42 03/19/2019 0443    BILITOT 1.8 (H) 03/19/2019 0443    ESTGFRAFRICA >60 05/14/2019 0743    EGFRNONAA 57 (A) 05/14/2019 0743          Lab Results   Component Value Date    TSH 5.151 (H) 03/13/2019     No results  found for: INR, PROTIME  Lab Results   Component Value Date    WBC 6.39 03/19/2019    HGB 13.2 (L) 03/19/2019    HCT 41.3 03/19/2019    MCV 94 03/19/2019     03/19/2019      Nuclear Quantitative Functional Analysis:   LVEF: 36 %  LVED Volume: 293 ml  LVES Volume: 210 ml    Impression: NORMAL MYOCARDIAL PERFUSION  1. The perfusion scan is free of evidence for myocardial ischemia or injury.   2. There is a mild intensity fixed defect in the anterior wall of the left ventricle, secondary to soft tissue attenuation.   3. There is abnormal wall motion at rest showing moderate global hypokinesis of the left ventricle.   4. There is resting LV dysfunction with a reduced ejection fraction of 36 %.   5. The left ventricular volume is moderately increased at rest.   6. The extracardiac distribution of radioactivity is normal.   7. There was no previous study available to compare.          This document has been electronically    SIGNED BY: Demond Mendoza MD On: 04/18/2019 17:10    CONCLUSIONS     1 - Biatrial enlargement.     2 - Moderate left ventricular enlargement.     3 - Concentric hypertrophy.     4 - No wall motion abnormalities.     5 - Severely depressed left ventricular systolic function (EF 10-15%).     6 - Right ventricular enlargement with moderately depressed systolic function.     7 - The estimated PA systolic pressure is greater than 38 mmHg.     8 - Moderate to severe mitral regurgitation.     9 - Mild to moderate tricuspid regurgitation.             This document has been electronically    SIGNED BY: Heath Nash MD On: 03/14/2019 13:26            Assessment:      1. Acute on chronic combined systolic and diastolic congestive heart failure    2. Essential hypertension    3. Typical atrial flutter    4. Heart failure with reduced ejection fraction, NYHA class III    5. Right ventricular enlargement    6. Moderate mitral regurgitation    7. CKD (chronic kidney disease) stage 3, GFR 30-59 ml/min    8. Morbid  obesity    9. Mild tricuspid valve regurgitation    10. BRINDA on CPAP      Patient presents to clinic for CHF follow up and is doing ok today.  Recent MPI stress test negative for ischemia.   No chest pain, SOB, CREWS or palpitations  Feeling better cardiac wise  Reports compliance with medications and dietary restrictions.   NO CNS complaints to suggest TIA or CVA today.  No signs of abnormal bleeding on Eliquis.   Plan:     Start Bidil 1 tab BID   Continue all other CV meds for now  Please take Metolazone on Monday, Wednesday, Friday.  Dash diet, 2 gm sodium restriction  50-60 ounces of fluid intake daily  Continue CPAP nightly  Encourage weight loss  Encourage regular physical activity as tolerated  Daily weights  I have asked him to weigh daily and call clinic if increase in weight by 3 lbs in 1 day or 5 lbs in 1 week  Follow up in 4-6 weeks for BP check  Keep appt with Dr. Marcos next week, reminded patient today in office.     JAK Grewal

## 2019-05-15 ENCOUNTER — TELEPHONE (OUTPATIENT)
Dept: CARDIOLOGY | Facility: CLINIC | Age: 39
End: 2019-05-15

## 2019-05-15 NOTE — TELEPHONE ENCOUNTER
----- Message from Viridiana Leon NP sent at 5/15/2019 10:03 AM CDT -----  Patient needs appt with PCP    Can we help him arrange?    Thanks  Viridiana Lloyd Back

## 2019-05-17 DIAGNOSIS — I50.43 ACUTE ON CHRONIC COMBINED SYSTOLIC AND DIASTOLIC CONGESTIVE HEART FAILURE: ICD-10-CM

## 2019-05-17 DIAGNOSIS — I10 ESSENTIAL HYPERTENSION: ICD-10-CM

## 2019-05-17 RX ORDER — ISOSORBIDE DINITRATE AND HYDRALAZINE HYDROCHLORIDE 37.5; 2 MG/1; MG/1
1 TABLET ORAL 2 TIMES DAILY
Qty: 60 TABLET | Refills: 11 | Status: SHIPPED | OUTPATIENT
Start: 2019-05-17 | End: 2019-07-01

## 2019-05-20 ENCOUNTER — OFFICE VISIT (OUTPATIENT)
Dept: INTERNAL MEDICINE | Facility: CLINIC | Age: 39
End: 2019-05-20
Payer: MEDICAID

## 2019-05-20 ENCOUNTER — LAB VISIT (OUTPATIENT)
Dept: LAB | Facility: HOSPITAL | Age: 39
End: 2019-05-20
Payer: MEDICAID

## 2019-05-20 VITALS
DIASTOLIC BLOOD PRESSURE: 82 MMHG | HEIGHT: 67 IN | WEIGHT: 315 LBS | TEMPERATURE: 97 F | HEART RATE: 89 BPM | SYSTOLIC BLOOD PRESSURE: 144 MMHG | BODY MASS INDEX: 49.44 KG/M2 | OXYGEN SATURATION: 95 %

## 2019-05-20 DIAGNOSIS — E66.01 MORBID OBESITY WITH BMI OF 60.0-69.9, ADULT: ICD-10-CM

## 2019-05-20 DIAGNOSIS — N28.9 RENAL INSUFFICIENCY: ICD-10-CM

## 2019-05-20 DIAGNOSIS — G47.33 OSA ON CPAP: ICD-10-CM

## 2019-05-20 DIAGNOSIS — E03.9 HYPOTHYROIDISM (ACQUIRED): ICD-10-CM

## 2019-05-20 DIAGNOSIS — E78.5 HYPERLIPIDEMIA LDL GOAL <130: ICD-10-CM

## 2019-05-20 DIAGNOSIS — I50.22 CHRONIC SYSTOLIC HEART FAILURE: ICD-10-CM

## 2019-05-20 DIAGNOSIS — I10 HYPERTENSION GOAL BP (BLOOD PRESSURE) < 140/90: Primary | ICD-10-CM

## 2019-05-20 PROCEDURE — 99999 PR PBB SHADOW E&M-EST. PATIENT-LVL III: CPT | Mod: PBBFAC,,, | Performed by: INTERNAL MEDICINE

## 2019-05-20 PROCEDURE — 36415 COLL VENOUS BLD VENIPUNCTURE: CPT

## 2019-05-20 PROCEDURE — 99999 PR PBB SHADOW E&M-EST. PATIENT-LVL III: ICD-10-PCS | Mod: PBBFAC,,, | Performed by: INTERNAL MEDICINE

## 2019-05-20 PROCEDURE — 99204 PR OFFICE/OUTPT VISIT, NEW, LEVL IV, 45-59 MIN: ICD-10-PCS | Mod: S$PBB,,, | Performed by: INTERNAL MEDICINE

## 2019-05-20 PROCEDURE — 99204 OFFICE O/P NEW MOD 45 MIN: CPT | Mod: S$PBB,,, | Performed by: INTERNAL MEDICINE

## 2019-05-20 PROCEDURE — 99213 OFFICE O/P EST LOW 20 MIN: CPT | Mod: PBBFAC | Performed by: INTERNAL MEDICINE

## 2019-05-20 PROCEDURE — 84443 ASSAY THYROID STIM HORMONE: CPT

## 2019-05-20 NOTE — PROGRESS NOTES
Subjective:       Patient ID: Chacha Zendejas is a 39 y.o. male.    Chief Complaint: Establish Care    39 y.o. Black or  male     Patient presents with:  Establish Care    HPI: Here today to establish care. His is new to [facility]. He has been seeing cardiology since his hospitalization in March for atrial flutter/CHF.   HTN--elevated. He saw cardiology last week and had his medication adjusted. He denies symptoms.   Renal insufficiency--recent values showed improvement. He denies symptoms.   Systolic heart failure--chronic. He reports being diagnosed approximately 10 ears ago. His echo done in March showed an EF of 10-20%. He states he is functioning a lot better now. He is compliant with his medication and diet. He is weighing himself regularly.   BRINDA--on CPAP.   Hypothyroidism--compliant with levothyroxine.                     TSH                      5.151 (H)           03/13/2019            Hyperlipidemia--compliant with atorvastatin.     Past Medical History:  Atrial flutter  Cellulitis      Comment:  RLE  CHF (congestive heart failure)  Hypertension  Hypothyroidism  Sleep apnea  Venous insufficiency of left lower extremity    Past Surgical History:  3/18/2019: CARDIOVERSION OR DEFIBRILLATION; N/A      Comment:  Performed by Talib Ratliff MD at [facility] CATH LAB  3/18/2019: ECHOCARDIOGRAM,TRANSESOPHAGEAL; N/A      Comment:  Performed by Talib Ratliff MD at [facility] CATH LAB    Review of patient's family history indicates:  Problem: Heart disease      Relation: Father          Age of Onset: (Not Specified)      Current Outpatient Medications on File Prior to Visit:  apixaban (ELIQUIS) 5 mg Tab, Take 1 tablet (5 mg total) by mouth 2 (two) times daily., Disp: 60 tablet, Rfl: 11  atorvastatin (LIPITOR) 20 MG tablet, Take 1 tablet (20 mg total) by mouth once daily., Disp: 90 tablet, Rfl: 3  bumetanide (BUMEX) 2 MG tablet, Take 1 tablet (2 mg total) by mouth 2 (two) times daily.,  Disp: 60 tablet, Rfl: 11  carvedilol (COREG) 25 MG tablet, Take 1 tablet (25 mg total) by mouth 2 (two) times daily with meals., Disp: 60 tablet, Rfl: 11  digoxin (LANOXIN) 125 mcg tablet, Take 0.5 tablets (62.5 mcg total) by mouth once daily., Disp: 15 tablet, Rfl: 11  isosorbide-hydrALAZINE 20-37.5 mg (BIDIL) 20-37.5 mg Tab, Take 1 tablet by mouth 2 (two) times daily., Disp: 60 tablet, Rfl: 11  levothyroxine (SYNTHROID) 50 MCG tablet, Take 1 tablet (50 mcg total) by mouth before breakfast., Disp: 90 tablet, Rfl: 3  losartan (COZAAR) 50 MG tablet, Take 1 tablet (50 mg total) by mouth once daily., Disp: 90 tablet, Rfl: 3  metOLazone (ZAROXOLYN) 5 MG tablet, Take 1 tablet (5 mg total) by mouth 3 (three) times a week. Take 30 minutes before Bumex, Disp: 90 tablet, Rfl: 11  potassium chloride SA (K-DUR,KLOR-CON) 20 MEQ tablet, Take 20 mEq by mouth 2 (two) times daily., Disp: , Rfl:   spironolactone (ALDACTONE) 50 MG tablet, Take 1 tablet (50 mg total) by mouth once daily., Disp: 30 tablet, Rfl: 11  amiodarone (PACERONE) 200 MG Tab, Take 1 tablet (200 mg total) by mouth once daily., Disp: 90 tablet, Rfl: 3    Allergies:   Review of patient's allergies indicates:   -- Iodine and iodide containing products -- Itching and Swelling   -- Shellfish containing products             Review of Systems   Constitutional: Negative for fatigue, fever and unexpected weight change.   HENT: Negative for hearing loss and trouble swallowing.    Eyes: Negative for visual disturbance.   Respiratory: Negative for cough and shortness of breath.    Cardiovascular: Positive for leg swelling. Negative for chest pain and palpitations.   Gastrointestinal: Negative for abdominal pain, blood in stool, constipation and diarrhea.   Genitourinary: Negative for difficulty urinating.   Musculoskeletal: Negative for arthralgias, back pain and gait problem.   Neurological: Negative for dizziness and headaches.   Psychiatric/Behavioral: Negative for  dysphoric mood and sleep disturbance. The patient is not nervous/anxious.        Objective:      Physical Exam   Constitutional: He is oriented to person, place, and time. He appears well-developed and well-nourished. No distress.   Eyes: No scleral icterus.   Neck: No tracheal deviation present.   Cardiovascular: Normal rate and regular rhythm. Exam reveals distant heart sounds.   Pulmonary/Chest: Effort normal and breath sounds normal. No respiratory distress. He has no wheezes. He has no rales.   Abdominal: Soft. Bowel sounds are normal.   Musculoskeletal: He exhibits edema.   Neurological: He is alert and oriented to person, place, and time.   Skin: Skin is warm and dry.   Psychiatric: He has a normal mood and affect.   Vitals reviewed.      Assessment:       1. Hypertension goal BP (blood pressure) < 140/90    2. Renal insufficiency    3. Chronic systolic heart failure    4. BRINDA on CPAP    5. Hypothyroidism (acquired)    6. Hyperlipidemia LDL goal <130    7. Morbid obesity with BMI of 60.0-69.9, adult        Plan:       Chacha was seen today for establish care.    Diagnoses and all orders for this visit:    Hypertension goal BP (blood pressure) < 140/90  -     Continue current management  -     Lifestyle modifications discussed  -     Comprehensive metabolic panel; Standing  -     Lipid panel; Standing    Renal insufficiency  -     Comprehensive metabolic panel; Standing    Chronic systolic heart failure  -     Counseled regarding low sodium diet and medication compliance  -     Continue daily weights  -     F/U with cardiology    BRINDA on CPAP    Hypothyroidism (acquired)  -     TSH; Standing    Hyperlipidemia LDL goal <130  -     Comprehensive metabolic panel; Standing  -     Lipid panel; Standing    Morbid obesity with BMI of 60.0-69.9, adult  -     Lifestyle modifications discussed    Schedule labs.     RTC in 4 weeks for HTN.

## 2019-05-21 LAB — TSH SERPL DL<=0.005 MIU/L-ACNC: 2.33 UIU/ML (ref 0.4–4)

## 2019-05-22 ENCOUNTER — OFFICE VISIT (OUTPATIENT)
Dept: CARDIOLOGY | Facility: CLINIC | Age: 39
End: 2019-05-22
Payer: MEDICAID

## 2019-05-22 ENCOUNTER — TELEPHONE (OUTPATIENT)
Dept: INTERNAL MEDICINE | Facility: CLINIC | Age: 39
End: 2019-05-22

## 2019-05-22 VITALS
HEART RATE: 76 BPM | HEIGHT: 67 IN | DIASTOLIC BLOOD PRESSURE: 74 MMHG | WEIGHT: 315 LBS | SYSTOLIC BLOOD PRESSURE: 124 MMHG | BODY MASS INDEX: 49.44 KG/M2

## 2019-05-22 DIAGNOSIS — N18.30 CKD (CHRONIC KIDNEY DISEASE) STAGE 3, GFR 30-59 ML/MIN: ICD-10-CM

## 2019-05-22 DIAGNOSIS — I48.3 TYPICAL ATRIAL FLUTTER: ICD-10-CM

## 2019-05-22 DIAGNOSIS — E66.01 MORBID OBESITY: ICD-10-CM

## 2019-05-22 DIAGNOSIS — I50.20 HEART FAILURE WITH REDUCED EJECTION FRACTION, NYHA CLASS III: ICD-10-CM

## 2019-05-22 DIAGNOSIS — I50.22 CHRONIC SYSTOLIC CONGESTIVE HEART FAILURE: Primary | ICD-10-CM

## 2019-05-22 DIAGNOSIS — I50.43 ACUTE ON CHRONIC COMBINED SYSTOLIC AND DIASTOLIC CONGESTIVE HEART FAILURE: ICD-10-CM

## 2019-05-22 DIAGNOSIS — G47.33 OSA ON CPAP: ICD-10-CM

## 2019-05-22 DIAGNOSIS — I10 ESSENTIAL HYPERTENSION: ICD-10-CM

## 2019-05-22 PROCEDURE — 99214 OFFICE O/P EST MOD 30 MIN: CPT | Mod: S$PBB,,, | Performed by: INTERNAL MEDICINE

## 2019-05-22 PROCEDURE — 99213 OFFICE O/P EST LOW 20 MIN: CPT | Mod: PBBFAC,PN | Performed by: INTERNAL MEDICINE

## 2019-05-22 PROCEDURE — 99999 PR PBB SHADOW E&M-EST. PATIENT-LVL III: ICD-10-PCS | Mod: PBBFAC,,, | Performed by: INTERNAL MEDICINE

## 2019-05-22 PROCEDURE — 99214 PR OFFICE/OUTPT VISIT, EST, LEVL IV, 30-39 MIN: ICD-10-PCS | Mod: S$PBB,,, | Performed by: INTERNAL MEDICINE

## 2019-05-22 PROCEDURE — 99999 PR PBB SHADOW E&M-EST. PATIENT-LVL III: CPT | Mod: PBBFAC,,, | Performed by: INTERNAL MEDICINE

## 2019-05-22 NOTE — PROGRESS NOTES
Subjective:    Patient ID:  Chacha Zendejas is a 39 y.o. male who presents for evaluation of Congestive Heart Failure      HPI    Review of Systems   Constitution: Negative.   HENT: Negative.    Eyes: Negative.    Cardiovascular: Negative.    Respiratory: Negative.    Endocrine: Negative.    Hematologic/Lymphatic: Negative.    Skin: Negative.    Musculoskeletal: Negative.    Gastrointestinal: Negative.    Genitourinary: Negative.    Neurological: Negative.    Psychiatric/Behavioral: Negative.    Allergic/Immunologic: Negative.         Objective:    Physical Exam      Assessment:       No diagnosis found.     Plan:

## 2019-05-22 NOTE — TELEPHONE ENCOUNTER
----- Message from Brandy Abreu DO sent at 5/21/2019  6:21 PM CDT -----  Notify patient repeat thyroid test is within normal range.

## 2019-05-22 NOTE — PROGRESS NOTES
Subjective:   Patient ID:  Chacha Zendejas is a 39 y.o. male     Chief complaint:Congestive Heart Failure      HPI  Background:  39 male  New patient to me   Referred by Ms May for A flutter and decreased EF  HTN, HLP, Chronic combined Systolic and diastolic CHF, HFrEF of 10-15%, NYHA FC III, AFL on Eliquis, CKD, BRINDA on CPAP, PHTN, RVE, Moderately depressed RV systolic fn, MR, TR, PA Systolic >38 mmHg.  Recurrent CHF admits for the past several years -- at least since 2014 -- multiple admits   2018 was a good year though     He was recently admitted to [facility] for decompensated CHF and atrial flutter. He underwent CAROL/DCCV with successful conversion to SR.   He was fitted for LifeVest but unable to fit LifeVest and was discharged home.     Update since then:  He is currently feeling well - he can walk 3 blocks -- not sure about stairs yet   He has been cutting grass etc   Feels like near class I -- if not class I  Current Outpatient Medications   Medication Sig    isosorbide-hydrALAZINE 20-37.5 mg (BIDIL) 20-37.5 mg Tab Take 1 tablet by mouth 2 (two) times daily.    levothyroxine (SYNTHROID) 50 MCG tablet Take 1 tablet (50 mcg total) by mouth before breakfast.    amiodarone (PACERONE) 200 MG Tab Take 1 tablet (200 mg total) by mouth once daily.    apixaban (ELIQUIS) 5 mg Tab Take 1 tablet (5 mg total) by mouth 2 (two) times daily.    atorvastatin (LIPITOR) 20 MG tablet Take 1 tablet (20 mg total) by mouth once daily.    bumetanide (BUMEX) 2 MG tablet Take 1 tablet (2 mg total) by mouth 2 (two) times daily.    carvedilol (COREG) 25 MG tablet Take 1 tablet (25 mg total) by mouth 2 (two) times daily with meals.    digoxin (LANOXIN) 125 mcg tablet Take 0.5 tablets (62.5 mcg total) by mouth once daily.    losartan (COZAAR) 50 MG tablet Take 1 tablet (50 mg total) by mouth once daily.    metOLazone (ZAROXOLYN) 5 MG tablet Take 1 tablet (5 mg total) by mouth 3 (three) times a week. Take 30 minutes before  Bumex    potassium chloride SA (K-DUR,KLOR-CON) 20 MEQ tablet Take 1 tablet (20 mEq total) by mouth 2 (two) times daily.    spironolactone (ALDACTONE) 50 MG tablet Take 1 tablet (50 mg total) by mouth once daily.     No current facility-administered medications for this visit.      Review of Systems   Constitution: Negative for decreased appetite, malaise/fatigue, weight gain and weight loss.   Eyes: Negative for blurred vision.   Cardiovascular: Negative for chest pain, claudication, cyanosis, dyspnea on exertion, irregular heartbeat, leg swelling, near-syncope, orthopnea and palpitations.   Respiratory: Negative for cough, shortness of breath, sleep disturbances due to breathing, snoring and wheezing.    Endocrine: Negative for heat intolerance.   Hematologic/Lymphatic: Does not bruise/bleed easily.   Musculoskeletal: Negative for muscle weakness and myalgias.   Gastrointestinal: Negative for melena, nausea and vomiting.   Genitourinary: Negative for nocturia.   Neurological: Negative for excessive daytime sleepiness, dizziness, headaches, light-headedness and weakness.   Psychiatric/Behavioral: Negative for depression, memory loss and substance abuse. The patient does not have insomnia and is not nervous/anxious.        Objective:   Physical Exam   Constitutional: He is oriented to person, place, and time. He appears well-developed and well-nourished.   overweight   HENT:   Head: Normocephalic and atraumatic.   Right Ear: External ear normal.   Left Ear: External ear normal.   Eyes: Pupils are equal, round, and reactive to light. Conjunctivae are normal. Left conjunctiva is not injected. Left conjunctiva has no hemorrhage.   Neck: Neck supple. No JVD present. No thyromegaly present.   Cardiovascular: Normal rate, regular rhythm, normal heart sounds and intact distal pulses. PMI is not displaced. Exam reveals no gallop, no friction rub, no midsystolic click and no opening snap.   No murmur heard.  Pulses:        "Carotid pulses are 2+ on the right side, and 2+ on the left side.       Radial pulses are 2+ on the right side, and 2+ on the left side.        Dorsalis pedis pulses are 2+ on the right side, and 2+ on the left side.        Posterior tibial pulses are 2+ on the right side, and 2+ on the left side.   Pulmonary/Chest: Effort normal and breath sounds normal. No respiratory distress. He has no wheezes. He has no rales. He exhibits no tenderness.   Abdominal: Soft. Normal appearance. He exhibits no pulsatile liver. There is no hepatomegaly. There is no tenderness. There is no rigidity and no guarding.   Obese abdomen   Musculoskeletal: Normal range of motion. He exhibits no edema or tenderness.        Right knee: He exhibits no swelling.        Left knee: He exhibits no swelling.        Right ankle: He exhibits no swelling.        Left ankle: He exhibits no swelling.        Right lower leg: He exhibits no swelling.        Left lower leg: He exhibits no swelling.        Right foot: There is no swelling.        Left foot: There is no swelling.   Neurological: He is alert and oriented to person, place, and time. He has normal strength and normal reflexes. No cranial nerve deficit. Coordination normal.   Skin: Skin is warm and dry. No rash noted. No cyanosis. No pallor.   Psychiatric: He has a normal mood and affect. His behavior is normal.   Nursing note and vitals reviewed.    /74 (BP Location: Left arm)   Pulse 76   Ht 5' 7" (1.702 m)   Wt (!) 199.3 kg (439 lb 6 oz)   BMI 68.82 kg/m²      Assessment:      1. Chronic systolic congestive heart failure    2. Morbid obesity    3. Typical atrial flutter    4. Heart failure with reduced ejection fraction, NYHA class III    5. Essential hypertension    6. Acute on chronic combined systolic and diastolic congestive heart failure    7. CKD (chronic kidney disease) stage 3, GFR 30-59 ml/min    8. BRINDA on CPAP        Plan:      Orders Placed This Encounter   Procedures    " Ambulatory Referral to Bariatric Medicine     Referral Priority:   Routine     Referral Type:   Consultation     Referral Reason:   Specialty Services Required     Requested Specialty:   Bariatrics     Number of Visits Requested:   1    2D Echo w/ Color Flow Doppler     Standing Status:   Future     Number of Occurrences:   1     Standing Expiration Date:   5/22/2020     Follow up in about 3 months (around 8/22/2019), or if symptoms worsen or fail to improve.  There are no discontinued medications.     Medication List with Changes/Refills   Current Medications    ISOSORBIDE-HYDRALAZINE 20-37.5 MG (BIDIL) 20-37.5 MG TAB    Take 1 tablet by mouth 2 (two) times daily.    LEVOTHYROXINE (SYNTHROID) 50 MCG TABLET    Take 1 tablet (50 mcg total) by mouth before breakfast.   Changed and/or Refilled Medications    Modified Medication Previous Medication    AMIODARONE (PACERONE) 200 MG TAB amiodarone (PACERONE) 200 MG Tab       Take 1 tablet (200 mg total) by mouth once daily.    Take 1 tablet (200 mg total) by mouth once daily.    APIXABAN (ELIQUIS) 5 MG TAB apixaban (ELIQUIS) 5 mg Tab       Take 1 tablet (5 mg total) by mouth 2 (two) times daily.    Take 1 tablet (5 mg total) by mouth 2 (two) times daily.    ATORVASTATIN (LIPITOR) 20 MG TABLET atorvastatin (LIPITOR) 20 MG tablet       Take 1 tablet (20 mg total) by mouth once daily.    Take 1 tablet (20 mg total) by mouth once daily.    BUMETANIDE (BUMEX) 2 MG TABLET bumetanide (BUMEX) 2 MG tablet       Take 1 tablet (2 mg total) by mouth 2 (two) times daily.    Take 1 tablet (2 mg total) by mouth 2 (two) times daily.    CARVEDILOL (COREG) 25 MG TABLET carvedilol (COREG) 25 MG tablet       Take 1 tablet (25 mg total) by mouth 2 (two) times daily with meals.    Take 1 tablet (25 mg total) by mouth 2 (two) times daily with meals.    DIGOXIN (LANOXIN) 125 MCG TABLET digoxin (LANOXIN) 125 mcg tablet       Take 0.5 tablets (62.5 mcg total) by mouth once daily.    Take 0.5  tablets (62.5 mcg total) by mouth once daily.    LOSARTAN (COZAAR) 50 MG TABLET losartan (COZAAR) 50 MG tablet       Take 1 tablet (50 mg total) by mouth once daily.    Take 1 tablet (50 mg total) by mouth once daily.    METOLAZONE (ZAROXOLYN) 5 MG TABLET metOLazone (ZAROXOLYN) 5 MG tablet       Take 1 tablet (5 mg total) by mouth 3 (three) times a week. Take 30 minutes before Bumex    Take 1 tablet (5 mg total) by mouth 3 (three) times a week. Take 30 minutes before Bumex    POTASSIUM CHLORIDE SA (K-DUR,KLOR-CON) 20 MEQ TABLET potassium chloride SA (K-DUR,KLOR-CON) 20 MEQ tablet       Take 1 tablet (20 mEq total) by mouth 2 (two) times daily.    Take 20 mEq by mouth 2 (two) times daily.    SPIRONOLACTONE (ALDACTONE) 50 MG TABLET spironolactone (ALDACTONE) 50 MG tablet       Take 1 tablet (50 mg total) by mouth once daily.    Take 1 tablet (50 mg total) by mouth once daily.

## 2019-05-22 NOTE — LETTER
June 4, 2019      Viridiana Leon, MENDEZ  [address]  [facility] [address]           [facility]  [address]  [place] [address]  Phone: [phone]  Fax: [phone]          Patient: Chacha Zendejas   MR Number: [id number]   YOB: 1980   Date of Visit: 5/22/2019       Dear Viridiana Leon:    Thank you for referring Chacha Zendejas to me for evaluation. Attached you will find relevant portions of my assessment and plan of care.    If you have questions, please do not hesitate to call me. I look forward to following Chacha Zendejas along with you.    Sincerely,    Kayden Marcos MD    Enclosure  CC:  No Recipients    If you would like to receive this communication electronically, please contact [email or web address] or [phone] to request more information on [employer] access.    For providers and/or their staff who would like to refer a patient to [facility], please contact us through our one-stop-shop provider referral line, [facility] , at [phone].    If you feel you have received this communication in error or would no longer like to receive these types of communications, please e-mail [email or web address]

## 2019-05-31 ENCOUNTER — CLINICAL SUPPORT (OUTPATIENT)
Dept: CARDIOLOGY | Facility: CLINIC | Age: 39
End: 2019-05-31
Attending: INTERNAL MEDICINE
Payer: MEDICAID

## 2019-05-31 ENCOUNTER — TELEPHONE (OUTPATIENT)
Dept: BARIATRICS | Facility: CLINIC | Age: 39
End: 2019-05-31

## 2019-05-31 ENCOUNTER — DOCUMENTATION ONLY (OUTPATIENT)
Dept: CARDIOLOGY | Facility: CLINIC | Age: 39
End: 2019-05-31

## 2019-05-31 DIAGNOSIS — I50.20 HEART FAILURE WITH REDUCED EJECTION FRACTION, NYHA CLASS III: ICD-10-CM

## 2019-05-31 DIAGNOSIS — I48.3 TYPICAL ATRIAL FLUTTER: ICD-10-CM

## 2019-05-31 DIAGNOSIS — I50.43 ACUTE ON CHRONIC COMBINED SYSTOLIC AND DIASTOLIC CONGESTIVE HEART FAILURE: ICD-10-CM

## 2019-05-31 DIAGNOSIS — N18.30 CKD (CHRONIC KIDNEY DISEASE) STAGE 3, GFR 30-59 ML/MIN: ICD-10-CM

## 2019-05-31 DIAGNOSIS — I27.20 PULMONARY HTN: ICD-10-CM

## 2019-05-31 DIAGNOSIS — I50.22 CHRONIC SYSTOLIC CONGESTIVE HEART FAILURE: ICD-10-CM

## 2019-05-31 PROCEDURE — 93306 TTE W/DOPPLER COMPLETE: CPT | Mod: 26,S$PBB,, | Performed by: INTERNAL MEDICINE

## 2019-05-31 PROCEDURE — C8929 TTE W OR WO FOL WCON,DOPPLER: HCPCS | Mod: PBBFAC,PN | Performed by: INTERNAL MEDICINE

## 2019-05-31 PROCEDURE — 93306 2D ECHO WITH COLOR FLOW DOPPLER: ICD-10-PCS | Mod: 26,S$PBB,, | Performed by: INTERNAL MEDICINE

## 2019-05-31 RX ORDER — BUMETANIDE 2 MG/1
2 TABLET ORAL 2 TIMES DAILY
Qty: 180 TABLET | Refills: 3 | Status: SHIPPED | OUTPATIENT
Start: 2019-05-31 | End: 2020-01-01 | Stop reason: SDUPTHER

## 2019-05-31 RX ORDER — DIGOXIN 125 MCG
62.5 TABLET ORAL DAILY
Qty: 45 TABLET | Refills: 3 | Status: SHIPPED | OUTPATIENT
Start: 2019-05-31

## 2019-05-31 RX ORDER — METOLAZONE 5 MG/1
5 TABLET ORAL
Qty: 360 TABLET | Refills: 3 | Status: SHIPPED | OUTPATIENT
Start: 2019-05-31 | End: 2019-08-12 | Stop reason: SDUPTHER

## 2019-05-31 RX ORDER — LOSARTAN POTASSIUM 50 MG/1
50 TABLET ORAL DAILY
Qty: 90 TABLET | Refills: 3 | Status: SHIPPED | OUTPATIENT
Start: 2019-05-31 | End: 2019-06-24

## 2019-05-31 RX ORDER — POTASSIUM CHLORIDE 20 MEQ/1
20 TABLET, EXTENDED RELEASE ORAL 2 TIMES DAILY
Qty: 180 TABLET | Refills: 3 | Status: SHIPPED | OUTPATIENT
Start: 2019-05-31 | End: 2019-07-01 | Stop reason: SDUPTHER

## 2019-05-31 RX ORDER — SPIRONOLACTONE 50 MG/1
50 TABLET, FILM COATED ORAL DAILY
Qty: 90 TABLET | Refills: 3 | Status: SHIPPED | OUTPATIENT
Start: 2019-05-31 | End: 2019-07-01 | Stop reason: SDUPTHER

## 2019-05-31 RX ORDER — AMIODARONE HYDROCHLORIDE 200 MG/1
200 TABLET ORAL DAILY
Qty: 90 TABLET | Refills: 3 | Status: SHIPPED | OUTPATIENT
Start: 2019-05-31 | End: 2020-01-01 | Stop reason: SDUPTHER

## 2019-05-31 RX ORDER — CARVEDILOL 25 MG/1
25 TABLET ORAL 2 TIMES DAILY WITH MEALS
Qty: 180 TABLET | Refills: 3 | Status: SHIPPED | OUTPATIENT
Start: 2019-05-31 | End: 2019-07-10 | Stop reason: SDUPTHER

## 2019-05-31 RX ORDER — ATORVASTATIN CALCIUM 20 MG/1
20 TABLET, FILM COATED ORAL DAILY
Qty: 90 TABLET | Refills: 3 | Status: SHIPPED | OUTPATIENT
Start: 2019-05-31 | End: 2020-01-01 | Stop reason: SDUPTHER

## 2019-05-31 NOTE — PROGRESS NOTES
Pt presented for an echocardiogram today.  This study was performed in conjunction with Optison contrast agent because of poor endocardial visualization.  Procedure was explained to the patient, He verbalized understanding and signed the consent.  IV, 24ga x 1 attempts, was started in the right AC using aseptic technique.  Administered a total of 3 ml of Optison (lot # [id number], expiration date 5/14/2020).  Patient tolerated the procedure well.  IV discontinued, pressure dressing applied.

## 2019-06-01 LAB
DIASTOLIC DYSFUNCTION: YES
ESTIMATED PA SYSTOLIC PRESSURE: 12.67
RETIRED EF AND QEF - SEE NOTES: 35 (ref 55–65)

## 2019-06-04 ENCOUNTER — TELEPHONE (OUTPATIENT)
Dept: CARDIOLOGY | Facility: CLINIC | Age: 39
End: 2019-06-04

## 2019-06-04 NOTE — TELEPHONE ENCOUNTER
Telephoned patient with Echo results, advised no need for Life Vest  At present, continue same medications and keep follow up appointment for 6/13/19.  Bariatric Medicine in NO advised Insurance not accepted advised patient will check with Dr. Fritz for alternative

## 2019-06-04 NOTE — TELEPHONE ENCOUNTER
----- Message from Kayden Marcos MD sent at 6/4/2019  9:34 AM CDT -----  See comments below and call patient to discuss.   Please close encounter when done -- no need to route back to me.  Thanks    There is a marked improvement in EF now that he is in NSR  Keep same and RTC in 3 months after seeing bariatrics -- he can in fact see bariatric surgery too (not just [facility])   tx

## 2019-06-10 ENCOUNTER — PATIENT OUTREACH (OUTPATIENT)
Dept: ADMINISTRATIVE | Facility: HOSPITAL | Age: 39
End: 2019-06-10

## 2019-06-13 ENCOUNTER — TELEPHONE (OUTPATIENT)
Dept: CARDIOLOGY | Facility: CLINIC | Age: 39
End: 2019-06-13

## 2019-06-13 PROBLEM — I50.42 CHRONIC COMBINED SYSTOLIC AND DIASTOLIC CONGESTIVE HEART FAILURE: Status: ACTIVE | Noted: 2019-03-14

## 2019-06-13 NOTE — TELEPHONE ENCOUNTER
Returned pt's call states would like to reschedule appt to 7/1/19 [facility] for 10:30am.    I was unable to schedule pt due to block. I will send message to someone to help assist with putting pt on Viridiana schedule for 7/1/19 [facility] at 10:30am.

## 2019-06-13 NOTE — TELEPHONE ENCOUNTER
----- Message from Chelo Spears sent at 6/13/2019  7:12 AM CDT -----  Pt is requesting a call from nurse to r/s his apt.      Please call pt back at [phone]

## 2019-06-24 ENCOUNTER — OFFICE VISIT (OUTPATIENT)
Dept: INTERNAL MEDICINE | Facility: CLINIC | Age: 39
End: 2019-06-24
Payer: MEDICAID

## 2019-06-24 VITALS
OXYGEN SATURATION: 95 % | SYSTOLIC BLOOD PRESSURE: 174 MMHG | WEIGHT: 315 LBS | HEIGHT: 67 IN | BODY MASS INDEX: 49.44 KG/M2 | DIASTOLIC BLOOD PRESSURE: 90 MMHG | HEART RATE: 100 BPM | TEMPERATURE: 98 F

## 2019-06-24 DIAGNOSIS — E03.9 HYPOTHYROIDISM (ACQUIRED): ICD-10-CM

## 2019-06-24 DIAGNOSIS — N18.30 CKD (CHRONIC KIDNEY DISEASE) STAGE 3, GFR 30-59 ML/MIN: ICD-10-CM

## 2019-06-24 DIAGNOSIS — I50.42 CHRONIC COMBINED SYSTOLIC AND DIASTOLIC CONGESTIVE HEART FAILURE: ICD-10-CM

## 2019-06-24 DIAGNOSIS — I10 ESSENTIAL HYPERTENSION: Primary | ICD-10-CM

## 2019-06-24 DIAGNOSIS — E66.01 MORBID OBESITY WITH BMI OF 70 AND OVER, ADULT: ICD-10-CM

## 2019-06-24 PROCEDURE — 99214 OFFICE O/P EST MOD 30 MIN: CPT | Mod: S$PBB,,, | Performed by: INTERNAL MEDICINE

## 2019-06-24 PROCEDURE — 99214 PR OFFICE/OUTPT VISIT, EST, LEVL IV, 30-39 MIN: ICD-10-PCS | Mod: S$PBB,,, | Performed by: INTERNAL MEDICINE

## 2019-06-24 PROCEDURE — 99213 OFFICE O/P EST LOW 20 MIN: CPT | Mod: PBBFAC | Performed by: INTERNAL MEDICINE

## 2019-06-24 PROCEDURE — 99999 PR PBB SHADOW E&M-EST. PATIENT-LVL III: ICD-10-PCS | Mod: PBBFAC,,, | Performed by: INTERNAL MEDICINE

## 2019-06-24 PROCEDURE — 99999 PR PBB SHADOW E&M-EST. PATIENT-LVL III: CPT | Mod: PBBFAC,,, | Performed by: INTERNAL MEDICINE

## 2019-06-24 RX ORDER — LOSARTAN POTASSIUM 100 MG/1
100 TABLET ORAL DAILY
Qty: 30 TABLET | Refills: 3 | Status: SHIPPED | OUTPATIENT
Start: 2019-06-24 | End: 2019-11-01 | Stop reason: SDUPTHER

## 2019-06-24 NOTE — PROGRESS NOTES
Chacha Zendejas  39 y.o. Black or  male    HPI: Presents to the clinic to follow up on hypertension. His b/p is elevated. He has been compliant with losartan, carvedilol, bumetanide, spironolactone, metolazone and Bidil. He has CKD III and CHF.   Hypothyroidism--stable on levothyroxine.  Lab Results   Component Value Date    TSH 2.330 05/20/2019     Past Medical History:   Diagnosis Date    Atrial flutter     Cellulitis     RLE    CHF (congestive heart failure)     Hypertension     Hypothyroidism     Sleep apnea     Venous insufficiency of left lower extremity      Current Outpatient Medications:     amiodarone (PACERONE) 200 MG Tab, Take 1 tablet (200 mg total) by mouth once daily., Disp: 90 tablet, Rfl: 3    apixaban (ELIQUIS) 5 mg Tab, Take 1 tablet (5 mg total) by mouth 2 (two) times daily., Disp: 180 tablet, Rfl: 3    atorvastatin (LIPITOR) 20 MG tablet, Take 1 tablet (20 mg total) by mouth once daily., Disp: 90 tablet, Rfl: 3    bumetanide (BUMEX) 2 MG tablet, Take 1 tablet (2 mg total) by mouth 2 (two) times daily., Disp: 180 tablet, Rfl: 3    carvedilol (COREG) 25 MG tablet, Take 1 tablet (25 mg total) by mouth 2 (two) times daily with meals., Disp: 180 tablet, Rfl: 3    digoxin (LANOXIN) 125 mcg tablet, Take 0.5 tablets (62.5 mcg total) by mouth once daily., Disp: 45 tablet, Rfl: 3    isosorbide-hydrALAZINE 20-37.5 mg (BIDIL) 20-37.5 mg Tab, Take 1 tablet by mouth 2 (two) times daily., Disp: 60 tablet, Rfl: 11    levothyroxine (SYNTHROID) 50 MCG tablet, Take 1 tablet (50 mcg total) by mouth before breakfast., Disp: 90 tablet, Rfl: 3    losartan (COZAAR) 50 MG tablet, Take 1 tablet (50 mg total) by mouth once daily., Disp: 30 tablet, Rfl: 3    metOLazone (ZAROXOLYN) 5 MG tablet, Take 1 tablet (5 mg total) by mouth 3 (three) times a week. Take 30 minutes before Bumex, Disp: 360 tablet, Rfl: 3    potassium chloride SA (K-DUR,KLOR-CON) 20 MEQ tablet, Take 1 tablet (20 mEq  total) by mouth 2 (two) times daily., Disp: 180 tablet, Rfl: 3    spironolactone (ALDACTONE) 50 MG tablet, Take 1 tablet (50 mg total) by mouth once daily., Disp: 90 tablet, Rfl: 3    Review of patient's allergies indicates:   Allergen Reactions    Iodine and iodide containing products Itching and Swelling    Shellfish containing products        ROS: Denies dizziness, headache, chest pain or shortness of breath    PE:  GEN: Alert and oriented, no acute distress  HEART: Normal S1 and S2, RRR, no lower extremity edema  LUNGS: Respirations unlabored, bilaterally clear to auscultation    ASSESSMENT/PLAN:  Chacha was seen today for follow-up.    Diagnoses and all orders for this visit:    Essential hypertension  -     Increase losartan (COZAAR) 100 MG tablet; Take 1 tablet (100 mg total) by mouth once daily.    CKD (chronic kidney disease) stage 3, GFR 30-59 ml/min  -     Increase losartan (COZAAR) 100 MG tablet; Take 1 tablet (100 mg total) by mouth once daily.    Chronic combined systolic and diastolic congestive heart failure  -     Increase losartan (COZAAR) 100 MG tablet; Take 1 tablet (100 mg total) by mouth once daily.    Hypothyroidism (acquired)  -     Continue levothyroxine    Morbid obesity with BMI of 70 and over, adult  -     Lifestyle modifications discussed  -     Discussed bariatric surgery    Labs and f/u in 2 weeks for HTN.

## 2019-06-28 NOTE — TELEPHONE ENCOUNTER
Awaiting call back to confirm appointment    ----- Message from Kayden Marcos MD sent at 6/9/2019 10:56 PM CDT -----  This is the patient who has Flutter and we could place him on the table despite his wt  Can he come in for a new exam/counseling -- tomorrow pm or perhaps Tuesday

## 2019-07-01 ENCOUNTER — OFFICE VISIT (OUTPATIENT)
Dept: CARDIOLOGY | Facility: CLINIC | Age: 39
End: 2019-07-01
Payer: MEDICAID

## 2019-07-01 VITALS
SYSTOLIC BLOOD PRESSURE: 134 MMHG | WEIGHT: 315 LBS | HEIGHT: 67 IN | HEART RATE: 84 BPM | DIASTOLIC BLOOD PRESSURE: 86 MMHG | BODY MASS INDEX: 49.44 KG/M2

## 2019-07-01 DIAGNOSIS — I27.20 PULMONARY HTN: ICD-10-CM

## 2019-07-01 DIAGNOSIS — I48.92 PAROXYSMAL ATRIAL FLUTTER: ICD-10-CM

## 2019-07-01 DIAGNOSIS — N18.30 CKD (CHRONIC KIDNEY DISEASE) STAGE 3, GFR 30-59 ML/MIN: ICD-10-CM

## 2019-07-01 DIAGNOSIS — I34.0 MODERATE MITRAL REGURGITATION: ICD-10-CM

## 2019-07-01 DIAGNOSIS — I10 ESSENTIAL HYPERTENSION: ICD-10-CM

## 2019-07-01 DIAGNOSIS — I50.42 CHRONIC COMBINED SYSTOLIC AND DIASTOLIC CONGESTIVE HEART FAILURE: Primary | ICD-10-CM

## 2019-07-01 DIAGNOSIS — I07.1 MILD TRICUSPID VALVE REGURGITATION: ICD-10-CM

## 2019-07-01 DIAGNOSIS — I51.7 RIGHT VENTRICULAR ENLARGEMENT: ICD-10-CM

## 2019-07-01 DIAGNOSIS — I50.43 ACUTE ON CHRONIC COMBINED SYSTOLIC AND DIASTOLIC CONGESTIVE HEART FAILURE: ICD-10-CM

## 2019-07-01 DIAGNOSIS — E66.01 MORBID OBESITY: ICD-10-CM

## 2019-07-01 DIAGNOSIS — I50.20 HEART FAILURE WITH REDUCED EJECTION FRACTION, NYHA CLASS III: ICD-10-CM

## 2019-07-01 DIAGNOSIS — G47.33 OSA ON CPAP: ICD-10-CM

## 2019-07-01 PROCEDURE — 99214 OFFICE O/P EST MOD 30 MIN: CPT | Mod: S$PBB,,, | Performed by: NURSE PRACTITIONER

## 2019-07-01 PROCEDURE — 99213 OFFICE O/P EST LOW 20 MIN: CPT | Mod: PBBFAC | Performed by: NURSE PRACTITIONER

## 2019-07-01 PROCEDURE — 99999 PR PBB SHADOW E&M-EST. PATIENT-LVL III: ICD-10-PCS | Mod: PBBFAC,,, | Performed by: NURSE PRACTITIONER

## 2019-07-01 PROCEDURE — 99214 PR OFFICE/OUTPT VISIT, EST, LEVL IV, 30-39 MIN: ICD-10-PCS | Mod: S$PBB,,, | Performed by: NURSE PRACTITIONER

## 2019-07-01 PROCEDURE — 99999 PR PBB SHADOW E&M-EST. PATIENT-LVL III: CPT | Mod: PBBFAC,,, | Performed by: NURSE PRACTITIONER

## 2019-07-01 RX ORDER — HYDRALAZINE HYDROCHLORIDE 50 MG/1
50 TABLET, FILM COATED ORAL EVERY 12 HOURS
Qty: 180 TABLET | Refills: 3 | Status: SHIPPED | OUTPATIENT
Start: 2019-07-01 | End: 2020-01-01 | Stop reason: SDUPTHER

## 2019-07-01 RX ORDER — SPIRONOLACTONE 50 MG/1
50 TABLET, FILM COATED ORAL DAILY
Qty: 90 TABLET | Refills: 3 | Status: SHIPPED | OUTPATIENT
Start: 2019-07-01 | End: 2020-01-01 | Stop reason: SDUPTHER

## 2019-07-01 RX ORDER — POTASSIUM CHLORIDE 20 MEQ/1
20 TABLET, EXTENDED RELEASE ORAL 2 TIMES DAILY
Qty: 180 TABLET | Refills: 3 | Status: CANCELLED | OUTPATIENT
Start: 2019-07-01

## 2019-07-01 RX ORDER — POTASSIUM CHLORIDE 20 MEQ/1
20 TABLET, EXTENDED RELEASE ORAL DAILY
Qty: 90 TABLET | Refills: 3 | Status: SHIPPED | OUTPATIENT
Start: 2019-07-01 | End: 2019-07-16 | Stop reason: SDUPTHER

## 2019-07-01 RX ORDER — ISOSORBIDE DINITRATE 40 MG/1
40 TABLET ORAL 2 TIMES DAILY
Qty: 180 TABLET | Refills: 3 | Status: SHIPPED | OUTPATIENT
Start: 2019-07-01 | End: 2020-01-01 | Stop reason: SDUPTHER

## 2019-07-01 NOTE — PROGRESS NOTES
Subjective:   Patient ID:  Chacha Zendejas is a 39 y.o. male who presents for evaluation of Congestive Heart Failure      HPI   Chacha Zendejas is a 39 year old male who presents to clinic for CHF follow up. He has been seen by Dr. Marcos since last visit with myself and repeat ECHo revealed EF 35-40%, DD. He was referred to Bariatric Surgery but did not accept his insurance. His current medical conditions include HTN, HLP, PHTN, BRINDA on CPAP, morbid obesity, Chronic combined systolic and diastolic CHF, HFrEF of 35-40%, PAFL on Eliquis. He returns today and states he is doing ok. His weight has gone up about 30 lbs since I saw him in May which is likely due to excess fluid. Will increase Metolazone dose this week and call him on Monday for phone review. Denies chest pain or anginal equivalents. He does have some degree of SOB and CREWS today. No palpitations. He reports compliance with nightly CPAP. He has some degree of LE edema today. No claudications. Denies any recent falls, syncope or near syncopal events. He is walking daily for exercise. He does endorse some degree of dietary indiscretions since his last visit with me. Reports compliance with medications and dietary restrictions. No CNS complaints to suggest TIA or CVA today. No signs of abnormal bleeding on Eliquis.     Past Medical History:   Diagnosis Date    Atrial flutter     Cellulitis     RLE    CHF (congestive heart failure)     Hypertension     Hypothyroidism     Sleep apnea     Venous insufficiency of left lower extremity        Past Surgical History:   Procedure Laterality Date    CARDIOVERSION OR DEFIBRILLATION N/A 3/18/2019    Performed by Talib Ratliff MD at [facility] CATH LAB    ECHOCARDIOGRAM,TRANSESOPHAGEAL N/A 3/18/2019    Performed by Talib Ratliff MD at [facility] CATH LAB       Social History     Tobacco Use    Smoking status: Former Smoker     Types: Cigarettes    Smokeless tobacco: Never Used   Substance Use  Topics    Alcohol use: Yes     Comment: socially    Drug use: No       Family History   Problem Relation Age of Onset    Heart disease Father      Wt Readings from Last 3 Encounters:   07/01/19 (!) 208.7 kg (460 lb 1.6 oz)   06/24/19 (!) 211.4 kg (466 lb 0.8 oz)   05/22/19 (!) 199.3 kg (439 lb 6 oz)     Temp Readings from Last 3 Encounters:   06/24/19 97.6 °F (36.4 °C) (Tympanic)   05/20/19 97.4 °F (36.3 °C) (Tympanic)   03/19/19 98.2 °F (36.8 °C) (Oral)     BP Readings from Last 3 Encounters:   07/01/19 134/86   06/24/19 (!) 174/90   05/22/19 124/74     Pulse Readings from Last 3 Encounters:   07/01/19 84   06/24/19 100   05/22/19 76     Current Outpatient Medications on File Prior to Visit   Medication Sig Dispense Refill    amiodarone (PACERONE) 200 MG Tab Take 1 tablet (200 mg total) by mouth once daily. 90 tablet 3    apixaban (ELIQUIS) 5 mg Tab Take 1 tablet (5 mg total) by mouth 2 (two) times daily. 180 tablet 3    atorvastatin (LIPITOR) 20 MG tablet Take 1 tablet (20 mg total) by mouth once daily. 90 tablet 3    bumetanide (BUMEX) 2 MG tablet Take 1 tablet (2 mg total) by mouth 2 (two) times daily. 180 tablet 3    carvedilol (COREG) 25 MG tablet Take 1 tablet (25 mg total) by mouth 2 (two) times daily with meals. 180 tablet 3    digoxin (LANOXIN) 125 mcg tablet Take 0.5 tablets (62.5 mcg total) by mouth once daily. 45 tablet 3    levothyroxine (SYNTHROID) 50 MCG tablet Take 1 tablet (50 mcg total) by mouth before breakfast. 90 tablet 3    losartan (COZAAR) 100 MG tablet Take 1 tablet (100 mg total) by mouth once daily. 30 tablet 3    metOLazone (ZAROXOLYN) 5 MG tablet Take 1 tablet (5 mg total) by mouth 3 (three) times a week. Take 30 minutes before Bumex 360 tablet 3    [DISCONTINUED] isosorbide-hydrALAZINE 20-37.5 mg (BIDIL) 20-37.5 mg Tab Take 1 tablet by mouth 2 (two) times daily. 60 tablet 11    [DISCONTINUED] potassium chloride SA (K-DUR,KLOR-CON) 20 MEQ tablet Take 1 tablet (20 mEq  "total) by mouth 2 (two) times daily. 180 tablet 3    [DISCONTINUED] spironolactone (ALDACTONE) 50 MG tablet Take 1 tablet (50 mg total) by mouth once daily. 90 tablet 3     No current facility-administered medications on file prior to visit.        Review of Systems   Constitution: Positive for malaise/fatigue.   HENT: Negative for hearing loss and hoarse voice.    Eyes: Negative for blurred vision and visual disturbance.   Cardiovascular: Positive for dyspnea on exertion and leg swelling. Negative for chest pain, claudication, irregular heartbeat, near-syncope, orthopnea, palpitations, paroxysmal nocturnal dyspnea and syncope.   Respiratory: Positive for snoring. Negative for cough, hemoptysis, shortness of breath, sleep disturbances due to breathing and wheezing.         BRINDA on CPAP   Endocrine: Negative for cold intolerance and heat intolerance.   Hematologic/Lymphatic: Bruises/bleeds easily.   Skin: Negative for color change, dry skin and nail changes.   Musculoskeletal: Positive for arthritis and back pain. Negative for joint pain and myalgias.   Gastrointestinal: Negative for bloating, abdominal pain, constipation, nausea and vomiting.   Genitourinary: Negative for dysuria, flank pain, hematuria and hesitancy.   Neurological: Positive for weakness. Negative for headaches, light-headedness, loss of balance, numbness and paresthesias.   Psychiatric/Behavioral: Negative for altered mental status.   Allergic/Immunologic: Negative for environmental allergies.     /86 (BP Location: Right arm, Patient Position: Sitting, BP Method: Large (Manual))   Pulse 84   Ht 5' 7" (1.702 m)   Wt (!) 208.7 kg (460 lb 1.6 oz)   BMI 72.06 kg/m²     Objective:   Physical Exam   Constitutional: He is oriented to person, place, and time. He appears well-developed and well-nourished. No distress.   HENT:   Head: Normocephalic and atraumatic.   Eyes: Pupils are equal, round, and reactive to light.   Neck: Normal range of " motion and full passive range of motion without pain. Neck supple. No JVD present.   Cardiovascular: Normal rate, regular rhythm, S1 normal, S2 normal and intact distal pulses.  Occasional extrasystoles are present. PMI is not displaced. Exam reveals no distant heart sounds.   No murmur heard.  Pulses:       Radial pulses are 2+ on the right side, and 2+ on the left side.        Dorsalis pedis pulses are 1+ on the right side, and 1+ on the left side.   BP well controlled today.    Pulmonary/Chest: Effort normal. No accessory muscle usage. No respiratory distress. He has decreased breath sounds in the right lower field and the left lower field. He has no wheezes. He has no rales.   +BRINDA on CPAP   Abdominal: Soft. Bowel sounds are normal. He exhibits no distension. There is no tenderness.   +morbid obesity   Musculoskeletal: Normal range of motion. He exhibits edema (trace BLE).        Right ankle: He exhibits swelling.        Left ankle: He exhibits swelling.   Neurological: He is alert and oriented to person, place, and time.   Skin: Skin is warm. He is not diaphoretic. No cyanosis. Nails show no clubbing.   Psychiatric: He has a normal mood and affect. His speech is normal and behavior is normal. Judgment and thought content normal. Cognition and memory are normal.   Nursing note and vitals reviewed.      Lab Results   Component Value Date    CHOL 223 (A) 07/11/2017     Lab Results   Component Value Date    HDL 32 07/11/2017     Lab Results   Component Value Date    LDLCALC 162 07/11/2017     Lab Results   Component Value Date    TRIG 143 07/11/2017     No results found for: CHOLHDL    Chemistry        Component Value Date/Time     05/14/2019 0743    K 3.6 05/14/2019 0743    CL 99 05/14/2019 0743    CO2 28 05/14/2019 0743    BUN 25 (H) 05/14/2019 0743    CREATININE 1.5 (H) 05/14/2019 0743     (H) 05/14/2019 0743        Component Value Date/Time    CALCIUM 9.7 05/14/2019 0743    ALKPHOS 137 (H)  03/19/2019 0443    AST 26 03/19/2019 0443    ALT 42 03/19/2019 0443    BILITOT 1.8 (H) 03/19/2019 0443    ESTGFRAFRICA >60 05/14/2019 0743    EGFRNONAA 57 (A) 05/14/2019 0743          Lab Results   Component Value Date    TSH 2.330 05/20/2019     No results found for: INR, PROTIME  Lab Results   Component Value Date    WBC 6.39 03/19/2019    HGB 13.2 (L) 03/19/2019    HCT 41.3 03/19/2019    MCV 94 03/19/2019     03/19/2019     TEST DESCRIPTION   Technical Quality: This is a technically challenging study. This study was performed in conjunction with a 3ml intravenous injection of Optison contrast agent.     Aorta: The aortic root is normal in size, measuring 3.1 cm at sinotubular junction and 3.4 cm at Sinuses of Valsalva. The proximal ascending aorta is normal in size, measuring 3.3 cm across.     Left Atrium: The left atrial volume index is mildly enlarged, measuring 39.86 cc/m2.     Left Ventricle: The left ventricle is normal in size, with an end-diastolic diameter of 5.4 cm, and an end-systolic diameter of 4.7 cm. LV wall thickness is normal, with the septum measuring 1.8 cm and the posterior wall measuring 2.0 cm across. Relative   wall thickness was increased at 0.74, and the LV mass index was increased at 223.5 g/m2 consistent with concentric left ventricular hypertrophy. The following segments were moderately hypokinetic: mid inferolateral wall, basal inferolateral wall.  The following segments were severely hypokinetic: basal inferoseptum, mid anterolateral wall, basal anterolateral wall, mid inferior wall, basal inferior wall.  Left ventricular systolic function appears moderately depressed. Visually estimated ejection fraction is 35-40%. The LV Doppler derived stroke volume equals 76.0 ccs.     Diastolic indices: E wave velocity 0.8 m/s, E/A ratio 1.2,  msec., E/e' ratio(avg) 9. There is diastolic dysfunction secondary to relaxation abnormality.     Right Atrium: The right atrium is normal in  size, measuring 4.8 cm in length and 3.6 cm in width in the apical view.     Right Ventricle: The right ventricle is normal in size measuring 3.9 cm at the base in the apical right ventricle-focused view. Global right ventricular systolic function appears normal. There is abnormal septal motion consistent with LBBB. The estimated   PA systolic pressure is greater than 13 mmHg.     Aortic Valve:  Aortic valve is normal in structure with normal leaflet mobility. The aortic valve is normal in structure.     Mitral Valve:  Mitral valve is normal in structure with normal leaflet mobility.     Tricuspid Valve:  Tricuspid valve is normal in structure with normal leaflet mobility.     Pulmonary Valve:  Pulmonary valve is normal in structure with normal leaflet mobility. The pulmonic valve is not well seen.     IVC: The IVC is not visualized.     Intracavitary: There is no evidence of pericardial effusion, intracavity mass, thrombi, or vegetation.         CONCLUSIONS     1 - Mild left atrial enlargement.     2 - Concentric hypertrophy.     3 - Wall motion abnormalities.     4 - Moderately depressed left ventricular systolic function (EF 35-40%).     5 - Impaired LV relaxation, normal LAP (grade 1 diastolic dysfunction).     6 - Normal right ventricular systolic function .     7 - The estimated PA systolic pressure is greater than 13 mmHg.     8 - Poor imaging quality due to body habitus.            This document has been electronically    SIGNED BY: Demond Mendoza MD On: 06/01/2019 09:13     Assessment:      1. Chronic combined systolic and diastolic congestive heart failure    2. Paroxysmal atrial flutter    3. Essential hypertension    4. Heart failure with reduced ejection fraction, NYHA class III    5. Right ventricular enlargement    6. Moderate mitral regurgitation    7. Pulmonary HTN    8. CKD (chronic kidney disease) stage 3, GFR 30-59 ml/min    9. Morbid obesity    10. BRINDA on CPAP    11. Mild tricuspid valve  regurgitation    12. Acute on chronic combined systolic and diastolic congestive heart failure      Patient presents to clinic for CHF follow up and is doing well today.   No chest pain or anginal equivalents today.   No SOB or CREWS today in office.  Seems to be NYHA FC II-III today in office  NO CNS complaints to suggest TIA or CVA today  No signs of abnormal bleeding on Eliquis  Plan:     Phone review on Monday  Take Metolazone Tuesday, Wednesday, Thursday, Friday then resume regular dosing next week  Continue all other CV meds for now  Dash diet, 2 gm restriction  50-60 oz fluid restriction  Daily weights  No more fast food meals  I have asked him to weigh daily and call clinic if increase in weight by 3 lbs in 1 day or 5 lbs in 1 week  Monitor BP at home  Bring BP/weight log to next visit  Keep F/U with EP as scheduled.   RTC in 6 weeks with BMP and BNP or sooner if needed    JAK Grewal

## 2019-07-01 NOTE — PATIENT INSTRUCTIONS
Take Metolazone Tuesday, Wednesday, Thursday and Friday    Go back to regular Monday, Wednesday, Friday starting next week    Phone review on Monday.

## 2019-07-08 ENCOUNTER — LAB VISIT (OUTPATIENT)
Dept: LAB | Facility: HOSPITAL | Age: 39
End: 2019-07-08
Payer: MEDICAID

## 2019-07-08 DIAGNOSIS — N28.9 RENAL INSUFFICIENCY: ICD-10-CM

## 2019-07-08 DIAGNOSIS — I10 HYPERTENSION GOAL BP (BLOOD PRESSURE) < 140/90: ICD-10-CM

## 2019-07-08 DIAGNOSIS — E78.5 HYPERLIPIDEMIA LDL GOAL <130: ICD-10-CM

## 2019-07-08 LAB
ALBUMIN SERPL BCP-MCNC: 3.3 G/DL (ref 3.5–5.2)
ALP SERPL-CCNC: 64 U/L (ref 55–135)
ALT SERPL W/O P-5'-P-CCNC: 17 U/L (ref 10–44)
ANION GAP SERPL CALC-SCNC: 11 MMOL/L (ref 8–16)
AST SERPL-CCNC: 17 U/L (ref 10–40)
BILIRUB SERPL-MCNC: 0.8 MG/DL (ref 0.1–1)
BUN SERPL-MCNC: 23 MG/DL (ref 6–20)
CALCIUM SERPL-MCNC: 10.2 MG/DL (ref 8.7–10.5)
CHLORIDE SERPL-SCNC: 99 MMOL/L (ref 95–110)
CHOLEST SERPL-MCNC: 180 MG/DL (ref 120–199)
CHOLEST/HDLC SERPL: 5.6 {RATIO} (ref 2–5)
CO2 SERPL-SCNC: 30 MMOL/L (ref 23–29)
CREAT SERPL-MCNC: 1.4 MG/DL (ref 0.5–1.4)
EST. GFR  (AFRICAN AMERICAN): >60 ML/MIN/1.73 M^2
EST. GFR  (NON AFRICAN AMERICAN): >60 ML/MIN/1.73 M^2
GLUCOSE SERPL-MCNC: 151 MG/DL (ref 70–110)
HDLC SERPL-MCNC: 32 MG/DL (ref 40–75)
HDLC SERPL: 17.8 % (ref 20–50)
LDLC SERPL CALC-MCNC: 116.4 MG/DL (ref 63–159)
NONHDLC SERPL-MCNC: 148 MG/DL
POTASSIUM SERPL-SCNC: 3.9 MMOL/L (ref 3.5–5.1)
PROT SERPL-MCNC: 8.4 G/DL (ref 6–8.4)
SODIUM SERPL-SCNC: 140 MMOL/L (ref 136–145)
TRIGL SERPL-MCNC: 158 MG/DL (ref 30–150)

## 2019-07-08 PROCEDURE — 80053 COMPREHEN METABOLIC PANEL: CPT

## 2019-07-08 PROCEDURE — 36415 COLL VENOUS BLD VENIPUNCTURE: CPT

## 2019-07-08 PROCEDURE — 80061 LIPID PANEL: CPT

## 2019-07-09 ENCOUNTER — TELEPHONE (OUTPATIENT)
Dept: INTERNAL MEDICINE | Facility: CLINIC | Age: 39
End: 2019-07-09

## 2019-07-09 ENCOUNTER — TELEPHONE (OUTPATIENT)
Dept: CARDIOLOGY | Facility: CLINIC | Age: 39
End: 2019-07-09

## 2019-07-09 NOTE — TELEPHONE ENCOUNTER
----- Message from Dorian Mckeon sent at 7/9/2019  2:31 PM CDT -----  Contact: Pt   Pt is requesting an earlier appt due to missed/ no show pt would like to speak with nurse. Next available is not until September.    Pt can be reached at [phone].    Thank You.

## 2019-07-09 NOTE — TELEPHONE ENCOUNTER
----- Message from Viridiana Leon NP sent at 7/9/2019 12:59 PM CDT -----  Please call patient for CHF phone review    Thanks  Viridiana

## 2019-07-10 ENCOUNTER — LAB VISIT (OUTPATIENT)
Dept: LAB | Facility: HOSPITAL | Age: 39
End: 2019-07-10
Attending: INTERNAL MEDICINE
Payer: MEDICAID

## 2019-07-10 ENCOUNTER — OFFICE VISIT (OUTPATIENT)
Dept: INTERNAL MEDICINE | Facility: CLINIC | Age: 39
End: 2019-07-10
Payer: MEDICAID

## 2019-07-10 VITALS
WEIGHT: 315 LBS | DIASTOLIC BLOOD PRESSURE: 60 MMHG | HEIGHT: 67 IN | RESPIRATION RATE: 16 BRPM | HEART RATE: 94 BPM | TEMPERATURE: 97 F | SYSTOLIC BLOOD PRESSURE: 124 MMHG | BODY MASS INDEX: 49.44 KG/M2 | OXYGEN SATURATION: 95 %

## 2019-07-10 DIAGNOSIS — I50.42 CHRONIC COMBINED SYSTOLIC (CONGESTIVE) AND DIASTOLIC (CONGESTIVE) HEART FAILURE: ICD-10-CM

## 2019-07-10 DIAGNOSIS — R73.01 IMPAIRED FASTING GLUCOSE: ICD-10-CM

## 2019-07-10 DIAGNOSIS — I10 ESSENTIAL HYPERTENSION: Primary | ICD-10-CM

## 2019-07-10 DIAGNOSIS — E66.01 MORBID OBESITY WITH BMI OF 70 AND OVER, ADULT: ICD-10-CM

## 2019-07-10 DIAGNOSIS — N18.30 CKD (CHRONIC KIDNEY DISEASE) STAGE 3, GFR 30-59 ML/MIN: ICD-10-CM

## 2019-07-10 LAB
ESTIMATED AVG GLUCOSE: 166 MG/DL (ref 68–131)
HBA1C MFR BLD HPLC: 7.4 % (ref 4–5.6)

## 2019-07-10 PROCEDURE — 36415 COLL VENOUS BLD VENIPUNCTURE: CPT

## 2019-07-10 PROCEDURE — 99999 PR PBB SHADOW E&M-EST. PATIENT-LVL III: CPT | Mod: PBBFAC,,, | Performed by: INTERNAL MEDICINE

## 2019-07-10 PROCEDURE — 99214 PR OFFICE/OUTPT VISIT, EST, LEVL IV, 30-39 MIN: ICD-10-PCS | Mod: S$PBB,,, | Performed by: INTERNAL MEDICINE

## 2019-07-10 PROCEDURE — 83036 HEMOGLOBIN GLYCOSYLATED A1C: CPT

## 2019-07-10 PROCEDURE — 99213 OFFICE O/P EST LOW 20 MIN: CPT | Mod: PBBFAC | Performed by: INTERNAL MEDICINE

## 2019-07-10 PROCEDURE — 99999 PR PBB SHADOW E&M-EST. PATIENT-LVL III: ICD-10-PCS | Mod: PBBFAC,,, | Performed by: INTERNAL MEDICINE

## 2019-07-10 PROCEDURE — 99214 OFFICE O/P EST MOD 30 MIN: CPT | Mod: S$PBB,,, | Performed by: INTERNAL MEDICINE

## 2019-07-10 RX ORDER — CARVEDILOL 25 MG/1
25 TABLET ORAL 2 TIMES DAILY WITH MEALS
Qty: 180 TABLET | Refills: 3 | Status: SHIPPED | OUTPATIENT
Start: 2019-07-10 | End: 2020-01-01 | Stop reason: SDUPTHER

## 2019-07-10 NOTE — PROGRESS NOTES
Chacha Zendejas  39 y.o. Black or  male    HPI: Presents to the clinic to follow up on hypertension. His b/p has improved. He has been compliant with medications.   He needs a refill on carvedilol.   CKD III--stable on losartan. He denies taking NSAID's.   His recent glucose was 151 and has been elevated in the past. He denies a history of diabetes.     Past Medical History:   Diagnosis Date    Atrial flutter     Cellulitis     RLE    CHF (congestive heart failure)     Hypertension     Hypothyroidism     Sleep apnea     Venous insufficiency of left lower extremity          Current Outpatient Medications:     amiodarone (PACERONE) 200 MG Tab, Take 1 tablet (200 mg total) by mouth once daily., Disp: 90 tablet, Rfl: 3    apixaban (ELIQUIS) 5 mg Tab, Take 1 tablet (5 mg total) by mouth 2 (two) times daily., Disp: 180 tablet, Rfl: 3    atorvastatin (LIPITOR) 20 MG tablet, Take 1 tablet (20 mg total) by mouth once daily., Disp: 90 tablet, Rfl: 3    bumetanide (BUMEX) 2 MG tablet, Take 1 tablet (2 mg total) by mouth 2 (two) times daily., Disp: 180 tablet, Rfl: 3    carvedilol (COREG) 25 MG tablet, Take 1 tablet (25 mg total) by mouth 2 (two) times daily with meals., Disp: 180 tablet, Rfl: 3    digoxin (LANOXIN) 125 mcg tablet, Take 0.5 tablets (62.5 mcg total) by mouth once daily., Disp: 45 tablet, Rfl: 3    hydrALAZINE (APRESOLINE) 50 MG tablet, Take 1 tablet (50 mg total) by mouth every 12 (twelve) hours., Disp: 180 tablet, Rfl: 3    isosorbide dinitrate (ISORDIL) 40 MG Tab, Take 1 tablet (40 mg total) by mouth 2 (two) times daily., Disp: 180 tablet, Rfl: 3    levothyroxine (SYNTHROID) 50 MCG tablet, Take 1 tablet (50 mcg total) by mouth before breakfast., Disp: 90 tablet, Rfl: 3    losartan (COZAAR) 100 MG tablet, Take 1 tablet (100 mg total) by mouth once daily., Disp: 30 tablet, Rfl: 3    metOLazone (ZAROXOLYN) 5 MG tablet, Take 1 tablet (5 mg total) by mouth 3 (three) times a  week. Take 30 minutes before Bumex, Disp: 360 tablet, Rfl: 3    potassium chloride SA (K-DUR,KLOR-CON) 20 MEQ tablet, Take 1 tablet (20 mEq total) by mouth once daily., Disp: 90 tablet, Rfl: 3    spironolactone (ALDACTONE) 50 MG tablet, Take 1 tablet (50 mg total) by mouth once daily., Disp: 90 tablet, Rfl: 3    Review of patient's allergies indicates:   Allergen Reactions    Iodine and iodide containing products Itching and Swelling    Shellfish containing products        ROS: Denies dizziness, headache, chest pain or shortness of breath    PE:  GEN: Alert and oriented, no acute distress  HEART: Regular rate   LUNGS: Respirations unlabored    ASSESSMENT/PLAN:  Chacha was seen today for hypertension.    Diagnoses and all orders for this visit:    Essential hypertension  -     Refill carvedilol (COREG) 25 MG tablet; Take 1 tablet (25 mg total) by mouth 2 (two) times daily with meals.  -     Continue losartan, bumetanide, spironolactone, metolazone and hydralazine     Chronic combined systolic (congestive) and diastolic (congestive) heart failure  -     Refill carvedilol (COREG) 25 MG tablet; Take 1 tablet (25 mg total) by mouth 2 (two) times daily with meals.  -     Continue losartan, isosorbide, hydralazine, bumetanide, spironolactone and metolazone     CKD (chronic kidney disease) stage 3, GFR 30-59 ml/min  -     Continue losartan    Impaired fasting glucose  -     Hemoglobin A1c; Future    Morbid obesity with BMI of 70 and over, adult  -     Lifestyle modifications discussed    RTC in 3 months for chronic conditions.

## 2019-07-12 ENCOUNTER — TELEPHONE (OUTPATIENT)
Dept: INTERNAL MEDICINE | Facility: CLINIC | Age: 39
End: 2019-07-12

## 2019-07-12 NOTE — TELEPHONE ENCOUNTER
----- Message from Brandy Abreu DO sent at 7/12/2019  3:12 PM CDT -----  Results and recommendations sent to patient's [facility] account.  Schedule f/u to discuss abnormal labs (new diabetes).

## 2019-07-16 ENCOUNTER — OFFICE VISIT (OUTPATIENT)
Dept: INTERNAL MEDICINE | Facility: CLINIC | Age: 39
End: 2019-07-16
Payer: MEDICAID

## 2019-07-16 VITALS
HEART RATE: 80 BPM | HEIGHT: 67 IN | OXYGEN SATURATION: 97 % | BODY MASS INDEX: 49.44 KG/M2 | SYSTOLIC BLOOD PRESSURE: 118 MMHG | TEMPERATURE: 97 F | DIASTOLIC BLOOD PRESSURE: 62 MMHG | WEIGHT: 315 LBS

## 2019-07-16 DIAGNOSIS — T50.2X5A DIURETIC-INDUCED HYPOKALEMIA: ICD-10-CM

## 2019-07-16 DIAGNOSIS — E11.9 NEW ONSET TYPE 2 DIABETES MELLITUS: Primary | ICD-10-CM

## 2019-07-16 DIAGNOSIS — N18.30 CKD (CHRONIC KIDNEY DISEASE) STAGE 3, GFR 30-59 ML/MIN: ICD-10-CM

## 2019-07-16 DIAGNOSIS — I10 HYPERTENSION GOAL BP (BLOOD PRESSURE) < 130/80: ICD-10-CM

## 2019-07-16 DIAGNOSIS — E87.6 DIURETIC-INDUCED HYPOKALEMIA: ICD-10-CM

## 2019-07-16 DIAGNOSIS — E66.01 MORBID OBESITY WITH BMI OF 70 AND OVER, ADULT: ICD-10-CM

## 2019-07-16 PROCEDURE — 99999 PR PBB SHADOW E&M-EST. PATIENT-LVL III: CPT | Mod: PBBFAC,,, | Performed by: INTERNAL MEDICINE

## 2019-07-16 PROCEDURE — 99213 OFFICE O/P EST LOW 20 MIN: CPT | Mod: PBBFAC | Performed by: INTERNAL MEDICINE

## 2019-07-16 PROCEDURE — 99214 OFFICE O/P EST MOD 30 MIN: CPT | Mod: S$PBB,,, | Performed by: INTERNAL MEDICINE

## 2019-07-16 PROCEDURE — 99214 PR OFFICE/OUTPT VISIT, EST, LEVL IV, 30-39 MIN: ICD-10-PCS | Mod: S$PBB,,, | Performed by: INTERNAL MEDICINE

## 2019-07-16 PROCEDURE — 99999 PR PBB SHADOW E&M-EST. PATIENT-LVL III: ICD-10-PCS | Mod: PBBFAC,,, | Performed by: INTERNAL MEDICINE

## 2019-07-16 RX ORDER — POTASSIUM CHLORIDE 20 MEQ/1
20 TABLET, EXTENDED RELEASE ORAL DAILY
Qty: 90 TABLET | Refills: 3 | Status: SHIPPED | OUTPATIENT
Start: 2019-07-16 | End: 2019-08-08 | Stop reason: SDUPTHER

## 2019-07-16 NOTE — PATIENT INSTRUCTIONS
Diabetes (General Information)  Diabetes is a long-term health problem. It means your body does not make enough insulin. Or it may mean that your body cannot use the insulin it makes. Insulin is a hormone in your body. It lets blood sugar (glucose) reach the cells in your body. All of your cells need glucose for fuel.  When you have diabetes, the glucose in your blood builds up because it cannot get into the cells. This buildup is called high blood sugar (hyperglycemia).  Your blood sugar level depends on several things. It depends on what kind of food you eat and how much of it you eat. It also depends on how much exercise you get, and how much insulin you have in your body. Eating too much of the wrong kinds of food or not taking diabetes medicine on time can cause high blood sugar. Infections can cause high blood sugar even if you are taking medicines correctly.  These things can also cause low blood sugar:  · Missing meals  · Not eating enough food  · Taking too much diabetes medicine  Diabetes can cause serious problems over time if you do not get treated. These problems include heart disease, stroke, kidney failure, and blindness. They also include nerve pain or loss of feeling in your legs and feet, and gangrene of the feet. By keeping your blood sugar under control you can prevent or delay these problems.  Normal blood sugar levels are 80 to 100 before a meal and less than 180 in the 1 to 2 hours after a meal.  Home care  Follow these guidelines when caring for yourself at home:  · Follow the diet your healthcare provider gives you. Take insulin or other diabetes medicine exactly as told to.  · Watch your blood sugar as you are told to. Keep a log of your results. This will help your provider change your medicines to keep your blood sugar under control.  · Try to reach your ideal weight. You may be able to cut back on or not have to take diabetes medicine if you eat the right foods and get exercise.  · Do  not smoke. Smoking worsens the effects of diabetes on your circulation. You are much more likely to have a heart attack if you have diabetes and you smoke.  · Take good care of your feet. If you have lost feeling in your feet, you may not see an injury or infection. Check your feet and between your toes at least once a week.  · Wear a medical alert bracelet or necklace, or carry a card in your wallet that says you have diabetes. This will help healthcare providers give you the right care if you get very ill and cannot tell them that you have diabetes.  Sick day plan  If you get a cold, the flu, or a bacterial or viral infection, take these steps:  · Look at your diabetes sick plan and call your healthcare provider as you were told to. You may need to call your provider right away if:  ¨ Your blood sugar is above 240 while taking your diabetes medicine  ¨ Your urine ketone levels are above normal or high  ¨ You have been vomiting more than 6 hours  ¨ You have trouble breathing or your breath ha s a fruity smell  ¨ You have a high fever  ¨ You have a fever for several days and you are not getting better  ¨ You get light-headed and are sleepier than usual  · Keep taking your diabetes pills (oral medicine) even if you have been vomiting and are feeling sick. Call your provider right away because you may need insulin to lower your blood sugar until you recover from your illness.  · Keep taking your insulin even if you have been vomiting and are feeling sick. Call your provider right away to ask if you need to change your insulin dose. This will depend on your blood sugar results.  · Check your blood sugar every 2 to 4 hours, or at least 4 times a day.  · Check your ketones often. If you are vomiting and having diarrhea, watch them more often.  · Do not skip meals. Try to eat small meals on a regular schedule. Do this even if you do not feel like eating.  · Drink water or other liquids that do not have caffeine or  calories. This will keep you from getting dehydrated. If you are nauseated or vomiting, takes small sips every 5 minutes. To prevent dehydration try to drink a cup (8 ounces) of fluids every hour while you are awake.  General care  Always bring a source of fast-acting sugar with you in case you have symptoms of low blood sugar (below 70). At the first sign of low blood sugar, eat or drink 15 to 20 grams of fast-acting sugar to raise your blood sugar. Examples are:  · 3 to 4 glucose tablets. You can buy these at most [employer].  · 4 ounces (1/2 cup) of regular (not diet) soft drinks  · 4 ounces (1/2 cup) of any fruit juice  · 8 ounces (1 cup) of milk  · 5 to 6 pieces of hard candy  · 1 tablespoon of honey  Check your blood sugar 15 minutes after treating yourself. If it is still below 70, take 15 to 20 more grams of fast-acting sugar. Test again in 15 minutes. If it returns to normal (70 or above), eat a snack or meal to keep your blood sugar in a safe range. If it stays low, call your doctor or go to an emergency room.  Follow-up care  Follow-up with your healthcare provider, or as advised. For more information about diabetes, visit the American Diabetes Association website at www.diabetes.org or call 515-745-1006.  When to seek medical advice  Call your healthcare provider right away if you have any of these symptoms of high blood sugar:  · Frequent urination  · Dizziness  · Drowsiness  · Thirst  · Headache  · Nausea or vomiting  · Abdominal pain  · Eyesight changes  · Fast breathing  · Confusion or loss of consciousness  Also call your provider right away if you have any of these signs of low blood sugar:  · Fatigue  · Headache  · Shakes  · Excess sweating  · Hunger  · Feeling anxious or restless  · Eyesight changes  · Drowsiness  · Weakness  · Confusion or loss of consciousness  Call 911  Call for emergency help right away if any of these occur:  · Chest pain or shortness of breath  · Dizziness or  fainting  · Weakness of an arm or leg or one side of the face  · Trouble speaking or seeing   Date Last Reviewed: 6/1/2016  © 6183-3155 [employer]. [address] [place] [address], [place] [address]. All rights reserved. This information is not intended as a substitute for professional medical care. Always follow your healthcare professional's instructions.

## 2019-07-16 NOTE — PROGRESS NOTES
Chacha Zendejas  39 y.o.  Black or  male    Chief Complaint   Patient presents with    Follow-up     Abnormal labs       HPI:   Presents to the clinic to discuss recently abnormal labs. He has been having elevated fasting glucoses. His A1C is 7.4. He denies a history of diabetes. He denies symptoms.   He has hypertension and CKD III.   He needs a refill on potassium.     PMH: Reviewed    MEDS: Reviewed med card    ALLERGIES: Reviewed allergy card    ROS: Denies polyuria, polydipsia, paresthesias, fatigue or vision disturbance    PE: Reviewed vitals  GENERAL: Alert and oriented, no acute distress  HEART: Regular rate  LUNGS: Unlabored respirations     ASSESSMENT/PLAN:    Chacha was seen today for follow-up.    Diagnoses and all orders for this visit:    New onset type 2 diabetes mellitus  -     Ambulatory consult to Diabetic Education  -     Comprehensive metabolic panel; Standing  -     Hemoglobin A1c; Standing  -     Lipid panel; Standing    Hypertension goal BP (blood pressure) < 130/80    CKD (chronic kidney disease) stage 3, GFR 30-59 ml/min    Diuretic-induced hypokalemia  -     Refill potassium chloride SA (K-DUR,KLOR-CON) 20 MEQ tablet; Take 1 tablet (20 mEq total) by mouth once daily.    Morbid obesity with BMI of 70 and over, adult  -     Lifestyle modifications discussed    Schedule A1C, CMP, lipid panel and f/u in 3 months.

## 2019-07-31 ENCOUNTER — CLINICAL SUPPORT (OUTPATIENT)
Dept: DIABETES | Facility: CLINIC | Age: 39
End: 2019-07-31
Payer: MEDICAID

## 2019-07-31 VITALS — BODY MASS INDEX: 47.74 KG/M2 | HEIGHT: 68 IN | WEIGHT: 315 LBS

## 2019-07-31 DIAGNOSIS — E11.9 NEW ONSET TYPE 2 DIABETES MELLITUS: Primary | ICD-10-CM

## 2019-07-31 PROCEDURE — G0108 DIAB MANAGE TRN  PER INDIV: HCPCS | Mod: PBBFAC | Performed by: DIETITIAN, REGISTERED

## 2019-07-31 PROCEDURE — 99999 PR PBB SHADOW E&M-EST. PATIENT-LVL III: ICD-10-PCS | Mod: PBBFAC,,, | Performed by: DIETITIAN, REGISTERED

## 2019-07-31 PROCEDURE — 99999 PR PBB SHADOW E&M-EST. PATIENT-LVL III: CPT | Mod: PBBFAC,,, | Performed by: DIETITIAN, REGISTERED

## 2019-07-31 PROCEDURE — 99213 OFFICE O/P EST LOW 20 MIN: CPT | Mod: PBBFAC | Performed by: DIETITIAN, REGISTERED

## 2019-07-31 NOTE — LETTER
July 31, 2019        Brandy Abreu DO  [address] [place] [address] [address] [place] [id number]             [facility]  [address] [place] [address] [address] [place] [address]  Phone: [phone]  Fax: [phone]   Patient: Chacha Zendejas   MR Number: [id number]   YOB: 1980   Date of Visit: 7/31/2019       Dear Dr. Abreu:    Thank you for referring Chacha Zendejas to me for evaluation. Below are the relevant portions of my assessment and plan of care.         If you have questions, please do not hesitate to call me. I look forward to following Chacha along with you.    Sincerely,      Elicia Morocho, AMBER           CC  No Recipients

## 2019-07-31 NOTE — PROGRESS NOTES
"Diabetes Education  Author: Elicia Morocho, AMBER  Date: 7/31/2019    Diabetes Care Management Summary  Diabetes Education Record Assessment/Progress: Initial  Current Diabetes Risk Level: Moderate     Referring Provider: Brandy Abreu DO  39 y.o. male in clinic today for diabetes education. Patient has not taken diabetes education courses in the past. Pt has seen RD for wt loss in the past at [facility].   Pt has inquired about bariatric surgery, but insurance not accepted here.     Weight: (!) 210.5 kg (463 lb 15.3 oz)   Height: 5' 7.5" (171.5 cm)   Body mass index is 71.59 kg/m².    Last A1c:   Lab Results   Component Value Date    HGBA1C 7.4 (H) 07/10/2019     Last visit with Diabetes Educator: : 07/31/2019      Diabetes Type  Diabetes Type : Type II    Diabetes History  Diabetes Diagnosis: 0-1 year(dx July 2019)  Current Treatment: Diet  Reviewed Problem List with Patient: Yes    Health Maintenance was reviewed today with patient. Discussed with patient importance of routine eye exams, foot exams/foot care, blood work (i.e.: A1c, microalbumin, and lipid), dental visits, yearly flu vaccine, and pneumonia vaccine as indicated by PCP. Patient verbalized understanding.     Health Maintenance Topics with due status: Not Due       Topic Last Completion Date    TETANUS VACCINE 01/10/2017    Influenza Vaccine 03/28/2018    Lipid Panel 07/08/2019    Hemoglobin A1c 07/10/2019    Low Dose Statin 07/16/2019     Health Maintenance Due   Topic Date Due    Foot Exam  05/01/1988    Eye Exam  05/01/1988     FOOT EVALUATION: 10 gram monofilament exam with protective sensation intact bilaterally. Nails very thick. Skin dry and flaky. No ulcers. Distal pulses palpable.    Nutrition  Meal Planning: skipping meals, snacks between meal, water(2 meals/day; wakes up late and skips breakfast )  What type of beverages do you drink?: water, sport drinks(fluid restrictionof 60oz daily  / drinks 1 soda, 1 bottle water )  Meal " Plan 24 Hour Recall - Breakfast: none (awake at 9:30a)  Meal Plan 24 Hour Recall - Lunch: 3p - chopped beef, rice and gravy, steamed broccoli, glass of water   Meal Plan 24 Hour Recall - Dinner: 9p - 10 pc nuggdavid from    Meal Plan 24 Hour Recall - Snack: none     Monitoring   Self Monitoring : no meter  Blood Glucose Logs: No  Do you use a personal continuous glucose monitor?: No    Exercise   Exercise Type: walking, bike riding  Intensity: Low  Frequency: 3-5 Times per week(3 days/wk )  Duration: 1 hour(ride bike for 1 hour // walk for 30 min (until back starts hurting) )    Current Diabetes Treatment   Current Treatment: Diet    Social History  Primary Support: Self, Family(lives with older sister )  Educational Level: High School  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  Smoking Status: Ex Smoker  Alcohol Use: Rarely                                Barriers to Change  Barriers to Change: None  Learning Challenges : None    Readiness to Learn   Readiness to Learn : Acceptance    Cultural Influences  Cultural Influences: No         Goals  Patient has selected/evaluated goals during today's session: Yes, selected  Healthy Eating: Set(start eating breakfast; avoid beverages with sugar - drink Coke Zero, Powerade Zero and water )  Start Date: 07/31/19  Target Date: 10/31/19  Monitoring: Set(Check BG 1-2 times daily and record on logsheet)  Start Date: 07/31/19  Target Date: 10/31/19         Diabetes Care Plan/Intervention  Education Plan/Intervention: Individual Follow-Up DSMT, Foot Exam, Other, Eye Exam(referrel to Podiatry // Schedule eye exam )   Gave True Metrix meter  F/u in 3 months    Diabetes Meal Plan  Calories: 2000  Carbohydrate Per Meal: 45-60g  Carbohydrate Per Snack : 15-30g    Today's Self-Management Care Plan was developed with the patient's input and is based on barriers identified during today's assessment.    The long and short-term goals in the  care plan were written with the patient/caregiver's input. The patient has agreed to work toward these goals to improve his overall diabetes control.      The patient received a copy of today's self-management plan and verbalized understanding of the care plan, goals, and all of today's instructions.      The patient was encouraged to communicate with his physician and care team regarding his condition(s) and treatment.  I provided the patient with my contact information today and encouraged him to contact me via phone or patient portal as needed.     Education Units of Time   Time Spent: 60 min

## 2019-08-08 DIAGNOSIS — E87.6 DIURETIC-INDUCED HYPOKALEMIA: ICD-10-CM

## 2019-08-08 DIAGNOSIS — T50.2X5A DIURETIC-INDUCED HYPOKALEMIA: ICD-10-CM

## 2019-08-08 RX ORDER — POTASSIUM CHLORIDE 20 MEQ/1
20 TABLET, EXTENDED RELEASE ORAL DAILY
Qty: 90 TABLET | Refills: 1 | Status: SHIPPED | OUTPATIENT
Start: 2019-08-08 | End: 2020-01-01 | Stop reason: SDUPTHER

## 2019-08-12 ENCOUNTER — OFFICE VISIT (OUTPATIENT)
Dept: CARDIOLOGY | Facility: CLINIC | Age: 39
End: 2019-08-12
Payer: MEDICAID

## 2019-08-12 VITALS
SYSTOLIC BLOOD PRESSURE: 101 MMHG | BODY MASS INDEX: 49.44 KG/M2 | DIASTOLIC BLOOD PRESSURE: 61 MMHG | HEART RATE: 80 BPM | HEIGHT: 67 IN | WEIGHT: 315 LBS

## 2019-08-12 DIAGNOSIS — N18.30 CKD (CHRONIC KIDNEY DISEASE) STAGE 3, GFR 30-59 ML/MIN: ICD-10-CM

## 2019-08-12 DIAGNOSIS — I34.0 MODERATE MITRAL REGURGITATION: ICD-10-CM

## 2019-08-12 DIAGNOSIS — E66.01 MORBID OBESITY: ICD-10-CM

## 2019-08-12 DIAGNOSIS — I27.20 PULMONARY HTN: ICD-10-CM

## 2019-08-12 DIAGNOSIS — I50.20 HEART FAILURE WITH REDUCED EJECTION FRACTION, NYHA CLASS III: ICD-10-CM

## 2019-08-12 DIAGNOSIS — G47.33 OSA ON CPAP: ICD-10-CM

## 2019-08-12 DIAGNOSIS — I07.1 MILD TRICUSPID VALVE REGURGITATION: ICD-10-CM

## 2019-08-12 DIAGNOSIS — I48.3 TYPICAL ATRIAL FLUTTER: ICD-10-CM

## 2019-08-12 DIAGNOSIS — I48.92 PAROXYSMAL ATRIAL FLUTTER: ICD-10-CM

## 2019-08-12 DIAGNOSIS — I51.7 RIGHT VENTRICULAR ENLARGEMENT: ICD-10-CM

## 2019-08-12 DIAGNOSIS — I10 ESSENTIAL HYPERTENSION: Primary | ICD-10-CM

## 2019-08-12 DIAGNOSIS — I50.42 CHRONIC COMBINED SYSTOLIC AND DIASTOLIC CONGESTIVE HEART FAILURE: ICD-10-CM

## 2019-08-12 DIAGNOSIS — I50.43 ACUTE ON CHRONIC COMBINED SYSTOLIC AND DIASTOLIC CONGESTIVE HEART FAILURE: ICD-10-CM

## 2019-08-12 PROCEDURE — 99999 PR PBB SHADOW E&M-EST. PATIENT-LVL IV: CPT | Mod: PBBFAC,,, | Performed by: NURSE PRACTITIONER

## 2019-08-12 PROCEDURE — 99214 OFFICE O/P EST MOD 30 MIN: CPT | Mod: S$PBB,,, | Performed by: NURSE PRACTITIONER

## 2019-08-12 PROCEDURE — 99999 PR PBB SHADOW E&M-EST. PATIENT-LVL IV: ICD-10-PCS | Mod: PBBFAC,,, | Performed by: NURSE PRACTITIONER

## 2019-08-12 PROCEDURE — 99214 OFFICE O/P EST MOD 30 MIN: CPT | Mod: PBBFAC | Performed by: NURSE PRACTITIONER

## 2019-08-12 PROCEDURE — 99214 PR OFFICE/OUTPT VISIT, EST, LEVL IV, 30-39 MIN: ICD-10-PCS | Mod: S$PBB,,, | Performed by: NURSE PRACTITIONER

## 2019-08-12 RX ORDER — METOLAZONE 5 MG/1
5 TABLET ORAL DAILY
Qty: 30 TABLET | Refills: 6 | Status: SHIPPED | OUTPATIENT
Start: 2019-08-12 | End: 2020-01-01 | Stop reason: SDUPTHER

## 2019-08-12 NOTE — PATIENT INSTRUCTIONS
Eating Heart-Healthy Food: Using the DASH Plan    Eating for your heart doesnt have to be hard or boring. You just need to know how to make healthier choices. The DASH eating plan has been developed to help you do just that. DASH stands for Dietary Approaches to Stop Hypertension. It is a plan that has been proven to be healthier for your heart and to lower your risk for high blood pressure. It can also help lower your risk for cancer, heart disease, osteoporosis, and diabetes.  Choosing from each food group  Choose foods from each of the food groups below each day. Try to get the recommended number of servings for each food group. The serving numbers are based on a diet of 2,000 calories a day. Talk to your doctor if youre unsure about your calorie needs. Along with getting the correct servings, the DASH plan also recommends a sodium intake less than 2,300 mg per day.        Grains  Servings: 6 to 8 a day  A serving is:  · 1 slice bread  · 1 ounce dry cereal  · Half a cup cooked rice, pasta or cereal  Best choices: Whole grains and any grains high in fiber. Vegetables  Servings: 4 to 5 a day  A serving is:  · 1 cup raw leafy vegetable  · Half a cup cut-up raw or cooked vegetable  · Half a cup vegetable juice  Best choices: Fresh or frozen vegetables prepared without added salt or fat.   Fruits  Servings: 4 to 5 a day  A serving is:  · 1 medium fruit  · One-quarter cup dried fruit  · Half a cup fresh, frozen, or canned fruit  · Half a cup of 100% fruit juices  Best choices: A variety of fresh fruits of different colors. Whole fruits are a better choice than fruit juices. Low-fat or fat-free dairy  Servings: 2 to 3 a day  A serving is:  · 1 cup milk  · 1 cup yogurt  · One and a half ounces cheese  Best choices: Skim or 1% milk, low-fat or fat-free yogurt or buttermilk, and low-fat cheeses.         Lean meats, poultry, fish  Servings: 6 or fewer a day  A serving is:  · 1 ounce cooked meats, poultry, or fish  · 1  egg  Best choices: Lean poultry and fish. Trim away visible fat. Broil, grill, roast, or boil instead of frying. Remove skin from poultry before eating. Limit how much red meat you eat.  Nuts, seeds, beans  Servings: 4 to 5 a week  A serving is:  · One-third cup nuts (one and a half ounces)  · 2 tablespoons nut butter or seeds  · Half a cup cooked dry beans or legumes  Best choices: Dry roasted nuts with no salt added, lentils, kidney beans, garbanzo beans, and whole marte beans.   Fats and oils  Servings: 2 to 3 a day  A serving is:  · 1 teaspoon vegetable oil  · 1 teaspoon soft margarine  · 1 tablespoon mayonnaise  · 2 tablespoons salad dressing  Best choices: Nut and vegetable oils (nontropical vegetable oils), such as olive and canola oil. Sweets  Servings: 5 a week or fewer  A serving is:  · 1 tablespoon sugar, maple syrup, or honey  · 1 tablespoon jam or jelly  · 1 half-ounce jelly beans (about 15)  · 1 cup lemonade  Best choices: Dried fruit can be a satisfying sweet. Choose low-fat sweets. And watch your serving sizes!      For more on the DASH eating plan, visit:  www.nhlbi.nih.gov/health/health-topics/topics/dash   Date Last Reviewed: 6/1/2016  © 4642-7233 [employer]. [address] [place] [address], [place] [address]. All rights reserved. This information is not intended as a substitute for professional medical care. Always follow your healthcare professional's instructions.

## 2019-08-12 NOTE — PROGRESS NOTES
Subjective:   Patient ID:  Chacha Zendejas is a 39 y.o. male who presents for evaluation of Hypertension and Congestive Heart Failure      HPI    Chacha Zendejas is a 39 year old male who presents to clinic for CHF follow up. His current medical conditions include Chronic Combined Systolic/Diastolic CHF, HFpEF of 35-40%, HTN, HLP, PHTN, BRINDA on CPAP, PAFL on Eliquis. He returns today and states he is doing ok. At last visit, I had increased his Metolazone for a few days with weight loss of 8-10 lbs per patient report but has continued to gain weight since that time despite Metolazone 3 times  Per week and Bumex BID. Seems to be NYHA FC II today in office. He assist his sister to move last week without any issues. He reports having to take breaks due to the heat but not due to SOB or CREWS issues. Denies chest pain or anginal equivalents. No shortness of breath, CREWS or palpitations. Denies orthopnea, PND or abdominal bloating. He does endorse some issues with bilateral knees and is trying to find somewhere to participate in water sports. Reports regular walking without any issues lately. Some degree of LE edema today on exam. No recent falls, syncope or near syncopal events. Reports compliance with medications and dietary restrictions. NO CNS complaints to suggest TIA or CVA today. No signs of abnormal bleeding on Eliquis.     Discussed extensively with patient regarding food choices and options to find pool for water exercise program. He states understanding and will attempt to find an exercise gym with pool.       Past Medical History:   Diagnosis Date    Atrial flutter     Cellulitis     RLE    CHF (congestive heart failure)     Diabetes mellitus, type 2     Hypertension     Hypothyroidism     Sleep apnea     Venous insufficiency of left lower extremity        Past Surgical History:   Procedure Laterality Date    CARDIOVERSION OR DEFIBRILLATION N/A 3/18/2019    Performed by Talib Ratliff MD  at [facility] C[facility] LAB    ECHOCARDIOGRAM,TRANSESOPHAGEAL N/A 3/18/2019    Performed by Talib Ratliff MD at [facility] CATH LAB       Social History     Tobacco Use    Smoking status: Former Smoker     Types: Cigarettes    Smokeless tobacco: Never Used   Substance Use Topics    Alcohol use: Yes     Comment: socially    Drug use: No       Family History   Problem Relation Age of Onset    Heart disease Father      Wt Readings from Last 3 Encounters:   08/12/19 (!) 211.6 kg (466 lb 7.9 oz)   07/31/19 (!) 210.5 kg (463 lb 15.3 oz)   07/16/19 (!) 208 kg (458 lb 8.9 oz)     Temp Readings from Last 3 Encounters:   07/16/19 96.7 °F (35.9 °C) (Tympanic)   07/10/19 97.1 °F (36.2 °C) (Tympanic)   06/24/19 97.6 °F (36.4 °C) (Tympanic)     BP Readings from Last 3 Encounters:   08/12/19 101/61   07/16/19 118/62   07/10/19 124/60     Pulse Readings from Last 3 Encounters:   08/12/19 80   07/16/19 80   07/10/19 94     Current Outpatient Medications on File Prior to Visit   Medication Sig Dispense Refill    amiodarone (PACERONE) 200 MG Tab Take 1 tablet (200 mg total) by mouth once daily. 90 tablet 3    apixaban (ELIQUIS) 5 mg Tab Take 1 tablet (5 mg total) by mouth 2 (two) times daily. 180 tablet 3    atorvastatin (LIPITOR) 20 MG tablet Take 1 tablet (20 mg total) by mouth once daily. 90 tablet 3    bumetanide (BUMEX) 2 MG tablet Take 1 tablet (2 mg total) by mouth 2 (two) times daily. 180 tablet 3    carvedilol (COREG) 25 MG tablet Take 1 tablet (25 mg total) by mouth 2 (two) times daily with meals. 180 tablet 3    digoxin (LANOXIN) 125 mcg tablet Take 0.5 tablets (62.5 mcg total) by mouth once daily. 45 tablet 3    hydrALAZINE (APRESOLINE) 50 MG tablet Take 1 tablet (50 mg total) by mouth every 12 (twelve) hours. 180 tablet 3    isosorbide dinitrate (ISORDIL) 40 MG Tab Take 1 tablet (40 mg total) by mouth 2 (two) times daily. 180 tablet 3    levothyroxine (SYNTHROID) 50 MCG tablet Take 1 tablet (50 mcg total) by mouth  "before breakfast. 90 tablet 3    losartan (COZAAR) 100 MG tablet Take 1 tablet (100 mg total) by mouth once daily. 30 tablet 3    potassium chloride SA (K-DUR,KLOR-CON) 20 MEQ tablet Take 1 tablet (20 mEq total) by mouth once daily. 90 tablet 1    spironolactone (ALDACTONE) 50 MG tablet Take 1 tablet (50 mg total) by mouth once daily. 90 tablet 3    [DISCONTINUED] metOLazone (ZAROXOLYN) 5 MG tablet Take 1 tablet (5 mg total) by mouth 3 (three) times a week. Take 30 minutes before Bumex 360 tablet 3     No current facility-administered medications on file prior to visit.        Review of Systems   Constitution: Positive for malaise/fatigue.   HENT: Negative for hearing loss and hoarse voice.    Eyes: Negative for blurred vision and visual disturbance.   Cardiovascular: Positive for leg swelling. Negative for chest pain, claudication, dyspnea on exertion, irregular heartbeat, near-syncope, orthopnea, palpitations, paroxysmal nocturnal dyspnea and syncope.   Respiratory: Positive for snoring. Negative for cough, hemoptysis, shortness of breath, sleep disturbances due to breathing and wheezing.         +BRINDA on CPAP   Endocrine: Negative for cold intolerance and heat intolerance.   Hematologic/Lymphatic: Bruises/bleeds easily (on eliquis).   Skin: Negative for color change, dry skin and nail changes.   Musculoskeletal: Positive for arthritis, back pain and joint pain (bilateral knees). Negative for myalgias.   Gastrointestinal: Negative for bloating, abdominal pain, constipation, nausea and vomiting.   Genitourinary: Negative for dysuria, flank pain, hematuria and hesitancy.   Neurological: Negative for headaches, light-headedness, loss of balance, numbness, paresthesias and weakness.   Psychiatric/Behavioral: Negative for altered mental status.   Allergic/Immunologic: Negative for environmental allergies.     /61 (BP Location: Right arm)   Pulse 80   Ht 5' 7" (1.702 m)   Wt (!) 211.6 kg (466 lb 7.9 oz)   " BMI 73.06 kg/m²     Objective:   Physical Exam   Constitutional: He is oriented to person, place, and time. He appears well-developed and well-nourished. No distress.   HENT:   Head: Normocephalic and atraumatic.   Eyes: Pupils are equal, round, and reactive to light.   Neck: Normal range of motion and full passive range of motion without pain. Neck supple. No JVD present.   Cardiovascular: Normal rate, regular rhythm, S1 normal, S2 normal and intact distal pulses.  Occasional extrasystoles are present. PMI is not displaced. Exam reveals no distant heart sounds.   No murmur heard.  Pulses:       Radial pulses are 2+ on the right side, and 2+ on the left side.        Dorsalis pedis pulses are 2+ on the right side, and 2+ on the left side.   Remains in SR today.    Pulmonary/Chest: Effort normal. No respiratory distress. He has decreased breath sounds in the right lower field and the left lower field. He has no wheezes. He has no rales.   +CPAP nightly.    Abdominal: Soft. Bowel sounds are normal. He exhibits no distension. There is no tenderness.   +morbidly obese abdomen   Musculoskeletal: Normal range of motion. He exhibits edema.        Right ankle: He exhibits swelling.        Left ankle: He exhibits swelling.   Neurological: He is alert and oriented to person, place, and time.   Skin: Skin is warm and dry. He is not diaphoretic. No cyanosis. Nails show no clubbing.   Psychiatric: He has a normal mood and affect. His speech is normal and behavior is normal. Judgment and thought content normal. Cognition and memory are normal.   Nursing note and vitals reviewed.      Lab Results   Component Value Date    CHOL 180 07/08/2019    CHOL 223 (A) 07/11/2017     Lab Results   Component Value Date    HDL 32 (L) 07/08/2019    HDL 32 07/11/2017     Lab Results   Component Value Date    LDLCALC 116.4 07/08/2019    LDLCALC 162 07/11/2017     Lab Results   Component Value Date    TRIG 158 (H) 07/08/2019    TRIG 143 07/11/2017      Lab Results   Component Value Date    CHOLHDL 17.8 (L) 07/08/2019       Chemistry        Component Value Date/Time     07/08/2019 1133    K 3.9 07/08/2019 1133    CL 99 07/08/2019 1133    CO2 30 (H) 07/08/2019 1133    BUN 23 (H) 07/08/2019 1133    CREATININE 1.4 07/08/2019 1133     (H) 07/08/2019 1133        Component Value Date/Time    CALCIUM 10.2 07/08/2019 1133    ALKPHOS 64 07/08/2019 1133    AST 17 07/08/2019 1133    ALT 17 07/08/2019 1133    BILITOT 0.8 07/08/2019 1133    ESTGFRAFRICA >60.0 07/08/2019 1133    EGFRNONAA >60.0 07/08/2019 1133          Lab Results   Component Value Date    TSH 2.330 05/20/2019     No results found for: INR, PROTIME  Lab Results   Component Value Date    WBC 6.39 03/19/2019    HGB 13.2 (L) 03/19/2019    HCT 41.3 03/19/2019    MCV 94 03/19/2019     03/19/2019       2D ECHO CONCLUSIONS     1 - Mild left atrial enlargement.     2 - Concentric hypertrophy.     3 - Wall motion abnormalities.     4 - Moderately depressed left ventricular systolic function (EF 35-40%).     5 - Impaired LV relaxation, normal LAP (grade 1 diastolic dysfunction).     6 - Normal right ventricular systolic function .     7 - The estimated PA systolic pressure is greater than 13 mmHg.     8 - Poor imaging quality due to body habitus.            This document has been electronically    SIGNED BY: Demond Mendoza MD On: 06/01/2019 09:13     Assessment:      1. Essential hypertension    2. Chronic combined systolic and diastolic congestive heart failure    3. Heart failure with reduced ejection fraction, NYHA class III    4. Moderate mitral regurgitation    5. Paroxysmal atrial flutter    6. Right ventricular enlargement    7. Pulmonary HTN    8. BRINDA on CPAP    9. Mild tricuspid valve regurgitation    10. Morbid obesity    11. CKD (chronic kidney disease) stage 3, GFR 30-59 ml/min    12. Typical atrial flutter    13. Acute on chronic combined systolic and diastolic congestive heart failure       Patient presents to clinic for CHF follow up and is doing ok today.  No signs of ADHF on exam today  Has gained excess of 6 lbs since last visit. He had lost 10 lbs but gained that plus 6 additional lbs.  He is working with dietitian to assist with Diabetes control  Will adjust diuretic dose today and follow up in 2 weeks.   Plan:     Change Metolazone to daily for now  Continue all other CV meds for now  BMP and BNP today with phone review  Dash diet, 2 gm sodium restriction  50-60 ozs fluid intake daily  Daily weights  I have asked him to weigh daily and call clinic if increase in weight by 3 lbs in 1 day or 5 lbs in 1 week  Monitor BP at home  Continue nightly CPAP  Encourage aqua exercise, discussed ways to obtain membership with pool  F/U in 2 weeks with BMp and BNP or sooner if needed.     ANN GrewalC

## 2019-08-29 ENCOUNTER — TELEPHONE (OUTPATIENT)
Dept: CARDIOLOGY | Facility: CLINIC | Age: 39
End: 2019-08-29

## 2019-08-29 NOTE — TELEPHONE ENCOUNTER
----- Message from Viridiana Leon NP sent at 8/29/2019  9:03 AM CDT -----  Patient cancelled his appt with labs yesterday    He needs to be seen.   Can we get him rescheduled with labs?    He needs to see Dr. Marcos next month as well    Thanks  Viridiana

## 2019-08-29 NOTE — TELEPHONE ENCOUNTER
Called patient to reschedule his cancelled appt with Dr. Juliane DAS With labs. LVM for patient to return call to the clinic.

## 2019-09-10 ENCOUNTER — TELEPHONE (OUTPATIENT)
Dept: CARDIOLOGY | Facility: CLINIC | Age: 39
End: 2019-09-10

## 2019-09-10 NOTE — TELEPHONE ENCOUNTER
----- Message from JAK Mera sent at 9/10/2019  1:00 PM CDT -----  Patient needs to have CHF follow up arranged next week.     Thanks  Viridiana

## 2019-09-16 ENCOUNTER — TELEPHONE (OUTPATIENT)
Dept: CARDIOLOGY | Facility: CLINIC | Age: 39
End: 2019-09-16

## 2019-09-16 NOTE — TELEPHONE ENCOUNTER
----- Message from Adriana Carrington sent at 9/16/2019 12:09 PM CDT -----  Contact: Pt   Pt is calling .;.Type:  Patient Returning Call    Who Called: Pt   Who Left Message for Patient: Nurse Lal   Does the patient know what this is regarding?: Pt is uncertain   Would the patient rather a call back or a response via [facility]? Call back   Best Call Back Number: [phone]           .Thank You  Adriana Carrington

## 2019-09-16 NOTE — TELEPHONE ENCOUNTER
----- Message from Chelo Spears sent at 9/16/2019  7:38 AM CDT -----  Pt is returning a call from nurse to discuss a new pt patient apt.        Please call back pt back at [phone]

## 2019-09-20 DIAGNOSIS — I10 ESSENTIAL HYPERTENSION: Primary | ICD-10-CM

## 2019-09-24 ENCOUNTER — OFFICE VISIT (OUTPATIENT)
Dept: CARDIOLOGY | Facility: CLINIC | Age: 39
End: 2019-09-24
Payer: MEDICAID

## 2019-09-24 VITALS
DIASTOLIC BLOOD PRESSURE: 62 MMHG | BODY MASS INDEX: 49.44 KG/M2 | SYSTOLIC BLOOD PRESSURE: 118 MMHG | HEART RATE: 97 BPM | HEIGHT: 67 IN | WEIGHT: 315 LBS

## 2019-09-24 DIAGNOSIS — I51.7 RIGHT VENTRICULAR ENLARGEMENT: ICD-10-CM

## 2019-09-24 DIAGNOSIS — I10 ESSENTIAL HYPERTENSION: ICD-10-CM

## 2019-09-24 DIAGNOSIS — I50.42 CHRONIC COMBINED SYSTOLIC AND DIASTOLIC CONGESTIVE HEART FAILURE: Primary | ICD-10-CM

## 2019-09-24 DIAGNOSIS — I34.0 MODERATE MITRAL REGURGITATION: ICD-10-CM

## 2019-09-24 DIAGNOSIS — G47.33 OSA ON CPAP: ICD-10-CM

## 2019-09-24 DIAGNOSIS — I50.20 HEART FAILURE WITH REDUCED EJECTION FRACTION, NYHA CLASS III: ICD-10-CM

## 2019-09-24 DIAGNOSIS — I27.20 PULMONARY HTN: ICD-10-CM

## 2019-09-24 DIAGNOSIS — I48.92 PAROXYSMAL ATRIAL FLUTTER: ICD-10-CM

## 2019-09-24 DIAGNOSIS — N18.30 CKD (CHRONIC KIDNEY DISEASE) STAGE 3, GFR 30-59 ML/MIN: ICD-10-CM

## 2019-09-24 DIAGNOSIS — E66.01 MORBID OBESITY: ICD-10-CM

## 2019-09-24 PROCEDURE — 99214 PR OFFICE/OUTPT VISIT, EST, LEVL IV, 30-39 MIN: ICD-10-PCS | Mod: S$PBB,,, | Performed by: NURSE PRACTITIONER

## 2019-09-24 PROCEDURE — 99214 OFFICE O/P EST MOD 30 MIN: CPT | Mod: S$PBB,,, | Performed by: NURSE PRACTITIONER

## 2019-09-24 PROCEDURE — 99213 OFFICE O/P EST LOW 20 MIN: CPT | Mod: PBBFAC | Performed by: NURSE PRACTITIONER

## 2019-09-24 PROCEDURE — 99999 PR PBB SHADOW E&M-EST. PATIENT-LVL III: CPT | Mod: PBBFAC,,, | Performed by: NURSE PRACTITIONER

## 2019-09-24 PROCEDURE — 99999 PR PBB SHADOW E&M-EST. PATIENT-LVL III: ICD-10-PCS | Mod: PBBFAC,,, | Performed by: NURSE PRACTITIONER

## 2019-09-24 NOTE — PROGRESS NOTES
Subjective:   Patient ID:  Chacha Zendejas is a 39 y.o. male who presents for evaluation of Essential hypertension (F/U) and Congestive Heart Failure      HPI    Chacha Zendejas is a 39 year old male who presents to clinic for CHF follow up. His current medical conditions include Chronic combined systolic/diastolic CHF, HFrEF of 35-40%, PAF on Eliquis, Hypothyroidism, BRINDA on CPAP, HTN, HLP. He returns today and states he is doing ok. He is in training for [employer]  and denies any SOB or CREWS with walking during training program.   Denies chest pain or anginal equivalents. No shortness of breath, CREWS or palpitations. Denies orthopnea, PND or abdominal bloating. Reports regular walking without any issues lately. NO leg swelling or claudications. No recent falls, syncope or near syncopal events. Reports compliance with medications and dietary restrictions. NO CNS complaints to suggest TIA or CVA today. No signs of abnormal bleeding on Eliquis    Today, he is NYHA FC II in office. He is doing well and able to do more activities without SOB or CREWS issues.       Medications: Eliquis 5 mg BID, Amiodarone 200 mg daily, Lipitor 20 mg daily, Bumex 2 mg nightly (BID on weekends), Aldactone 50 mg daily, Metolazone 5 mg daily with Bumex, Hydralazine 50 mg BID, Isordil 40 mg BID, KCL 20 meq BID.   Sodium: Reports compliance, denies any excess salty food.   Diet: Compliant with Cardiac, low salt diet.   Activity: Been participating in [employer]  training and denies any CREWS with activities. Able to do more without any symptoms.   CPAP: Has not been wearing every night.   Alcohol: Sparingly  Smoking/Vaping: Denies recent use, quit 10 years ago  BP log: Did not bring but BP log today, well controlled in office  Weight: up 472 lbs today (increased 6 lbs)     Past Medical History:   Diagnosis Date    Atrial flutter     Cellulitis     RLE    CHF (congestive heart failure)     Diabetes mellitus, type 2     Hypertension      Hypothyroidism     Sleep apnea     Venous insufficiency of left lower extremity        Past Surgical History:   Procedure Laterality Date    TREATMENT OF CARDIAC ARRHYTHMIA N/A 3/18/2019    Procedure: CARDIOVERSION OR DEFIBRILLATION;  Surgeon: Talib Ratliff MD;  Location: [facility] CATH LAB;  Service: Cardiology;  Laterality: N/A;       Social History     Tobacco Use    Smoking status: Former Smoker     Types: Cigarettes    Smokeless tobacco: Never Used   Substance Use Topics    Alcohol use: Yes     Comment: socially    Drug use: No       Family History   Problem Relation Age of Onset    Heart disease Father      Wt Readings from Last 3 Encounters:   09/24/19 (!) 214.5 kg (472 lb 14.2 oz)   08/12/19 (!) 211.6 kg (466 lb 7.9 oz)   07/31/19 (!) 210.5 kg (463 lb 15.3 oz)     Temp Readings from Last 3 Encounters:   07/16/19 96.7 °F (35.9 °C) (Tympanic)   07/10/19 97.1 °F (36.2 °C) (Tympanic)   06/24/19 97.6 °F (36.4 °C) (Tympanic)     BP Readings from Last 3 Encounters:   09/24/19 118/62   08/12/19 101/61   07/16/19 118/62     Pulse Readings from Last 3 Encounters:   09/24/19 97   08/12/19 80   07/16/19 80     Current Outpatient Medications on File Prior to Visit   Medication Sig Dispense Refill    amiodarone (PACERONE) 200 MG Tab Take 1 tablet (200 mg total) by mouth once daily. 90 tablet 3    apixaban (ELIQUIS) 5 mg Tab Take 1 tablet (5 mg total) by mouth 2 (two) times daily. 180 tablet 3    atorvastatin (LIPITOR) 20 MG tablet Take 1 tablet (20 mg total) by mouth once daily. 90 tablet 3    bumetanide (BUMEX) 2 MG tablet Take 1 tablet (2 mg total) by mouth 2 (two) times daily. 180 tablet 3    carvedilol (COREG) 25 MG tablet Take 1 tablet (25 mg total) by mouth 2 (two) times daily with meals. 180 tablet 3    digoxin (LANOXIN) 125 mcg tablet Take 0.5 tablets (62.5 mcg total) by mouth once daily. 45 tablet 3    hydrALAZINE (APRESOLINE) 50 MG tablet Take 1 tablet (50 mg total) by mouth every 12  (twelve) hours. 180 tablet 3    isosorbide dinitrate (ISORDIL) 40 MG Tab Take 1 tablet (40 mg total) by mouth 2 (two) times daily. 180 tablet 3    levothyroxine (SYNTHROID) 50 MCG tablet Take 1 tablet (50 mcg total) by mouth before breakfast. 90 tablet 3    losartan (COZAAR) 100 MG tablet Take 1 tablet (100 mg total) by mouth once daily. 30 tablet 3    metOLazone (ZAROXOLYN) 5 MG tablet Take 1 tablet (5 mg total) by mouth once daily. Take 30 minutes before Bumex 30 tablet 6    potassium chloride SA (K-DUR,KLOR-CON) 20 MEQ tablet Take 1 tablet (20 mEq total) by mouth once daily. 90 tablet 1    spironolactone (ALDACTONE) 50 MG tablet Take 1 tablet (50 mg total) by mouth once daily. 90 tablet 3     No current facility-administered medications on file prior to visit.        Review of Systems   Constitution: Positive for malaise/fatigue.   HENT: Negative for hearing loss and hoarse voice.    Eyes: Negative for blurred vision and visual disturbance.   Cardiovascular: Negative for chest pain, claudication, dyspnea on exertion, irregular heartbeat, leg swelling, near-syncope, orthopnea, palpitations, paroxysmal nocturnal dyspnea and syncope.   Respiratory: Negative for cough, hemoptysis, shortness of breath, sleep disturbances due to breathing, snoring and wheezing.    Endocrine: Negative for cold intolerance and heat intolerance.   Hematologic/Lymphatic: Bruises/bleeds easily.   Skin: Negative for color change, dry skin and nail changes.   Musculoskeletal: Positive for arthritis and back pain. Negative for joint pain and myalgias.   Gastrointestinal: Negative for bloating, abdominal pain, constipation, nausea and vomiting.   Genitourinary: Negative for dysuria, flank pain, hematuria and hesitancy.   Neurological: Negative for headaches, light-headedness, loss of balance, numbness, paresthesias and weakness.   Psychiatric/Behavioral: Negative for altered mental status.   Allergic/Immunologic: Negative for  "environmental allergies.     /62 (BP Location: Right arm, Patient Position: Sitting, BP Method: Large (Manual))   Pulse 97   Ht 5' 7" (1.702 m)   Wt (!) 214.5 kg (472 lb 14.2 oz)   BMI 74.06 kg/m²     Objective:   Physical Exam   Constitutional: He is oriented to person, place, and time. He appears well-developed and well-nourished. No distress.   HENT:   Head: Normocephalic and atraumatic.   Eyes: Pupils are equal, round, and reactive to light.   Neck: Normal range of motion and full passive range of motion without pain. Neck supple. No JVD present.   Cardiovascular: Normal rate, regular rhythm, S1 normal, S2 normal and intact distal pulses.  Occasional extrasystoles are present. PMI is not displaced. Exam reveals no distant heart sounds.   No murmur heard.  Pulses:       Radial pulses are 2+ on the right side, and 2+ on the left side.        Dorsalis pedis pulses are 2+ on the right side, and 2+ on the left side.   BP well controlled today   Pulmonary/Chest: Effort normal. No accessory muscle usage. No respiratory distress. He has decreased breath sounds in the right lower field and the left lower field. He has no wheezes. He has no rales.   Abdominal: Soft. Bowel sounds are normal. He exhibits no distension. There is no tenderness.   +morbidly obese   Musculoskeletal: Normal range of motion. He exhibits edema (BLE).        Right ankle: He exhibits swelling.        Left ankle: He exhibits swelling.   Neurological: He is alert and oriented to person, place, and time.   Skin: Skin is warm and dry. He is not diaphoretic. No cyanosis. Nails show no clubbing.   Psychiatric: He has a normal mood and affect. His speech is normal and behavior is normal. Judgment and thought content normal. Cognition and memory are normal.   Nursing note and vitals reviewed.      Lab Results   Component Value Date    CHOL 180 07/08/2019    CHOL 223 (A) 07/11/2017     Lab Results   Component Value Date    HDL 32 (L) 07/08/2019    " HDL 32 07/11/2017     Lab Results   Component Value Date    LDLCALC 116.4 07/08/2019    LDLCALC 162 07/11/2017     Lab Results   Component Value Date    TRIG 158 (H) 07/08/2019    TRIG 143 07/11/2017     Lab Results   Component Value Date    CHOLHDL 17.8 (L) 07/08/2019       Chemistry        Component Value Date/Time     07/08/2019 1133    K 3.9 07/08/2019 1133    CL 99 07/08/2019 1133    CO2 30 (H) 07/08/2019 1133    BUN 23 (H) 07/08/2019 1133    CREATININE 1.4 07/08/2019 1133     (H) 07/08/2019 1133        Component Value Date/Time    CALCIUM 10.2 07/08/2019 1133    ALKPHOS 64 07/08/2019 1133    AST 17 07/08/2019 1133    ALT 17 07/08/2019 1133    BILITOT 0.8 07/08/2019 1133    ESTGFRAFRICA >60.0 07/08/2019 1133    EGFRNONAA >60.0 07/08/2019 1133          Lab Results   Component Value Date    TSH 2.330 05/20/2019     No results found for: INR, PROTIME  Lab Results   Component Value Date    WBC 6.39 03/19/2019    HGB 13.2 (L) 03/19/2019    HCT 41.3 03/19/2019    MCV 94 03/19/2019     03/19/2019   2D ECHO   CONCLUSIONS     1 - Mild left atrial enlargement.     2 - Concentric hypertrophy.     3 - Wall motion abnormalities.     4 - Moderately depressed left ventricular systolic function (EF 35-40%).     5 - Impaired LV relaxation, normal LAP (grade 1 diastolic dysfunction).     6 - Normal right ventricular systolic function .     7 - The estimated PA systolic pressure is greater than 13 mmHg.     8 - Poor imaging quality due to body habitus.            This document has been electronically    SIGNED BY: Demond Mendoza MD On: 06/01/2019 09:13  Assessment:      1. Chronic combined systolic and diastolic congestive heart failure    2. Heart failure with reduced ejection fraction, NYHA class III    3. Paroxysmal atrial flutter    4. Right ventricular enlargement    5. Moderate mitral regurgitation    6. Essential hypertension    7. CKD (chronic kidney disease) stage 3, GFR 30-59 ml/min    8. Morbid obesity     9. BRINDA on CPAP    10. Pulmonary HTN      Patient presents to clinic for CHF follow up. No new cardiac complaints today. Doing well in office today.   Needs to be more compliant with Diuretics. Has started training for CATS  and has not been taking AM Bumex dose during the week.   Plan:     Continue same CV meds today  Needs to take Bumex BID  Dash diet, 2 gm sodium restriction  1500 ml fluid restriction  Daily weights  Monitor BP at home  Bring BP log to next visit  Encourage weight loss  Encourage daily walking  RTC In 3 months or sooner if needed  F/U with EP for CCM if indicated    JAK Grewal  [facility]

## 2019-09-30 ENCOUNTER — PATIENT OUTREACH (OUTPATIENT)
Dept: ADMINISTRATIVE | Facility: HOSPITAL | Age: 39
End: 2019-09-30

## 2019-10-03 ENCOUNTER — OFFICE VISIT (OUTPATIENT)
Dept: CARDIOLOGY | Facility: CLINIC | Age: 39
End: 2019-10-03
Payer: MEDICAID

## 2019-10-03 ENCOUNTER — LAB VISIT (OUTPATIENT)
Dept: LAB | Facility: HOSPITAL | Age: 39
End: 2019-10-03
Attending: INTERNAL MEDICINE
Payer: MEDICAID

## 2019-10-03 ENCOUNTER — CLINICAL SUPPORT (OUTPATIENT)
Dept: CARDIOLOGY | Facility: CLINIC | Age: 39
End: 2019-10-03
Payer: MEDICAID

## 2019-10-03 ENCOUNTER — DOCUMENTATION ONLY (OUTPATIENT)
Dept: RESEARCH | Facility: HOSPITAL | Age: 39
End: 2019-10-03

## 2019-10-03 VITALS
WEIGHT: 315 LBS | HEIGHT: 67 IN | HEART RATE: 92 BPM | DIASTOLIC BLOOD PRESSURE: 80 MMHG | BODY MASS INDEX: 49.44 KG/M2 | SYSTOLIC BLOOD PRESSURE: 136 MMHG

## 2019-10-03 DIAGNOSIS — I50.42 CHRONIC COMBINED SYSTOLIC AND DIASTOLIC CONGESTIVE HEART FAILURE: ICD-10-CM

## 2019-10-03 DIAGNOSIS — E66.01 MORBID OBESITY: ICD-10-CM

## 2019-10-03 DIAGNOSIS — I10 ESSENTIAL HYPERTENSION: ICD-10-CM

## 2019-10-03 DIAGNOSIS — R06.02 SOB (SHORTNESS OF BREATH): ICD-10-CM

## 2019-10-03 DIAGNOSIS — N18.30 CKD (CHRONIC KIDNEY DISEASE) STAGE 3, GFR 30-59 ML/MIN: ICD-10-CM

## 2019-10-03 DIAGNOSIS — G47.33 OSA ON CPAP: ICD-10-CM

## 2019-10-03 DIAGNOSIS — I48.92 PAROXYSMAL ATRIAL FLUTTER: ICD-10-CM

## 2019-10-03 DIAGNOSIS — I48.92 PAROXYSMAL ATRIAL FLUTTER: Primary | ICD-10-CM

## 2019-10-03 PROCEDURE — 99999 PR PBB SHADOW E&M-EST. PATIENT-LVL III: CPT | Mod: PBBFAC,,, | Performed by: INTERNAL MEDICINE

## 2019-10-03 PROCEDURE — 99999 PR PBB SHADOW E&M-EST. PATIENT-LVL III: ICD-10-PCS | Mod: PBBFAC,,, | Performed by: INTERNAL MEDICINE

## 2019-10-03 PROCEDURE — 84443 ASSAY THYROID STIM HORMONE: CPT

## 2019-10-03 PROCEDURE — 99215 OFFICE O/P EST HI 40 MIN: CPT | Mod: S$PBB,,, | Performed by: INTERNAL MEDICINE

## 2019-10-03 PROCEDURE — 83880 ASSAY OF NATRIURETIC PEPTIDE: CPT

## 2019-10-03 PROCEDURE — 93010 EKG 12-LEAD: ICD-10-PCS | Mod: S$PBB,,, | Performed by: INTERNAL MEDICINE

## 2019-10-03 PROCEDURE — 93010 ELECTROCARDIOGRAM REPORT: CPT | Mod: S$PBB,,, | Performed by: INTERNAL MEDICINE

## 2019-10-03 PROCEDURE — 80299 QUANTITATIVE ASSAY DRUG: CPT

## 2019-10-03 PROCEDURE — 36415 COLL VENOUS BLD VENIPUNCTURE: CPT

## 2019-10-03 PROCEDURE — 99213 OFFICE O/P EST LOW 20 MIN: CPT | Mod: PBBFAC,25 | Performed by: INTERNAL MEDICINE

## 2019-10-03 PROCEDURE — 99215 PR OFFICE/OUTPT VISIT, EST, LEVL V, 40-54 MIN: ICD-10-PCS | Mod: S$PBB,,, | Performed by: INTERNAL MEDICINE

## 2019-10-03 PROCEDURE — 93005 ELECTROCARDIOGRAM TRACING: CPT | Mod: PBBFAC | Performed by: INTERNAL MEDICINE

## 2019-10-03 PROCEDURE — 80053 COMPREHEN METABOLIC PANEL: CPT

## 2019-10-03 PROCEDURE — 83735 ASSAY OF MAGNESIUM: CPT

## 2019-10-03 RX ORDER — DIPHENOXYLATE HYDROCHLORIDE AND ATROPINE SULFATE 2.5; .025 MG/1; MG/1
TABLET ORAL
COMMUNITY
Start: 2019-10-01 | End: 2020-01-01

## 2019-10-03 NOTE — PROGRESS NOTES
Study Title: Continued Access for the Evaluation of the OPTIMIZER Smart System in Subjects with Moderate-to-Severe Heart Failure with Ejection Fraction between 25% and 45%  Protocol Version: CP [id number]  IRB #/Approval Date: [id number]  Sponsor: [employer]                           : Dr. Kayden Ibrahim  Others present: None     The subject was escorted into a private examination and confirmed identity by  2 unique patient identifiers. Spoke with patient regarding potential participation in the Optimizer trial. Discussed the consent form in detail, providing the patient with the purpose of the study and the inclusions for participation, the voluntary nature of participation, the study procedures and expectations, the potential adverse events, the risks and benefits, the alternatives if subject decides not to participate, our obligation of protection of privacy and the nature of sharing information with outside agencies.       Patient completed visit with Dr. Marcos where it was determined that he was too healthy and high functioning for trial participation. Will continue to follow.

## 2019-10-03 NOTE — LETTER
October 7, 2019      Johny Puri MD  [address]  [place] [address]           [facility]  [address]  [place] [address]  Phone: [phone]  Fax: [phone]          Patient: Chacha Zendejas   MR Number: [id number]   YOB: 1980   Date of Visit: 10/3/2019       Dear Dr. Johny Puri:    Thank you for referring Chacha Zendejas to me for evaluation. Attached you will find relevant portions of my assessment and plan of care.    If you have questions, please do not hesitate to call me. I look forward to following Chacha Zendejas along with you.    Sincerely,    Kayden Marcos MD    Enclosure  CC:  No Recipients    If you would like to receive this communication electronically, please contact [email or web address] or [phone] to request more information on [email or web address] Link access.    For providers and/or their staff who would like to refer a patient to [facility], please contact us through our one-stop-shop provider referral line, [facility], at [phone].    If you feel you have received this communication in error or would no longer like to receive these types of communications, please e-mail [email or web address]

## 2019-10-03 NOTE — PROGRESS NOTES
Subjective:   Patient ID:  Chacha Zendejas is a 39 y.o. male     Chief complaint:Congestive Heart Failure      HPI  Background (see note dated 6/4/19):  39 male  New patient to me   Referred by Ms May for A flutter and decreased EF  HTN, HLP, Chronic combined Systolic and diastolic CHF, HFrEF of 10-15%, NYHA FC III, AFL on Eliquis, CKD, BRINDA on CPAP, PHTN, RVE, Moderately depressed RV systolic fn, MR, TR, PA Systolic >38 mmHg.  Recurrent CHF admits for the past several years -- at least since 2014 -- multiple admits   2018 was a good year though      He was recently admitted to [facility] for decompensated CHF and atrial flutter. He underwent CAROL/DCCV with successful conversion to SR.   He was fitted for LifeVest but unable to fit LifeVest and was discharged home.      Update 6/4/19:  He is currently feeling well - he can walk 3 blocks -- not sure about stairs yet   He has been cutting grass etc   Feels like near class I -- if not class I, maybe class II    Update since then:  He is here today and upon review of his medication list, it seems that he is not taking them as prescribed.  Specifically, hydralazine and isosorbide are taken once a day, Bumex is be taken twice a day and metolazone is taken Monday Wednesday and Friday.  He was referred to a bariatric surgery but the appointment was canceled due to insurance issues as he has Medicaid for coverage.  He still feels well.  He still on amiodarone and on anticoagulants.    ECHO on 05/31:  >>    1 - Mild left atrial enlargement.     2 - Concentric hypertrophy.     3 - Wall motion abnormalities.     4 - Moderately depressed left ventricular systolic function (EF 35-40%).     5 - Impaired LV relaxation, normal LAP (grade 1 diastolic dysfunction).     6 - Normal right ventricular systolic function .     7 - The estimated PA systolic pressure is greater than 13 mmHg.     8 - Poor imaging quality due to body habitus.    Current Outpatient Medications   Medication Sig     amiodarone (PACERONE) 200 MG Tab Take 1 tablet (200 mg total) by mouth once daily.    apixaban (ELIQUIS) 5 mg Tab Take 1 tablet (5 mg total) by mouth 2 (two) times daily.    atorvastatin (LIPITOR) 20 MG tablet Take 1 tablet (20 mg total) by mouth once daily.    bumetanide (BUMEX) 2 MG tablet Take 1 tablet (2 mg total) by mouth 2 (two) times daily.    carvedilol (COREG) 25 MG tablet Take 1 tablet (25 mg total) by mouth 2 (two) times daily with meals.    digoxin (LANOXIN) 125 mcg tablet Take 0.5 tablets (62.5 mcg total) by mouth once daily.    diphenoxylate-atropine 2.5-0.025 mg (LOMOTIL) 2.5-0.025 mg per tablet     hydrALAZINE (APRESOLINE) 50 MG tablet Take 1 tablet (50 mg total) by mouth every 12 (twelve) hours.    isosorbide dinitrate (ISORDIL) 40 MG Tab Take 1 tablet (40 mg total) by mouth 2 (two) times daily.    levothyroxine (SYNTHROID) 50 MCG tablet Take 1 tablet (50 mcg total) by mouth before breakfast.    losartan (COZAAR) 100 MG tablet Take 1 tablet (100 mg total) by mouth once daily.    metOLazone (ZAROXOLYN) 5 MG tablet Take 1 tablet (5 mg total) by mouth once daily. Take 30 minutes before Bumex    potassium chloride SA (K-DUR,KLOR-CON) 20 MEQ tablet Take 1 tablet (20 mEq total) by mouth once daily.    spironolactone (ALDACTONE) 50 MG tablet Take 1 tablet (50 mg total) by mouth once daily.     No current facility-administered medications for this visit.      Review of Systems   Constitution: Positive for malaise/fatigue. Negative for decreased appetite, weight gain and weight loss.   Eyes: Negative.  Negative for blurred vision.   Cardiovascular: Positive for dyspnea on exertion and leg swelling. Negative for chest pain, claudication, cyanosis, irregular heartbeat, near-syncope, orthopnea and palpitations.   Respiratory: Positive for snoring. Negative for cough, shortness of breath, sleep disturbances due to breathing and wheezing.    Endocrine: Negative.  Negative for heat intolerance.    Hematologic/Lymphatic: Negative.  Does not bruise/bleed easily.   Musculoskeletal: Negative.  Negative for muscle weakness and myalgias.   Gastrointestinal: Negative.  Negative for melena, nausea and vomiting.   Genitourinary: Positive for nocturia.   Neurological: Positive for excessive daytime sleepiness. Negative for dizziness, headaches, light-headedness and weakness.   Psychiatric/Behavioral: Negative.  Negative for depression, memory loss and substance abuse. The patient does not have insomnia and is not nervous/anxious.        Objective:   Physical Exam   Constitutional: He is oriented to person, place, and time. He appears well-developed and well-nourished.   overweight   HENT:   Head: Normocephalic and atraumatic.   Right Ear: External ear normal.   Left Ear: External ear normal.   Eyes: Pupils are equal, round, and reactive to light. Conjunctivae are normal. Left conjunctiva is not injected. Left conjunctiva has no hemorrhage.   Neck: Neck supple. No JVD present. No thyromegaly present.   Cardiovascular: Normal rate, regular rhythm, normal heart sounds and intact distal pulses. PMI is not displaced. Exam reveals no gallop, no friction rub, no midsystolic click and no opening snap.   No murmur heard.  Pulses:       Carotid pulses are 2+ on the right side, and 2+ on the left side.       Radial pulses are 2+ on the right side, and 2+ on the left side.        Dorsalis pedis pulses are 2+ on the right side, and 2+ on the left side.        Posterior tibial pulses are 2+ on the right side, and 2+ on the left side.   Pulmonary/Chest: Effort normal and breath sounds normal. No respiratory distress. He has no wheezes. He has no rales. He exhibits no tenderness.   Abdominal: Soft. Normal appearance. He exhibits no pulsatile liver. There is no hepatomegaly. There is no tenderness. There is no rigidity and no guarding.   Obese abdomen   Musculoskeletal: Normal range of motion. He exhibits no edema or tenderness.       "  Right knee: He exhibits no swelling.        Left knee: He exhibits no swelling.        Right ankle: He exhibits no swelling.        Left ankle: He exhibits no swelling.        Right lower leg: He exhibits no swelling.        Left lower leg: He exhibits no swelling.        Right foot: There is no swelling.        Left foot: There is no swelling.   Neurological: He is alert and oriented to person, place, and time. He has normal strength and normal reflexes. No cranial nerve deficit. Coordination normal.   Skin: Skin is warm and dry. No rash noted. No cyanosis. No pallor.   Psychiatric: He has a normal mood and affect. His behavior is normal.   Nursing note and vitals reviewed.    /80 (BP Location: Left arm, Patient Position: Sitting)   Pulse 92   Ht 5' 7" (1.702 m)   Wt (!) 214.5 kg (472 lb 14.2 oz)   BMI 74.06 kg/m²      Assessment:      1. Paroxysmal atrial flutter    2. Chronic combined systolic and diastolic congestive heart failure    3. SOB (shortness of breath)    4. Essential hypertension    5. Morbid obesity    6. BRINDA on CPAP    7. CKD (chronic kidney disease) stage 3, GFR 30-59 ml/min        Plan:     He needs an amnio level and a mute ox screen.  We will also repeat BNP and echocardiography.  I would like to get him rid of oral anticoagulants.  To that effect, it may be reasonable to proceed with RFA of the CTI as CTI flutter was no only documented arrhythmia.  However, unfortunately he may weight too much for the x-ray table   We will send him to the cath lab for a dry run of being placed on the table and decide if the table will be able to be maneuvered for procedures while carrying him.  I have discussed the RFA procedure in detail with the patient. I described its benefits and risks. I reviewed alternative therapies and discussed their potential value. The patient was given ample opportunity to express concerns and ask questions and I provided appropriate responses and  answers to such.The " patient understands and agrees to proceed.  Consent form was signed today by patient and myself and appropriately witnessed.     A couple additional interventions will include referral to Dr. Mcclain at [facility] for bariatric surgery and visit with Ms. Yan SALAZAR to review and organize medications.    Orders Placed This Encounter   Procedures    TSH     Standing Status:   Future     Number of Occurrences:   1     Standing Expiration Date:   12/1/2020    Comprehensive metabolic panel     Standing Status:   Future     Number of Occurrences:   1     Standing Expiration Date:   12/1/2020    Magnesium     Standing Status:   Future     Number of Occurrences:   1     Standing Expiration Date:   12/1/2020    Amiodarone level     Standing Status:   Future     Number of Occurrences:   1     Standing Expiration Date:   12/1/2020    Brain natriuretic peptide     Standing Status:   Future     Number of Occurrences:   1     Standing Expiration Date:   10/2/2020    APTT     Standing Status:   Future     Standing Expiration Date:   12/1/2020    Protime-INR     Standing Status:   Future     Standing Expiration Date:   12/1/2020    CBC auto differential     Standing Status:   Future     Standing Expiration Date:   12/1/2020    2D Echo w/ Bubble Contrast     Standing Status:   Future     Standing Expiration Date:   10/3/2020    2D echo with color flow doppler     Standing Status:   Future     Standing Expiration Date:   10/2/2020     Follow up post w/up .  There are no discontinued medications.     Medication List with Changes/Refills   Current Medications    AMIODARONE (PACERONE) 200 MG TAB    Take 1 tablet (200 mg total) by mouth once daily.    APIXABAN (ELIQUIS) 5 MG TAB    Take 1 tablet (5 mg total) by mouth 2 (two) times daily.    ATORVASTATIN (LIPITOR) 20 MG TABLET    Take 1 tablet (20 mg total) by mouth once daily.    BUMETANIDE (BUMEX) 2 MG TABLET    Take 1 tablet (2 mg total) by mouth 2 (two) times daily.    CARVEDILOL  (COREG) 25 MG TABLET    Take 1 tablet (25 mg total) by mouth 2 (two) times daily with meals.    DIGOXIN (LANOXIN) 125 MCG TABLET    Take 0.5 tablets (62.5 mcg total) by mouth once daily.    DIPHENOXYLATE-ATROPINE 2.5-0.025 MG (LOMOTIL) 2.5-0.025 MG PER TABLET        HYDRALAZINE (APRESOLINE) 50 MG TABLET    Take 1 tablet (50 mg total) by mouth every 12 (twelve) hours.    ISOSORBIDE DINITRATE (ISORDIL) 40 MG TAB    Take 1 tablet (40 mg total) by mouth 2 (two) times daily.    LEVOTHYROXINE (SYNTHROID) 50 MCG TABLET    Take 1 tablet (50 mcg total) by mouth before breakfast.    LOSARTAN (COZAAR) 100 MG TABLET    Take 1 tablet (100 mg total) by mouth once daily.    METOLAZONE (ZAROXOLYN) 5 MG TABLET    Take 1 tablet (5 mg total) by mouth once daily. Take 30 minutes before Bumex    POTASSIUM CHLORIDE SA (K-DUR,KLOR-CON) 20 MEQ TABLET    Take 1 tablet (20 mEq total) by mouth once daily.    SPIRONOLACTONE (ALDACTONE) 50 MG TABLET    Take 1 tablet (50 mg total) by mouth once daily.

## 2019-10-04 LAB
ALBUMIN SERPL BCP-MCNC: 3.4 G/DL (ref 3.5–5.2)
ALP SERPL-CCNC: 67 U/L (ref 55–135)
ALT SERPL W/O P-5'-P-CCNC: 25 U/L (ref 10–44)
ANION GAP SERPL CALC-SCNC: 11 MMOL/L (ref 8–16)
AST SERPL-CCNC: 22 U/L (ref 10–40)
BILIRUB SERPL-MCNC: 0.3 MG/DL (ref 0.1–1)
BNP SERPL-MCNC: 14 PG/ML (ref 0–99)
BUN SERPL-MCNC: 20 MG/DL (ref 6–20)
CALCIUM SERPL-MCNC: 9.5 MG/DL (ref 8.7–10.5)
CHLORIDE SERPL-SCNC: 99 MMOL/L (ref 95–110)
CO2 SERPL-SCNC: 32 MMOL/L (ref 23–29)
CREAT SERPL-MCNC: 1.5 MG/DL (ref 0.5–1.4)
EST. GFR  (AFRICAN AMERICAN): >60 ML/MIN/1.73 M^2
EST. GFR  (NON AFRICAN AMERICAN): 57 ML/MIN/1.73 M^2
GLUCOSE SERPL-MCNC: 152 MG/DL (ref 70–110)
MAGNESIUM SERPL-MCNC: 1.6 MG/DL (ref 1.6–2.6)
POTASSIUM SERPL-SCNC: 3.3 MMOL/L (ref 3.5–5.1)
PROT SERPL-MCNC: 8.2 G/DL (ref 6–8.4)
SODIUM SERPL-SCNC: 142 MMOL/L (ref 136–145)
TSH SERPL DL<=0.005 MIU/L-ACNC: 1.64 UIU/ML (ref 0.4–4)

## 2019-10-07 LAB
AMIODARONE SERPL-SCNC: 0.2 UG/ML (ref 1–3)
N-DESETHYL-AMIODARONE: 0.2 UG/ML

## 2019-10-14 ENCOUNTER — CLINICAL SUPPORT (OUTPATIENT)
Dept: CARDIOLOGY | Facility: CLINIC | Age: 39
End: 2019-10-14
Attending: INTERNAL MEDICINE
Payer: MEDICAID

## 2019-10-14 ENCOUNTER — DOCUMENTATION ONLY (OUTPATIENT)
Dept: CARDIOLOGY | Facility: CLINIC | Age: 39
End: 2019-10-14

## 2019-10-14 ENCOUNTER — OFFICE VISIT (OUTPATIENT)
Dept: INTERNAL MEDICINE | Facility: CLINIC | Age: 39
End: 2019-10-14
Payer: MEDICAID

## 2019-10-14 VITALS
OXYGEN SATURATION: 94 % | BODY MASS INDEX: 49.44 KG/M2 | HEIGHT: 67 IN | WEIGHT: 315 LBS | SYSTOLIC BLOOD PRESSURE: 130 MMHG | TEMPERATURE: 97 F | HEART RATE: 93 BPM | DIASTOLIC BLOOD PRESSURE: 80 MMHG

## 2019-10-14 DIAGNOSIS — R06.02 SOB (SHORTNESS OF BREATH): ICD-10-CM

## 2019-10-14 DIAGNOSIS — I10 ESSENTIAL HYPERTENSION: Primary | ICD-10-CM

## 2019-10-14 DIAGNOSIS — I50.42 CHRONIC COMBINED SYSTOLIC AND DIASTOLIC CONGESTIVE HEART FAILURE: ICD-10-CM

## 2019-10-14 DIAGNOSIS — Z23 IMMUNIZATION DUE: ICD-10-CM

## 2019-10-14 DIAGNOSIS — I48.92 PAROXYSMAL ATRIAL FLUTTER: ICD-10-CM

## 2019-10-14 DIAGNOSIS — E66.01 MORBID OBESITY WITH BMI OF 70 AND OVER, ADULT: ICD-10-CM

## 2019-10-14 LAB
ESTIMATED PA SYSTOLIC PRESSURE: 31.58
MITRAL VALVE REGURGITATION: ABNORMAL
RETIRED EF AND QEF - SEE NOTES: 40 (ref 55–65)

## 2019-10-14 PROCEDURE — 93306 2D ECHO WITH COLOR FLOW DOPPLER: ICD-10-PCS | Mod: 26,S$PBB,, | Performed by: INTERNAL MEDICINE

## 2019-10-14 PROCEDURE — 99999 PR PBB SHADOW E&M-EST. PATIENT-LVL III: ICD-10-PCS | Mod: PBBFAC,,, | Performed by: INTERNAL MEDICINE

## 2019-10-14 PROCEDURE — 99999 PR PBB SHADOW E&M-EST. PATIENT-LVL III: CPT | Mod: PBBFAC,,, | Performed by: INTERNAL MEDICINE

## 2019-10-14 PROCEDURE — 93306 TTE W/DOPPLER COMPLETE: CPT | Mod: 26,S$PBB,, | Performed by: INTERNAL MEDICINE

## 2019-10-14 PROCEDURE — 90472 IMMUNIZATION ADMIN EACH ADD: CPT | Mod: PBBFAC

## 2019-10-14 PROCEDURE — 99214 OFFICE O/P EST MOD 30 MIN: CPT | Mod: S$PBB,,, | Performed by: INTERNAL MEDICINE

## 2019-10-14 PROCEDURE — 99214 PR OFFICE/OUTPT VISIT, EST, LEVL IV, 30-39 MIN: ICD-10-PCS | Mod: S$PBB,,, | Performed by: INTERNAL MEDICINE

## 2019-10-14 PROCEDURE — 90471 IMMUNIZATION ADMIN: CPT | Mod: PBBFAC

## 2019-10-14 PROCEDURE — 99213 OFFICE O/P EST LOW 20 MIN: CPT | Mod: PBBFAC,25 | Performed by: INTERNAL MEDICINE

## 2019-10-14 NOTE — PROGRESS NOTES
Pt presented for an echocardiogram today.  This study was performed in conjunction with Optison contrast agent because of poor endocardial visualization.  Procedure was explained to the patient, he verbalized understanding and signed the consent.  IV, 22ga x 1 attempts, was started in the right AC using aseptic technique.  Administered a total of 4 ml of Optison (lot # [id number], expiration date 11/25/2020).  Patient tolerated the procedure well.  IV discontinued, pressure dressing applied.

## 2019-10-14 NOTE — PROGRESS NOTES
Subjective:       Patient ID: Chacha Zednejas is a 39 y.o. male.    Chief Complaint: Follow-up (3 month HTN)    Chacha Zendejas  39 y.o. Black or  male    Patient presents with:  Follow-up: 3 month HTN    HPI: Here today to follow up on chronic conditions.  HTN--stable on losartan, carvedilol, hydralazine, bumetanide, metolazone and spironolactone.   Diabetes--he denies symptoms. He is not on medication.               HGBA1C                   7.4 (H)             07/10/2019            CHF--management per cardiology.     Past Medical History:  Atrial flutter  Cellulitis      Comment:  RLE  CHF (congestive heart failure)  Diabetes mellitus, type 2  Hypertension  Hypothyroidism  Sleep apnea  Venous insufficiency of left lower extremity    Current Outpatient Medications on File Prior to Visit:  amiodarone (PACERONE) 200 MG Tab, Take 1 tablet (200 mg total) by mouth once daily., Disp: 90 tablet, Rfl: 3  apixaban (ELIQUIS) 5 mg Tab, Take 1 tablet (5 mg total) by mouth 2 (two) times daily., Disp: 180 tablet, Rfl: 3  atorvastatin (LIPITOR) 20 MG tablet, Take 1 tablet (20 mg total) by mouth once daily., Disp: 90 tablet, Rfl: 3  bumetanide (BUMEX) 2 MG tablet, Take 1 tablet (2 mg total) by mouth 2 (two) times daily., Disp: 180 tablet, Rfl: 3  carvedilol (COREG) 25 MG tablet, Take 1 tablet (25 mg total) by mouth 2 (two) times daily with meals., Disp: 180 tablet, Rfl: 3  digoxin (LANOXIN) 125 mcg tablet, Take 0.5 tablets (62.5 mcg total) by mouth once daily., Disp: 45 tablet, Rfl: 3  diphenoxylate-atropine 2.5-0.025 mg (LOMOTIL) 2.5-0.025 mg per tablet, , Disp: , Rfl:   hydrALAZINE (APRESOLINE) 50 MG tablet, Take 1 tablet (50 mg total) by mouth every 12 (twelve) hours., Disp: 180 tablet, Rfl: 3  isosorbide dinitrate (ISORDIL) 40 MG Tab, Take 1 tablet (40 mg total) by mouth 2 (two) times daily., Disp: 180 tablet, Rfl: 3  levothyroxine (SYNTHROID) 50 MCG tablet, Take 1 tablet (50 mcg total) by mouth  before breakfast., Disp: 90 tablet, Rfl: 3  losartan (COZAAR) 100 MG tablet, Take 1 tablet (100 mg total) by mouth once daily., Disp: 30 tablet, Rfl: 3  metOLazone (ZAROXOLYN) 5 MG tablet, Take 1 tablet (5 mg total) by mouth once daily. Take 30 minutes before Bumex, Disp: 30 tablet, Rfl: 6  potassium chloride SA (K-DUR,KLOR-CON) 20 MEQ tablet, Take 1 tablet (20 mEq total) by mouth once daily., Disp: 90 tablet, Rfl: 1  spironolactone (ALDACTONE) 50 MG tablet, Take 1 tablet (50 mg total) by mouth once daily., Disp: 90 tablet, Rfl: 3    Allergies:  Review of patient's allergies indicates:   -- Iodine and iodide containing products -- Itching and Swelling   -- Shellfish containing products           Review of Systems   Constitutional: Negative for fever and unexpected weight change.   Respiratory: Negative for cough and shortness of breath.         Denies orthopnea    Cardiovascular: Positive for leg swelling. Negative for chest pain.   Genitourinary: Negative for difficulty urinating.   Musculoskeletal: Negative for gait problem.   Neurological: Negative for dizziness.       Objective:      Physical Exam   Constitutional: He is oriented to person, place, and time. He appears well-developed and well-nourished. No distress.   Eyes: No scleral icterus.   Cardiovascular: Normal rate and regular rhythm.   Pulmonary/Chest: Effort normal and breath sounds normal. No respiratory distress. He has no wheezes. He has no rales.   Neurological: He is alert and oriented to person, place, and time.   Skin: Skin is warm and dry.   Psychiatric: He has a normal mood and affect.   Vitals reviewed.      Assessment:       1. Essential hypertension    2. Diabetes mellitus type 2, uncontrolled, without complications    3. Chronic combined systolic and diastolic congestive heart failure    4. Morbid obesity with BMI of 70 and over, adult    5. Immunization due        Plan:       Chacha was seen today for follow-up.    Diagnoses and all  orders for this visit:    Essential hypertension  -     Continue current management    Diabetes mellitus type 2, uncontrolled, without complications  -     Check A1C, CMP and Microalbumin/creatinine urine ratio; Future    Chronic combined systolic and diastolic congestive heart failure  -     Continue current management  -     F/U with cardiology    Morbid obesity with BMI of 70 and over, adult  -     Lifestyle modifications discussed    Immunization due  -     (In Office Administered) Pneumococcal Conjugate Vaccine (13 Valent) (IM)  -     Influenza - Quadrivalent (PF)    Schedule labs.     RTC in 3 months.

## 2019-10-16 ENCOUNTER — LAB VISIT (OUTPATIENT)
Dept: LAB | Facility: HOSPITAL | Age: 39
End: 2019-10-16
Attending: INTERNAL MEDICINE
Payer: MEDICAID

## 2019-10-16 DIAGNOSIS — E11.9 NEW ONSET TYPE 2 DIABETES MELLITUS: ICD-10-CM

## 2019-10-16 LAB
ALBUMIN SERPL BCP-MCNC: 3.4 G/DL (ref 3.5–5.2)
ALP SERPL-CCNC: 67 U/L (ref 55–135)
ALT SERPL W/O P-5'-P-CCNC: 25 U/L (ref 10–44)
ANION GAP SERPL CALC-SCNC: 9 MMOL/L (ref 8–16)
AST SERPL-CCNC: 22 U/L (ref 10–40)
BILIRUB SERPL-MCNC: 0.4 MG/DL (ref 0.1–1)
BUN SERPL-MCNC: 20 MG/DL (ref 6–20)
CALCIUM SERPL-MCNC: 10.5 MG/DL (ref 8.7–10.5)
CHLORIDE SERPL-SCNC: 96 MMOL/L (ref 95–110)
CHOLEST SERPL-MCNC: 211 MG/DL (ref 120–199)
CHOLEST/HDLC SERPL: 6.8 {RATIO} (ref 2–5)
CO2 SERPL-SCNC: 32 MMOL/L (ref 23–29)
CREAT SERPL-MCNC: 1.6 MG/DL (ref 0.5–1.4)
EST. GFR  (AFRICAN AMERICAN): >60 ML/MIN/1.73 M^2
EST. GFR  (NON AFRICAN AMERICAN): 52.7 ML/MIN/1.73 M^2
ESTIMATED AVG GLUCOSE: 194 MG/DL (ref 68–131)
GLUCOSE SERPL-MCNC: 202 MG/DL (ref 70–110)
HBA1C MFR BLD HPLC: 8.4 % (ref 4–5.6)
HDLC SERPL-MCNC: 31 MG/DL (ref 40–75)
HDLC SERPL: 14.7 % (ref 20–50)
LDLC SERPL CALC-MCNC: 140.6 MG/DL (ref 63–159)
NONHDLC SERPL-MCNC: 180 MG/DL
POTASSIUM SERPL-SCNC: 4 MMOL/L (ref 3.5–5.1)
PROT SERPL-MCNC: 8.5 G/DL (ref 6–8.4)
SODIUM SERPL-SCNC: 137 MMOL/L (ref 136–145)
TRIGL SERPL-MCNC: 197 MG/DL (ref 30–150)

## 2019-10-16 PROCEDURE — 80061 LIPID PANEL: CPT

## 2019-10-16 PROCEDURE — 80053 COMPREHEN METABOLIC PANEL: CPT

## 2019-10-16 PROCEDURE — 83036 HEMOGLOBIN GLYCOSYLATED A1C: CPT

## 2019-10-16 PROCEDURE — 36415 COLL VENOUS BLD VENIPUNCTURE: CPT

## 2019-10-18 ENCOUNTER — PATIENT MESSAGE (OUTPATIENT)
Dept: INTERNAL MEDICINE | Facility: CLINIC | Age: 39
End: 2019-10-18

## 2019-10-18 ENCOUNTER — TELEPHONE (OUTPATIENT)
Dept: INTERNAL MEDICINE | Facility: CLINIC | Age: 39
End: 2019-10-18

## 2019-10-18 DIAGNOSIS — E11.65 UNCONTROLLED TYPE 2 DIABETES MELLITUS WITH HYPERGLYCEMIA, WITHOUT LONG-TERM CURRENT USE OF INSULIN: Primary | ICD-10-CM

## 2019-10-18 RX ORDER — METFORMIN HYDROCHLORIDE 500 MG/1
500 TABLET, EXTENDED RELEASE ORAL
Qty: 30 TABLET | Refills: 3 | Status: SHIPPED | OUTPATIENT
Start: 2019-10-18 | End: 2020-02-10 | Stop reason: DRUGHIGH

## 2019-10-18 NOTE — PATIENT INSTRUCTIONS
Metformin extended-release tablets  What is this medicine?  METFORMIN (met FOR min) is used to treat type 2 diabetes. It helps to control blood sugar. Treatment is combined with diet and exercise. This medicine can be used alone or with other medicines for diabetes.  How should I use this medicine?  Take this medicine by mouth with a glass of water. Take it with meals. Swallow whole, do not crush or chew. Follow the directions on the prescription label. Take your medicine at regular intervals. Do not take your medicine more often than directed.  Talk to your pediatrician regarding the use of this medicine in children. Special care may be needed.  What side effects may I notice from receiving this medicine?  Side effects that you should report to your doctor or health care professional as soon as possible:  · allergic reactions like skin rash, itching or hives, swelling of the face, lips, or tongue  · breathing problems  · feeling faint or lightheaded, falls  · muscle aches or pains  · signs and symptoms of low blood sugar such as feeling anxious, confusion, dizziness, increased hunger, unusually weak or tired, sweating, shakiness, cold, irritable, headache, blurred vision, fast heartbeat, loss of consciousness  · slow or irregular heartbeat  · unusual stomach pain or discomfort  · unusually tired or weak  Side effects that usually do not require medical attention (report to your doctor or health care professional if they continue or are bothersome):  · diarrhea  · headache  · heartburn  · metallic taste in mouth  · nausea  · stomach gas, upset  What may interact with this medicine?  Do not take this medicine with any of the following medications:  · dofetilide  · gatifloxacin  · certain contrast medicines given before X-rays, CT scans, MRI, or other procedures  This medicine may also interact with the following medications:  · acetazolamide  · certain medicines for HIV infection or hepatitis, like adefovir,  emtricitabine, entecavir, lamivudine, or tenofovir  · cimetidine  · crizotinib  · digoxin  · diuretics  · female hormones, like estrogens or progestins and birth control pills  · glycopyrrolate  · isoniazid  · lamotrigine  · medicines for blood pressure, heart disease, irregular heart beat  · memantine  · midodrine  · methazolamide  · morphine  · nicotinic acid  · phenothiazines like chlorpromazine, mesoridazine, prochlorperazine, thioridazine  · phenytoin  · procainamide  · propantheline  · quinidine  · quinine  · ranitidine  · ranolazine  · steroid medicines like prednisone or cortisone  · stimulant medicines for attention disorders, weight loss, or to stay awake  · thyroid medicines  · topiramate  · trimethoprim  · trospium  · vancomycin  · vandetanib  · zonisamide  What if I miss a dose?  If you miss a dose, take it as soon as you can. If it is almost time for your next dose, take only that dose. Do not take double or extra doses.  Where should I keep my medicine?  Keep out of the reach of children.  Store at room temperature between 15 and 30 degrees C (59 and 86 degrees F). Protect from light. Throw away any unused medicine after the expiration date.  What should I tell my health care provider before I take this medicine?  They need to know if you have any of these conditions:  · anemia  · frequently drink alcohol-containing beverages  · become easily dehydrated  · heart attack  · heart failure  · kidney disease  · liver disease  · polycystic ovary syndrome  · serious infection or injury  · vomiting  · an unusual or allergic reaction to metformin, other medicines, foods, dyes, or preservatives  · pregnant or trying to get pregnant  · breast-feeding  What should I watch for while using this medicine?  Visit your doctor or health care professional for regular checks on your progress.  A test called the HbA1C (A1C) will be monitored. This is a simple blood test. It measures your blood sugar control over the last  2 to 3 months. You will receive this test every 3 to 6 months.  Learn how to check your blood sugar. Learn the symptoms of low and high blood sugar and how to manage them.  Always carry a quick-source of sugar with you in case you have symptoms of low blood sugar. Examples include hard sugar candy or glucose tablets. Make sure others know that you can choke if you eat or drink when you develop serious symptoms of low blood sugar, such as seizures or unconsciousness. They must get medical help at once.  Tell your doctor or health care professional if you have high blood sugar. You might need to change the dose of your medicine. If you are sick or exercising more than usual, you might need to change the dose of your medicine.  Do not skip meals. Ask your doctor or health care professional if you should avoid alcohol. Many nonprescription cough and cold products contain sugar or alcohol. These can affect blood sugar.  This medicine may cause ovulation in premenopausal women who do not have regular monthly periods. This may increase your chances of becoming pregnant. You should not take this medicine if you become pregnant or think you may be pregnant. Talk with your doctor or health care professional about your birth control options while taking this medicine. Contact your doctor or health care professional right away if think you are pregnant.  The tablet shell for some brands of this medicine does not dissolve. This is normal. The tablet shell may appear whole in the stool. This is not a cause for concern.  If you are going to need surgery, a MRI, CT scan, or other procedure, tell your doctor that you are taking this medicine. You may need to stop taking this medicine before the procedure.  Wear a medical ID bracelet or chain, and carry a card that describes your disease and details of your medicine and dosage times.  NOTE:This sheet is a summary. It may not cover all possible information. If you have questions about  this medicine, talk to your doctor, pharmacist, or health care provider. Copyright© 2017 [employer] Standard        Diet: Diabetes  Food is an important tool that you can use to control diabetes and stay healthy. Eating well-balanced meals in the correct amounts will help you control your blood glucose levels and prevent low blood sugar reactions. It will also help you reduce the health risks of diabetes. There is no one specific diet that is right for everyone with diabetes. But there are general guidelines to follow. A registered dietitian (AMBER) will create a tailored diet approach thats just right for you. He or she will also help you plan healthy meals and snacks. If you have any questions, call your dietitian for advice.     Guidelines for success  Talk with your healthcare provider before starting a diabetes diet or weight loss program. If you haven't talked with a dietitian yet, ask your provider for a referral. The following guidelines can help you succeed:  · Select foods from the 6 food groups below. Your dietitian will help you find food choices within each group. He or she will also show you serving sizes and how many servings you can have at each meal.  ¨ Grains, beans, and starchy vegetables  ¨ Vegetables  ¨ Fruit  ¨ Milk or yogurt  ¨ Meat, poultry, fish, or tofu  ¨ Healthy fats  · Check your blood sugar levels as directed by your provider. Take any medicine as prescribed by your provider.  · Learn to read food labels and pick the right portion sizes.  · Eat only the amount of food in your meal plan. Eat about the same amount of food at regular times each day. Dont skip meals. Eat meals 4 to 5 hours apart, with snacks in between.  · Limit alcohol. It raises blood sugar levels. Drink water or calorie-free diet drinks that use safe sweeteners.  · Eat less fat to help lower your risk of heart disease. Use nonfat or low-fat dairy products and lean meats. Avoid fried foods. Use cooking oils that are unsaturated,  such as olive, canola, or peanut oil.  · Talk with your dietitian about safe sugar substitutes.  · Avoid added salt. It can contribute to high blood pressure, which can cause heart disease. People with diabetes already have a risk of high blood pressure and heart disease.  · Stay at a healthy weight. If you need to lose weight, cut down on your portion sizes. But dont skip meals. Exercise is an important part of any weight management program. Talk with your provider about an exercise program thats right for you.  · For more information about the best diet plan for you, talk with a registered dietitian (RD). To find an RD in your area, contact:  ¨ Academy of Nutrition and Dietetics www.eatright.org  ¨ The American Diabetes Association 266-068-8338 www.diabetes.org  Date Last Reviewed: 8/1/2016  © 1622-2590 [employer] [address] [place] [address], [place] [address]. All rights reserved. This information is not intended as a substitute for professional medical care. Always follow your healthcare professional's instructions.

## 2019-11-01 DIAGNOSIS — I10 ESSENTIAL HYPERTENSION: ICD-10-CM

## 2019-11-01 DIAGNOSIS — N18.30 CKD (CHRONIC KIDNEY DISEASE) STAGE 3, GFR 30-59 ML/MIN: ICD-10-CM

## 2019-11-01 DIAGNOSIS — I50.42 CHRONIC COMBINED SYSTOLIC AND DIASTOLIC CONGESTIVE HEART FAILURE: ICD-10-CM

## 2019-11-02 RX ORDER — LOSARTAN POTASSIUM 100 MG/1
TABLET ORAL
Qty: 30 TABLET | Refills: 3 | Status: SHIPPED | OUTPATIENT
Start: 2019-11-02 | End: 2020-01-01

## 2019-12-13 ENCOUNTER — OFFICE VISIT (OUTPATIENT)
Dept: CARDIOLOGY | Facility: CLINIC | Age: 39
End: 2019-12-13
Payer: MEDICAID

## 2019-12-13 VITALS
SYSTOLIC BLOOD PRESSURE: 132 MMHG | BODY MASS INDEX: 49.44 KG/M2 | WEIGHT: 315 LBS | DIASTOLIC BLOOD PRESSURE: 84 MMHG | HEIGHT: 67 IN | HEART RATE: 98 BPM

## 2019-12-13 DIAGNOSIS — I51.7 RIGHT VENTRICULAR ENLARGEMENT: ICD-10-CM

## 2019-12-13 DIAGNOSIS — I34.0 MODERATE MITRAL REGURGITATION: ICD-10-CM

## 2019-12-13 DIAGNOSIS — I50.42 CHRONIC COMBINED SYSTOLIC AND DIASTOLIC CONGESTIVE HEART FAILURE: Primary | ICD-10-CM

## 2019-12-13 DIAGNOSIS — I50.20 HEART FAILURE WITH REDUCED EJECTION FRACTION, NYHA CLASS III: ICD-10-CM

## 2019-12-13 DIAGNOSIS — I07.1 MILD TRICUSPID VALVE REGURGITATION: ICD-10-CM

## 2019-12-13 DIAGNOSIS — G47.33 OSA ON CPAP: ICD-10-CM

## 2019-12-13 DIAGNOSIS — I10 ESSENTIAL HYPERTENSION: ICD-10-CM

## 2019-12-13 DIAGNOSIS — I48.92 PAROXYSMAL ATRIAL FLUTTER: ICD-10-CM

## 2019-12-13 DIAGNOSIS — E66.01 MORBID OBESITY WITH BMI OF 70 AND OVER, ADULT: ICD-10-CM

## 2019-12-13 PROCEDURE — 99999 PR PBB SHADOW E&M-EST. PATIENT-LVL IV: ICD-10-PCS | Mod: PBBFAC,,, | Performed by: NURSE PRACTITIONER

## 2019-12-13 PROCEDURE — 99214 PR OFFICE/OUTPT VISIT, EST, LEVL IV, 30-39 MIN: ICD-10-PCS | Mod: S$PBB,,, | Performed by: NURSE PRACTITIONER

## 2019-12-13 PROCEDURE — 99999 PR PBB SHADOW E&M-EST. PATIENT-LVL IV: CPT | Mod: PBBFAC,,, | Performed by: NURSE PRACTITIONER

## 2019-12-13 PROCEDURE — 99214 OFFICE O/P EST MOD 30 MIN: CPT | Mod: PBBFAC | Performed by: NURSE PRACTITIONER

## 2019-12-13 PROCEDURE — 99214 OFFICE O/P EST MOD 30 MIN: CPT | Mod: S$PBB,,, | Performed by: NURSE PRACTITIONER

## 2019-12-13 RX ORDER — DILTIAZEM HYDROCHLORIDE 60 MG/1
60 CAPSULE, EXTENDED RELEASE ORAL 2 TIMES DAILY
Qty: 60 CAPSULE | Refills: 11 | Status: SHIPPED | OUTPATIENT
Start: 2019-12-13 | End: 2020-01-01

## 2019-12-13 NOTE — PROGRESS NOTES
Subjective:   Patient ID:  Chacha Zendejas is a 39 y.o. male who presents for evaluation of Hypertension and Congestive Heart Failure      HPI     Chacha Zendejas is a 39 year old male who presents to clinic for CHF follow up. His current medical conditions include Chronic combined CHF, HFrEF of 40-45%, Morbidly obesity, BRINDA on CPAP, AFL on Eliquis/Amio, HTN, HLP, PHTN. He returns today and states he is doing ok.     He does endorse some issues with fatigue, CREWS and bendopnea recently. He feels like he has less energy than previously but overall he still feels much better than before.     Denies chest pain or anginal equivalents.  Denies orthopnea, PND or abdominal bloating. Reports regular walking without any issues lately but does have some degree of CREWS. NO leg swelling or claudications. No recent falls, syncope or near syncopal events. Reports compliance with medications and dietary restrictions. NO CNS complaints to suggest TIA or CVA today. No signs of abnormal bleeding on Eliquis.       Past Medical History:   Diagnosis Date    Atrial flutter     Cellulitis     RLE    CHF (congestive heart failure)     Diabetes mellitus, type 2     Hypertension     Hypothyroidism     Sleep apnea     Venous insufficiency of left lower extremity        Past Surgical History:   Procedure Laterality Date    TREATMENT OF CARDIAC ARRHYTHMIA N/A 3/18/2019    Procedure: CARDIOVERSION OR DEFIBRILLATION;  Surgeon: Talib Ratliff MD;  Location: [facility] CATH LAB;  Service: Cardiology;  Laterality: N/A;       Social History     Tobacco Use    Smoking status: Former Smoker     Types: Cigarettes    Smokeless tobacco: Never Used   Substance Use Topics    Alcohol use: Yes     Comment: socially    Drug use: No       Family History   Problem Relation Age of Onset    Heart disease Father      Wt Readings from Last 3 Encounters:   12/13/19 (!) 213.5 kg (470 lb 10.9 oz)   10/14/19 (!) 218.6 kg (481 lb 14.8 oz)    10/03/19 (!) 214.5 kg (472 lb 14.2 oz)     Temp Readings from Last 3 Encounters:   10/14/19 96.7 °F (35.9 °C) (Tympanic)   07/16/19 96.7 °F (35.9 °C) (Tympanic)   07/10/19 97.1 °F (36.2 °C) (Tympanic)     BP Readings from Last 3 Encounters:   12/13/19 132/84   10/14/19 130/80   10/03/19 136/80     Pulse Readings from Last 3 Encounters:   12/13/19 98   10/14/19 93   10/03/19 92     Current Outpatient Medications on File Prior to Visit   Medication Sig Dispense Refill    amiodarone (PACERONE) 200 MG Tab Take 1 tablet (200 mg total) by mouth once daily. 90 tablet 3    apixaban (ELIQUIS) 5 mg Tab Take 1 tablet (5 mg total) by mouth 2 (two) times daily. 180 tablet 3    atorvastatin (LIPITOR) 20 MG tablet Take 1 tablet (20 mg total) by mouth once daily. 90 tablet 3    bumetanide (BUMEX) 2 MG tablet Take 1 tablet (2 mg total) by mouth 2 (two) times daily. 180 tablet 3    carvedilol (COREG) 25 MG tablet Take 1 tablet (25 mg total) by mouth 2 (two) times daily with meals. 180 tablet 3    digoxin (LANOXIN) 125 mcg tablet Take 0.5 tablets (62.5 mcg total) by mouth once daily. 45 tablet 3    hydrALAZINE (APRESOLINE) 50 MG tablet Take 1 tablet (50 mg total) by mouth every 12 (twelve) hours. 180 tablet 3    levothyroxine (SYNTHROID) 50 MCG tablet Take 1 tablet (50 mcg total) by mouth before breakfast. 90 tablet 3    losartan (COZAAR) 100 MG tablet TAKE 1 TABLET BY MOUTH EVERY DAY 30 tablet 3    metFORMIN (GLUCOPHAGE-XR) 500 MG 24 hr tablet Take 1 tablet (500 mg total) by mouth daily with breakfast. 30 tablet 3    metOLazone (ZAROXOLYN) 5 MG tablet Take 1 tablet (5 mg total) by mouth once daily. Take 30 minutes before Bumex 30 tablet 6    potassium chloride SA (K-DUR,KLOR-CON) 20 MEQ tablet Take 1 tablet (20 mEq total) by mouth once daily. 90 tablet 1    spironolactone (ALDACTONE) 50 MG tablet Take 1 tablet (50 mg total) by mouth once daily. 90 tablet 3    diphenoxylate-atropine 2.5-0.025 mg (LOMOTIL) 2.5-0.025 mg  "per tablet       isosorbide dinitrate (ISORDIL) 40 MG Tab Take 1 tablet (40 mg total) by mouth 2 (two) times daily. 180 tablet 3     No current facility-administered medications on file prior to visit.          Review of Systems   Constitution: Positive for malaise/fatigue.   HENT: Negative for hearing loss and hoarse voice.    Eyes: Negative for blurred vision and visual disturbance.   Cardiovascular: Positive for dyspnea on exertion. Negative for chest pain, claudication, irregular heartbeat, leg swelling, near-syncope, orthopnea, palpitations, paroxysmal nocturnal dyspnea and syncope.        +bendopnea   Respiratory: Negative for cough, hemoptysis, shortness of breath, sleep disturbances due to breathing, snoring and wheezing.    Endocrine: Negative for cold intolerance and heat intolerance.   Hematologic/Lymphatic: Bruises/bleeds easily (on eliquis).   Skin: Negative for color change, dry skin and nail changes.   Musculoskeletal: Positive for arthritis and back pain. Negative for joint pain and myalgias.   Gastrointestinal: Negative for bloating, abdominal pain, constipation, nausea and vomiting.   Genitourinary: Negative for dysuria, flank pain, hematuria and hesitancy.   Neurological: Negative for headaches, light-headedness, loss of balance, numbness, paresthesias and weakness.   Psychiatric/Behavioral: Negative for altered mental status.   Allergic/Immunologic: Negative for environmental allergies.     /84 (BP Location: Left arm)   Pulse 98   Ht 5' 7" (1.702 m)   Wt (!) 213.5 kg (470 lb 10.9 oz)   BMI 73.72 kg/m²     Objective:   Physical Exam   Constitutional: He is oriented to person, place, and time. He appears well-developed and well-nourished. No distress.   HENT:   Head: Normocephalic and atraumatic.   Eyes: Pupils are equal, round, and reactive to light.   Neck: Normal range of motion and full passive range of motion without pain. Neck supple. No JVD present.   Cardiovascular: Normal rate, " regular rhythm, S1 normal, S2 normal and intact distal pulses.  Occasional extrasystoles are present. PMI is not displaced. Exam reveals no distant heart sounds.   No murmur heard.  Pulses:       Radial pulses are 2+ on the right side, and 2+ on the left side.        Dorsalis pedis pulses are 2+ on the right side, and 2+ on the left side.   BP well controlled today   Pulmonary/Chest: Effort normal. No accessory muscle usage. No respiratory distress. He has decreased breath sounds in the right lower field and the left lower field. He has no wheezes. He has no rales.   Abdominal: Soft. Bowel sounds are normal. He exhibits no distension. There is no tenderness.   +morbidly obese   Musculoskeletal: Normal range of motion. He exhibits edema (BLE trace).        Right ankle: He exhibits swelling.        Left ankle: He exhibits swelling.   Neurological: He is alert and oriented to person, place, and time.   Skin: Skin is warm and dry. He is not diaphoretic. No cyanosis. Nails show no clubbing.   Psychiatric: He has a normal mood and affect. His speech is normal and behavior is normal. Judgment and thought content normal. Cognition and memory are normal.   Nursing note and vitals reviewed.      Lab Results   Component Value Date    CHOL 211 (H) 10/16/2019    CHOL 180 07/08/2019    CHOL 223 (A) 07/11/2017     Lab Results   Component Value Date    HDL 31 (L) 10/16/2019    HDL 32 (L) 07/08/2019    HDL 32 07/11/2017     Lab Results   Component Value Date    LDLCALC 140.6 10/16/2019    LDLCALC 116.4 07/08/2019    LDLCALC 162 07/11/2017     Lab Results   Component Value Date    TRIG 197 (H) 10/16/2019    TRIG 158 (H) 07/08/2019    TRIG 143 07/11/2017     Lab Results   Component Value Date    CHOLHDL 14.7 (L) 10/16/2019    CHOLHDL 17.8 (L) 07/08/2019       Chemistry        Component Value Date/Time     10/16/2019 0807    K 4.0 10/16/2019 0807    CL 96 10/16/2019 0807    CO2 32 (H) 10/16/2019 0807    BUN 20 10/16/2019 0807     CREATININE 1.6 (H) 10/16/2019 0807     (H) 10/16/2019 0807        Component Value Date/Time    CALCIUM 10.5 10/16/2019 0807    ALKPHOS 67 10/16/2019 0807    AST 22 10/16/2019 0807    ALT 25 10/16/2019 0807    BILITOT 0.4 10/16/2019 0807    ESTGFRAFRICA >60.0 10/16/2019 0807    EGFRNONAA 52.7 (A) 10/16/2019 0807          Lab Results   Component Value Date    TSH 1.641 10/03/2019     No results found for: INR, PROTIME  Lab Results   Component Value Date    WBC 6.39 03/19/2019    HGB 13.2 (L) 03/19/2019    HCT 41.3 03/19/2019    MCV 94 03/19/2019     03/19/2019      2D ECHO  CONCLUSIONS     1 - Concentric hypertrophy.     2 - Mildly to moderately depressed left ventricular systolic function (EF 40-45%).     3 - Indeterminate LV diastolic function.     4 - Normal right ventricular systolic function .     5 - The estimated PA systolic pressure is greater than 32 mmHg.     6 - Mild mitral regurgitation.     7 - Poor imaging quality            This document has been electronically    SIGNED BY: Demond Mendoza MD On: 10/14/2019 18:01          Assessment:      1. Chronic combined systolic and diastolic congestive heart failure    2. Paroxysmal atrial flutter    3. Essential hypertension    4. Heart failure with reduced ejection fraction, NYHA class III    5. Moderate mitral regurgitation    6. Right ventricular enlargement    7. Morbid obesity with BMI of 70 and over, adult    8. BRINDA on CPAP    9. Mild tricuspid valve regurgitation      Patient presents to clinic for CHF follow up   Complains of increased fatigue and weakness recently.   Some degree of CREWS as well lately.   NO chest pain or SOB today in office.   Plan:       Add diltiazem 60 mg BID for better rate control  Continue all other CV meds   Dash diet, 2 gm sodium restriction  1500 ml fluid restriction  Monitor BP at home  Referral to Bariatric Surgery at [facility]  Encourage weight loss  Encourage daily walking for exercise/heart health  RTC in 4 weeks for  symptom check or sooner if needed.     Viridiana Leon, SUSANP-C  [facility]

## 2020-01-01 ENCOUNTER — OFFICE VISIT (OUTPATIENT)
Dept: DIABETES | Facility: CLINIC | Age: 40
End: 2020-01-01
Payer: MEDICAID

## 2020-01-01 ENCOUNTER — PATIENT OUTREACH (OUTPATIENT)
Dept: ADMINISTRATIVE | Facility: HOSPITAL | Age: 40
End: 2020-01-01

## 2020-01-01 ENCOUNTER — TELEPHONE (OUTPATIENT)
Dept: PHARMACY | Facility: CLINIC | Age: 40
End: 2020-01-01

## 2020-01-01 ENCOUNTER — OFFICE VISIT (OUTPATIENT)
Dept: INTERNAL MEDICINE | Facility: CLINIC | Age: 40
End: 2020-01-01
Payer: MEDICAID

## 2020-01-01 ENCOUNTER — TELEPHONE (OUTPATIENT)
Dept: INTERNAL MEDICINE | Facility: CLINIC | Age: 40
End: 2020-01-01

## 2020-01-01 ENCOUNTER — LAB VISIT (OUTPATIENT)
Dept: LAB | Facility: HOSPITAL | Age: 40
End: 2020-01-01
Attending: NURSE PRACTITIONER
Payer: MEDICAID

## 2020-01-01 ENCOUNTER — PATIENT OUTREACH (OUTPATIENT)
Dept: ADMINISTRATIVE | Facility: OTHER | Age: 40
End: 2020-01-01

## 2020-01-01 ENCOUNTER — NURSE TRIAGE (OUTPATIENT)
Dept: ADMINISTRATIVE | Facility: CLINIC | Age: 40
End: 2020-01-01

## 2020-01-01 ENCOUNTER — TELEPHONE (OUTPATIENT)
Dept: DIABETES | Facility: CLINIC | Age: 40
End: 2020-01-01

## 2020-01-01 ENCOUNTER — OFFICE VISIT (OUTPATIENT)
Dept: CARDIOLOGY | Facility: CLINIC | Age: 40
End: 2020-01-01
Payer: MEDICAID

## 2020-01-01 ENCOUNTER — OFFICE VISIT (OUTPATIENT)
Dept: OPHTHALMOLOGY | Facility: CLINIC | Age: 40
End: 2020-01-01
Payer: MEDICAID

## 2020-01-01 ENCOUNTER — OFFICE VISIT (OUTPATIENT)
Dept: PODIATRY | Facility: CLINIC | Age: 40
End: 2020-01-01
Payer: MEDICAID

## 2020-01-01 ENCOUNTER — PATIENT MESSAGE (OUTPATIENT)
Dept: OTHER | Facility: OTHER | Age: 40
End: 2020-01-01

## 2020-01-01 ENCOUNTER — LAB VISIT (OUTPATIENT)
Dept: LAB | Facility: HOSPITAL | Age: 40
End: 2020-01-01
Attending: INTERNAL MEDICINE
Payer: MEDICAID

## 2020-01-01 VITALS
DIASTOLIC BLOOD PRESSURE: 85 MMHG | WEIGHT: 315 LBS | HEIGHT: 60 IN | HEART RATE: 45 BPM | BODY MASS INDEX: 61.84 KG/M2 | SYSTOLIC BLOOD PRESSURE: 137 MMHG

## 2020-01-01 VITALS
SYSTOLIC BLOOD PRESSURE: 116 MMHG | BODY MASS INDEX: 61.84 KG/M2 | HEART RATE: 100 BPM | WEIGHT: 315 LBS | HEIGHT: 60 IN | OXYGEN SATURATION: 97 % | DIASTOLIC BLOOD PRESSURE: 82 MMHG

## 2020-01-01 VITALS
HEART RATE: 90 BPM | HEIGHT: 60 IN | SYSTOLIC BLOOD PRESSURE: 116 MMHG | WEIGHT: 315 LBS | DIASTOLIC BLOOD PRESSURE: 75 MMHG | BODY MASS INDEX: 61.84 KG/M2

## 2020-01-01 VITALS
DIASTOLIC BLOOD PRESSURE: 70 MMHG | HEIGHT: 70 IN | OXYGEN SATURATION: 96 % | BODY MASS INDEX: 45.1 KG/M2 | TEMPERATURE: 100 F | SYSTOLIC BLOOD PRESSURE: 110 MMHG | HEART RATE: 94 BPM | WEIGHT: 315 LBS

## 2020-01-01 VITALS
BODY MASS INDEX: 75.79 KG/M2 | DIASTOLIC BLOOD PRESSURE: 80 MMHG | SYSTOLIC BLOOD PRESSURE: 140 MMHG | OXYGEN SATURATION: 96 % | HEIGHT: 60 IN | DIASTOLIC BLOOD PRESSURE: 88 MMHG | WEIGHT: 315 LBS | SYSTOLIC BLOOD PRESSURE: 112 MMHG | TEMPERATURE: 98 F | BODY MASS INDEX: 61.84 KG/M2 | HEART RATE: 85 BPM | WEIGHT: 315 LBS | HEART RATE: 103 BPM | TEMPERATURE: 99 F | OXYGEN SATURATION: 93 %

## 2020-01-01 VITALS
OXYGEN SATURATION: 95 % | DIASTOLIC BLOOD PRESSURE: 80 MMHG | WEIGHT: 315 LBS | HEART RATE: 98 BPM | SYSTOLIC BLOOD PRESSURE: 138 MMHG | BODY MASS INDEX: 80.6 KG/M2

## 2020-01-01 DIAGNOSIS — E87.6 DIURETIC-INDUCED HYPOKALEMIA: ICD-10-CM

## 2020-01-01 DIAGNOSIS — L85.3 XEROSIS CUTIS: ICD-10-CM

## 2020-01-01 DIAGNOSIS — N18.30 CKD (CHRONIC KIDNEY DISEASE) STAGE 3, GFR 30-59 ML/MIN: ICD-10-CM

## 2020-01-01 DIAGNOSIS — E66.01 MORBID OBESITY WITH BMI OF 70 AND OVER, ADULT: ICD-10-CM

## 2020-01-01 DIAGNOSIS — E66.01 MORBID OBESITY WITH BMI OF 50.0-59.9, ADULT: ICD-10-CM

## 2020-01-01 DIAGNOSIS — I50.42 CHRONIC COMBINED SYSTOLIC AND DIASTOLIC CONGESTIVE HEART FAILURE: Primary | ICD-10-CM

## 2020-01-01 DIAGNOSIS — E11.9 TYPE 2 DIABETES MELLITUS WITHOUT COMPLICATION, WITHOUT LONG-TERM CURRENT USE OF INSULIN: ICD-10-CM

## 2020-01-01 DIAGNOSIS — Z79.4 TYPE 2 DIABETES MELLITUS WITH OTHER CIRCULATORY COMPLICATION, WITH LONG-TERM CURRENT USE OF INSULIN: ICD-10-CM

## 2020-01-01 DIAGNOSIS — E11.65 UNCONTROLLED TYPE 2 DIABETES MELLITUS WITH HYPERGLYCEMIA, WITHOUT LONG-TERM CURRENT USE OF INSULIN: ICD-10-CM

## 2020-01-01 DIAGNOSIS — I48.92 PAROXYSMAL ATRIAL FLUTTER: ICD-10-CM

## 2020-01-01 DIAGNOSIS — I10 ESSENTIAL HYPERTENSION: ICD-10-CM

## 2020-01-01 DIAGNOSIS — E11.9 ENCOUNTER FOR COMPREHENSIVE DIABETIC FOOT EXAMINATION, TYPE 2 DIABETES MELLITUS: Primary | ICD-10-CM

## 2020-01-01 DIAGNOSIS — N28.1 RENAL CYST, LEFT: Primary | ICD-10-CM

## 2020-01-01 DIAGNOSIS — I50.20 HEART FAILURE WITH REDUCED EJECTION FRACTION, NYHA CLASS III: ICD-10-CM

## 2020-01-01 DIAGNOSIS — E11.59 TYPE 2 DIABETES MELLITUS WITH OTHER CIRCULATORY COMPLICATION, WITH LONG-TERM CURRENT USE OF INSULIN: ICD-10-CM

## 2020-01-01 DIAGNOSIS — E11.9 NEW ONSET TYPE 2 DIABETES MELLITUS: ICD-10-CM

## 2020-01-01 DIAGNOSIS — I07.1 MILD TRICUSPID VALVE REGURGITATION: ICD-10-CM

## 2020-01-01 DIAGNOSIS — H52.03 HYPEROPIA WITH ASTIGMATISM AND PRESBYOPIA, BILATERAL: ICD-10-CM

## 2020-01-01 DIAGNOSIS — G47.33 OSA ON CPAP: ICD-10-CM

## 2020-01-01 DIAGNOSIS — E11.65 UNCONTROLLED TYPE 2 DIABETES MELLITUS WITH HYPERGLYCEMIA, WITHOUT LONG-TERM CURRENT USE OF INSULIN: Primary | ICD-10-CM

## 2020-01-01 DIAGNOSIS — I51.7 RIGHT VENTRICULAR ENLARGEMENT: ICD-10-CM

## 2020-01-01 DIAGNOSIS — I50.20 HEART FAILURE WITH REDUCED EJECTION FRACTION, NYHA CLASS III: Primary | ICD-10-CM

## 2020-01-01 DIAGNOSIS — I50.42 CHRONIC COMBINED SYSTOLIC AND DIASTOLIC CONGESTIVE HEART FAILURE: ICD-10-CM

## 2020-01-01 DIAGNOSIS — Z23 IMMUNIZATION DUE: ICD-10-CM

## 2020-01-01 DIAGNOSIS — H52.4 HYPEROPIA WITH ASTIGMATISM AND PRESBYOPIA, BILATERAL: ICD-10-CM

## 2020-01-01 DIAGNOSIS — I34.0 MODERATE MITRAL REGURGITATION: ICD-10-CM

## 2020-01-01 DIAGNOSIS — Z09 HOSPITAL DISCHARGE FOLLOW-UP: Primary | ICD-10-CM

## 2020-01-01 DIAGNOSIS — I10 HYPERTENSION GOAL BP (BLOOD PRESSURE) < 130/80: ICD-10-CM

## 2020-01-01 DIAGNOSIS — I50.43 ACUTE ON CHRONIC COMBINED SYSTOLIC AND DIASTOLIC CONGESTIVE HEART FAILURE: ICD-10-CM

## 2020-01-01 DIAGNOSIS — H52.203 HYPEROPIA WITH ASTIGMATISM AND PRESBYOPIA, BILATERAL: ICD-10-CM

## 2020-01-01 DIAGNOSIS — Z79.01 CURRENT USE OF LONG TERM ANTICOAGULATION: ICD-10-CM

## 2020-01-01 DIAGNOSIS — E11.65 UNCONTROLLED TYPE 2 DIABETES MELLITUS WITH HYPERGLYCEMIA, WITH LONG-TERM CURRENT USE OF INSULIN: Primary | ICD-10-CM

## 2020-01-01 DIAGNOSIS — E11.65 UNCONTROLLED TYPE 2 DIABETES MELLITUS WITH HYPERGLYCEMIA, WITH LONG-TERM CURRENT USE OF INSULIN: ICD-10-CM

## 2020-01-01 DIAGNOSIS — I27.20 PULMONARY HTN: ICD-10-CM

## 2020-01-01 DIAGNOSIS — I50.42 CHRONIC COMBINED SYSTOLIC (CONGESTIVE) AND DIASTOLIC (CONGESTIVE) HEART FAILURE: ICD-10-CM

## 2020-01-01 DIAGNOSIS — S81.832D GUNSHOT WOUND OF LEFT LOWER LEG, SUBSEQUENT ENCOUNTER: Primary | ICD-10-CM

## 2020-01-01 DIAGNOSIS — I48.3 TYPICAL ATRIAL FLUTTER: ICD-10-CM

## 2020-01-01 DIAGNOSIS — E11.69 TYPE 2 DIABETES MELLITUS WITH OTHER SPECIFIED COMPLICATION, WITHOUT LONG-TERM CURRENT USE OF INSULIN: Primary | ICD-10-CM

## 2020-01-01 DIAGNOSIS — R09.81 SINUS CONGESTION: ICD-10-CM

## 2020-01-01 DIAGNOSIS — E11.9 DIABETES MELLITUS WITHOUT COMPLICATION: Primary | ICD-10-CM

## 2020-01-01 DIAGNOSIS — T50.2X5A DIURETIC-INDUCED HYPOKALEMIA: ICD-10-CM

## 2020-01-01 DIAGNOSIS — E78.5 HYPERLIPIDEMIA LDL GOAL <70: ICD-10-CM

## 2020-01-01 DIAGNOSIS — N28.1 RENAL CYST, LEFT: ICD-10-CM

## 2020-01-01 DIAGNOSIS — B35.1 DERMATOPHYTOSIS OF NAIL: ICD-10-CM

## 2020-01-01 DIAGNOSIS — Z79.4 UNCONTROLLED TYPE 2 DIABETES MELLITUS WITH HYPERGLYCEMIA, WITH LONG-TERM CURRENT USE OF INSULIN: ICD-10-CM

## 2020-01-01 DIAGNOSIS — Z79.4 UNCONTROLLED TYPE 2 DIABETES MELLITUS WITH HYPERGLYCEMIA, WITH LONG-TERM CURRENT USE OF INSULIN: Primary | ICD-10-CM
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
## 2020-01-01 DIAGNOSIS — E87.1 HYPONATREMIA: ICD-10-CM

## 2020-01-01 DIAGNOSIS — E11.9 TYPE 2 DIABETES MELLITUS WITHOUT RETINOPATHY: Primary | ICD-10-CM
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
## 2020-01-01 LAB
ALBUMIN SERPL BCP-MCNC: 3.1 G/DL (ref 3.5–5.2)
ALBUMIN SERPL BCP-MCNC: 3.6 G/DL (ref 3.5–5.2)
ALP SERPL-CCNC: 68 U/L (ref 55–135)
ALP SERPL-CCNC: 79 U/L (ref 55–135)
ALT SERPL W/O P-5'-P-CCNC: 18 U/L (ref 10–44)
ALT SERPL W/O P-5'-P-CCNC: 18 U/L (ref 10–44)
ANION GAP SERPL CALC-SCNC: 12 MMOL/L (ref 8–16)
ANION GAP SERPL CALC-SCNC: 9 MMOL/L (ref 8–16)
ANION GAP SERPL CALC-SCNC: 9 MMOL/L (ref 8–16)
AST SERPL-CCNC: 12 U/L (ref 10–40)
AST SERPL-CCNC: 13 U/L (ref 10–40)
BILIRUB SERPL-MCNC: 0.5 MG/DL (ref 0.1–1)
BILIRUB SERPL-MCNC: 0.6 MG/DL (ref 0.1–1)
BNP SERPL-MCNC: 121 PG/ML (ref 0–99)
BUN SERPL-MCNC: 12 MG/DL (ref 6–20)
BUN SERPL-MCNC: 13 MG/DL (ref 6–20)
BUN SERPL-MCNC: 13 MG/DL (ref 6–20)
C PEPTIDE SERPL-MCNC: 2.8 NG/ML (ref 0.78–5.19)
CALCIUM SERPL-MCNC: 9.2 MG/DL (ref 8.7–10.5)
CALCIUM SERPL-MCNC: 9.2 MG/DL (ref 8.7–10.5)
CALCIUM SERPL-MCNC: 9.7 MG/DL (ref 8.7–10.5)
CHLORIDE SERPL-SCNC: 93 MMOL/L (ref 95–110)
CHLORIDE SERPL-SCNC: 99 MMOL/L (ref 95–110)
CHLORIDE SERPL-SCNC: 99 MMOL/L (ref 95–110)
CHOLEST SERPL-MCNC: 198 MG/DL (ref 120–199)
CHOLEST SERPL-MCNC: 216 MG/DL (ref 120–199)
CHOLEST/HDLC SERPL: 6.8 {RATIO} (ref 2–5)
CHOLEST/HDLC SERPL: 8 {RATIO} (ref 2–5)
CO2 SERPL-SCNC: 30 MMOL/L (ref 23–29)
CO2 SERPL-SCNC: 30 MMOL/L (ref 23–29)
CO2 SERPL-SCNC: 33 MMOL/L (ref 23–29)
CREAT SERPL-MCNC: 1.3 MG/DL (ref 0.5–1.4)
CREAT SERPL-MCNC: 1.3 MG/DL (ref 0.5–1.4)
CREAT SERPL-MCNC: 1.5 MG/DL (ref 0.5–1.4)
EST. GFR  (AFRICAN AMERICAN): >60 ML/MIN/1.73 M^2
EST. GFR  (NON AFRICAN AMERICAN): 56.6 ML/MIN/1.73 M^2
EST. GFR  (NON AFRICAN AMERICAN): >60 ML/MIN/1.73 M^2
EST. GFR  (NON AFRICAN AMERICAN): >60 ML/MIN/1.73 M^2
ESTIMATED AVG GLUCOSE: 312 MG/DL (ref 68–131)
ESTIMATED AVG GLUCOSE: ABNORMAL MG/DL (ref 68–131)
GAD65 AB SER-SCNC: 0.27 NMOL/L
GLUCOSE SERPL-MCNC: 235 MG/DL (ref 70–110)
GLUCOSE SERPL-MCNC: 235 MG/DL (ref 70–110)
GLUCOSE SERPL-MCNC: 310 MG/DL (ref 70–110)
GLUCOSE SERPL-MCNC: 450 MG/DL (ref 70–110)
GLUCOSE SERPL-MCNC: 500 MG/DL (ref 70–110)
HBA1C MFR BLD HPLC: 12.5 % (ref 4–5.6)
HBA1C MFR BLD HPLC: >14 % (ref 4–5.6)
HDLC SERPL-MCNC: 27 MG/DL (ref 40–75)
HDLC SERPL-MCNC: 29 MG/DL (ref 40–75)
HDLC SERPL: 12.5 % (ref 20–50)
HDLC SERPL: 14.6 % (ref 20–50)
HIV 1+2 AB+HIV1 P24 AG SERPL QL IA: NORMAL
LDLC SERPL CALC-MCNC: 121.4 MG/DL (ref 63–159)
LDLC SERPL CALC-MCNC: 156.2 MG/DL (ref 63–159)
NONHDLC SERPL-MCNC: 169 MG/DL
NONHDLC SERPL-MCNC: 189 MG/DL
POTASSIUM SERPL-SCNC: 3.4 MMOL/L (ref 3.5–5.1)
POTASSIUM SERPL-SCNC: 4 MMOL/L (ref 3.5–5.1)
POTASSIUM SERPL-SCNC: 4 MMOL/L (ref 3.5–5.1)
PROT SERPL-MCNC: 7.5 G/DL (ref 6–8.4)
PROT SERPL-MCNC: 8.2 G/DL (ref 6–8.4)
SODIUM SERPL-SCNC: 138 MMOL/L (ref 136–145)
TRIGL SERPL-MCNC: 164 MG/DL (ref 30–150)
TRIGL SERPL-MCNC: 238 MG/DL (ref 30–150)
TSH SERPL DL<=0.005 MIU/L-ACNC: 3.68 UIU/ML (ref 0.4–4)

## 2020-01-01 PROCEDURE — 86341 ISLET CELL ANTIBODY: CPT

## 2020-01-01 PROCEDURE — 99214 OFFICE O/P EST MOD 30 MIN: CPT | Mod: S$PBB,,, | Performed by: NURSE PRACTITIONER

## 2020-01-01 PROCEDURE — 99215 OFFICE O/P EST HI 40 MIN: CPT | Mod: PBBFAC,27,25 | Performed by: PODIATRIST

## 2020-01-01 PROCEDURE — 99999 PR PBB SHADOW E&M-EST. PATIENT-LVL V: CPT | Mod: PBBFAC,,, | Performed by: NURSE PRACTITIONER

## 2020-01-01 PROCEDURE — 83036 HEMOGLOBIN GLYCOSYLATED A1C: CPT

## 2020-01-01 PROCEDURE — 99496 TRANSJ CARE MGMT HIGH F2F 7D: CPT | Mod: PBBFAC | Performed by: NURSE PRACTITIONER

## 2020-01-01 PROCEDURE — 99999 PR PBB SHADOW E&M-EST. PATIENT-LVL V: ICD-10-PCS | Mod: PBBFAC,,, | Performed by: PODIATRIST

## 2020-01-01 PROCEDURE — 99214 OFFICE O/P EST MOD 30 MIN: CPT | Mod: S$PBB,,, | Performed by: INTERNAL MEDICINE

## 2020-01-01 PROCEDURE — 99999 PR PBB SHADOW E&M-EST. PATIENT-LVL IV: ICD-10-PCS | Mod: PBBFAC,,, | Performed by: PHYSICIAN ASSISTANT

## 2020-01-01 PROCEDURE — 99214 OFFICE O/P EST MOD 30 MIN: CPT | Mod: S$PBB,,, | Performed by: PHYSICIAN ASSISTANT

## 2020-01-01 PROCEDURE — 92015 PR REFRACTION: ICD-10-PCS | Mod: ,,, | Performed by: OPTOMETRIST

## 2020-01-01 PROCEDURE — 99203 OFFICE O/P NEW LOW 30 MIN: CPT | Mod: 25,S$PBB,, | Performed by: PODIATRIST

## 2020-01-01 PROCEDURE — 36415 COLL VENOUS BLD VENIPUNCTURE: CPT

## 2020-01-01 PROCEDURE — 92015 DETERMINE REFRACTIVE STATE: CPT | Mod: ,,, | Performed by: OPTOMETRIST

## 2020-01-01 PROCEDURE — 92004 COMPRE OPH EXAM NEW PT 1/>: CPT | Mod: S$PBB,,, | Performed by: OPTOMETRIST

## 2020-01-01 PROCEDURE — 99215 OFFICE O/P EST HI 40 MIN: CPT | Mod: PBBFAC,25 | Performed by: INTERNAL MEDICINE

## 2020-01-01 PROCEDURE — 82962 GLUCOSE BLOOD TEST: CPT | Mod: PBBFAC | Performed by: NURSE PRACTITIONER

## 2020-01-01 PROCEDURE — 99215 OFFICE O/P EST HI 40 MIN: CPT | Mod: PBBFAC | Performed by: NURSE PRACTITIONER

## 2020-01-01 PROCEDURE — 99215 OFFICE O/P EST HI 40 MIN: CPT | Mod: PBBFAC | Performed by: INTERNAL MEDICINE

## 2020-01-01 PROCEDURE — 11721 DEBRIDE NAIL 6 OR MORE: CPT | Mod: Q9,S$PBB,, | Performed by: PODIATRIST

## 2020-01-01 PROCEDURE — 92004 PR EYE EXAM, NEW PATIENT,COMPREHESV: ICD-10-PCS | Mod: S$PBB,,, | Performed by: OPTOMETRIST

## 2020-01-01 PROCEDURE — 99999 PR PBB SHADOW E&M-EST. PATIENT-LVL IV: CPT | Mod: PBBFAC,,, | Performed by: PHYSICIAN ASSISTANT

## 2020-01-01 PROCEDURE — 99214 PR OFFICE/OUTPT VISIT, EST, LEVL IV, 30-39 MIN: ICD-10-PCS | Mod: S$PBB,,, | Performed by: NURSE PRACTITIONER

## 2020-01-01 PROCEDURE — 99203 PR OFFICE/OUTPT VISIT, NEW, LEVL III, 30-44 MIN: ICD-10-PCS | Mod: 25,S$PBB,, | Performed by: PODIATRIST

## 2020-01-01 PROCEDURE — 80053 COMPREHEN METABOLIC PANEL: CPT

## 2020-01-01 PROCEDURE — 99213 PR OFFICE/OUTPT VISIT, EST, LEVL III, 20-29 MIN: ICD-10-PCS | Mod: 95,,, | Performed by: PHYSICIAN ASSISTANT

## 2020-01-01 PROCEDURE — 99999 PR PBB SHADOW E&M-EST. PATIENT-LVL III: CPT | Mod: PBBFAC,,, | Performed by: OPTOMETRIST

## 2020-01-01 PROCEDURE — 99999 PR PBB SHADOW E&M-EST. PATIENT-LVL V: CPT | Mod: PBBFAC,,, | Performed by: INTERNAL MEDICINE

## 2020-01-01 PROCEDURE — 99214 PR OFFICE/OUTPT VISIT, EST, LEVL IV, 30-39 MIN: ICD-10-PCS | Mod: 95,,, | Performed by: PHYSICIAN ASSISTANT

## 2020-01-01 PROCEDURE — 99999 PR PBB SHADOW E&M-EST. PATIENT-LVL V: ICD-10-PCS | Mod: PBBFAC,,, | Performed by: INTERNAL MEDICINE

## 2020-01-01 PROCEDURE — 99999 PR PBB SHADOW E&M-EST. PATIENT-LVL V: ICD-10-PCS | Mod: PBBFAC,,, | Performed by: NURSE PRACTITIONER

## 2020-01-01 PROCEDURE — 82962 GLUCOSE BLOOD TEST: CPT | Mod: PBBFAC | Performed by: PHYSICIAN ASSISTANT

## 2020-01-01 PROCEDURE — 99214 OFFICE O/P EST MOD 30 MIN: CPT | Mod: 95,,, | Performed by: PHYSICIAN ASSISTANT

## 2020-01-01 PROCEDURE — 99213 OFFICE O/P EST LOW 20 MIN: CPT | Mod: PBBFAC,27 | Performed by: OPTOMETRIST

## 2020-01-01 PROCEDURE — 83880 ASSAY OF NATRIURETIC PEPTIDE: CPT

## 2020-01-01 PROCEDURE — 80061 LIPID PANEL: CPT

## 2020-01-01 PROCEDURE — 99213 OFFICE O/P EST LOW 20 MIN: CPT | Mod: 95,,, | Performed by: PHYSICIAN ASSISTANT

## 2020-01-01 PROCEDURE — 11721 DEBRIDE NAIL 6 OR MORE: CPT | Mod: Q9,PBBFAC | Performed by: PODIATRIST

## 2020-01-01 PROCEDURE — 99999 PR PBB SHADOW E&M-EST. PATIENT-LVL V: CPT | Mod: PBBFAC,,, | Performed by: PODIATRIST

## 2020-01-01 PROCEDURE — 99214 OFFICE O/P EST MOD 30 MIN: CPT | Mod: PBBFAC | Performed by: PHYSICIAN ASSISTANT

## 2020-01-01 PROCEDURE — 84443 ASSAY THYROID STIM HORMONE: CPT

## 2020-01-01 PROCEDURE — 99214 PR OFFICE/OUTPT VISIT, EST, LEVL IV, 30-39 MIN: ICD-10-PCS | Mod: S$PBB,,, | Performed by: INTERNAL MEDICINE

## 2020-01-01 PROCEDURE — 90471 IMMUNIZATION ADMIN: CPT | Mod: PBBFAC

## 2020-01-01 PROCEDURE — 99214 PR OFFICE/OUTPT VISIT, EST, LEVL IV, 30-39 MIN: ICD-10-PCS | Mod: S$PBB,,, | Performed by: PHYSICIAN ASSISTANT

## 2020-01-01 PROCEDURE — 11721 PR DEBRIDEMENT OF NAILS, 6 OR MORE: ICD-10-PCS | Mod: Q9,S$PBB,, | Performed by: PODIATRIST

## 2020-01-01 PROCEDURE — 99215 OFFICE O/P EST HI 40 MIN: CPT | Mod: PBBFAC,25 | Performed by: NURSE PRACTITIONER

## 2020-01-01 PROCEDURE — 84681 ASSAY OF C-PEPTIDE: CPT

## 2020-01-01 PROCEDURE — 99999 PR PBB SHADOW E&M-EST. PATIENT-LVL III: ICD-10-PCS | Mod: PBBFAC,,, | Performed by: OPTOMETRIST

## 2020-01-01 RX ORDER — HYDROCODONE BITARTRATE AND ACETAMINOPHEN 5; 325 MG/1; MG/1
1 TABLET ORAL EVERY 8 HOURS PRN
Qty: 21 TABLET | Refills: 0 | Status: SHIPPED | OUTPATIENT
Start: 2020-01-01 | End: 2020-01-01

## 2020-01-01 RX ORDER — METFORMIN HYDROCHLORIDE 500 MG/1
1000 TABLET ORAL 2 TIMES DAILY WITH MEALS
Qty: 120 TABLET | Refills: 11 | Status: SHIPPED | OUTPATIENT
Start: 2020-01-01 | End: 2020-01-01

## 2020-01-01 RX ORDER — INSULIN GLARGINE 100 [IU]/ML
60 INJECTION, SOLUTION SUBCUTANEOUS NIGHTLY
Qty: 15 ML | Refills: 11 | Status: SHIPPED | OUTPATIENT
Start: 2020-01-01 | End: 2021-08-13

## 2020-01-01 RX ORDER — HYDRALAZINE HYDROCHLORIDE 50 MG/1
50 TABLET, FILM COATED ORAL EVERY 12 HOURS
Qty: 180 TABLET | Refills: 3 | Status: SHIPPED | OUTPATIENT
Start: 2020-01-01 | End: 2021-09-09

## 2020-01-01 RX ORDER — UREA 40 %
CREAM (GRAM) TOPICAL
COMMUNITY
Start: 2020-01-01 | End: 2020-01-01

## 2020-01-01 RX ORDER — ATORVASTATIN CALCIUM 20 MG/1
20 TABLET, FILM COATED ORAL DAILY
Qty: 90 TABLET | Refills: 3 | Status: SHIPPED | OUTPATIENT
Start: 2020-01-01

## 2020-01-01 RX ORDER — EXENATIDE 2 MG/.85ML
2 INJECTION, SUSPENSION, EXTENDED RELEASE SUBCUTANEOUS
Qty: 12 SYRINGE | Refills: 3 | Status: SHIPPED | OUTPATIENT
Start: 2020-01-01 | End: 2020-01-01 | Stop reason: SDUPTHER

## 2020-01-01 RX ORDER — BUMETANIDE 1 MG/1
1 TABLET ORAL 2 TIMES DAILY
COMMUNITY

## 2020-01-01 RX ORDER — LOSARTAN POTASSIUM 100 MG/1
TABLET ORAL
Qty: 30 TABLET | Refills: 3 | Status: SHIPPED | OUTPATIENT
Start: 2020-01-01 | End: 2020-01-01

## 2020-01-01 RX ORDER — CEPHALEXIN 500 MG/1
500 CAPSULE ORAL 4 TIMES DAILY
COMMUNITY
Start: 2020-01-01 | End: 2020-01-01

## 2020-01-01 RX ORDER — GENTAMICIN SULFATE 590MCG/MG
POWDER (GRAM) MISCELLANEOUS
COMMUNITY
Start: 2020-01-01

## 2020-01-01 RX ORDER — ISOSORBIDE DINITRATE 40 MG/1
40 TABLET ORAL 2 TIMES DAILY
Qty: 180 TABLET | Refills: 3 | Status: SHIPPED | OUTPATIENT
Start: 2020-01-01 | End: 2021-09-09

## 2020-01-01 RX ORDER — DILTIAZEM HYDROCHLORIDE 60 MG/1
60 CAPSULE, EXTENDED RELEASE ORAL DAILY
COMMUNITY
Start: 2020-01-01

## 2020-01-01 RX ORDER — METOLAZONE 5 MG/1
5 TABLET ORAL DAILY
Qty: 30 TABLET | Refills: 6 | Status: SHIPPED | OUTPATIENT
Start: 2020-01-01

## 2020-01-01 RX ORDER — INSULIN ASPART 100 [IU]/ML
10 INJECTION, SOLUTION INTRAVENOUS; SUBCUTANEOUS
Qty: 27 ML | Refills: 3 | Status: SHIPPED | OUTPATIENT
Start: 2020-01-01 | End: 2020-01-01 | Stop reason: SDUPTHER

## 2020-01-01 RX ORDER — BUMETANIDE 2 MG/1
2 TABLET ORAL 2 TIMES DAILY
Qty: 180 TABLET | Refills: 3 | Status: SHIPPED | OUTPATIENT
Start: 2020-01-01 | End: 2020-01-01

## 2020-01-01 RX ORDER — INSULIN ASPART 100 [IU]/ML
20 INJECTION, SOLUTION INTRAVENOUS; SUBCUTANEOUS
Qty: 18 ML | Refills: 11 | Status: SHIPPED | OUTPATIENT
Start: 2020-01-01 | End: 2021-08-13

## 2020-01-01 RX ORDER — FLUTICASONE PROPIONATE 50 MCG
SPRAY, SUSPENSION (ML) NASAL
Qty: 16 ML | Refills: 1 | Status: SHIPPED | OUTPATIENT
Start: 2020-01-01 | End: 2020-01-01

## 2020-01-01 RX ORDER — SPIRONOLACTONE 50 MG/1
50 TABLET, FILM COATED ORAL DAILY
Qty: 90 TABLET | Refills: 3 | Status: SHIPPED | OUTPATIENT
Start: 2020-01-01

## 2020-01-01 RX ORDER — LANCETS 30 GAUGE
EACH MISCELLANEOUS
COMMUNITY
Start: 2020-01-01

## 2020-01-01 RX ORDER — CARVEDILOL 25 MG/1
25 TABLET ORAL 2 TIMES DAILY WITH MEALS
Qty: 180 TABLET | Refills: 3 | Status: SHIPPED | OUTPATIENT
Start: 2020-01-01

## 2020-01-01 RX ORDER — VORICONAZOLE 10 MG/ML
INJECTION, POWDER, LYOPHILIZED, FOR SOLUTION INTRAVENOUS
COMMUNITY
Start: 2020-01-01

## 2020-01-01 RX ORDER — INSULIN PUMP SYRINGE, 3 ML
EACH MISCELLANEOUS
Qty: 1 EACH | Refills: 0 | Status: SHIPPED | OUTPATIENT
Start: 2020-01-01 | End: 2020-01-01 | Stop reason: SDUPTHER

## 2020-01-01 RX ORDER — INSULIN GLARGINE 100 [IU]/ML
40 INJECTION, SOLUTION SUBCUTANEOUS NIGHTLY
Qty: 12 ML | Refills: 11 | Status: SHIPPED | OUTPATIENT
Start: 2020-01-01 | End: 2020-01-01

## 2020-01-01 RX ORDER — POTASSIUM CHLORIDE 20 MEQ/1
20 TABLET, EXTENDED RELEASE ORAL DAILY
Qty: 90 TABLET | Refills: 1 | Status: SHIPPED | OUTPATIENT
Start: 2020-01-01

## 2020-01-01 RX ORDER — AMIODARONE HYDROCHLORIDE 200 MG/1
200 TABLET ORAL
COMMUNITY
Start: 2020-01-01 | End: 2020-01-01

## 2020-01-01 RX ORDER — FLASH GLUCOSE SENSOR
1 KIT MISCELLANEOUS
Qty: 2 KIT | Refills: 11 | Status: SHIPPED | OUTPATIENT
Start: 2020-01-01 | End: 2020-01-01

## 2020-01-01 RX ORDER — CARVEDILOL 25 MG/1
25 TABLET ORAL
COMMUNITY
Start: 2020-01-01 | End: 2020-01-01

## 2020-01-01 RX ORDER — METFORMIN HYDROCHLORIDE 1000 MG/1
1000 TABLET ORAL
COMMUNITY

## 2020-01-01 RX ORDER — AMIODARONE HYDROCHLORIDE 200 MG/1
200 TABLET ORAL DAILY
Qty: 90 TABLET | Refills: 3 | Status: SHIPPED | OUTPATIENT
Start: 2020-01-01

## 2020-01-01 RX ORDER — HYDROCODONE BITARTRATE AND ACETAMINOPHEN 5; 325 MG/1; MG/1
1 TABLET ORAL EVERY 8 HOURS PRN
Qty: 21 TABLET | Refills: 0 | Status: SHIPPED | OUTPATIENT
Start: 2020-01-01 | End: 2020-01-01 | Stop reason: SDUPTHER

## 2020-01-01 RX ORDER — EXENATIDE 2 MG/.85ML
2 INJECTION, SUSPENSION, EXTENDED RELEASE SUBCUTANEOUS
Qty: 10.2 ML | Refills: 3 | Status: SHIPPED | OUTPATIENT
Start: 2020-01-01 | End: 2020-01-01

## 2020-01-01 RX ORDER — INSULIN ASPART 100 [IU]/ML
15 INJECTION, SOLUTION INTRAVENOUS; SUBCUTANEOUS
Qty: 13.5 ML | Refills: 11 | Status: SHIPPED | OUTPATIENT
Start: 2020-01-01 | End: 2020-01-01 | Stop reason: SDUPTHER

## 2020-01-01 RX ORDER — INSULIN GLARGINE 100 [IU]/ML
50 INJECTION, SOLUTION SUBCUTANEOUS NIGHTLY
Qty: 45 ML | Refills: 3 | Status: SHIPPED | OUTPATIENT
Start: 2020-01-01 | End: 2020-01-01

## 2020-01-15 ENCOUNTER — TELEPHONE (OUTPATIENT)
Dept: INTERNAL MEDICINE | Facility: CLINIC | Age: 40
End: 2020-01-15

## 2020-01-15 NOTE — TELEPHONE ENCOUNTER
----- Message from Ambreen Jama sent at 1/15/2020 12:16 PM CST -----  Contact: Pt  Pt asked that nurse return his call regarding appt for today, pt asked if he could come in for 3pm today. Pt asked that nurse return his call at ([phone])

## 2020-01-20 ENCOUNTER — OFFICE VISIT (OUTPATIENT)
Dept: INTERNAL MEDICINE | Facility: CLINIC | Age: 40
End: 2020-01-20
Payer: MEDICAID

## 2020-01-20 VITALS
OXYGEN SATURATION: 99 % | DIASTOLIC BLOOD PRESSURE: 84 MMHG | HEART RATE: 110 BPM | WEIGHT: 315 LBS | HEIGHT: 67 IN | SYSTOLIC BLOOD PRESSURE: 132 MMHG | BODY MASS INDEX: 49.44 KG/M2 | TEMPERATURE: 96 F

## 2020-01-20 DIAGNOSIS — R09.81 SINUS CONGESTION: ICD-10-CM

## 2020-01-20 DIAGNOSIS — I10 HYPERTENSION GOAL BP (BLOOD PRESSURE) < 130/80: Primary | ICD-10-CM

## 2020-01-20 DIAGNOSIS — E11.65 UNCONTROLLED TYPE 2 DIABETES MELLITUS WITH HYPERGLYCEMIA, WITHOUT LONG-TERM CURRENT USE OF INSULIN: ICD-10-CM

## 2020-01-20 DIAGNOSIS — E66.01 MORBID OBESITY WITH BMI OF 70 AND OVER, ADULT: ICD-10-CM

## 2020-01-20 DIAGNOSIS — E78.5 HYPERLIPIDEMIA LDL GOAL <70: ICD-10-CM

## 2020-01-20 DIAGNOSIS — G47.30 SLEEP APNEA, UNSPECIFIED TYPE: ICD-10-CM

## 2020-01-20 PROCEDURE — 99213 OFFICE O/P EST LOW 20 MIN: CPT | Mod: PBBFAC | Performed by: INTERNAL MEDICINE

## 2020-01-20 PROCEDURE — 99214 OFFICE O/P EST MOD 30 MIN: CPT | Mod: S$PBB,,, | Performed by: INTERNAL MEDICINE

## 2020-01-20 PROCEDURE — 99999 PR PBB SHADOW E&M-EST. PATIENT-LVL III: ICD-10-PCS | Mod: PBBFAC,,, | Performed by: INTERNAL MEDICINE

## 2020-01-20 PROCEDURE — 99214 PR OFFICE/OUTPT VISIT, EST, LEVL IV, 30-39 MIN: ICD-10-PCS | Mod: S$PBB,,, | Performed by: INTERNAL MEDICINE

## 2020-01-20 PROCEDURE — 99999 PR PBB SHADOW E&M-EST. PATIENT-LVL III: CPT | Mod: PBBFAC,,, | Performed by: INTERNAL MEDICINE

## 2020-01-20 RX ORDER — FLUTICASONE PROPIONATE 50 MCG
1 SPRAY, SUSPENSION (ML) NASAL DAILY
Qty: 1 BOTTLE | Refills: 1 | Status: SHIPPED | OUTPATIENT
Start: 2020-01-20 | End: 2020-01-01

## 2020-01-20 NOTE — PROGRESS NOTES
Subjective:       Patient ID: Chacha Zendejas is a 39 y.o. male.    Chief Complaint: Follow-up (3 month)    Chacha Zendejas  39 y.o. Black or  male    Patient presents with:  Follow-up: 3 month    HPI: Here today to follow up on chronic conditions.  HTN--stable on losartan, bumetanide, diltiazem, hydralazine, metolazone and spironolactone.   Diabetes--uncontrolled. He does not check his glucoses at home but his last glucose was 202 in October (fasting labs). He is taking metformin as prescribed.                      HGBA1C                   8.4 (H)             10/16/2019            HLD--compliant with atorvastatin.                    CHOL                     211 (H)             10/16/2019                 HDL                      31 (L)              10/16/2019                  LDLCALC                  140.6               10/16/2019                 TRIG                     197 (H)             10/16/2019            Sleep apnea--he is not using his CPAP.   He has sinus congestion primarily on the left frontal region. He does not take any medication for his symptoms.        Past Medical History:  Atrial flutter  Cellulitis      Comment:  RLE  CHF (congestive heart failure)  Diabetes mellitus, type 2  Hypertension  Hypothyroidism  Sleep apnea  Venous insufficiency of left lower extremity    Current Outpatient Medications on File Prior to Visit:  amiodarone (PACERONE) 200 MG Tab, Take 1 tablet (200 mg total) by mouth once daily., Disp: 90 tablet, Rfl: 3  apixaban (ELIQUIS) 5 mg Tab, Take 1 tablet (5 mg total) by mouth 2 (two) times daily., Disp: 180 tablet, Rfl: 3  atorvastatin (LIPITOR) 20 MG tablet, Take 1 tablet (20 mg total) by mouth once daily., Disp: 90 tablet, Rfl: 3  bumetanide (BUMEX) 2 MG tablet, Take 1 tablet (2 mg total) by mouth 2 (two) times daily., Disp: 180 tablet, Rfl: 3  carvedilol (COREG) 25 MG tablet, Take 1 tablet (25 mg total) by mouth 2 (two) times daily with meals., Disp:  180 tablet, Rfl: 3  digoxin (LANOXIN) 125 mcg tablet, Take 0.5 tablets (62.5 mcg total) by mouth once daily., Disp: 45 tablet, Rfl: 3  diltiaZEM (CARDIZEM SR) 60 MG Cp12, Take 1 capsule (60 mg total) by mouth 2 (two) times daily., Disp: 60 capsule, Rfl: 11  diphenoxylate-atropine 2.5-0.025 mg (LOMOTIL) 2.5-0.025 mg per tablet, , Disp: , Rfl:   hydrALAZINE (APRESOLINE) 50 MG tablet, Take 1 tablet (50 mg total) by mouth every 12 (twelve) hours., Disp: 180 tablet, Rfl: 3  isosorbide dinitrate (ISORDIL) 40 MG Tab, Take 1 tablet (40 mg total) by mouth 2 (two) times daily., Disp: 180 tablet, Rfl: 3  levothyroxine (SYNTHROID) 50 MCG tablet, Take 1 tablet (50 mcg total) by mouth before breakfast., Disp: 90 tablet, Rfl: 3  losartan (COZAAR) 100 MG tablet, TAKE 1 TABLET BY MOUTH EVERY DAY, Disp: 30 tablet, Rfl: 3  metFORMIN (GLUCOPHAGE-XR) 500 MG 24 hr tablet, Take 1 tablet (500 mg total) by mouth daily with breakfast., Disp: 30 tablet, Rfl: 3  metOLazone (ZAROXOLYN) 5 MG tablet, Take 1 tablet (5 mg total) by mouth once daily. Take 30 minutes before Bumex, Disp: 30 tablet, Rfl: 6  potassium chloride SA (K-DUR,KLOR-CON) 20 MEQ tablet, Take 1 tablet (20 mEq total) by mouth once daily., Disp: 90 tablet, Rfl: 1  spironolactone (ALDACTONE) 50 MG tablet, Take 1 tablet (50 mg total) by mouth once daily., Disp: 90 tablet, Rfl: 3    Allergies:  Review of patient's allergies indicates:   -- Iodine and iodide containing products -- Itching and Swelling   -- Shellfish containing products       Review of Systems   Constitutional: Negative for fever and unexpected weight change.   HENT: Positive for congestion and sinus pressure. Negative for postnasal drip and sinus pain.    Respiratory: Negative for cough and shortness of breath.    Cardiovascular: Positive for leg swelling (chronic). Negative for chest pain.   Genitourinary: Negative for difficulty urinating.   Neurological: Negative for dizziness and headaches.       Objective:       Physical Exam   Constitutional: He is oriented to person, place, and time. He appears well-developed and well-nourished. No distress.   Eyes: No scleral icterus.   Cardiovascular: Normal rate, regular rhythm and normal heart sounds.   Pulmonary/Chest: Effort normal and breath sounds normal. No respiratory distress.   Musculoskeletal: He exhibits edema.   Neurological: He is alert and oriented to person, place, and time.   Skin: Skin is warm and dry.   Psychiatric: He has a normal mood and affect.   Vitals reviewed.      Assessment:       1. Hypertension goal BP (blood pressure) < 130/80    2. Uncontrolled type 2 diabetes mellitus with hyperglycemia, without long-term current use of insulin    3. Hyperlipidemia LDL goal <70    4. Sleep apnea, unspecified type    5. Sinus congestion    6. Morbid obesity with BMI of 70 and over, adult        Plan:       Chacha was seen today for follow-up.    Diagnoses and all orders for this visit:    Hypertension goal BP (blood pressure) < 130/80  -     Continue current management    Uncontrolled type 2 diabetes mellitus with hyperglycemia, without long-term current use of insulin  -     Check A1C and CMP    Hyperlipidemia LDL goal <70  -     Check lipid panel    Sleep apnea, unspecified type  -     Recommend CPAP use    Sinus congestion  -     fluticasone propionate (FLONASE) 50 mcg/actuation nasal spray; 1 spray (50 mcg total) by Each Nostril route once daily.    Morbid obesity with BMI of 70 and over, adult  -     Lifestyle modifications discussed    Schedule labs.

## 2020-01-21 ENCOUNTER — OFFICE VISIT (OUTPATIENT)
Dept: CARDIOLOGY | Facility: CLINIC | Age: 40
End: 2020-01-21
Payer: MEDICAID

## 2020-01-21 VITALS
BODY MASS INDEX: 49.44 KG/M2 | DIASTOLIC BLOOD PRESSURE: 82 MMHG | HEIGHT: 67 IN | WEIGHT: 315 LBS | HEART RATE: 55 BPM | OXYGEN SATURATION: 96 % | SYSTOLIC BLOOD PRESSURE: 136 MMHG

## 2020-01-21 DIAGNOSIS — E66.01 MORBID OBESITY WITH BMI OF 70 AND OVER, ADULT: ICD-10-CM

## 2020-01-21 DIAGNOSIS — I27.20 PULMONARY HTN: ICD-10-CM

## 2020-01-21 DIAGNOSIS — I50.42 CHRONIC COMBINED SYSTOLIC AND DIASTOLIC CONGESTIVE HEART FAILURE: ICD-10-CM

## 2020-01-21 DIAGNOSIS — I48.92 PAROXYSMAL ATRIAL FLUTTER: Primary | ICD-10-CM

## 2020-01-21 PROCEDURE — 99999 PR PBB SHADOW E&M-EST. PATIENT-LVL IV: CPT | Mod: PBBFAC,,, | Performed by: NURSE PRACTITIONER

## 2020-01-21 PROCEDURE — 99214 PR OFFICE/OUTPT VISIT, EST, LEVL IV, 30-39 MIN: ICD-10-PCS | Mod: S$PBB,,, | Performed by: NURSE PRACTITIONER

## 2020-01-21 PROCEDURE — 99214 OFFICE O/P EST MOD 30 MIN: CPT | Mod: PBBFAC | Performed by: NURSE PRACTITIONER

## 2020-01-21 PROCEDURE — 99214 OFFICE O/P EST MOD 30 MIN: CPT | Mod: S$PBB,,, | Performed by: NURSE PRACTITIONER

## 2020-01-21 PROCEDURE — 99999 PR PBB SHADOW E&M-EST. PATIENT-LVL IV: ICD-10-PCS | Mod: PBBFAC,,, | Performed by: NURSE PRACTITIONER

## 2020-01-21 NOTE — PROGRESS NOTES
Subjective:   Patient ID:  Chacha Zendejas is a 39 y.o. male who presents for evaluation of Congestive Heart Failure      HPI     Chacha Zendejas is a 39 year old male who presents to clinic for CHF follow up. His current medical conditions include Chronic combined CHF, HTN, HLP, AFL on Eliquis, BRINDA on CPAP, morbid obesity, hypothyroidism. He returns today and states he is doing ok.     He has gained about 10-12 pounds since last visit with me. He reports recent dietary indiscretions.     Denies chest pain or anginal equivalents. No shortness of breath, CREWS or palpitations. Denies orthopnea, PND or abdominal bloating. Reports regular walking without any issues lately. NO leg swelling or claudications. No recent falls, syncope or near syncopal events. Reports compliance with medications and dietary restrictions. NO CNS complaints to suggest TIA or CVA today. No signs of abnormal bleeding on Eliquis.        Past Medical History:   Diagnosis Date    Atrial flutter     Cellulitis     RLE    CHF (congestive heart failure)     Diabetes mellitus, type 2     Hypertension     Hypothyroidism     Sleep apnea     Venous insufficiency of left lower extremity        Past Surgical History:   Procedure Laterality Date    TREATMENT OF CARDIAC ARRHYTHMIA N/A 3/18/2019    Procedure: CARDIOVERSION OR DEFIBRILLATION;  Surgeon: Talib Ratliff MD;  Location: [facility] CATH LAB;  Service: Cardiology;  Laterality: N/A;       Social History     Tobacco Use    Smoking status: Former Smoker     Types: Cigarettes    Smokeless tobacco: Never Used   Substance Use Topics    Alcohol use: Yes     Comment: socially    Drug use: No       Family History   Problem Relation Age of Onset    Heart disease Father      Wt Readings from Last 3 Encounters:   01/21/20 (!) 218.8 kg (482 lb 5.9 oz)   01/20/20 (!) 219.9 kg (484 lb 12.7 oz)   12/13/19 (!) 213.5 kg (470 lb 10.9 oz)     Temp Readings from Last 3 Encounters:   01/20/20 96.2  °F (35.7 °C) (Tympanic)   10/14/19 96.7 °F (35.9 °C) (Tympanic)   07/16/19 96.7 °F (35.9 °C) (Tympanic)     BP Readings from Last 3 Encounters:   01/20/20 132/84   12/13/19 132/84   10/14/19 130/80     Pulse Readings from Last 3 Encounters:   01/20/20 110   12/13/19 98   10/14/19 93     Current Outpatient Medications on File Prior to Visit   Medication Sig Dispense Refill    amiodarone (PACERONE) 200 MG Tab Take 1 tablet (200 mg total) by mouth once daily. 90 tablet 3    apixaban (ELIQUIS) 5 mg Tab Take 1 tablet (5 mg total) by mouth 2 (two) times daily. 180 tablet 3    atorvastatin (LIPITOR) 20 MG tablet Take 1 tablet (20 mg total) by mouth once daily. 90 tablet 3    bumetanide (BUMEX) 2 MG tablet Take 1 tablet (2 mg total) by mouth 2 (two) times daily. 180 tablet 3    carvedilol (COREG) 25 MG tablet Take 1 tablet (25 mg total) by mouth 2 (two) times daily with meals. 180 tablet 3    digoxin (LANOXIN) 125 mcg tablet Take 0.5 tablets (62.5 mcg total) by mouth once daily. 45 tablet 3    diltiaZEM (CARDIZEM SR) 60 MG Cp12 Take 1 capsule (60 mg total) by mouth 2 (two) times daily. 60 capsule 11    diphenoxylate-atropine 2.5-0.025 mg (LOMOTIL) 2.5-0.025 mg per tablet       fluticasone propionate (FLONASE) 50 mcg/actuation nasal spray 1 spray (50 mcg total) by Each Nostril route once daily. 1 Bottle 1    hydrALAZINE (APRESOLINE) 50 MG tablet Take 1 tablet (50 mg total) by mouth every 12 (twelve) hours. 180 tablet 3    isosorbide dinitrate (ISORDIL) 40 MG Tab Take 1 tablet (40 mg total) by mouth 2 (two) times daily. 180 tablet 3    levothyroxine (SYNTHROID) 50 MCG tablet Take 1 tablet (50 mcg total) by mouth before breakfast. 90 tablet 3    losartan (COZAAR) 100 MG tablet TAKE 1 TABLET BY MOUTH EVERY DAY 30 tablet 3    metFORMIN (GLUCOPHAGE-XR) 500 MG 24 hr tablet Take 1 tablet (500 mg total) by mouth daily with breakfast. 30 tablet 3    metOLazone (ZAROXOLYN) 5 MG tablet Take 1 tablet (5 mg total) by mouth  "once daily. Take 30 minutes before Bumex 30 tablet 6    potassium chloride SA (K-DUR,KLOR-CON) 20 MEQ tablet Take 1 tablet (20 mEq total) by mouth once daily. 90 tablet 1    spironolactone (ALDACTONE) 50 MG tablet Take 1 tablet (50 mg total) by mouth once daily. 90 tablet 3     No current facility-administered medications on file prior to visit.        Review of Systems   Constitution: Positive for malaise/fatigue.   HENT: Negative for hearing loss and hoarse voice.    Eyes: Negative for blurred vision and visual disturbance.   Cardiovascular: Positive for dyspnea on exertion. Negative for chest pain, claudication, irregular heartbeat, leg swelling, near-syncope, orthopnea, palpitations, paroxysmal nocturnal dyspnea and syncope.        +bendopnea   Respiratory: Negative for cough, hemoptysis, shortness of breath, sleep disturbances due to breathing, snoring and wheezing.    Endocrine: Negative for cold intolerance and heat intolerance.   Hematologic/Lymphatic: Bruises/bleeds easily (on eliquis).   Skin: Negative for color change, dry skin and nail changes.   Musculoskeletal: Positive for arthritis and back pain. Negative for joint pain and myalgias.   Gastrointestinal: Negative for bloating, abdominal pain, constipation, nausea and vomiting.   Genitourinary: Negative for dysuria, flank pain, hematuria and hesitancy.   Neurological: Negative for headaches, light-headedness, loss of balance, numbness, paresthesias and weakness.   Psychiatric/Behavioral: Negative for altered mental status.   Allergic/Immunologic: Negative for environmental allergies.     /82   Pulse (!) 55   Ht 5' 7" (1.702 m)   Wt (!) 218.8 kg (482 lb 5.9 oz)   SpO2 96%   BMI 75.55 kg/m²     Objective:   Physical Exam   Constitutional: He is oriented to person, place, and time. He appears well-developed and well-nourished. No distress.   HENT:   Head: Normocephalic and atraumatic.   Eyes: Pupils are equal, round, and reactive to light. "   Neck: Normal range of motion and full passive range of motion without pain. Neck supple. No JVD present.   Cardiovascular: Regular rhythm, S1 normal, S2 normal and intact distal pulses.  Occasional extrasystoles are present. Bradycardia present. PMI is not displaced. Exam reveals no distant heart sounds.   No murmur heard.  Pulses:       Radial pulses are 2+ on the right side, and 2+ on the left side.        Dorsalis pedis pulses are 2+ on the right side, and 2+ on the left side.   Pulmonary/Chest: Effort normal. No accessory muscle usage. No respiratory distress. He has decreased breath sounds in the right lower field and the left lower field. He has no wheezes. He has no rales.   Abdominal: Soft. Bowel sounds are normal. He exhibits no distension. There is no tenderness.   +morbidly obese   Musculoskeletal: Normal range of motion. He exhibits edema (BLE trace).        Right ankle: He exhibits swelling.        Left ankle: He exhibits swelling.   Neurological: He is alert and oriented to person, place, and time.   Skin: Skin is warm and dry. He is not diaphoretic. No cyanosis. Nails show no clubbing.   Psychiatric: He has a normal mood and affect. His speech is normal and behavior is normal. Judgment and thought content normal. Cognition and memory are normal.   Nursing note and vitals reviewed.      Lab Results   Component Value Date    CHOL 211 (H) 10/16/2019    CHOL 180 07/08/2019    CHOL 223 (A) 07/11/2017     Lab Results   Component Value Date    HDL 31 (L) 10/16/2019    HDL 32 (L) 07/08/2019    HDL 32 07/11/2017     Lab Results   Component Value Date    LDLCALC 140.6 10/16/2019    LDLCALC 116.4 07/08/2019    LDLCALC 162 07/11/2017     Lab Results   Component Value Date    TRIG 197 (H) 10/16/2019    TRIG 158 (H) 07/08/2019    TRIG 143 07/11/2017     Lab Results   Component Value Date    CHOLHDL 14.7 (L) 10/16/2019    CHOLHDL 17.8 (L) 07/08/2019       Chemistry        Component Value Date/Time      10/16/2019 0807    K 4.0 10/16/2019 0807    CL 96 10/16/2019 0807    CO2 32 (H) 10/16/2019 0807    BUN 20 10/16/2019 0807    CREATININE 1.6 (H) 10/16/2019 0807     (H) 10/16/2019 0807        Component Value Date/Time    CALCIUM 10.5 10/16/2019 0807    ALKPHOS 67 10/16/2019 0807    AST 22 10/16/2019 0807    ALT 25 10/16/2019 0807    BILITOT 0.4 10/16/2019 0807    ESTGFRAFRICA >60.0 10/16/2019 0807    EGFRNONAA 52.7 (A) 10/16/2019 0807          Lab Results   Component Value Date    TSH 1.641 10/03/2019     No results found for: INR, PROTIME  Lab Results   Component Value Date    WBC 6.39 03/19/2019    HGB 13.2 (L) 03/19/2019    HCT 41.3 03/19/2019    MCV 94 03/19/2019     03/19/2019        Assessment:      1. Paroxysmal atrial flutter    2. Chronic combined systolic and diastolic congestive heart failure    3. Pulmonary HTN    4. Morbid obesity with BMI of 70 and over, adult      Patient presents to clinic for CHF follow up  NO new cardiac complaints today in office  Plan:     Take an additional dose of metolazone on Thursday this week  Continue all other CV meds for now  BNP tomorrow with phone review  Dash diet, 2 gm sodium restriction  1500 ml fluid restriction  Encourage regular physical activity  Encourage weight loss  RTC in 2 months for CHF    JAK Grewal  [facility]

## 2020-01-22 ENCOUNTER — LAB VISIT (OUTPATIENT)
Dept: LAB | Facility: HOSPITAL | Age: 40
End: 2020-01-22
Payer: MEDICAID

## 2020-01-22 DIAGNOSIS — E11.9 NEW ONSET TYPE 2 DIABETES MELLITUS: ICD-10-CM

## 2020-01-22 DIAGNOSIS — I50.42 CHRONIC COMBINED SYSTOLIC AND DIASTOLIC CONGESTIVE HEART FAILURE: ICD-10-CM

## 2020-01-22 DIAGNOSIS — I48.92 PAROXYSMAL ATRIAL FLUTTER: ICD-10-CM

## 2020-01-22 LAB
ALBUMIN SERPL BCP-MCNC: 3.4 G/DL (ref 3.5–5.2)
ALP SERPL-CCNC: 67 U/L (ref 55–135)
ALT SERPL W/O P-5'-P-CCNC: 24 U/L (ref 10–44)
ANION GAP SERPL CALC-SCNC: 13 MMOL/L (ref 8–16)
AST SERPL-CCNC: 19 U/L (ref 10–40)
BILIRUB SERPL-MCNC: 0.5 MG/DL (ref 0.1–1)
BNP SERPL-MCNC: 33 PG/ML (ref 0–99)
BUN SERPL-MCNC: 14 MG/DL (ref 6–20)
CALCIUM SERPL-MCNC: 9.5 MG/DL (ref 8.7–10.5)
CHLORIDE SERPL-SCNC: 96 MMOL/L (ref 95–110)
CHOLEST SERPL-MCNC: 191 MG/DL (ref 120–199)
CHOLEST/HDLC SERPL: 6.2 {RATIO} (ref 2–5)
CO2 SERPL-SCNC: 28 MMOL/L (ref 23–29)
CREAT SERPL-MCNC: 1.3 MG/DL (ref 0.5–1.4)
EST. GFR  (AFRICAN AMERICAN): >60 ML/MIN/1.73 M^2
EST. GFR  (NON AFRICAN AMERICAN): >60 ML/MIN/1.73 M^2
ESTIMATED AVG GLUCOSE: 226 MG/DL (ref 68–131)
GLUCOSE SERPL-MCNC: 222 MG/DL (ref 70–110)
HBA1C MFR BLD HPLC: 9.5 % (ref 4–5.6)
HDLC SERPL-MCNC: 31 MG/DL (ref 40–75)
HDLC SERPL: 16.2 % (ref 20–50)
LDLC SERPL CALC-MCNC: 124.4 MG/DL (ref 63–159)
NONHDLC SERPL-MCNC: 160 MG/DL
POTASSIUM SERPL-SCNC: 4.2 MMOL/L (ref 3.5–5.1)
PROT SERPL-MCNC: 8.3 G/DL (ref 6–8.4)
SODIUM SERPL-SCNC: 137 MMOL/L (ref 136–145)
TRIGL SERPL-MCNC: 178 MG/DL (ref 30–150)

## 2020-01-22 PROCEDURE — 80053 COMPREHEN METABOLIC PANEL: CPT

## 2020-01-22 PROCEDURE — 83036 HEMOGLOBIN GLYCOSYLATED A1C: CPT

## 2020-01-22 PROCEDURE — 36415 COLL VENOUS BLD VENIPUNCTURE: CPT

## 2020-01-22 PROCEDURE — 83880 ASSAY OF NATRIURETIC PEPTIDE: CPT

## 2020-01-22 PROCEDURE — 80061 LIPID PANEL: CPT

## 2020-01-30 ENCOUNTER — TELEPHONE (OUTPATIENT)
Dept: INTERNAL MEDICINE | Facility: CLINIC | Age: 40
End: 2020-01-30

## 2020-01-30 NOTE — TELEPHONE ENCOUNTER
Spoke to pt advise him that today is Dr Brady guardado and she has nothing today, ask if he wants to see another provider he refused, was able to get him a late appt

## 2020-01-30 NOTE — TELEPHONE ENCOUNTER
----- Message from Ambreen Jama sent at 1/30/2020  8:47 AM CST -----  Contact: Pt  Pt asked that nurse return his call regarding his appt, pt would like to know if he could be fit in today.  ([phone])

## 2020-02-03 ENCOUNTER — OFFICE VISIT (OUTPATIENT)
Dept: INTERNAL MEDICINE | Facility: CLINIC | Age: 40
End: 2020-02-03
Payer: MEDICAID

## 2020-02-03 VITALS
SYSTOLIC BLOOD PRESSURE: 144 MMHG | HEART RATE: 94 BPM | BODY MASS INDEX: 49.44 KG/M2 | WEIGHT: 315 LBS | TEMPERATURE: 96 F | OXYGEN SATURATION: 95 % | HEIGHT: 67 IN | DIASTOLIC BLOOD PRESSURE: 88 MMHG

## 2020-02-03 DIAGNOSIS — E11.65 UNCONTROLLED TYPE 2 DIABETES MELLITUS WITH HYPERGLYCEMIA, WITHOUT LONG-TERM CURRENT USE OF INSULIN: Primary | ICD-10-CM

## 2020-02-03 DIAGNOSIS — I10 HYPERTENSION GOAL BP (BLOOD PRESSURE) < 130/80: ICD-10-CM

## 2020-02-03 DIAGNOSIS — E66.01 MORBID OBESITY WITH BMI OF 70 AND OVER, ADULT: ICD-10-CM

## 2020-02-03 PROCEDURE — 99214 OFFICE O/P EST MOD 30 MIN: CPT | Mod: PBBFAC | Performed by: INTERNAL MEDICINE

## 2020-02-03 PROCEDURE — 99214 PR OFFICE/OUTPT VISIT, EST, LEVL IV, 30-39 MIN: ICD-10-PCS | Mod: S$PBB,,, | Performed by: INTERNAL MEDICINE

## 2020-02-03 PROCEDURE — 99214 OFFICE O/P EST MOD 30 MIN: CPT | Mod: S$PBB,,, | Performed by: INTERNAL MEDICINE

## 2020-02-03 PROCEDURE — 99999 PR PBB SHADOW E&M-EST. PATIENT-LVL IV: CPT | Mod: PBBFAC,,, | Performed by: INTERNAL MEDICINE

## 2020-02-03 PROCEDURE — 99999 PR PBB SHADOW E&M-EST. PATIENT-LVL IV: ICD-10-PCS | Mod: PBBFAC,,, | Performed by: INTERNAL MEDICINE

## 2020-02-03 NOTE — PROGRESS NOTES
Subjective:       Patient ID: Chacha Zendejas is a 39 y.o. male.    Chief Complaint: Follow-up    Chacha Zendejas  39 y.o. Black or  male    Patient presents with:  Follow-up    HPI: Presents to the clinic to discuss recent abnormal labs.   His diabetes has worsened and his glucose is high. He is taking metformin but admits to diet non compliance. He states the metformin causes some GI upset.  He denies symptoms of hyperglycemia.             HGBA1C                   9.5 (H)             01/22/2020            HTN--elevated today. He is compliant with medication.     Past Medical History:  Atrial flutter  Cellulitis      Comment:  RLE  CHF (congestive heart failure)  Diabetes mellitus, type 2  Hypertension  Hypothyroidism  Sleep apnea  Venous insufficiency of left lower extremity    Current Outpatient Medications on File Prior to Visit:  amiodarone (PACERONE) 200 MG Tab, Take 1 tablet (200 mg total) by mouth once daily., Disp: 90 tablet, Rfl: 3  apixaban (ELIQUIS) 5 mg Tab, Take 1 tablet (5 mg total) by mouth 2 (two) times daily., Disp: 180 tablet, Rfl: 3  atorvastatin (LIPITOR) 20 MG tablet, Take 1 tablet (20 mg total) by mouth once daily., Disp: 90 tablet, Rfl: 3  bumetanide (BUMEX) 2 MG tablet, Take 1 tablet (2 mg total) by mouth 2 (two) times daily., Disp: 180 tablet, Rfl: 3  carvedilol (COREG) 25 MG tablet, Take 1 tablet (25 mg total) by mouth 2 (two) times daily with meals., Disp: 180 tablet, Rfl: 3  digoxin (LANOXIN) 125 mcg tablet, Take 0.5 tablets (62.5 mcg total) by mouth once daily., Disp: 45 tablet, Rfl: 3  diltiaZEM (CARDIZEM SR) 60 MG Cp12, Take 1 capsule (60 mg total) by mouth 2 (two) times daily., Disp: 60 capsule, Rfl: 11  diphenoxylate-atropine 2.5-0.025 mg (LOMOTIL) 2.5-0.025 mg per tablet, , Disp: , Rfl:   fluticasone propionate (FLONASE) 50 mcg/actuation nasal spray, 1 spray (50 mcg total) by Each Nostril route once daily., Disp: 1 Bottle, Rfl: 1  hydrALAZINE  (APRESOLINE) 50 MG tablet, Take 1 tablet (50 mg total) by mouth every 12 (twelve) hours., Disp: 180 tablet, Rfl: 3  isosorbide dinitrate (ISORDIL) 40 MG Tab, Take 1 tablet (40 mg total) by mouth 2 (two) times daily., Disp: 180 tablet, Rfl: 3  levothyroxine (SYNTHROID) 50 MCG tablet, Take 1 tablet (50 mcg total) by mouth before breakfast., Disp: 90 tablet, Rfl: 3  losartan (COZAAR) 100 MG tablet, TAKE 1 TABLET BY MOUTH EVERY DAY, Disp: 30 tablet, Rfl: 3  metFORMIN (GLUCOPHAGE-XR) 500 MG 24 hr tablet, Take 1 tablet (500 mg total) by mouth daily with breakfast., Disp: 30 tablet, Rfl: 3  metOLazone (ZAROXOLYN) 5 MG tablet, Take 1 tablet (5 mg total) by mouth once daily. Take 30 minutes before Bumex, Disp: 30 tablet, Rfl: 6  potassium chloride SA (K-DUR,KLOR-CON) 20 MEQ tablet, Take 1 tablet (20 mEq total) by mouth once daily., Disp: 90 tablet, Rfl: 1  spironolactone (ALDACTONE) 50 MG tablet, Take 1 tablet (50 mg total) by mouth once daily., Disp: 90 tablet, Rfl: 3    Allergies:  Review of patient's allergies indicates:   -- Iodine and iodide containing products -- Itching and Swelling   -- Shellfish containing products           Review of Systems   Constitutional: Negative for fever and unexpected weight change.   Respiratory: Negative for shortness of breath.    Cardiovascular: Negative for chest pain.   Gastrointestinal: Positive for diarrhea.   Musculoskeletal: Negative for gait problem.   Neurological: Negative for dizziness and headaches.       Objective:      Physical Exam   Constitutional: He is oriented to person, place, and time. He appears well-developed and well-nourished. No distress.   Cardiovascular: Normal rate.   Pulmonary/Chest: Effort normal. No respiratory distress.   Neurological: He is alert and oriented to person, place, and time.   Skin: Skin is warm and dry.   Psychiatric: He has a normal mood and affect.   Vitals reviewed.      Assessment:       1. Uncontrolled type 2 diabetes mellitus with  hyperglycemia, without long-term current use of insulin    2. Hypertension goal BP (blood pressure) < 130/80    3. Morbid obesity with BMI of 70 and over, adult        Plan:       Chacha was seen today for follow-up.    Diagnoses and all orders for this visit:    Uncontrolled type 2 diabetes mellitus with hyperglycemia, without long-term current use of insulin  -     Start dulaglutide (TRULICITY) 0.75 mg/0.5 mL PnIj; Inject 0.5 mLs (0.75 mg total) into the skin every 7 days. Discussed medication risks and benefits.   -     Continue metformin   -     Lifestyle modifications discussed  -     Ambulatory consult to Diabetic Education; Future    Hypertension goal BP (blood pressure) < 130/80  -     Continue current management  -     Monitor    Morbid obesity with BMI of 70 and over, adult  -     Lifestyle modifications discussed    Labs and f/u in 3 months.

## 2020-02-10 ENCOUNTER — OFFICE VISIT (OUTPATIENT)
Dept: DIABETES | Facility: CLINIC | Age: 40
End: 2020-02-10
Payer: MEDICAID

## 2020-02-10 VITALS
HEART RATE: 73 BPM | SYSTOLIC BLOOD PRESSURE: 143 MMHG | HEIGHT: 67 IN | DIASTOLIC BLOOD PRESSURE: 86 MMHG | BODY MASS INDEX: 49.44 KG/M2 | WEIGHT: 315 LBS | TEMPERATURE: 99 F

## 2020-02-10 DIAGNOSIS — E66.01 MORBID OBESITY WITH BMI OF 70 AND OVER, ADULT: ICD-10-CM

## 2020-02-10 DIAGNOSIS — N18.30 CKD (CHRONIC KIDNEY DISEASE) STAGE 3, GFR 30-59 ML/MIN: ICD-10-CM

## 2020-02-10 DIAGNOSIS — E11.65 UNCONTROLLED TYPE 2 DIABETES MELLITUS WITH HYPERGLYCEMIA, WITHOUT LONG-TERM CURRENT USE OF INSULIN: Primary | ICD-10-CM

## 2020-02-10 DIAGNOSIS — I10 ESSENTIAL HYPERTENSION: ICD-10-CM

## 2020-02-10 DIAGNOSIS — I50.20 HEART FAILURE WITH REDUCED EJECTION FRACTION, NYHA CLASS III: ICD-10-CM

## 2020-02-10 PROCEDURE — 99999 PR PBB SHADOW E&M-EST. PATIENT-LVL V: CPT | Mod: PBBFAC,,, | Performed by: PHYSICIAN ASSISTANT

## 2020-02-10 PROCEDURE — 99215 PR OFFICE/OUTPT VISIT, EST, LEVL V, 40-54 MIN: ICD-10-PCS | Mod: S$PBB,,, | Performed by: PHYSICIAN ASSISTANT

## 2020-02-10 PROCEDURE — 99215 OFFICE O/P EST HI 40 MIN: CPT | Mod: S$PBB,,, | Performed by: PHYSICIAN ASSISTANT

## 2020-02-10 PROCEDURE — 99215 OFFICE O/P EST HI 40 MIN: CPT | Mod: PBBFAC | Performed by: PHYSICIAN ASSISTANT

## 2020-02-10 PROCEDURE — 99999 PR PBB SHADOW E&M-EST. PATIENT-LVL V: ICD-10-PCS | Mod: PBBFAC,,, | Performed by: PHYSICIAN ASSISTANT

## 2020-02-10 RX ORDER — INSULIN PUMP SYRINGE, 3 ML
EACH MISCELLANEOUS
Qty: 1 EACH | Refills: 0 | Status: SHIPPED | OUTPATIENT
Start: 2020-02-10

## 2020-02-10 RX ORDER — METFORMIN HYDROCHLORIDE 500 MG/1
1000 TABLET ORAL 2 TIMES DAILY WITH MEALS
Qty: 120 TABLET | Refills: 11 | Status: SHIPPED | OUTPATIENT
Start: 2020-02-10 | End: 2020-01-01 | Stop reason: SDUPTHER

## 2020-02-10 RX ORDER — LANCETS
1 EACH MISCELLANEOUS 4 TIMES DAILY
Qty: 100 EACH | Refills: 11 | Status: SHIPPED | OUTPATIENT
Start: 2020-02-10

## 2020-02-10 NOTE — PROGRESS NOTES
"PCP: Brandy Abreu DO    Subjective:     Chief Complaint: Diabetes Consult    HISTORY OF PRESENT ILLNESS: 39 y.o.   male presenting for diabetes consult.   Patient has had Type II diabetes since 2019.  Pertinent to decision making is the following comorbidities: HTN, HLD, CHF, CKD III and Obesity by BMI  Patient has the following Diabetes complications: with diabetic chronic kidney disease  He  is to be enrolled in diabetes education classes.     Patient's most recent A1c of 9.5% was completed 3 weeks ago.   Patient states since His last A1c His blood glucose levels have been unknown since patient is not monitoring regularly. .   Patient monitors blood glucose 0 times per day.    Patient blood glucose monitoring device will not be uploaded into [employer] today secondary to patient not monitoring regularly.   Patient endorses the following diabetes related symptoms: Polydipsia, Polyuria and Leg swelling or edema.  Patient is due today for the following diabetes-related health maintenance standards: Eye Exam.   He denies recent hospital admissions or emergency room visits.   He denies having hypoglycemia.   Patient's concerns today include glycemic control.  Patient medication regimen is as below.     CURRENT DM MEDICATIONS:    Metformin 500 mg w/ breakfast   Trulicity - patient unable to start because not within formulary    Patient has failed the following Diabetes medications:    N/A      Labs Reviewed.       No results found for: CPEPTIDE  No results found for: GLUTAMICACID    Height: 5' 7" (170.2 cm)  //  Weight: (!) 219.5 kg (483 lb 14.6 oz), Body mass index is 75.79 kg/m².  His blood sugar in clinic today is:    343 @ 12:15 pm    Review of Systems   Constitutional: Negative for activity change, appetite change, chills and fever.   HENT: Negative for dental problem, mouth sores, nosebleeds, sore throat and trouble swallowing.    Eyes: Negative for pain and discharge.   Respiratory: " Negative for shortness of breath, wheezing and stridor.    Cardiovascular: Positive for leg swelling (Chronic; related to CHF). Negative for chest pain and palpitations.   Gastrointestinal: Negative for abdominal pain, diarrhea, nausea and vomiting.   Endocrine: Positive for polydipsia and polyuria. Negative for polyphagia.   Genitourinary: Negative for dysuria, frequency and urgency.   Musculoskeletal: Negative for joint swelling and myalgias.   Skin: Negative for rash and wound.   Neurological: Negative for dizziness, syncope, weakness and headaches.   Psychiatric/Behavioral: Negative for behavioral problems and dysphoric mood.         Diabetes Management Status  Statin: Taking  ACE/ARB: Taking    Screening or Prevention Patient's value Goal Complete/Controlled?   HgA1C Testing and Control   Lab Results   Component Value Date    HGBA1C 9.5 (H) 01/22/2020      Annually/Less than 8% No   Lipid profile : 01/22/2020 Annually Yes   LDL control Lab Results   Component Value Date    LDLCALC 124.4 01/22/2020    Annually/Less than 100 mg/dl  No   Nephropathy screening Lab Results   Component Value Date    MICALBCREAT 15.3 10/16/2019     Lab Results   Component Value Date    PROTEINUA Negative 03/14/2019    Annually Yes   Blood pressure BP Readings from Last 1 Encounters:   02/10/20 (!) 143/86    Less than 140/90 No   Dilated retinal exam Most Recent Eye Exam Date: Not Found Annually No    Foot exam   : 07/31/2019 Annually Yes     ACTIVITY LEVEL: Rarely Active. Discussed activities, benefits, methods, and precautions.  MEAL PLANNING: Patient reports number of meals per day to be 3 and number of snacks per day to be 2.   Patient is encouraged to carb count and consume no more than 30 - 45 grams of carbohydrates in each meal and 15 grams of carbohydrates in each snack.     Social History     Socioeconomic History    Marital status: Single     Spouse name: Not on file    Number of children: Not on file    Years of  education: Not on file    Highest education level: Not on file   Occupational History    Not on file   Social Needs    Financial resource strain: Not on file    Food insecurity:     Worry: Not on file     Inability: Not on file    Transportation needs:     Medical: Not on file     Non-medical: Not on file   Tobacco Use    Smoking status: Former Smoker     Types: Cigarettes    Smokeless tobacco: Never Used   Substance and Sexual Activity    Alcohol use: Yes     Comment: socially    Drug use: No    Sexual activity: Not on file   Lifestyle    Physical activity:     Days per week: Not on file     Minutes per session: Not on file    Stress: Not on file   Relationships    Social connections:     Talks on phone: Not on file     Gets together: Not on file     Attends Hindu service: Not on file     Active member of club or organization: Not on file     Attends meetings of clubs or organizations: Not on file     Relationship status: Not on file   Other Topics Concern    Not on file   Social History Narrative    Not on file     Past Medical History:   Diagnosis Date    Atrial flutter     Cellulitis     RLE    CHF (congestive heart failure)     Diabetes mellitus, type 2     Hypertension     Hypothyroidism     Sleep apnea     Venous insufficiency of left lower extremity        Objective:      Physical Exam   Constitutional: He is oriented to person, place, and time. He appears well-developed and well-nourished. No distress.   HENT:   Head: Normocephalic and atraumatic.   Right Ear: External ear normal.   Left Ear: External ear normal.   Nose: Nose normal.   Eyes: Pupils are equal, round, and reactive to light. EOM are normal. Right eye exhibits no discharge. Left eye exhibits no discharge.   Neck: Normal range of motion. Neck supple.   Cardiovascular: Regular rhythm, normal heart sounds and intact distal pulses.   No murmur heard.  Pulmonary/Chest: Effort normal and breath sounds normal.   Abdominal:  Soft.   Musculoskeletal: Normal range of motion.   Neurological: He is alert and oriented to person, place, and time. He exhibits normal muscle tone. Coordination normal.   Skin: Skin is warm and dry. Capillary refill takes less than 2 seconds. He is not diaphoretic.   Psychiatric: He has a normal mood and affect. His behavior is normal. Judgment and thought content normal.         Assessment / Plan:     Uncontrolled type 2 diabetes mellitus with hyperglycemia, without long-term current use of insulin  -     Ambulatory consult to Diabetic Education  -     exenatide microspheres (BYDUREON BCISE) 2 mg/0.85 mL AtIn; Inject 2 mg into the skin every 7 days.  Dispense: 12 Syringe; Refill: 3  -     blood-glucose meter kit; Test finger stick blood sugar once daily.  Dispense: 1 each; Refill: 0  -     blood sugar diagnostic Strp; 1 strip by Misc.(Non-Drug; Combo Route) route 4 (four) times daily.  Dispense: 100 each; Refill: 11  -     lancets Misc; 1 each by Misc.(Non-Drug; Combo Route) route 4 (four) times daily.  Dispense: 100 each; Refill: 11  -     metFORMIN (GLUCOPHAGE) 500 MG tablet; Take 2 tablets (1,000 mg total) by mouth 2 (two) times daily with meals.  Dispense: 120 tablet; Refill: 11  -     Ambulatory referral/consult to Optometry; Future; Expected date: 02/17/2020  -     flash glucose sensor (FREESTYLE KENDRICK 14 DAY SENSOR) Kit; 1 each by Misc.(Non-Drug; Combo Route) route every 14 (fourteen) days.  Dispense: 2 kit; Refill: 11  -     flash glucose scanning reader (FREESTYLE KENDRICK 14 DAY READER) Misc; 1 each by Misc.(Non-Drug; Combo Route) route once daily.  Dispense: 1 each; Refill: 0    CKD (chronic kidney disease) stage 3, GFR 30-59 ml/min    Heart failure with reduced ejection fraction, NYHA class III    Essential hypertension    Morbid obesity with BMI of 70 and over, adult      Additional Plan Details:    - POCT Glucose  - Encouraged continuation of lifestyle changes including regular exercise and limiting  carbohydrates to 30-45 grams per meal threes times daily and 15 grams per snack with a limit of two daily.   - Encouraged continued monitoring of blood glucose with an increase to 3 times daily at Fasting, Before Dinner and After Dinner. Will Rx blood glucose meter kit - Nursing tutorial given on use. Will Rx Amelia CGM.   - Current DM Medication Regimen: Change Metformin from 500 mg with breakfast to 1000 mg BID. Patient given Metformin instruction sheet for medication ramp up. Start Bydureon 2 mg weekly.   - Health Maintenance standards addressed today: Eye Exam - will be completed within [facility] system and scheduled today.   - Nursing Visit: Patient is age 79 or younger with an A1c of 7.5 or greater and will need nursing visit by Phone in 2 weeks for BG log review and non-insulin adjustment since patient unable or unwilling to come in for physical visit.   - Follow up with me in 6 weeks.      Surya Kerns PA-C  [facility]    A total of 60 minutes was spent in face to face time, of which over 50 % was spent in counseling patient on disease process, complications, treatment, and side effects of medications.

## 2020-02-14 DIAGNOSIS — E11.65 UNCONTROLLED TYPE 2 DIABETES MELLITUS WITH HYPERGLYCEMIA, WITHOUT LONG-TERM CURRENT USE OF INSULIN: ICD-10-CM

## 2020-02-14 RX ORDER — METFORMIN HYDROCHLORIDE 500 MG/1
TABLET, EXTENDED RELEASE ORAL
Qty: 30 TABLET | Refills: 3 | OUTPATIENT
Start: 2020-02-14

## 2020-03-20 NOTE — TELEPHONE ENCOUNTER
Left message on pt voicemail regarding options for pt appt on 03/23. Briefly covered options of Tele-med vs rescheduling appt to later date. Advised pt to call clinic back to discuss options.

## 2020-03-22 NOTE — PROGRESS NOTES
Chart reviewed.   Immunizations: Triggered Imm Registry     Orders placed: n/a  Upcoming appts to satisfy RAFAEL topics: n/a

## 2020-06-02 PROBLEM — E11.9 TYPE 2 DIABETES MELLITUS: Status: ACTIVE | Noted: 2020-01-01
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
## 2020-06-02 NOTE — TELEPHONE ENCOUNTER
Pt stated that he need pain medication sent to [facility] on [address] at.

## 2020-06-02 NOTE — TELEPHONE ENCOUNTER
----- Message from Pearl Johnson sent at 6/2/2020  2:40 PM CDT -----  Contact: Patient  .Type:  Patient Returning Call    Who Called:Patient  Who Left Message for Patient:  Does the patient know what this is regarding?: missed call   Would the patient rather a call back or a response via [facility]? Call  Best Call Back Number: .[phone] (home)   Additional Information:

## 2020-06-02 NOTE — TELEPHONE ENCOUNTER
Called patient and discussed previous message. Patient states he will keep bg logs and  insulin today. Visit in 3 days.       BM

## 2020-06-02 NOTE — TELEPHONE ENCOUNTER
----- Message from Patty Ram sent at 6/2/2020  2:08 PM CDT -----  Contact: catie/eli  please transfer narco to [facility] per [facility]..[phone] (home)

## 2020-06-02 NOTE — PATIENT INSTRUCTIONS
Keep your wound clean and dry     Make sure to take antibiotic and do not miss doses   Take with food to avoid upsetting your stomach
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
## 2020-06-04 NOTE — TELEPHONE ENCOUNTER
LM for pt to call the office back.      Pt should contact his insurance to find a covered urologist. External referral placed.
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
## 2020-06-09 NOTE — PROGRESS NOTES
Subjective:   Patient ID:  Chacha Zendejas is a 40 y.o. male who presents for evaluation of No chief complaint on file.      HPI     Chacha Zendejas is a 39 year old male who presents to clinic for CHF follow up. His current medical conditions include Chronic combined CHF, HTN, HLP, AFL on Eliquis, BRINDA on CPAP, morbid obesity, hypothyroidism. He returns today and states he is doing ok.     He has gained about 10-12 pounds since last visit with me. He reports recent dietary indiscretions.     Denies chest pain or anginal equivalents. No shortness of breath, CREWS or palpitations. Denies orthopnea, PND or abdominal bloating. Reports regular walking without any issues lately. NO leg swelling or claudications. No recent falls, syncope or near syncopal events. Reports compliance with medications and dietary restrictions. NO CNS complaints to suggest TIA or CVA today. No signs of abnormal bleeding on Eliquis.      6/10/2020 Update:  He has lost about 70 lbs since last visit with me. Feels great today in office.   Reports compliance with medications and dietary restrictions. No new cardiac complaints in office. He is walking every day to increase physical activity. No shortness of breath with walking and does endorse some intermittent episodes of palpitations but does not last long.     Trace LE edema today in office but much improved from last visit. Reports compliance with nightly CPAP. Denies orthopnea or PND today.     Past Medical History:   Diagnosis Date    Atrial flutter     Cellulitis     RLE    CHF (congestive heart failure)     Diabetes mellitus, type 2     Hypertension     Hypothyroidism     Sleep apnea     Venous insufficiency of left lower extremity        Past Surgical History:   Procedure Laterality Date    TREATMENT OF CARDIAC ARRHYTHMIA N/A 3/18/2019    Procedure: CARDIOVERSION OR DEFIBRILLATION;  Surgeon: Talib Ratliff MD;  Location: [facility] CATH LAB;  Service: Cardiology;   Laterality: N/A;       Social History     Tobacco Use    Smoking status: Former Smoker     Types: Cigarettes    Smokeless tobacco: Never Used   Substance Use Topics    Alcohol use: Yes     Comment: socially    Drug use: No       Family History   Problem Relation Age of Onset    Heart disease Father      Wt Readings from Last 3 Encounters:   20 (!) 219.5 kg (483 lb 14.6 oz)   02/10/20 (!) 219.5 kg (483 lb 14.6 oz)   20 (!) 220.5 kg (486 lb 1.8 oz)     Temp Readings from Last 3 Encounters:   20 98.2 °F (36.8 °C)   02/10/20 98.5 °F (36.9 °C) (Oral)   20 96.2 °F (35.7 °C) (Tympanic)     BP Readings from Last 3 Encounters:   20 (!) 140/88   02/10/20 (!) 143/86   20 (!) 144/88     Pulse Readings from Last 3 Encounters:   20 85   02/10/20 73   20 94     Current Outpatient Medications on File Prior to Visit   Medication Sig Dispense Refill    amiodarone (PACERONE) 200 MG Tab Take 1 tablet (200 mg total) by mouth once daily. 90 tablet 3    apixaban (ELIQUIS) 5 mg Tab Take 1 tablet (5 mg total) by mouth 2 (two) times daily. 180 tablet 3    atorvastatin (LIPITOR) 20 MG tablet Take 1 tablet (20 mg total) by mouth once daily. 90 tablet 3    blood sugar diagnostic Strp 1 strip by Misc.(Non-Drug; Combo Route) route 4 (four) times daily. 100 each 11    blood-glucose meter kit Test finger stick blood sugar once daily. 1 each 0    bumetanide (BUMEX) 2 MG tablet Take 1 tablet (2 mg total) by mouth 2 (two) times daily. 180 tablet 3    carvedilol (COREG) 25 MG tablet Take 1 tablet (25 mg total) by mouth 2 (two) times daily with meals. 180 tablet 3    [] cephALEXin (KEFLEX) 500 MG capsule Take 500 mg by mouth 4 (four) times daily.      digoxin (LANOXIN) 125 mcg tablet Take 0.5 tablets (62.5 mcg total) by mouth once daily. 45 tablet 3    diltiaZEM (CARDIZEM SR) 60 MG Cp12 Take 1 capsule (60 mg total) by mouth 2 (two) times daily. 60 capsule 11    exenatide microspheres  (BYLIBERTAD BCISE) 2 mg/0.85 mL AtIn Inject 2 mg into the skin every 7 days. 12 Syringe 3    flash glucose scanning reader (FREESTYLE KENDRICK 14 DAY READER) Misc 1 each by Misc.(Non-Drug; Combo Route) route once daily. 1 each 0    flash glucose sensor (FREESTYLE KENDRICK 14 DAY SENSOR) Kit 1 each by Misc.(Non-Drug; Combo Route) route every 14 (fourteen) days. 2 kit 11    fluticasone propionate (FLONASE) 50 mcg/actuation nasal spray INSTILL 1 SPRAY INTO EACH NOSTRIL ONCE DAILY AS DIRECTED 16 mL 1    hydrALAZINE (APRESOLINE) 50 MG tablet Take 1 tablet (50 mg total) by mouth every 12 (twelve) hours. 180 tablet 3    [] HYDROcodone-acetaminophen (NORCO) 5-325 mg per tablet Take 1 tablet by mouth every 8 (eight) hours as needed for Pain. 21 tablet 0    insulin (LANTUS SOLOSTAR U-100 INSULIN) glargine 100 units/mL (3mL) SubQ pen Inject 40 Units into the skin every evening. 12 mL 11    insulin (LANTUS SOLOSTAR U-100 INSULIN) glargine 100 units/mL (3mL) SubQ pen Inject 50 Units into the skin every evening. 45 mL 3    insulin aspart U-100 (NOVOLOG) 100 unit/mL (3 mL) InPn pen Inject 10 Units into the skin 3 (three) times daily with meals. 27 mL 3    isosorbide dinitrate (ISORDIL) 40 MG Tab Take 1 tablet (40 mg total) by mouth 2 (two) times daily. 180 tablet 3    lancets Misc 1 each by Misc.(Non-Drug; Combo Route) route 4 (four) times daily. 100 each 11    levothyroxine (SYNTHROID) 50 MCG tablet Take 1 tablet (50 mcg total) by mouth before breakfast. 90 tablet 3    losartan (COZAAR) 100 MG tablet TAKE 1 TABLET BY MOUTH EVERY DAY 30 tablet 3    metFORMIN (GLUCOPHAGE) 500 MG tablet Take 2 tablets (1,000 mg total) by mouth 2 (two) times daily with meals. 120 tablet 11    metOLazone (ZAROXOLYN) 5 MG tablet Take 1 tablet (5 mg total) by mouth once daily. Take 30 minutes before Bumex 30 tablet 6    potassium chloride SA (K-DUR,KLOR-CON) 20 MEQ tablet Take 1 tablet (20 mEq total) by mouth once daily. 90 tablet 1     spironolactone (ALDACTONE) 50 MG tablet Take 1 tablet (50 mg total) by mouth once daily. 90 tablet 3     No current facility-administered medications on file prior to visit.        Review of Systems   Constitution: Positive for malaise/fatigue.   HENT: Negative for hearing loss and hoarse voice.    Eyes: Negative for blurred vision and visual disturbance.   Cardiovascular: Positive for dyspnea on exertion. Negative for chest pain, claudication, irregular heartbeat, leg swelling, near-syncope, orthopnea, palpitations, paroxysmal nocturnal dyspnea and syncope.        +bendopnea   Respiratory: Negative for cough, hemoptysis, shortness of breath, sleep disturbances due to breathing, snoring and wheezing.    Endocrine: Negative for cold intolerance and heat intolerance.   Hematologic/Lymphatic: Bruises/bleeds easily (on eliquis).   Skin: Negative for color change, dry skin and nail changes.   Musculoskeletal: Positive for arthritis and back pain. Negative for joint pain and myalgias.   Gastrointestinal: Negative for bloating, abdominal pain, constipation, nausea and vomiting.   Genitourinary: Negative for dysuria, flank pain, hematuria and hesitancy.   Neurological: Negative for headaches, light-headedness, loss of balance, numbness, paresthesias and weakness.   Psychiatric/Behavioral: Negative for altered mental status.   Allergic/Immunologic: Negative for environmental allergies.     There were no vitals taken for this visit.   /82 (BP Location: Right arm, Patient Position: Sitting, BP Method: Large (Manual))   Pulse 100   Ht 5' (1.524 m)   Wt (!) 188.2 kg (414 lb 14.5 oz)   SpO2 97%   BMI 81.03 kg/m²       Objective:   Physical Exam   Constitutional: He is oriented to person, place, and time. He appears well-developed and well-nourished. No distress.   HENT:   Head: Normocephalic and atraumatic.   Eyes: Pupils are equal, round, and reactive to light.   Neck: Normal range of motion and full passive range of  motion without pain. Neck supple. No JVD present.   Cardiovascular: Regular rhythm, S1 normal, S2 normal and intact distal pulses.  Occasional extrasystoles are present. Bradycardia present. PMI is not displaced. Exam reveals no distant heart sounds.   No murmur heard.  Pulses:       Radial pulses are 2+ on the right side, and 2+ on the left side.        Dorsalis pedis pulses are 2+ on the right side, and 2+ on the left side.   Pulmonary/Chest: Effort normal. No accessory muscle usage. No respiratory distress. He has decreased breath sounds in the right lower field and the left lower field. He has no wheezes. He has no rales.   Abdominal: Soft. Bowel sounds are normal. He exhibits no distension. There is no tenderness.   +morbidly obese   Musculoskeletal: Normal range of motion. He exhibits edema (BLE trace).        Right ankle: He exhibits swelling.        Left ankle: He exhibits swelling.   Neurological: He is alert and oriented to person, place, and time.   Skin: Skin is warm and dry. He is not diaphoretic. No cyanosis. Nails show no clubbing.   Psychiatric: He has a normal mood and affect. His speech is normal and behavior is normal. Judgment and thought content normal. Cognition and memory are normal.   Nursing note and vitals reviewed.      Lab Results   Component Value Date    CHOL 191 01/22/2020    CHOL 211 (H) 10/16/2019    CHOL 180 07/08/2019     Lab Results   Component Value Date    HDL 31 (L) 01/22/2020    HDL 31 (L) 10/16/2019    HDL 32 (L) 07/08/2019     Lab Results   Component Value Date    LDLCALC 124.4 01/22/2020    LDLCALC 140.6 10/16/2019    LDLCALC 116.4 07/08/2019     Lab Results   Component Value Date    TRIG 178 (H) 01/22/2020    TRIG 197 (H) 10/16/2019    TRIG 158 (H) 07/08/2019     Lab Results   Component Value Date    CHOLHDL 16.2 (L) 01/22/2020    CHOLHDL 14.7 (L) 10/16/2019    CHOLHDL 17.8 (L) 07/08/2019       Chemistry        Component Value Date/Time     01/22/2020 0923    K 4.2  01/22/2020 0923    CL 96 01/22/2020 0923    CO2 28 01/22/2020 0923    BUN 14 01/22/2020 0923    CREATININE 1.3 01/22/2020 0923     (H) 01/22/2020 0923        Component Value Date/Time    CALCIUM 9.5 01/22/2020 0923    ALKPHOS 67 01/22/2020 0923    AST 19 01/22/2020 0923    ALT 24 01/22/2020 0923    BILITOT 0.5 01/22/2020 0923    ESTGFRAFRICA >60 01/22/2020 0923    EGFRNONAA >60 01/22/2020 0923          Lab Results   Component Value Date    TSH 1.641 10/03/2019     No results found for: INR, PROTIME  Lab Results   Component Value Date    WBC 6.39 03/19/2019    HGB 13.2 (L) 03/19/2019    HCT 41.3 03/19/2019    MCV 94 03/19/2019     03/19/2019        Assessment:      1. Heart failure with reduced ejection fraction, NYHA class III    2. Chronic combined systolic and diastolic congestive heart failure    3. Essential hypertension    4. Moderate mitral regurgitation    5. Paroxysmal atrial flutter    6. CKD (chronic kidney disease) stage 3, GFR 30-59 ml/min    7. Morbid obesity with BMI of 70 and over, adult    8. Type 2 diabetes mellitus without complication, without long-term current use of insulin    9. Mild tricuspid valve regurgitation    10. BRINDA on CPAP      Patient presents to clinic for CHF follow up  NO new cardiac complaints today in office  Plan:     Continue all other CV meds for now  Dash diet, 2 gm sodium restriction  1500 ml fluid restriction  Encourage regular physical activity  Encourage weight loss  RTC in 3 months or sooner if needed with BMP, BNP    Nicole May, FNP-C [facility]

## 2020-06-16 NOTE — TELEPHONE ENCOUNTER
Unable to schedule due to pt insurance can one of you schedule the pt for me.     Thanks in advance
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
## 2020-06-30 NOTE — PROGRESS NOTES
Requested updates within Care Everywhere.  Patient's chart was reviewed for overdue RAFAEL topics.  Immunizations reconciled.

## 2020-08-12 NOTE — PROGRESS NOTES
Updates were requested from care everywhere.  Chart was reviewed for overdue Proactive [facility] Encounters (RAFAEL) topics (CRS, Breast Cancer Screening, Eye exam)  Health Maintenance has been updated.  Eye exam scheduled 8/13/20.

## 2020-08-13 NOTE — LETTER
August 14, 2020      Surya Kerns PA-C  [address]  [place] [address]           [facility]  [address]  [place] [address]  Phone: [phone]  Fax: [phone]          Patient: Chacha Zendejas   MR Number: [id number]   YOB: 1980   Date of Visit: 8/13/2020       Dear Surya Kerns:    Thank you for referring Chacha Zendejas to me for evaluation. Attached you will find relevant portions of my assessment and plan of care.    If you have questions, please do not hesitate to call me. I look forward to following Chacha Zendejas along with you.    Sincerely,    Davide Saleh, OD    Enclosure  CC:  No Recipients    If you would like to receive this communication electronically, please contact [email or web address] or [phone] to request more information on [facility] Link access.    For providers and/or their staff who would like to refer a patient to [facility], please contact us through our one-stop-shop provider referral line, [facility] , at [phone].    If you feel you have received this communication in error or would no longer like to receive these types of communications, please e-mail [email or web address]

## 2020-08-13 NOTE — PROGRESS NOTES
PCP: Brandy Abreu DO    Subjective:     Chief Complaint: Diabetes Follow up    HISTORY OF PRESENT ILLNESS: 40 y.o.   male presenting for diabetes management visit.   Patient has previously been established with the Diabetes Management Department and was last seen by myself on 06/30/20.  Patient has had Type II diabetes since 2019.  Pertinent to decision making is the following comorbidities: HTN, HLD, CHF, CKD III and Obesity by BMI  Patient has the following Diabetes complications: with diabetic chronic kidney disease  He  has attended diabetes education in the past.     Patient's most recent A1c of 9.5% was completed 7 months ago.   Patient states since His last A1c His blood glucose levels have been high  throughout the day .   Patient monitors blood glucose 3 times per day: Fasting and Before Lunch.  Patient blood glucose monitoring device will not be uploaded into [employer] today secondary to unable to upload successfully.   Per patient recall, fasting blood sugars ranging from 200 - 300 and before lunch ranging from 215 - 300.   Patient endorses the following diabetes related symptoms: Polydipsia, Polyuria and Leg swelling or edema. Patient manages edema with compression stockings but did not wear today.   Patient is due today for the following diabetes-related health maintenance standards: Foot Exam  and A1c.   He denies recent hospital admissions or emergency room visits.   He denies having hypoglycemia.   Patient's concerns today include glycemic control.  Patient medication regimen is as below.     CURRENT DM MEDICATIONS:    Metformin 1000 mg BID    Lantus 50 units daily   Novolog 15 units TID wm    Bydureon 2 mg weekly - patient did not     Patient has failed the following Diabetes medications:    N/A      Labs Reviewed.       No results found for: CPEPTIDE  No results found for: GLUTAMICACID    Height: 5' (152.4 cm)  //  Weight: (!) 183.2 kg (403 lb 14.1 oz), Body mass  index is 78.88 kg/m².  His blood sugar in clinic today is:    450 @ 1:25  pm     Review of Systems   Constitutional: Negative for activity change, appetite change, chills and fever.   HENT: Negative for dental problem, mouth sores, nosebleeds, sore throat and trouble swallowing.    Eyes: Negative for pain and discharge.   Respiratory: Negative for shortness of breath, wheezing and stridor.    Cardiovascular: Positive for leg swelling (Chronic; related to CHF). Negative for chest pain and palpitations.   Gastrointestinal: Negative for abdominal pain, diarrhea, nausea and vomiting.   Endocrine: Positive for polydipsia and polyuria. Negative for polyphagia.   Genitourinary: Negative for dysuria, frequency and urgency.   Musculoskeletal: Negative for joint swelling and myalgias.   Skin: Negative for rash and wound.   Neurological: Negative for dizziness, syncope, weakness and headaches.   Psychiatric/Behavioral: Negative for behavioral problems and dysphoric mood.         Diabetes Management Status  Statin: Taking  ACE/ARB: Taking    Screening or Prevention Patient's value Goal Complete/Controlled?   HgA1C Testing and Control   Lab Results   Component Value Date    HGBA1C 9.5 (H) 01/22/2020      Annually/Less than 8% No   Lipid profile : 01/22/2020 Annually Yes   LDL control Lab Results   Component Value Date    LDLCALC 124.4 01/22/2020    Annually/Less than 100 mg/dl  No   Nephropathy screening Lab Results   Component Value Date    MICALBCREAT 15.3 10/16/2019     Lab Results   Component Value Date    PROTEINUA Negative 03/14/2019    Annually Yes   Blood pressure BP Readings from Last 1 Encounters:   08/13/20 116/75    Less than 140/90 No   Dilated retinal exam Most Recent Eye Exam Date: Not Found Annually No    Foot exam   : 07/31/2019 Annually Yes     ACTIVITY LEVEL: Rarely Active. Discussed activities, benefits, methods, and precautions.  MEAL PLANNING: Patient reports number of meals per day to be 3 and number of  snacks per day to be 2.   Patient is encouraged to carb count and consume no more than 30 - 45 grams of carbohydrates in each meal and 15 grams of carbohydrates in each snack.     Social History     Socioeconomic History    Marital status: Single     Spouse name: Not on file    Number of children: Not on file    Years of education: Not on file    Highest education level: Not on file   Occupational History    Not on file   Social Needs    Financial resource strain: Not on file    Food insecurity     Worry: Not on file     Inability: Not on file    Transportation needs     Medical: Not on file     Non-medical: Not on file   Tobacco Use    Smoking status: Former Smoker     Types: Cigarettes    Smokeless tobacco: Never Used   Substance and Sexual Activity    Alcohol use: Yes     Comment: socially    Drug use: No    Sexual activity: Yes   Lifestyle    Physical activity     Days per week: Not on file     Minutes per session: Not on file    Stress: Not at all   Relationships    Social connections     Talks on phone: Not on file     Gets together: Not on file     Attends Che[employer] service: Not on file     Active member of club or organization: Not on file     Attends meetings of clubs or organizations: Not on file     Relationship status: Not on file   Other Topics Concern    Not on file   Social History Narrative    Not on file     Past Medical History:   Diagnosis Date    Atrial flutter     Cellulitis     RLE    CHF (congestive heart failure)     Diabetes mellitus, type 2     Hypertension     Hypothyroidism     Sleep apnea     Venous insufficiency of left lower extremity        Objective:      Physical Exam  Constitutional:       General: He is not in acute distress.     Appearance: He is well-developed. He is not diaphoretic.   HENT:      Head: Normocephalic and atraumatic.      Right Ear: External ear normal.      Left Ear: External ear normal.      Nose: Nose normal.   Eyes:      General:          Right eye: No discharge.         Left eye: No discharge.      Pupils: Pupils are equal, round, and reactive to light.   Neck:      Musculoskeletal: Normal range of motion and neck supple.   Cardiovascular:      Rate and Rhythm: Normal rate and regular rhythm.      Pulses:           Dorsalis pedis pulses are 2+ on the right side and 2+ on the left side.        Posterior tibial pulses are 2+ on the right side and 2+ on the left side.      Heart sounds: Normal heart sounds. No murmur.   Pulmonary:      Effort: Pulmonary effort is normal.      Breath sounds: Normal breath sounds.   Abdominal:      Palpations: Abdomen is soft.   Musculoskeletal: Normal range of motion.      Right lower leg: Edema present.      Left lower leg: Edema present.      Right foot: Normal range of motion. No deformity.      Left foot: Normal range of motion. No deformity.   Feet:      Right foot:      Protective Sensation: 6 sites tested. 3 sites sensed.      Skin integrity: Skin breakdown (Maceration between toes), callus and dry skin present. No ulcer, blister, erythema or warmth.      Toenail Condition: Right toenails are abnormally thick. Fungal disease present.     Left foot:      Protective Sensation: 6 sites tested. 4 sites sensed.      Skin integrity: Skin breakdown (Maceration between toes), callus and dry skin present. No ulcer, blister, erythema or warmth.      Toenail Condition: Left toenails are abnormally thick. Fungal disease present.  Skin:     General: Skin is warm and dry.      Capillary Refill: Capillary refill takes less than 2 seconds.   Neurological:      Mental Status: He is oriented to person, place, and time.   Psychiatric:         Behavior: Behavior normal.         Thought Content: Thought content normal.         Judgment: Judgment normal.              Assessment / Plan:     Uncontrolled type 2 diabetes mellitus with hyperglycemia, without long-term current use of insulin  -     POCT Glucose, Hand-Held Device  -      exenatide microspheres (BYDUREON BCISE) 2 mg/0.85 mL AtIn; Inject 2 mg into the skin every 7 days.  Dispense: 10.2 mL; Refill: 3  -     insulin (LANTUS SOLOSTAR U-100 INSULIN) glargine 100 units/mL (3mL) SubQ pen; Inject 60 Units into the skin every evening.  Dispense: 15 mL; Refill: 11  -     metFORMIN (GLUCOPHAGE) 500 MG tablet; Take 2 tablets (1,000 mg total) by mouth 2 (two) times daily with meals.  Dispense: 120 tablet; Refill: 11  -     insulin aspart U-100 (NOVOLOG) 100 unit/mL (3 mL) InPn pen; Inject 20 Units into the skin 3 (three) times daily with meals.  Dispense: 18 mL; Refill: 11  -     flash glucose sensor (FREESTYLE KENDRICK 14 DAY SENSOR) Kit; 1 each by Misc.(Non-Drug; Combo Route) route every 14 (fourteen) days.  Dispense: 2 kit; Refill: 11  -     C-peptide; Future; Expected date: 08/13/2020  -     Glucose, fasting; Future; Expected date: 08/13/2020  -     Glutamic acid decarboxylase; Future; Expected date: 08/13/2020  -     Ambulatory referral/consult to Podiatry; Future; Expected date: 08/20/2020    CKD (chronic kidney disease) stage 3, GFR 30-59 ml/min    Heart failure with reduced ejection fraction, NYHA class III    Essential hypertension    BRINDA on CPAP    Morbid obesity with BMI of 70 and over, adult      Additional Plan Details:    - POCT Glucose  - Encouraged continuation of lifestyle changes including regular exercise and limiting carbohydrates to 30-45 grams per meal threes times daily and 15 grams per snack with a limit of two daily.   - Encouraged continued monitoring of blood glucose with an increase to 3 times daily at Fasting, Before Lunch and Before Bed. Kendrick Rx and add Kendrick sharing.   - Current DM Medication Regimen: Continue Metformin 1000 mg BID. Change Lantus to 60 units daily . Change Novolog 20 units TID wm. Start Bydureon 2 mg weekly.   - Referral to Podiatry for High Risk Diabetic Foot Exam  - Fasting labs scheduled  - Health Maintenance standards addressed today: Foot Exam -  completed today and documented in physical exam with feedback to patient about proper diabetic foot care and findings and Eye Exam - will be completed within [facility] system and scheduled today.   - Nursing Visit: Patient is age 79 or younger with an A1c of 7.5 or greater and will need nursing visit by Phone in 2 weeks for BG log review and non-insulin adjustment since patient unable or unwilling to come in for physical visit.   - Follow up with me in 6 weeks.      Surya Kerns PA-C  [facility]    A total of 30 minutes was spent in face to face time, of which over 50 % was spent in counseling patient on disease process, complications, treatment, and side effects of medications.

## 2020-08-13 NOTE — PROGRESS NOTES
HPI     Diabetic Eye Exam     Comments: Yearly              Comments     NP to DNL  Patient here today for yearly eye exam  Diabetic eye exam  Diagnosed with diabetes in 2019  Recent vision fluctuations No  Lab Results       Component                Value               Date                       HGBA1C                   9.5 (H)             01/22/2020            HPI    Any vision changes since last exam: Yes at near   Eye pain: No  Other ocular symptoms: Viviane    Do you wear currently wear glasses or contacts? None    Interested in contacts today? No    Do you plan on getting new glasses today? If needed                Last edited by Lizette Hernandez, PCT on 8/13/2020  2:31 PM. (History)              Assessment /Plan     For exam results, see Encounter Report.    Type 2 diabetes mellitus without retinopathy  No diabetic retinopathy in either eye  Continue close care with PCP   Monitor 12 months    Hyperopia with astigmatism and presbyopia, bilateral  Hold spec Rx for now due to increased blood sugar  PT is to try OTC readers prn  Will rtc for undilated refraction prn      RTC 1 yr for dilated eye exam or PRN if any problems.   Discussed above and answered questions.

## 2020-09-02 NOTE — TELEPHONE ENCOUNTER
Good Morning,     The prior authorization for Chacha Zendejas's Bydureon Bcise prescription has been DENIED for the following reason(s): pharmacy records do not show patient using Metformin for at least 90 days within previous 180 day period.  Patient has not tried and failed Bydureon, Byetta or Victoza first.       Patient has been notified of the decision on 9/2/2020.  Bydureon Pen has been called in and patient has picked up today.     If there are any additional questions or concerns, please contact me.    Thank You!   Emily Gaston CPhT, [email or web address]  Patient Care Advocate   [facility]  Phone: [phone] Ext [phone]  Fax: [phone]

## 2020-09-02 NOTE — TELEPHONE ENCOUNTER
Edwin I spoke with Mr. Zendejas and he stated that he is taking the metformin and that he just came by today to  an refill.

## 2020-09-11 NOTE — PROGRESS NOTES
.nmpr  Subjective:   Patient ID:  Chacha Zendejas is a 40 y.o. male who presents for evaluation of No chief complaint on file.      HPI     Chacha Zendejas is a 40 year old male who presents to clinic for CHF follow up. His current medical conditions include Morbid obesity, new onset DM, Chronic combined CHF, HFrEF, EF recovered to 40-45%, AFL on Eliquis, BRINDA on CPAP, PHTN. He returns today and states he is doing ok.     NO new cardiac complaints today in office. He has been diagnosed with diabetes and is following with diabetes. He is trying to make adjustments to his diet and cut back on his portion sizes. Reports compliance with nightly cpap.     Denies chest pain or anginal equivalents. No shortness of breath, CREWS or palpitations. Denies orthopnea, PND or abdominal bloating. Reports regular walking without any issues lately. NO leg swelling or claudications. No recent falls, syncope or near syncopal events. Reports compliance with medications and dietary restrictions. NO CNS complaints to suggest TIA or CVA today. No signs of abnormal bleeding on Eliquis.       2D ECHO   CONCLUSIONS     1 - Concentric hypertrophy.     2 - Mildly to moderately depressed left ventricular systolic function (EF 40-45%).     3 - Indeterminate LV diastolic function.     4 - Normal right ventricular systolic function .     5 - The estimated PA systolic pressure is greater than 32 mmHg.     6 - Mild mitral regurgitation.     7 - Poor imaging quality             This document has been electronically    SIGNED BY: Demond Mendoza MD On: 10/14/2019 18:01          Past Medical History:   Diagnosis Date    Atrial flutter     Cellulitis     RLE    CHF (congestive heart failure)     Diabetes mellitus, type 2     Hypertension     Hypothyroidism     Sleep apnea     Venous insufficiency of left lower extremity        Past Surgical History:   Procedure Laterality Date    TREATMENT OF CARDIAC ARRHYTHMIA N/A 3/18/2019    Procedure:  "CARDIOVERSION OR DEFIBRILLATION;  Surgeon: Talib Ratliff MD;  Location: [facility];  Service: Cardiology;  Laterality: N/A;       Social History     Tobacco Use    Smoking status: Former Smoker     Types: Cigarettes    Smokeless tobacco: Never Used   Substance Use Topics    Alcohol use: Yes     Comment: socially    Drug use: No       Family History   Problem Relation Age of Onset    Heart disease Father      Wt Readings from Last 3 Encounters:   09/14/20 (!) 187.2 kg (412 lb 11.2 oz)   09/14/20 (!) 186.8 kg (411 lb 13.1 oz)   08/13/20 (!) 183.2 kg (403 lb 14.1 oz)     Temp Readings from Last 3 Encounters:   06/17/20 98.5 °F (36.9 °C) (Tympanic)   06/02/20 98.2 °F (36.8 °C)   02/10/20 98.5 °F (36.9 °C) (Oral)     BP Readings from Last 3 Encounters:   09/14/20 138/80   09/14/20 137/85   08/13/20 116/75     Pulse Readings from Last 3 Encounters:   09/14/20 98   09/14/20 (!) 45   08/13/20 90     Current Outpatient Medications on File Prior to Visit   Medication Sig Dispense Refill    amiodarone (PACERONE) 200 MG Tab Take 1 tablet (200 mg total) by mouth once daily. 90 tablet 3    apixaban (ELIQUIS) 5 mg Tab Take 1 tablet (5 mg total) by mouth 2 (two) times daily. 180 tablet 3    atorvastatin (LIPITOR) 20 MG tablet Take 1 tablet (20 mg total) by mouth once daily. 90 tablet 3    BD ULTRA-FINE MELISSA PEN NEEDLE 32 gauge x 5/32" Ndle USE TO INJECT INSULIN EVERY  each 11    blood sugar diagnostic Strp 1 strip by Misc.(Non-Drug; Combo Route) route 4 (four) times daily. 100 each 11    blood-glucose meter kit Test finger stick blood sugar once daily. 1 each 0    blood-glucose meter kit Use as instructed 1 each 0    bumetanide (BUMEX) 2 MG tablet Take 1 tablet (2 mg total) by mouth 2 (two) times daily. 180 tablet 3    carvediloL (COREG) 25 MG tablet Take 1 tablet (25 mg total) by mouth 2 (two) times daily with meals. 180 tablet 3    digoxin (LANOXIN) 125 mcg tablet Take 0.5 tablets (62.5 mcg total) " by mouth once daily. 45 tablet 3    exenatide microspheres (BYDUREON) 2 mg/0.65 mL PnIj Inject 2 mg into the skin every 7 days. 4 each 11    flash glucose scanning reader (FREESTYLE KENDRICK 14 DAY READER) Misc 1 each by Misc.(Non-Drug; Combo Route) route once daily. 1 each 0    flash glucose sensor (FREESTYLE KENDRICK 14 DAY SENSOR) Kit 1 each by Misc.(Non-Drug; Combo Route) route every 14 (fourteen) days. 2 kit 11    fluticasone propionate (FLONASE) 50 mcg/actuation nasal spray INSTILL 1 SPRAY INTO EACH NOSTRIL ONCE DAILY AS DIRECTED 16 mL 3    hydrALAZINE (APRESOLINE) 50 MG tablet Take 1 tablet (50 mg total) by mouth every 12 (twelve) hours. 180 tablet 3    insulin (LANTUS SOLOSTAR U-100 INSULIN) glargine 100 units/mL (3mL) SubQ pen Inject 60 Units into the skin every evening. 15 mL 11    insulin aspart U-100 (NOVOLOG) 100 unit/mL (3 mL) InPn pen Inject 20 Units into the skin 3 (three) times daily with meals. 18 mL 11    isosorbide dinitrate (ISORDIL) 40 MG Tab Take 1 tablet (40 mg total) by mouth 2 (two) times daily. 180 tablet 3    lancets Misc 1 each by Misc.(Non-Drug; Combo Route) route 4 (four) times daily. 100 each 11    levothyroxine (SYNTHROID) 50 MCG tablet Take 1 tablet (50 mcg total) by mouth before breakfast. 90 tablet 3    losartan (COZAAR) 100 MG tablet TAKE 1 TABLET BY MOUTH EVERY DAY 30 tablet 3    metFORMIN (GLUCOPHAGE) 500 MG tablet Take 2 tablets (1,000 mg total) by mouth 2 (two) times daily with meals. 120 tablet 11    metOLazone (ZAROXOLYN) 5 MG tablet Take 1 tablet (5 mg total) by mouth once daily. Take 30 minutes before Bumex 30 tablet 6    potassium chloride SA (K-DUR,KLOR-CON) 20 MEQ tablet Take 1 tablet (20 mEq total) by mouth once daily. 90 tablet 1    spironolactone (ALDACTONE) 50 MG tablet Take 1 tablet (50 mg total) by mouth once daily. 90 tablet 3     No current facility-administered medications on file prior to visit.        Review of Systems   Constitution: Positive for  malaise/fatigue.   HENT: Negative for hearing loss and hoarse voice.    Eyes: Negative for blurred vision and visual disturbance.   Cardiovascular: Positive for dyspnea on exertion (improved). Negative for chest pain, claudication, irregular heartbeat, leg swelling, near-syncope, orthopnea, palpitations, paroxysmal nocturnal dyspnea and syncope.        +bendopnea   Respiratory: Negative for cough, hemoptysis, shortness of breath, sleep disturbances due to breathing, snoring and wheezing.    Endocrine: Negative for cold intolerance and heat intolerance.   Hematologic/Lymphatic: Bruises/bleeds easily (on eliquis).   Skin: Negative for color change, dry skin and nail changes.   Musculoskeletal: Positive for arthritis and back pain. Negative for joint pain and myalgias.   Gastrointestinal: Negative for bloating, abdominal pain, constipation, nausea and vomiting.   Genitourinary: Negative for dysuria, flank pain, hematuria and hesitancy.   Neurological: Negative for headaches, light-headedness, loss of balance, numbness, paresthesias and weakness.   Psychiatric/Behavioral: Negative for altered mental status.   Allergic/Immunologic: Negative for environmental allergies.     /80 (BP Location: Left arm, Patient Position: Sitting)   Pulse 98   Wt (!) 187.2 kg (412 lb 11.2 oz)   SpO2 95%   BMI 80.60 kg/m²     Objective:   Physical Exam   Constitutional: He is oriented to person, place, and time. He appears well-developed and well-nourished. No distress.   HENT:   Head: Normocephalic and atraumatic.   Eyes: Pupils are equal, round, and reactive to light.   Neck: Normal range of motion and full passive range of motion without pain. Neck supple. No JVD present.   Cardiovascular: Normal rate, regular rhythm, S1 normal, S2 normal and intact distal pulses.  Occasional extrasystoles are present. PMI is not displaced. Exam reveals no distant heart sounds.   No murmur heard.  Pulses:       Radial pulses are 2+ on the right  side and 2+ on the left side.        Dorsalis pedis pulses are 2+ on the right side and 2+ on the left side.   Pulmonary/Chest: Effort normal. No accessory muscle usage. No respiratory distress. He has decreased breath sounds in the right lower field and the left lower field. He has no wheezes. He has no rales.   Abdominal: Soft. Bowel sounds are normal. He exhibits no distension. There is no abdominal tenderness.   +morbidly obese   Musculoskeletal: Normal range of motion.         General: Edema (BLE trace) present.      Right ankle: He exhibits swelling.      Left ankle: He exhibits swelling.   Neurological: He is alert and oriented to person, place, and time.   Skin: Skin is warm and dry. He is not diaphoretic. No cyanosis. Nails show no clubbing.   Psychiatric: He has a normal mood and affect. His speech is normal and behavior is normal. Judgment and thought content normal. Cognition and memory are normal.   Nursing note and vitals reviewed.      Lab Results   Component Value Date    CHOL 216 (H) 09/10/2020    CHOL 191 01/22/2020    CHOL 211 (H) 10/16/2019     Lab Results   Component Value Date    HDL 27 (L) 09/10/2020    HDL 31 (L) 01/22/2020    HDL 31 (L) 10/16/2019     Lab Results   Component Value Date    LDLCALC 156.2 09/10/2020    LDLCALC 124.4 01/22/2020    LDLCALC 140.6 10/16/2019     Lab Results   Component Value Date    TRIG 164 (H) 09/10/2020    TRIG 178 (H) 01/22/2020    TRIG 197 (H) 10/16/2019     Lab Results   Component Value Date    CHOLHDL 12.5 (L) 09/10/2020    CHOLHDL 16.2 (L) 01/22/2020    CHOLHDL 14.7 (L) 10/16/2019       Chemistry        Component Value Date/Time     09/10/2020 1016     09/10/2020 1016    K 4.0 09/10/2020 1016    K 4.0 09/10/2020 1016    CL 99 09/10/2020 1016    CL 99 09/10/2020 1016    CO2 30 (H) 09/10/2020 1016    CO2 30 (H) 09/10/2020 1016    BUN 13 09/10/2020 1016    BUN 13 09/10/2020 1016    CREATININE 1.3 09/10/2020 1016    CREATININE 1.3 09/10/2020 1016      (H) 09/10/2020 1016     (H) 09/10/2020 1016        Component Value Date/Time    CALCIUM 9.2 09/10/2020 1016    CALCIUM 9.2 09/10/2020 1016    ALKPHOS 68 09/10/2020 1016    AST 12 09/10/2020 1016    ALT 18 09/10/2020 1016    BILITOT 0.6 09/10/2020 1016    ESTGFRAFRICA >60 09/10/2020 1016    ESTGFRAFRICA >60 09/10/2020 1016    EGFRNONAA >60 09/10/2020 1016    EGFRNONAA >60 09/10/2020 1016          Lab Results   Component Value Date    TSH 1.641 10/03/2019     No results found for: INR, PROTIME  Lab Results   Component Value Date    WBC 6.39 03/19/2019    HGB 13.2 (L) 03/19/2019    HCT 41.3 03/19/2019    MCV 94 03/19/2019     03/19/2019        Assessment:      1. Chronic combined systolic and diastolic congestive heart failure    2. Essential hypertension    3. Heart failure with reduced ejection fraction, NYHA class III    4. Moderate mitral regurgitation    5. Paroxysmal atrial flutter    6. Right ventricular enlargement    7. CKD (chronic kidney disease) stage 3, GFR 30-59 ml/min    8. Morbid obesity with BMI of 70 and over, adult    9. Type 2 diabetes mellitus with other circulatory complication, with long-term current use of insulin    10. Mild tricuspid valve regurgitation    11. BRINDA on CPAP      Patient presents to clinic for routine CHF follow up and is doing well today.   No new cardiac complaints today in office.   [facility]  Plan:     Continue same CV meds for now  Needs A1C <7  Keep F/U with diabetes  Dash diet, 2 gm sodium restriction  1500 ml fluid restriction  Daily walking  Encourage weight loss  Low carb diet recommended, discussed with patient  Will resend referral for Bariatric surgery to [facility]  RTC in 6 months or sooner if needed    Nicole May, FNP-C [facility]

## 2020-09-14 NOTE — PROGRESS NOTES
Subjective:       Patient ID: Chacha Zendejas is a 40 y.o. male.    Chief Complaint: Diabetic Foot Exam (Pt presents for foot exam. He c/o dry skin and elongated thickened nails. No c/o pain at present. Wears tennis shoes w/ socks. Last seen PCP on 6/17/2020.)      HPI: Chacha Zendejas presents to the office today, under referral by their Primary Care Provider, Brandy Abreu DO, for his annual diabetic foot assessment and risk evaluation.  Patient is a DMII. Patient states neuropathy and kidney pathology.  Patient is on long-term anticoagulation therapy with Eliquis.  He is being managed by cardiology.  Last cardiologist appointment was 09/14/2020.  States that his diabetes is not under control and last A1c is +14%.  He reports that he does follow with [facility] for diabetic Education with last appointment on  08/13/2020.  He complains of numbness and tingling sensation to the right left foot which has been ongoing and constant for approximately 2 weeks.  He relates 0/10 pain at present.  This patient last saw his/her primary care provider on 06/17/2020.  Patient relates that he is not active and has not been motivated for activity.  Of note, he relates he does have a young daughter, age 10, who enjoys playing basketball.    Hemoglobin A1C   Date Value Ref Range Status   09/10/2020 >14.0 (H) 4.0 - 5.6 % Final     Comment:     ADA Screening Guidelines:  5.7-6.4%  Consistent with prediabetes  >or=6.5%  Consistent with diabetes  High levels of fetal hemoglobin interfere with the HbA1C  assay. Heterozygous hemoglobin variants (HbS, HgC, etc)do  not significantly interfere with this assay.   However, presence of multiple variants may affect accuracy.     01/22/2020 9.5 (H) 4.0 - 5.6 % Final     Comment:     ADA Screening Guidelines:  5.7-6.4%  Consistent with prediabetes  >or=6.5%  Consistent with diabetes  High levels of fetal hemoglobin interfere with the HbA1C  assay. Heterozygous  hemoglobin variants (HbS, HgC, etc)do  not significantly interfere with this assay.   However, presence of multiple variants may affect accuracy.     10/16/2019 8.4 (H) 4.0 - 5.6 % Final     Comment:     ADA Screening Guidelines:  5.7-6.4%  Consistent with prediabetes  >or=6.5%  Consistent with diabetes  High levels of fetal hemoglobin interfere with the HbA1C  assay. Heterozygous hemoglobin variants (HbS, HgC, etc)do  not significantly interfere with this assay.   However, presence of multiple variants may affect accuracy.     .    Review of patient's allergies indicates:   Allergen Reactions    Iodine and iodide containing products Itching and Swelling    Shellfish containing products        Past Medical History:   Diagnosis Date    Atrial flutter     Cellulitis     RLE    CHF (congestive heart failure)     Diabetes mellitus, type 2     Hypertension     Hypothyroidism     Sleep apnea     Venous insufficiency of left lower extremity        Family History   Problem Relation Age of Onset    Heart disease Father        Social History     Socioeconomic History    Marital status: Single     Spouse name: Not on file    Number of children: Not on file    Years of education: Not on file    Highest education level: Not on file   Occupational History    Not on file   Social Needs    Financial resource strain: Not on file    Food insecurity     Worry: Not on file     Inability: Not on file    Transportation needs     Medical: Not on file     Non-medical: Not on file   Tobacco Use    Smoking status: Former Smoker     Types: Cigarettes    Smokeless tobacco: Never Used   Substance and Sexual Activity    Alcohol use: Yes     Comment: socially    Drug use: No    Sexual activity: Yes   Lifestyle    Physical activity     Days per week: Not on file     Minutes per session: Not on file    Stress: Not at all   Relationships    Social connections     Talks on phone: Not on file     Gets together: Not on file      Attends Christian service: Not on file     Active member of club or organization: Not on file     Attends meetings of clubs or organizations: Not on file     Relationship status: Not on file   Other Topics Concern    Not on file   Social History Narrative    Not on file       Past Surgical History:   Procedure Laterality Date    TREATMENT OF CARDIAC ARRHYTHMIA N/A 3/18/2019    Procedure: CARDIOVERSION OR DEFIBRILLATION;  Surgeon: Talib Ratliff MD;  Location: [facility];  Service: Cardiology;  Laterality: N/A;       Review of Systems   Constitutional: Negative for activity change, appetite change, chills and fever.   HENT: Negative for sinus pain, sore throat and voice change.    Eyes: Negative for pain, redness and visual disturbance.   Respiratory: Negative for cough and shortness of breath.    Cardiovascular: Positive for leg swelling. Negative for chest pain and palpitations.   Gastrointestinal: Negative for diarrhea, nausea and vomiting.   Musculoskeletal: Negative for back pain and joint swelling.   Skin: Negative for color change, pallor and wound.        Xerosis cutis bilateral   Neurological: Negative for dizziness, weakness and numbness.   Psychiatric/Behavioral: The patient is not nervous/anxious.          Objective:   /85   Pulse (!) 45   Ht 5' (1.524 m)   Wt (!) 186.8 kg (411 lb 13.1 oz)   BMI 80.43 kg/m²     Physical Exam  LOWER EXTREMITY PHYSICAL EXAMINATION    ORTHOPEDIC: No pain on palpation of the foot or ankle.   Range of motion within normal limits of the ankle joint, rearfoot, and forefoot.  Manual muscle strength testing is 5/5 with dorsiflexion, plantar flexion, abduction and abduction of the lower extremity.  No pain with or without resistance.  The patient is a full to ambulate without pain or discomfort.  The patient's gait is non antalgic.  The patient does not utilize any assistive device for ambulation.    VASCULAR: Capillary refill time is greater than 3s. Edema  is non-pitting and severe. The left dorsalis pedis pulse is 2/4 and the right dorsalis pedis pulse is 2/4. The left posterior tibial pulse is 1/4 and the right posterior tibial pulse is 1/4. Varicosities are absent.  Spider veins and telangiectasias are noted Hair growth is sparse to absent. Skin appearance is thin and shiny. Proximal to distal warm to cool.  Dependent rubor is noted.    NEUROLOGY: Proprioception is intact. Sensation to light touch is not intact. Sensation to pin prick is reduced. Vibratory sensation is diminished to the left and right lower extremity. Examination with 5.07 Stonewall Flavia monofilament reveals that protective sensation is not intact to the left and right plantar surfaces of the foot and digits, as the patient has no sensation/detection at greater than 4 distinct points of contact.     DERMATOLOGY: Skin is dry, thickened, intact.  There is bilateral flaking to the plantar surface of the right left foot.  There is no overt breaks in the skin.  There is no fissures noted.  The nails on the right foot 1, 2, 5 in the left foot 1, 2, 5 are thickened, discolored, dystrophic, malodorous with subungual debris.  The nail plates are yellowing to brown in color.  The remaining nails on the right foot left foot 3-4 are elongated with normal color, thickness, and texture.  There is no callusing, ulceration, or other lesions identified to the dorsal plantar aspect the right left foot.  The web spaces are clean, dry, intact bilaterally.    Assessment:     1. Encounter for comprehensive diabetic foot examination, type 2 diabetes mellitus    2. Uncontrolled type 2 diabetes mellitus with peripheral neuropathy    3. Uncontrolled type 2 diabetes mellitus with hyperglycemia, without long-term current use of insulin    4. Current use of long term anticoagulation    5. Dermatophytosis of nail    6. Morbid obesity with BMI of 70 and over, adult    7. Xerosis cutis        Plan:     Encounter for  comprehensive diabetic foot examination, type 2 diabetes mellitus  I counseled the patient on his/her Diabetic Mellitus regarding today's clinical examination and objection findings. We did also discuss recent medication changes, pertinent labs and imaging evaluations and other medical consultation notes and progress notes. Greater than 50% of this visit was spent on counseling and coordination of care. Greater than 20 minutes of this appt. was spent on education about the diabetic foot, in relation to PVD and/or neuropathy, and the prevention of limb loss.     Shoe gear is inspected and wear and proper fit/type. Patient is reminded of the importance of good nutrition and blood sugar control to help prevent podiatric complications of diabetes. Patient instructed on proper foot hygeine. We discussed wearing proper shoe gear, daily foot inspections, never walking without protective shoe gear, never putting sharp instruments to feet.  Patient  will continue to monitor the areas daily, inspect feet, wear protective shoe gear when ambulatory, moisturizer to maintain skin integrity.     Patient's DMI/DMII is managed by Primary Care Provider and/or Endocrinology Advanced Practice Provider.    Uncontrolled type 2 diabetes mellitus with peripheral neuropathy  Uncontrolled type 2 diabetes mellitus with hyperglycemia, without long-term current use of insulin  -     Ambulatory referral/consult to Podiatry  Foot counseling and education is provided at this visit. Patient is advised to wear socks and shoes at all times.  Do not walk barefoot, or with just socks, even when indoors.  Be sure to check and inspect the inside of the shoe before putting them on her feet.  Protect your feet at all times.  Walking shoes and/or athletic shoes are the best types of shoe gear. Do not wear vinyl or plastic type shoe gear, as they do not stretch and/or breathe.  Protect your feet from hot and/or cold. Elevate the extremities when sitting.  Do  not wear excessively tight socks and/or shoe gear. Wiggle your toes for a few minutes throughout the day. Move your ankles up and down, in and out, to help blood flow in your lower extremity.     Current use of long term anticoagulation  Patient continues on anticoagulation therapy per Cardiology.    Dermatophytosis of nail   The onychomycotic nail plates, as outlined above (R# 1, 2, 5  ; L# 1, 2, 5), are sharply debrided with double action nail nipper, and/or with the assistance of a mechanical rotary connie, with removal of all offending nail and nail border(s), for reduction of pains. Nails are reduced in terms of length, width and girth with removal of subungual debris to facilitate pain free weight bearing and ambulation. The elongated nails as outlined in the objective portion of this note, were trimmed to appropriate length, with a double action nail nipper, for alleviation/reduction of pains as well. Follow up in approx. 3-4 months.    Morbid obesity with BMI of 70 and over, adult  Patient is counseled and reminded concerning the importance of good nutrition and healthy eating habits, which may include blood sugar control, to prevent and/or help podiatric foot and ankle complications.  Encouraged patient to initiate exercising or play basketball with his daughter.  I discussed with him the importance of daily activity and exercise as this will help with overall BMI and decreasing his A1c.  Encourage patient to make a conscious effort daily to do some sort of physical activity    Xerosis cutis  Recommend twice to 3 times daily topical emollient.  Recommend utilizes product following bathing or showering as this traps the fluid into the skin and helps to reduce overall dryness.  Did discuss with the patient concerning dry and thin skin in relation to complications due to comorbid states.  [facility]  Discussed with patient that this would be  helpful in reducing the significant dryness and improving the overall skin texture.  Recommend over-the-counter urea cream.

## 2020-09-30 PROBLEM — Z79.4 UNCONTROLLED TYPE 2 DIABETES MELLITUS WITH HYPERGLYCEMIA, WITH LONG-TERM CURRENT USE OF INSULIN: Status: ACTIVE | Noted: 2020-01-01

## 2020-09-30 PROBLEM — E66.01 MORBID OBESITY WITH BMI OF 70 AND OVER, ADULT: Status: RESOLVED | Noted: 2019-03-18 | Resolved: 2020-01-01

## 2020-09-30 PROBLEM — E11.65 UNCONTROLLED TYPE 2 DIABETES MELLITUS WITH HYPERGLYCEMIA, WITH LONG-TERM CURRENT USE OF INSULIN: Status: ACTIVE | Noted: 2020-01-01

## 2020-09-30 PROBLEM — N18.30 CKD (CHRONIC KIDNEY DISEASE) STAGE 3, GFR 30-59 ML/MIN: Status: RESOLVED | Noted: 2019-03-14 | Resolved: 2020-01-01

## 2020-09-30 NOTE — PROGRESS NOTES
Subjective:       Patient ID: Chacha Zendejas is a 40 y.o. male.    Chief Complaint: Follow-up (3 month)    Chacha Zendejas  40 y.o. Black or  male    Patient presents with:  Follow-up: 3 month    HPI: Here today to follow up on chronic conditions.  Diabetes--uncontrolled. He is taking metformin, Lantus 50 units daily (instead of 60) and Novolog 20 units TID. He has not taken the Bydureon because he is not sure how to operate the pen. He checks his glucoses regularly and states his readings have been above 200 fasting. He denies non compliance with his diet. He is seeing diabetes management.                     HGBA1C                   >14.0 (H)           09/10/2020            HTN--stable on carvedilol, losartan, bumetanide and metolazone.   HLD--compliant with atorvastatin.                  LDLCALC                  156.2               09/10/2020                 CHOL                     216 (H)             09/10/2020                 TRIG                     164 (H)             09/10/2020                 HDL                      27 (L)              09/10/2020                 ALT                      18                  09/10/2020                 AST                      12                  09/10/2020                 NA                       138                 09/10/2020                 K                        4.0                 09/10/2020                 CL                       99                  09/10/2020                 CREATININE               1.3                 09/10/2020                 BUN                      13                  09/10/2020                 CO2                      30 (H)              09/10/2020                 TSH                      1.641               10/03/2019            BRINDA--on CPAP.     Past Medical History:  Atrial flutter  Cellulitis      Comment:  RLE  CHF (congestive heart failure)  Diabetes mellitus, type 2  Hypertension  Hypothyroidism  Sleep  "apnea  Venous insufficiency of left lower extremity    Current Outpatient Medications on File Prior to Visit:  apixaban (ELIQUIS) 2.5 mg Tab, Take by mouth., Disp: , Rfl:   atorvastatin (LIPITOR) 20 MG tablet, Take 1 tablet (20 mg total) by mouth once daily., Disp: 90 tablet, Rfl: 3  BD ULTRA-FINE MELISSA PEN NEEDLE 32 gauge x 5/32" Ndle, USE TO INJECT INSULIN EVERY DAY, Disp: 100 each, Rfl: 11  blood sugar diagnostic Strp, 1 strip by Misc.(Non-Drug; Combo Route) route 4 (four) times daily., Disp: 100 each, Rfl: 11  blood-glucose meter kit, Test finger stick blood sugar once daily., Disp: 1 each, Rfl: 0  bumetanide (BUMEX) 1 MG tablet, Take 1 mg by mouth 2 (two) times daily. , Disp: , Rfl:   carvediloL (COREG) 25 MG tablet, Take 1 tablet (25 mg total) by mouth 2 (two) times daily with meals., Disp: 180 tablet, Rfl: 3  diltiaZEM (CARDIZEM SR) 60 MG Cp12, Take 60 mg by mouth once daily. , Disp: , Rfl:   exenatide microspheres (BYDUREON) 2 mg/0.65 mL PnIj, Inject 2 mg into the skin every 7 days., Disp: 4 each, Rfl: 11  hydrALAZINE (APRESOLINE) 50 MG tablet, Take 1 tablet (50 mg total) by mouth every 12 (twelve) hours., Disp: 180 tablet, Rfl: 3  insulin (LANTUS SOLOSTAR U-100 INSULIN) glargine 100 units/mL (3mL) SubQ pen, Inject 60 Units into the skin every evening., Disp: 15 mL, Rfl: 11  insulin aspart U-100 (NOVOLOG) 100 unit/mL (3 mL) InPn pen, Inject 20 Units into the skin 3 (three) times daily with meals., Disp: 18 mL, Rfl: 11  isosorbide dinitrate (ISORDIL) 40 MG Tab, Take 1 tablet (40 mg total) by mouth 2 (two) times daily., Disp: 180 tablet, Rfl: 3  lancets Misc, 1 each by Misc.(Non-Drug; Combo Route) route 4 (four) times daily., Disp: 100 each, Rfl: 11  levothyroxine (SYNTHROID) 50 MCG tablet, Take 1 tablet (50 mcg total) by mouth before breakfast., Disp: 90 tablet, Rfl: 3  losartan (COZAAR) 100 MG tablet, TAKE 1 TABLET BY MOUTH EVERY DAY, Disp: 30 tablet, Rfl: 3  metFORMIN (GLUCOPHAGE) 1000 MG tablet, Take 1,000 " mg by mouth., Disp: , Rfl:   metOLazone (ZAROXOLYN) 5 MG tablet, Take 1 tablet (5 mg total) by mouth once daily. Take 30 minutes before Bumex, Disp: 30 tablet, Rfl: 6  ONETOUCH DELICA LANCETS 30 gauge Misc, 1 EACH BY MISC.(NON DRUG COMBO ROUTE) ROUTE 4 (FOUR) TIMES DAILY., Disp: , Rfl:   ONETOUCH VERIO FLEX METER Misc, USE AS INSTRUCTED, Disp: , Rfl:   potassium chloride SA (K-DUR,KLOR-CON) 20 MEQ tablet, Take 1 tablet (20 mEq total) by mouth once daily., Disp: 90 tablet, Rfl: 1  spironolactone (ALDACTONE) 50 MG tablet, Take 1 tablet (50 mg total) by mouth once daily., Disp: 90 tablet, Rfl: 3  voriconazole (VFEND) 200 mg injection, MIX THE CONTENTS OF 1 VIAL WITH 10ML DILUENT PROVIDED. SPRAY ONTO AFFECTED AREA AND COVER WITH BASSA-GEL. PERFORM TWICE DAILY., Disp: , Rfl:   amiodarone (PACERONE) 200 MG Tab, Take 1 tablet (200 mg total) by mouth once daily. (Patient not taking: Reported on 9/30/2020), Disp: 90 tablet, Rfl: 3  digoxin (LANOXIN) 125 mcg tablet, Take 0.5 tablets (62.5 mcg total) by mouth once daily. (Patient not taking: Reported on 9/30/2020), Disp: 45 tablet, Rfl: 3  gentamicin sulfate (GENTAMICIN, BULK,) 590 mcg/mg Powd, , Disp: , Rfl:     Allergies:  Review of patient's allergies indicates:   -- Iodine and iodide containing products -- Itching and Swelling   -- Shellfish containing products       Review of Systems   Constitutional: Negative for fatigue.   Eyes: Negative for visual disturbance.   Respiratory: Negative for shortness of breath.    Cardiovascular: Negative for chest pain.   Genitourinary: Negative for difficulty urinating.   Musculoskeletal: Negative for gait problem.   Neurological: Negative for dizziness and headaches.         Objective:      Physical Exam  Vitals signs reviewed.   Constitutional:       General: He is not in acute distress.     Appearance: He is well-developed. He is obese. He is not ill-appearing.   Cardiovascular:      Rate and Rhythm: Normal rate.   Pulmonary:       Effort: Pulmonary effort is normal. No respiratory distress.   Neurological:      Mental Status: He is alert and oriented to person, place, and time.   Psychiatric:         Behavior: Behavior normal.         Thought Content: Thought content normal.         Judgment: Judgment normal.         Assessment:       1. Uncontrolled type 2 diabetes mellitus with hyperglycemia, with long-term current use of insulin    2. Hypertension goal BP (blood pressure) < 130/80    3. Hyperlipidemia LDL goal <70    4. BRINDA on CPAP    5. Morbid obesity with BMI of 50.0-59.9, adult    6. Immunization due        Plan:       Chacha was seen today for follow-up.    Diagnoses and all orders for this visit:    Uncontrolled type 2 diabetes mellitus with hyperglycemia, with long-term current use of insulin  -     Counseled regarding medication compliance  -     Lifestyle modifications discussed  -     F/U with diabetes management  -     Microalbumin/creatinine urine ratio; Future    Hypertension goal BP (blood pressure) < 130/80  -     Continue current management  -     TSH; Future    Hyperlipidemia LDL goal <70  -     Lifestyle modifications discussed  -     Continue atorvastatin    BRINDA on CPAP    Morbid obesity with BMI of 50.0-59.9, adult  -     Lifestyle modifications discussed    Immunization due  -     Influenza - Quadrivalent (PF)    Labs and f/u in 3 months.

## 2020-12-30 NOTE — PROGRESS NOTES
Hemoglobin A1C report  Patient has a lab appointment scheduled today for a 3 month recheck of Hemoglobin A1C.   Patient has appointment scheduled with his PCP on 01/06/2021 for 3 month follow up.

## 2021-01-01 ENCOUNTER — PATIENT MESSAGE (OUTPATIENT)
Dept: SURGERY | Facility: CLINIC | Age: 41
End: 2021-01-01

## 2021-01-01 ENCOUNTER — OFFICE VISIT (OUTPATIENT)
Dept: INTERNAL MEDICINE | Facility: CLINIC | Age: 41
End: 2021-01-01
Payer: MEDICAID

## 2021-01-01 ENCOUNTER — TELEPHONE (OUTPATIENT)
Dept: INTERNAL MEDICINE | Facility: CLINIC | Age: 41
End: 2021-01-01

## 2021-01-01 ENCOUNTER — HOSPITAL ENCOUNTER (INPATIENT)
Facility: HOSPITAL | Age: 41
LOS: 13 days | DRG: 291 | End: 2021-03-16
Attending: EMERGENCY MEDICINE | Admitting: HOSPITALIST
Payer: MEDICAID

## 2021-01-01 ENCOUNTER — PATIENT OUTREACH (OUTPATIENT)
Dept: ADMINISTRATIVE | Facility: HOSPITAL | Age: 41
End: 2021-01-01

## 2021-01-01 ENCOUNTER — OFFICE VISIT (OUTPATIENT)
Dept: PODIATRY | Facility: CLINIC | Age: 41
End: 2021-01-01
Payer: MEDICAID

## 2021-01-01 VITALS
SYSTOLIC BLOOD PRESSURE: 100 MMHG | BODY MASS INDEX: 45.1 KG/M2 | HEART RATE: 95 BPM | TEMPERATURE: 97 F | DIASTOLIC BLOOD PRESSURE: 60 MMHG | HEIGHT: 70 IN | OXYGEN SATURATION: 97 % | WEIGHT: 315 LBS

## 2021-01-01 VITALS — HEIGHT: 70 IN | BODY MASS INDEX: 45.1 KG/M2 | WEIGHT: 315 LBS

## 2021-01-01 VITALS
TEMPERATURE: 104 F | RESPIRATION RATE: 34 BRPM | BODY MASS INDEX: 45.1 KG/M2 | WEIGHT: 315 LBS | DIASTOLIC BLOOD PRESSURE: 118 MMHG | SYSTOLIC BLOOD PRESSURE: 237 MMHG | OXYGEN SATURATION: 88 % | HEART RATE: 114 BPM | HEIGHT: 70 IN

## 2021-01-01 DIAGNOSIS — E11.65 UNCONTROLLED TYPE 2 DIABETES MELLITUS WITH HYPERGLYCEMIA, WITH LONG-TERM CURRENT USE OF INSULIN: ICD-10-CM

## 2021-01-01 DIAGNOSIS — E78.5 HYPERLIPIDEMIA LDL GOAL <70: ICD-10-CM

## 2021-01-01 DIAGNOSIS — N17.9 AKI (ACUTE KIDNEY INJURY): ICD-10-CM

## 2021-01-01 DIAGNOSIS — I48.3 TYPICAL ATRIAL FLUTTER: Primary | ICD-10-CM

## 2021-01-01 DIAGNOSIS — E03.9 HYPOTHYROIDISM (ACQUIRED): ICD-10-CM

## 2021-01-01 DIAGNOSIS — R79.89 TROPONIN I ABOVE REFERENCE RANGE: ICD-10-CM

## 2021-01-01 DIAGNOSIS — Z79.4 UNCONTROLLED TYPE 2 DIABETES MELLITUS WITH HYPERGLYCEMIA, WITH LONG-TERM CURRENT USE OF INSULIN: ICD-10-CM

## 2021-01-01 DIAGNOSIS — I50.9 CHF (CONGESTIVE HEART FAILURE): ICD-10-CM

## 2021-01-01 DIAGNOSIS — R07.9 CHEST PAIN: ICD-10-CM

## 2021-01-01 DIAGNOSIS — R34 OLIGURIA: ICD-10-CM

## 2021-01-01 DIAGNOSIS — K72.00 SHOCK LIVER: ICD-10-CM

## 2021-01-01 DIAGNOSIS — Z51.5 PALLIATIVE CARE ENCOUNTER: ICD-10-CM

## 2021-01-01 DIAGNOSIS — I10 HYPERTENSION GOAL BP (BLOOD PRESSURE) < 130/80: ICD-10-CM

## 2021-01-01 DIAGNOSIS — Z79.01 CURRENT USE OF LONG TERM ANTICOAGULATION: ICD-10-CM

## 2021-01-01 DIAGNOSIS — I50.40 COMBINED SYSTOLIC AND DIASTOLIC CONGESTIVE HEART FAILURE, UNSPECIFIED HF CHRONICITY: ICD-10-CM

## 2021-01-01 DIAGNOSIS — J96.21 ACUTE ON CHRONIC RESPIRATORY FAILURE WITH HYPOXIA AND HYPERCAPNIA: ICD-10-CM

## 2021-01-01 DIAGNOSIS — B35.1 DERMATOPHYTOSIS OF NAIL: ICD-10-CM

## 2021-01-01 DIAGNOSIS — J69.0 ASPIRATION PNEUMONIA OF BOTH LUNGS, UNSPECIFIED ASPIRATION PNEUMONIA TYPE, UNSPECIFIED PART OF LUNG: ICD-10-CM

## 2021-01-01 DIAGNOSIS — I50.43 ACUTE ON CHRONIC COMBINED SYSTOLIC AND DIASTOLIC CONGESTIVE HEART FAILURE: ICD-10-CM

## 2021-01-01 DIAGNOSIS — I50.22 CHRONIC SYSTOLIC HEART FAILURE: ICD-10-CM

## 2021-01-01 DIAGNOSIS — R57.0 CARDIOGENIC SHOCK: ICD-10-CM

## 2021-01-01 DIAGNOSIS — E11.65 UNCONTROLLED TYPE 2 DIABETES MELLITUS WITH HYPERGLYCEMIA, WITH LONG-TERM CURRENT USE OF INSULIN: Primary | ICD-10-CM

## 2021-01-01 DIAGNOSIS — I48.92 FLUTTER-FIBRILLATION: ICD-10-CM

## 2021-01-01 DIAGNOSIS — I48.91 FLUTTER-FIBRILLATION: ICD-10-CM

## 2021-01-01 DIAGNOSIS — J96.22 ACUTE ON CHRONIC RESPIRATORY FAILURE WITH HYPOXIA AND HYPERCAPNIA: ICD-10-CM

## 2021-01-01 DIAGNOSIS — I46.9 CARDIAC ARREST: ICD-10-CM

## 2021-01-01 DIAGNOSIS — E11.65 UNCONTROLLED TYPE 2 DIABETES MELLITUS WITH HYPERGLYCEMIA, WITH LONG-TERM CURRENT USE OF INSULIN: Chronic | ICD-10-CM

## 2021-01-01 DIAGNOSIS — K92.2 UGIB (UPPER GASTROINTESTINAL BLEED): ICD-10-CM

## 2021-01-01 DIAGNOSIS — N18.30 STAGE 3 CHRONIC KIDNEY DISEASE, UNSPECIFIED WHETHER STAGE 3A OR 3B CKD: ICD-10-CM

## 2021-01-01 DIAGNOSIS — E87.20 METABOLIC ACIDOSIS: ICD-10-CM

## 2021-01-01 DIAGNOSIS — E66.01 MORBID OBESITY WITH BMI OF 60.0-69.9, ADULT: ICD-10-CM

## 2021-01-01 DIAGNOSIS — E66.01 MORBID OBESITY WITH BMI OF 60.0-69.9, ADULT: Chronic | ICD-10-CM

## 2021-01-01 DIAGNOSIS — G47.33 OSA ON CPAP: ICD-10-CM

## 2021-01-01 DIAGNOSIS — E66.01 MORBID OBESITY WITH BMI OF 50.0-59.9, ADULT: ICD-10-CM

## 2021-01-01 DIAGNOSIS — I50.1 CARDIOGENIC PULMONARY EDEMA: ICD-10-CM

## 2021-01-01 DIAGNOSIS — Z79.4 UNCONTROLLED TYPE 2 DIABETES MELLITUS WITH HYPERGLYCEMIA, WITH LONG-TERM CURRENT USE OF INSULIN: Chronic | ICD-10-CM

## 2021-01-01 DIAGNOSIS — R56.9 SEIZURE-LIKE ACTIVITY: ICD-10-CM

## 2021-01-01 DIAGNOSIS — Z79.4 UNCONTROLLED TYPE 2 DIABETES MELLITUS WITH HYPERGLYCEMIA, WITH LONG-TERM CURRENT USE OF INSULIN: Primary | ICD-10-CM

## 2021-01-01 LAB
ALBUMIN SERPL BCP-MCNC: 2 G/DL (ref 3.5–5.2)
ALBUMIN SERPL BCP-MCNC: 2 G/DL (ref 3.5–5.2)
ALBUMIN SERPL BCP-MCNC: 2.1 G/DL (ref 3.5–5.2)
ALBUMIN SERPL BCP-MCNC: 2.2 G/DL (ref 3.5–5.2)
ALBUMIN SERPL BCP-MCNC: 2.3 G/DL (ref 3.5–5.2)
ALBUMIN SERPL BCP-MCNC: 2.4 G/DL (ref 3.5–5.2)
ALBUMIN SERPL BCP-MCNC: 2.5 G/DL (ref 3.5–5.2)
ALBUMIN SERPL BCP-MCNC: 2.5 G/DL (ref 3.5–5.2)
ALBUMIN SERPL BCP-MCNC: 2.6 G/DL (ref 3.5–5.2)
ALBUMIN SERPL BCP-MCNC: 2.9 G/DL (ref 3.5–5.2)
ALBUMIN SERPL BCP-MCNC: 3 G/DL (ref 3.5–5.2)
ALBUMIN SERPL BCP-MCNC: 3 G/DL (ref 3.5–5.2)
ALBUMIN SERPL BCP-MCNC: 3.2 G/DL (ref 3.5–5.2)
ALBUMIN SERPL BCP-MCNC: 3.3 G/DL (ref 3.5–5.2)
ALLENS TEST: ABNORMAL
ALP SERPL-CCNC: 104 U/L (ref 55–135)
ALP SERPL-CCNC: 119 U/L (ref 55–135)
ALP SERPL-CCNC: 125 U/L (ref 55–135)
ALP SERPL-CCNC: 126 U/L (ref 55–135)
ALP SERPL-CCNC: 156 U/L (ref 55–135)
ALP SERPL-CCNC: 172 U/L (ref 55–135)
ALP SERPL-CCNC: 189 U/L (ref 55–135)
ALP SERPL-CCNC: 220 U/L (ref 55–135)
ALP SERPL-CCNC: 64 U/L (ref 55–135)
ALP SERPL-CCNC: 67 U/L (ref 55–135)
ALP SERPL-CCNC: 72 U/L (ref 55–135)
ALP SERPL-CCNC: 76 U/L (ref 55–135)
ALP SERPL-CCNC: 83 U/L (ref 55–135)
ALP SERPL-CCNC: 83 U/L (ref 55–135)
ALP SERPL-CCNC: 85 U/L (ref 55–135)
ALP SERPL-CCNC: 99 U/L (ref 55–135)
ALT SERPL W/O P-5'-P-CCNC: 1162 U/L (ref 10–44)
ALT SERPL W/O P-5'-P-CCNC: 14 U/L (ref 10–44)
ALT SERPL W/O P-5'-P-CCNC: 1757 U/L (ref 10–44)
ALT SERPL W/O P-5'-P-CCNC: 178 U/L (ref 10–44)
ALT SERPL W/O P-5'-P-CCNC: 2865 U/L (ref 10–44)
ALT SERPL W/O P-5'-P-CCNC: 33 U/L (ref 10–44)
ALT SERPL W/O P-5'-P-CCNC: 33 U/L (ref 10–44)
ALT SERPL W/O P-5'-P-CCNC: 334 U/L (ref 10–44)
ALT SERPL W/O P-5'-P-CCNC: 3575 U/L (ref 10–44)
ALT SERPL W/O P-5'-P-CCNC: 3972 U/L (ref 10–44)
ALT SERPL W/O P-5'-P-CCNC: 48 U/L (ref 10–44)
ALT SERPL W/O P-5'-P-CCNC: 562 U/L (ref 10–44)
ALT SERPL W/O P-5'-P-CCNC: 816 U/L (ref 10–44)
ALT SERPL W/O P-5'-P-CCNC: 833 U/L (ref 10–44)
ALT SERPL W/O P-5'-P-CCNC: 95 U/L (ref 10–44)
ALT SERPL W/O P-5'-P-CCNC: 96 U/L (ref 10–44)
AMPHET+METHAMPHET UR QL: NEGATIVE
ANION GAP SERPL CALC-SCNC: 10 MMOL/L (ref 8–16)
ANION GAP SERPL CALC-SCNC: 11 MMOL/L (ref 8–16)
ANION GAP SERPL CALC-SCNC: 12 MMOL/L (ref 8–16)
ANION GAP SERPL CALC-SCNC: 12 MMOL/L (ref 8–16)
ANION GAP SERPL CALC-SCNC: 13 MMOL/L (ref 8–16)
ANION GAP SERPL CALC-SCNC: 14 MMOL/L (ref 8–16)
ANION GAP SERPL CALC-SCNC: 15 MMOL/L (ref 8–16)
ANION GAP SERPL CALC-SCNC: 16 MMOL/L (ref 8–16)
ANION GAP SERPL CALC-SCNC: 17 MMOL/L (ref 8–16)
ANION GAP SERPL CALC-SCNC: 18 MMOL/L (ref 8–16)
ANION GAP SERPL CALC-SCNC: 19 MMOL/L (ref 8–16)
ANION GAP SERPL CALC-SCNC: 28 MMOL/L (ref 8–16)
ANION GAP SERPL CALC-SCNC: 9 MMOL/L (ref 8–16)
ANISOCYTOSIS BLD QL SMEAR: SLIGHT
AORTIC ROOT ANNULUS: 3.59 CM
APTT BLDCRRT: 24.1 SEC (ref 21–32)
APTT BLDCRRT: 24.2 SEC (ref 21–32)
APTT BLDCRRT: 26.4 SEC (ref 21–32)
APTT BLDCRRT: 27.1 SEC (ref 21–32)
APTT BLDCRRT: 29.4 SEC (ref 21–32)
APTT BLDCRRT: 29.6 SEC (ref 21–32)
APTT BLDCRRT: 30.3 SEC (ref 21–32)
APTT BLDCRRT: 32.5 SEC (ref 21–32)
APTT BLDCRRT: 32.5 SEC (ref 21–32)
APTT BLDCRRT: 34.6 SEC (ref 21–32)
APTT BLDCRRT: 34.9 SEC (ref 21–32)
APTT BLDCRRT: 35.8 SEC (ref 21–32)
APTT BLDCRRT: 36.5 SEC (ref 21–32)
APTT BLDCRRT: 36.7 SEC (ref 21–32)
APTT BLDCRRT: 37 SEC (ref 21–32)
APTT BLDCRRT: 37.6 SEC (ref 21–32)
APTT BLDCRRT: 37.7 SEC (ref 21–32)
APTT BLDCRRT: 38 SEC (ref 21–32)
APTT BLDCRRT: 38.2 SEC (ref 21–32)
APTT BLDCRRT: 38.5 SEC (ref 21–32)
APTT BLDCRRT: 38.7 SEC (ref 21–32)
APTT BLDCRRT: 39.3 SEC (ref 21–32)
APTT BLDCRRT: 39.3 SEC (ref 21–32)
APTT BLDCRRT: 41.6 SEC (ref 21–32)
APTT BLDCRRT: 42.7 SEC (ref 21–32)
APTT BLDCRRT: 44.2 SEC (ref 21–32)
APTT BLDCRRT: 47.4 SEC (ref 21–32)
APTT BLDCRRT: 51.1 SEC (ref 21–32)
APTT BLDCRRT: 51.2 SEC (ref 21–32)
APTT BLDCRRT: 51.2 SEC (ref 21–32)
APTT BLDCRRT: 55.2 SEC (ref 21–32)
APTT BLDCRRT: 61.2 SEC (ref 21–32)
APTT BLDCRRT: 75.4 SEC (ref 21–32)
APTT BLDCRRT: 76.5 SEC (ref 21–32)
APTT BLDCRRT: 76.9 SEC (ref 21–32)
ASCENDING AORTA: 2.72 CM
AST SERPL-CCNC: 106 U/L (ref 10–40)
AST SERPL-CCNC: 154 U/L (ref 10–40)
AST SERPL-CCNC: 159 U/L (ref 10–40)
AST SERPL-CCNC: 1973 U/L (ref 10–40)
AST SERPL-CCNC: 27 U/L (ref 10–40)
AST SERPL-CCNC: 304 U/L (ref 10–40)
AST SERPL-CCNC: 32 U/L (ref 10–40)
AST SERPL-CCNC: 35 U/L (ref 10–40)
AST SERPL-CCNC: 38 U/L (ref 10–40)
AST SERPL-CCNC: 38 U/L (ref 10–40)
AST SERPL-CCNC: 412 U/L (ref 10–40)
AST SERPL-CCNC: 6105 U/L (ref 10–40)
AST SERPL-CCNC: 64 U/L (ref 10–40)
AST SERPL-CCNC: 75 U/L (ref 10–40)
AST SERPL-CCNC: 78 U/L (ref 10–40)
AST SERPL-CCNC: 7871 U/L (ref 10–40)
AV INDEX (PROSTH): 0.66
AV MEAN GRADIENT: 2 MMHG
AV PEAK GRADIENT: 3 MMHG
AV VALVE AREA: 2.06 CM2
AV VELOCITY RATIO: 0.51
BACTERIA #/AREA URNS HPF: ABNORMAL /HPF
BACTERIA #/AREA URNS HPF: ABNORMAL /HPF
BACTERIA BLD CULT: NORMAL
BACTERIA BLD CULT: NORMAL
BACTERIA SPEC AEROBE CULT: ABNORMAL
BACTERIA SPEC AEROBE CULT: ABNORMAL
BARBITURATES UR QL SCN>200 NG/ML: NEGATIVE
BASOPHILS # BLD AUTO: 0.03 K/UL (ref 0–0.2)
BASOPHILS # BLD AUTO: 0.04 K/UL (ref 0–0.2)
BASOPHILS # BLD AUTO: 0.05 K/UL (ref 0–0.2)
BASOPHILS # BLD AUTO: 0.05 K/UL (ref 0–0.2)
BASOPHILS # BLD AUTO: 0.06 K/UL (ref 0–0.2)
BASOPHILS # BLD AUTO: 0.1 K/UL (ref 0–0.2)
BASOPHILS # BLD AUTO: ABNORMAL K/UL (ref 0–0.2)
BASOPHILS NFR BLD: 0 % (ref 0–1.9)
BASOPHILS NFR BLD: 0.2 % (ref 0–1.9)
BASOPHILS NFR BLD: 0.2 % (ref 0–1.9)
BASOPHILS NFR BLD: 0.3 % (ref 0–1.9)
BASOPHILS NFR BLD: 0.4 % (ref 0–1.9)
BASOPHILS NFR BLD: 0.5 % (ref 0–1.9)
BASOPHILS NFR BLD: 0.7 % (ref 0–1.9)
BENZODIAZ UR QL SCN>200 NG/ML: NEGATIVE
BILIRUB SERPL-MCNC: 0.5 MG/DL (ref 0.1–1)
BILIRUB SERPL-MCNC: 0.5 MG/DL (ref 0.1–1)
BILIRUB SERPL-MCNC: 0.6 MG/DL (ref 0.1–1)
BILIRUB SERPL-MCNC: 0.7 MG/DL (ref 0.1–1)
BILIRUB SERPL-MCNC: 0.7 MG/DL (ref 0.1–1)
BILIRUB SERPL-MCNC: 0.8 MG/DL (ref 0.1–1)
BILIRUB SERPL-MCNC: 0.9 MG/DL (ref 0.1–1)
BILIRUB SERPL-MCNC: 0.9 MG/DL (ref 0.1–1)
BILIRUB SERPL-MCNC: 1.1 MG/DL (ref 0.1–1)
BILIRUB SERPL-MCNC: 1.2 MG/DL (ref 0.1–1)
BILIRUB SERPL-MCNC: 1.4 MG/DL (ref 0.1–1)
BILIRUB UR QL STRIP: NEGATIVE
BILIRUB UR QL STRIP: NEGATIVE
BNP SERPL-MCNC: 200 PG/ML (ref 0–99)
BSA FOR ECHO PROCEDURE: 3.15 M2
BUN SERPL-MCNC: 27 MG/DL (ref 6–20)
BUN SERPL-MCNC: 34 MG/DL (ref 6–20)
BUN SERPL-MCNC: 41 MG/DL (ref 6–20)
BUN SERPL-MCNC: 44 MG/DL (ref 6–20)
BUN SERPL-MCNC: 45 MG/DL (ref 6–20)
BUN SERPL-MCNC: 47 MG/DL (ref 6–20)
BUN SERPL-MCNC: 48 MG/DL (ref 6–20)
BUN SERPL-MCNC: 48 MG/DL (ref 6–20)
BUN SERPL-MCNC: 50 MG/DL (ref 6–20)
BUN SERPL-MCNC: 51 MG/DL (ref 6–20)
BUN SERPL-MCNC: 52 MG/DL (ref 6–20)
BUN SERPL-MCNC: 53 MG/DL (ref 6–20)
BUN SERPL-MCNC: 53 MG/DL (ref 6–20)
BUN SERPL-MCNC: 54 MG/DL (ref 6–20)
BUN SERPL-MCNC: 54 MG/DL (ref 6–20)
BUN SERPL-MCNC: 55 MG/DL (ref 6–20)
BUN SERPL-MCNC: 56 MG/DL (ref 6–20)
BUN SERPL-MCNC: 57 MG/DL (ref 6–20)
BUN SERPL-MCNC: 57 MG/DL (ref 6–20)
BUN SERPL-MCNC: 59 MG/DL (ref 6–20)
BUN SERPL-MCNC: 60 MG/DL (ref 6–20)
BUN SERPL-MCNC: 61 MG/DL (ref 6–20)
BUN SERPL-MCNC: 61 MG/DL (ref 6–20)
BUN SERPL-MCNC: 62 MG/DL (ref 6–20)
BUN SERPL-MCNC: 65 MG/DL (ref 6–20)
BUN SERPL-MCNC: 65 MG/DL (ref 6–20)
BUN SERPL-MCNC: 73 MG/DL (ref 6–20)
BUN SERPL-MCNC: 76 MG/DL (ref 6–20)
BUN SERPL-MCNC: 80 MG/DL (ref 6–20)
BUN SERPL-MCNC: 82 MG/DL (ref 6–20)
BUN SERPL-MCNC: 82 MG/DL (ref 6–20)
BUN SERPL-MCNC: 83 MG/DL (ref 6–20)
BUN SERPL-MCNC: 85 MG/DL (ref 6–20)
BURR CELLS BLD QL SMEAR: ABNORMAL
BURR CELLS BLD QL SMEAR: ABNORMAL
BZE UR QL SCN: NEGATIVE
CALCIUM SERPL-MCNC: 7 MG/DL (ref 8.7–10.5)
CALCIUM SERPL-MCNC: 7.8 MG/DL (ref 8.7–10.5)
CALCIUM SERPL-MCNC: 7.8 MG/DL (ref 8.7–10.5)
CALCIUM SERPL-MCNC: 8 MG/DL (ref 8.7–10.5)
CALCIUM SERPL-MCNC: 8.1 MG/DL (ref 8.7–10.5)
CALCIUM SERPL-MCNC: 8.2 MG/DL (ref 8.7–10.5)
CALCIUM SERPL-MCNC: 8.3 MG/DL (ref 8.7–10.5)
CALCIUM SERPL-MCNC: 8.4 MG/DL (ref 8.7–10.5)
CALCIUM SERPL-MCNC: 8.6 MG/DL (ref 8.7–10.5)
CALCIUM SERPL-MCNC: 8.6 MG/DL (ref 8.7–10.5)
CALCIUM SERPL-MCNC: 8.7 MG/DL (ref 8.7–10.5)
CALCIUM SERPL-MCNC: 8.8 MG/DL (ref 8.7–10.5)
CALCIUM SERPL-MCNC: 8.9 MG/DL (ref 8.7–10.5)
CALCIUM SERPL-MCNC: 9.1 MG/DL (ref 8.7–10.5)
CALCIUM SERPL-MCNC: 9.1 MG/DL (ref 8.7–10.5)
CALCIUM SERPL-MCNC: 9.2 MG/DL (ref 8.7–10.5)
CALCIUM SERPL-MCNC: 9.2 MG/DL (ref 8.7–10.5)
CALCIUM SERPL-MCNC: 9.4 MG/DL (ref 8.7–10.5)
CALCIUM SERPL-MCNC: 9.4 MG/DL (ref 8.7–10.5)
CANNABINOIDS UR QL SCN: NEGATIVE
CHLORIDE SERPL-SCNC: 100 MMOL/L (ref 95–110)
CHLORIDE SERPL-SCNC: 101 MMOL/L (ref 95–110)
CHLORIDE SERPL-SCNC: 102 MMOL/L (ref 95–110)
CHLORIDE SERPL-SCNC: 92 MMOL/L (ref 95–110)
CHLORIDE SERPL-SCNC: 93 MMOL/L (ref 95–110)
CHLORIDE SERPL-SCNC: 93 MMOL/L (ref 95–110)
CHLORIDE SERPL-SCNC: 94 MMOL/L (ref 95–110)
CHLORIDE SERPL-SCNC: 95 MMOL/L (ref 95–110)
CHLORIDE SERPL-SCNC: 96 MMOL/L (ref 95–110)
CHLORIDE SERPL-SCNC: 97 MMOL/L (ref 95–110)
CHLORIDE SERPL-SCNC: 98 MMOL/L (ref 95–110)
CHLORIDE SERPL-SCNC: 98 MMOL/L (ref 95–110)
CHLORIDE SERPL-SCNC: 99 MMOL/L (ref 95–110)
CK SERPL-CCNC: 144 U/L (ref 20–200)
CK SERPL-CCNC: 197 U/L (ref 20–200)
CLARITY UR: CLEAR
CLARITY UR: CLEAR
CO2 SERPL-SCNC: 10 MMOL/L (ref 23–29)
CO2 SERPL-SCNC: 17 MMOL/L (ref 23–29)
CO2 SERPL-SCNC: 18 MMOL/L (ref 23–29)
CO2 SERPL-SCNC: 19 MMOL/L (ref 23–29)
CO2 SERPL-SCNC: 20 MMOL/L (ref 23–29)
CO2 SERPL-SCNC: 21 MMOL/L (ref 23–29)
CO2 SERPL-SCNC: 22 MMOL/L (ref 23–29)
CO2 SERPL-SCNC: 23 MMOL/L (ref 23–29)
CO2 SERPL-SCNC: 23 MMOL/L (ref 23–29)
CO2 SERPL-SCNC: 24 MMOL/L (ref 23–29)
CO2 SERPL-SCNC: 24 MMOL/L (ref 23–29)
CO2 SERPL-SCNC: 25 MMOL/L (ref 23–29)
COLOR UR: YELLOW
COLOR UR: YELLOW
CREAT SERPL-MCNC: 1.7 MG/DL (ref 0.5–1.4)
CREAT SERPL-MCNC: 2.3 MG/DL (ref 0.5–1.4)
CREAT SERPL-MCNC: 3 MG/DL (ref 0.5–1.4)
CREAT SERPL-MCNC: 3 MG/DL (ref 0.5–1.4)
CREAT SERPL-MCNC: 3.2 MG/DL (ref 0.5–1.4)
CREAT SERPL-MCNC: 3.2 MG/DL (ref 0.5–1.4)
CREAT SERPL-MCNC: 3.3 MG/DL (ref 0.5–1.4)
CREAT SERPL-MCNC: 3.3 MG/DL (ref 0.5–1.4)
CREAT SERPL-MCNC: 3.5 MG/DL (ref 0.5–1.4)
CREAT SERPL-MCNC: 3.5 MG/DL (ref 0.5–1.4)
CREAT SERPL-MCNC: 3.8 MG/DL (ref 0.5–1.4)
CREAT SERPL-MCNC: 3.8 MG/DL (ref 0.5–1.4)
CREAT SERPL-MCNC: 3.9 MG/DL (ref 0.5–1.4)
CREAT SERPL-MCNC: 4 MG/DL (ref 0.5–1.4)
CREAT SERPL-MCNC: 4 MG/DL (ref 0.5–1.4)
CREAT SERPL-MCNC: 4.4 MG/DL (ref 0.5–1.4)
CREAT SERPL-MCNC: 4.5 MG/DL (ref 0.5–1.4)
CREAT SERPL-MCNC: 4.6 MG/DL (ref 0.5–1.4)
CREAT SERPL-MCNC: 4.7 MG/DL (ref 0.5–1.4)
CREAT SERPL-MCNC: 4.7 MG/DL (ref 0.5–1.4)
CREAT SERPL-MCNC: 4.9 MG/DL (ref 0.5–1.4)
CREAT SERPL-MCNC: 5.3 MG/DL (ref 0.5–1.4)
CREAT SERPL-MCNC: 5.3 MG/DL (ref 0.5–1.4)
CREAT SERPL-MCNC: 5.5 MG/DL (ref 0.5–1.4)
CREAT SERPL-MCNC: 5.5 MG/DL (ref 0.5–1.4)
CREAT SERPL-MCNC: 5.7 MG/DL (ref 0.5–1.4)
CREAT SERPL-MCNC: 5.8 MG/DL (ref 0.5–1.4)
CREAT SERPL-MCNC: 5.8 MG/DL (ref 0.5–1.4)
CREAT SERPL-MCNC: 6 MG/DL (ref 0.5–1.4)
CREAT SERPL-MCNC: 6.1 MG/DL (ref 0.5–1.4)
CREAT SERPL-MCNC: 6.2 MG/DL (ref 0.5–1.4)
CREAT SERPL-MCNC: 6.4 MG/DL (ref 0.5–1.4)
CREAT SERPL-MCNC: 6.5 MG/DL (ref 0.5–1.4)
CREAT SERPL-MCNC: 6.5 MG/DL (ref 0.5–1.4)
CREAT SERPL-MCNC: 6.6 MG/DL (ref 0.5–1.4)
CREAT SERPL-MCNC: 6.7 MG/DL (ref 0.5–1.4)
CREAT SERPL-MCNC: 7 MG/DL (ref 0.5–1.4)
CREAT SERPL-MCNC: 7.1 MG/DL (ref 0.5–1.4)
CREAT SERPL-MCNC: 7.2 MG/DL (ref 0.5–1.4)
CREAT UR-MCNC: 57.6 MG/DL (ref 23–375)
CTP QC/QA: YES
CV ECHO LV RWT: 0.45 CM
D DIMER PPP IA.FEU-MCNC: 3.16 MG/L FEU
DACRYOCYTES BLD QL SMEAR: ABNORMAL
DELSYS: ABNORMAL
DIFFERENTIAL METHOD: ABNORMAL
DIGOXIN SERPL-MCNC: 0.2 NG/ML (ref 0.8–2)
DIGOXIN SERPL-MCNC: 0.4 NG/ML (ref 0.8–2)
DIGOXIN SERPL-MCNC: <0.1 NG/ML (ref 0.8–2)
DOP CALC AO PEAK VEL: 0.83 M/S
DOP CALC AO VTI: 9.05 CM
DOP CALC LVOT AREA: 3.1 CM2
DOP CALC LVOT DIAMETER: 2 CM
DOP CALC LVOT PEAK VEL: 0.42 M/S
DOP CALC LVOT STROKE VOLUME: 18.65 CM3
DOP CALC RVOT PEAK VEL: 0.44 M/S
DOP CALC RVOT VTI: 6.33 CM
DOP CALCLVOT PEAK VEL VTI: 5.94 CM
E WAVE DECELERATION TIME: 129.58 MSEC
E/A RATIO: 2.47
E/E' RATIO: 16.44 M/S
ECHO LV POSTERIOR WALL: 1.62 CM (ref 0.6–1.1)
EOSINOPHIL # BLD AUTO: 0 K/UL (ref 0–0.5)
EOSINOPHIL # BLD AUTO: 0.1 K/UL (ref 0–0.5)
EOSINOPHIL # BLD AUTO: ABNORMAL K/UL (ref 0–0.5)
EOSINOPHIL NFR BLD: 0 % (ref 0–8)
EOSINOPHIL NFR BLD: 0.1 % (ref 0–8)
EOSINOPHIL NFR BLD: 0.1 % (ref 0–8)
EOSINOPHIL NFR BLD: 0.2 % (ref 0–8)
EOSINOPHIL NFR BLD: 0.3 % (ref 0–8)
EOSINOPHIL NFR BLD: 0.5 % (ref 0–8)
EOSINOPHIL NFR BLD: 0.7 % (ref 0–8)
EOSINOPHIL NFR BLD: 0.9 % (ref 0–8)
EOSINOPHIL NFR BLD: 0.9 % (ref 0–8)
EOSINOPHIL NFR BLD: 1 % (ref 0–8)
EOSINOPHIL NFR BLD: 1.5 % (ref 0–8)
ERYTHROCYTE [DISTWIDTH] IN BLOOD BY AUTOMATED COUNT: 13.7 % (ref 11.5–14.5)
ERYTHROCYTE [DISTWIDTH] IN BLOOD BY AUTOMATED COUNT: 13.8 % (ref 11.5–14.5)
ERYTHROCYTE [DISTWIDTH] IN BLOOD BY AUTOMATED COUNT: 13.8 % (ref 11.5–14.5)
ERYTHROCYTE [DISTWIDTH] IN BLOOD BY AUTOMATED COUNT: 13.9 % (ref 11.5–14.5)
ERYTHROCYTE [DISTWIDTH] IN BLOOD BY AUTOMATED COUNT: 14 % (ref 11.5–14.5)
ERYTHROCYTE [DISTWIDTH] IN BLOOD BY AUTOMATED COUNT: 14.2 % (ref 11.5–14.5)
ERYTHROCYTE [DISTWIDTH] IN BLOOD BY AUTOMATED COUNT: 14.2 % (ref 11.5–14.5)
ERYTHROCYTE [DISTWIDTH] IN BLOOD BY AUTOMATED COUNT: 14.4 % (ref 11.5–14.5)
ERYTHROCYTE [DISTWIDTH] IN BLOOD BY AUTOMATED COUNT: 14.6 % (ref 11.5–14.5)
ERYTHROCYTE [DISTWIDTH] IN BLOOD BY AUTOMATED COUNT: 14.6 % (ref 11.5–14.5)
ERYTHROCYTE [DISTWIDTH] IN BLOOD BY AUTOMATED COUNT: 14.7 % (ref 11.5–14.5)
ERYTHROCYTE [DISTWIDTH] IN BLOOD BY AUTOMATED COUNT: 14.8 % (ref 11.5–14.5)
ERYTHROCYTE [DISTWIDTH] IN BLOOD BY AUTOMATED COUNT: 14.9 % (ref 11.5–14.5)
ERYTHROCYTE [DISTWIDTH] IN BLOOD BY AUTOMATED COUNT: 14.9 % (ref 11.5–14.5)
ERYTHROCYTE [DISTWIDTH] IN BLOOD BY AUTOMATED COUNT: 15.1 % (ref 11.5–14.5)
ERYTHROCYTE [DISTWIDTH] IN BLOOD BY AUTOMATED COUNT: 15.3 % (ref 11.5–14.5)
ERYTHROCYTE [DISTWIDTH] IN BLOOD BY AUTOMATED COUNT: 15.3 % (ref 11.5–14.5)
ERYTHROCYTE [DISTWIDTH] IN BLOOD BY AUTOMATED COUNT: 15.4 % (ref 11.5–14.5)
ERYTHROCYTE [SEDIMENTATION RATE] IN BLOOD BY WESTERGREN METHOD: 24 MM/H
ERYTHROCYTE [SEDIMENTATION RATE] IN BLOOD BY WESTERGREN METHOD: 30 MM/H
ERYTHROCYTE [SEDIMENTATION RATE] IN BLOOD BY WESTERGREN METHOD: 32 MM/H
ERYTHROCYTE [SEDIMENTATION RATE] IN BLOOD BY WESTERGREN METHOD: 34 MM/H
EST. GFR  (AFRICAN AMERICAN): 10 ML/MIN/1.73 M^2
EST. GFR  (AFRICAN AMERICAN): 11 ML/MIN/1.73 M^2
EST. GFR  (AFRICAN AMERICAN): 12 ML/MIN/1.73 M^2
EST. GFR  (AFRICAN AMERICAN): 13 ML/MIN/1.73 M^2
EST. GFR  (AFRICAN AMERICAN): 14 ML/MIN/1.73 M^2
EST. GFR  (AFRICAN AMERICAN): 16 ML/MIN/1.73 M^2
EST. GFR  (AFRICAN AMERICAN): 17 ML/MIN/1.73 M^2
EST. GFR  (AFRICAN AMERICAN): 17 ML/MIN/1.73 M^2
EST. GFR  (AFRICAN AMERICAN): 18 ML/MIN/1.73 M^2
EST. GFR  (AFRICAN AMERICAN): 20 ML/MIN/1.73 M^2
EST. GFR  (AFRICAN AMERICAN): 20 ML/MIN/1.73 M^2
EST. GFR  (AFRICAN AMERICAN): 21 ML/MIN/1.73 M^2
EST. GFR  (AFRICAN AMERICAN): 23 ML/MIN/1.73 M^2
EST. GFR  (AFRICAN AMERICAN): 23 ML/MIN/1.73 M^2
EST. GFR  (AFRICAN AMERICAN): 25 ML/MIN/1.73 M^2
EST. GFR  (AFRICAN AMERICAN): 25 ML/MIN/1.73 M^2
EST. GFR  (AFRICAN AMERICAN): 26 ML/MIN/1.73 M^2
EST. GFR  (AFRICAN AMERICAN): 26 ML/MIN/1.73 M^2
EST. GFR  (AFRICAN AMERICAN): 28 ML/MIN/1.73 M^2
EST. GFR  (AFRICAN AMERICAN): 28 ML/MIN/1.73 M^2
EST. GFR  (AFRICAN AMERICAN): 39 ML/MIN/1.73 M^2
EST. GFR  (AFRICAN AMERICAN): 56 ML/MIN/1.73 M^2
EST. GFR  (NON AFRICAN AMERICAN): 10 ML/MIN/1.73 M^2
EST. GFR  (NON AFRICAN AMERICAN): 11 ML/MIN/1.73 M^2
EST. GFR  (NON AFRICAN AMERICAN): 12 ML/MIN/1.73 M^2
EST. GFR  (NON AFRICAN AMERICAN): 14 ML/MIN/1.73 M^2
EST. GFR  (NON AFRICAN AMERICAN): 15 ML/MIN/1.73 M^2
EST. GFR  (NON AFRICAN AMERICAN): 17 ML/MIN/1.73 M^2
EST. GFR  (NON AFRICAN AMERICAN): 17 ML/MIN/1.73 M^2
EST. GFR  (NON AFRICAN AMERICAN): 18 ML/MIN/1.73 M^2
EST. GFR  (NON AFRICAN AMERICAN): 20 ML/MIN/1.73 M^2
EST. GFR  (NON AFRICAN AMERICAN): 20 ML/MIN/1.73 M^2
EST. GFR  (NON AFRICAN AMERICAN): 22 ML/MIN/1.73 M^2
EST. GFR  (NON AFRICAN AMERICAN): 22 ML/MIN/1.73 M^2
EST. GFR  (NON AFRICAN AMERICAN): 23 ML/MIN/1.73 M^2
EST. GFR  (NON AFRICAN AMERICAN): 23 ML/MIN/1.73 M^2
EST. GFR  (NON AFRICAN AMERICAN): 24 ML/MIN/1.73 M^2
EST. GFR  (NON AFRICAN AMERICAN): 24 ML/MIN/1.73 M^2
EST. GFR  (NON AFRICAN AMERICAN): 34 ML/MIN/1.73 M^2
EST. GFR  (NON AFRICAN AMERICAN): 49 ML/MIN/1.73 M^2
EST. GFR  (NON AFRICAN AMERICAN): 8 ML/MIN/1.73 M^2
EST. GFR  (NON AFRICAN AMERICAN): 9 ML/MIN/1.73 M^2
FIBRINOGEN PPP-MCNC: >800 MG/DL (ref 182–366)
FIO2: 100
FIO2: 60
FIO2: 80
FIO2: 85
FIO2: 90
FIO2: 90
FRACTIONAL SHORTENING: 7 % (ref 28–44)
GLUCOSE SERPL-MCNC: 135 MG/DL (ref 70–110)
GLUCOSE SERPL-MCNC: 137 MG/DL (ref 70–110)
GLUCOSE SERPL-MCNC: 145 MG/DL (ref 70–110)
GLUCOSE SERPL-MCNC: 151 MG/DL (ref 70–110)
GLUCOSE SERPL-MCNC: 154 MG/DL (ref 70–110)
GLUCOSE SERPL-MCNC: 163 MG/DL (ref 70–110)
GLUCOSE SERPL-MCNC: 166 MG/DL (ref 70–110)
GLUCOSE SERPL-MCNC: 168 MG/DL (ref 70–110)
GLUCOSE SERPL-MCNC: 172 MG/DL (ref 70–110)
GLUCOSE SERPL-MCNC: 176 MG/DL (ref 70–110)
GLUCOSE SERPL-MCNC: 180 MG/DL (ref 70–110)
GLUCOSE SERPL-MCNC: 182 MG/DL (ref 70–110)
GLUCOSE SERPL-MCNC: 190 MG/DL (ref 70–110)
GLUCOSE SERPL-MCNC: 193 MG/DL (ref 70–110)
GLUCOSE SERPL-MCNC: 193 MG/DL (ref 70–110)
GLUCOSE SERPL-MCNC: 206 MG/DL (ref 70–110)
GLUCOSE SERPL-MCNC: 208 MG/DL (ref 70–110)
GLUCOSE SERPL-MCNC: 211 MG/DL (ref 70–110)
GLUCOSE SERPL-MCNC: 211 MG/DL (ref 70–110)
GLUCOSE SERPL-MCNC: 213 MG/DL (ref 70–110)
GLUCOSE SERPL-MCNC: 213 MG/DL (ref 70–110)
GLUCOSE SERPL-MCNC: 214 MG/DL (ref 70–110)
GLUCOSE SERPL-MCNC: 214 MG/DL (ref 70–110)
GLUCOSE SERPL-MCNC: 235 MG/DL (ref 70–110)
GLUCOSE SERPL-MCNC: 235 MG/DL (ref 70–110)
GLUCOSE SERPL-MCNC: 236 MG/DL (ref 70–110)
GLUCOSE SERPL-MCNC: 237 MG/DL (ref 70–110)
GLUCOSE SERPL-MCNC: 237 MG/DL (ref 70–110)
GLUCOSE SERPL-MCNC: 241 MG/DL (ref 70–110)
GLUCOSE SERPL-MCNC: 243 MG/DL (ref 70–110)
GLUCOSE SERPL-MCNC: 244 MG/DL (ref 70–110)
GLUCOSE SERPL-MCNC: 249 MG/DL (ref 70–110)
GLUCOSE SERPL-MCNC: 250 MG/DL (ref 70–110)
GLUCOSE SERPL-MCNC: 251 MG/DL (ref 70–110)
GLUCOSE SERPL-MCNC: 258 MG/DL (ref 70–110)
GLUCOSE SERPL-MCNC: 262 MG/DL (ref 70–110)
GLUCOSE SERPL-MCNC: 271 MG/DL (ref 70–110)
GLUCOSE SERPL-MCNC: 279 MG/DL (ref 70–110)
GLUCOSE SERPL-MCNC: 279 MG/DL (ref 70–110)
GLUCOSE SERPL-MCNC: 286 MG/DL (ref 70–110)
GLUCOSE SERPL-MCNC: 298 MG/DL (ref 70–110)
GLUCOSE SERPL-MCNC: 319 MG/DL (ref 70–110)
GLUCOSE SERPL-MCNC: 332 MG/DL (ref 70–110)
GLUCOSE UR QL STRIP: NEGATIVE
GLUCOSE UR QL STRIP: NEGATIVE
GRAM STN SPEC: ABNORMAL
HBV CORE AB SERPL QL IA: NEGATIVE
HBV SURFACE AB SER QL IA: NEGATIVE
HBV SURFACE AB SER-ACNC: NEGATIVE M[IU]/ML
HBV SURFACE AB SERPL IA-ACNC: <3 MIU/ML
HBV SURFACE AG SERPL QL IA: NEGATIVE
HCO3 UR-SCNC: 16 MMOL/L (ref 24–28)
HCO3 UR-SCNC: 19.9 MMOL/L (ref 24–28)
HCO3 UR-SCNC: 20.4 MMOL/L (ref 24–28)
HCO3 UR-SCNC: 22.2 MMOL/L (ref 24–28)
HCO3 UR-SCNC: 22.5 MMOL/L (ref 24–28)
HCO3 UR-SCNC: 22.6 MMOL/L (ref 24–28)
HCO3 UR-SCNC: 24.4 MMOL/L (ref 24–28)
HCO3 UR-SCNC: 24.5 MMOL/L (ref 24–28)
HCO3 UR-SCNC: 24.7 MMOL/L (ref 24–28)
HCO3 UR-SCNC: 24.9 MMOL/L (ref 24–28)
HCO3 UR-SCNC: 25.1 MMOL/L (ref 24–28)
HCO3 UR-SCNC: 25.4 MMOL/L (ref 24–28)
HCO3 UR-SCNC: 25.5 MMOL/L (ref 24–28)
HCO3 UR-SCNC: 25.6 MMOL/L (ref 24–28)
HCO3 UR-SCNC: 25.9 MMOL/L (ref 24–28)
HCO3 UR-SCNC: 26.2 MMOL/L (ref 24–28)
HCO3 UR-SCNC: 26.7 MMOL/L (ref 24–28)
HCO3 UR-SCNC: 26.8 MMOL/L (ref 24–28)
HCO3 UR-SCNC: 26.9 MMOL/L (ref 24–28)
HCO3 UR-SCNC: 26.9 MMOL/L (ref 24–28)
HCO3 UR-SCNC: 27 MMOL/L (ref 24–28)
HCO3 UR-SCNC: 27.4 MMOL/L (ref 24–28)
HCT VFR BLD AUTO: 28.8 % (ref 40–54)
HCT VFR BLD AUTO: 28.9 % (ref 40–54)
HCT VFR BLD AUTO: 29 % (ref 40–54)
HCT VFR BLD AUTO: 29.8 % (ref 40–54)
HCT VFR BLD AUTO: 29.9 % (ref 40–54)
HCT VFR BLD AUTO: 30.5 % (ref 40–54)
HCT VFR BLD AUTO: 30.9 % (ref 40–54)
HCT VFR BLD AUTO: 31.6 % (ref 40–54)
HCT VFR BLD AUTO: 31.7 % (ref 40–54)
HCT VFR BLD AUTO: 31.7 % (ref 40–54)
HCT VFR BLD AUTO: 32.3 % (ref 40–54)
HCT VFR BLD AUTO: 32.5 % (ref 40–54)
HCT VFR BLD AUTO: 32.7 % (ref 40–54)
HCT VFR BLD AUTO: 33.4 % (ref 40–54)
HCT VFR BLD AUTO: 34.2 % (ref 40–54)
HCT VFR BLD AUTO: 34.6 % (ref 40–54)
HCT VFR BLD AUTO: 36.2 % (ref 40–54)
HCT VFR BLD AUTO: 37.2 % (ref 40–54)
HCT VFR BLD AUTO: 38.4 % (ref 40–54)
HCT VFR BLD AUTO: 40.6 % (ref 40–54)
HCT VFR BLD AUTO: 42.6 % (ref 40–54)
HCT VFR BLD CALC: 34 %PCV (ref 36–54)
HCT VFR BLD CALC: 34 %PCV (ref 36–54)
HCT VFR BLD CALC: 40 %PCV (ref 36–54)
HCT VFR BLD CALC: 41 %PCV (ref 36–54)
HCV AB SERPL QL IA: NEGATIVE
HGB BLD-MCNC: 10.2 G/DL (ref 14–18)
HGB BLD-MCNC: 10.4 G/DL (ref 14–18)
HGB BLD-MCNC: 10.4 G/DL (ref 14–18)
HGB BLD-MCNC: 10.8 G/DL (ref 14–18)
HGB BLD-MCNC: 11.5 G/DL (ref 14–18)
HGB BLD-MCNC: 11.9 G/DL (ref 14–18)
HGB BLD-MCNC: 12.2 G/DL (ref 14–18)
HGB BLD-MCNC: 12.3 G/DL (ref 14–18)
HGB BLD-MCNC: 12.8 G/DL (ref 14–18)
HGB BLD-MCNC: 13 G/DL (ref 14–18)
HGB BLD-MCNC: 8.9 G/DL (ref 14–18)
HGB BLD-MCNC: 9.1 G/DL (ref 14–18)
HGB BLD-MCNC: 9.1 G/DL (ref 14–18)
HGB BLD-MCNC: 9.2 G/DL (ref 14–18)
HGB BLD-MCNC: 9.4 G/DL (ref 14–18)
HGB BLD-MCNC: 9.6 G/DL (ref 14–18)
HGB BLD-MCNC: 9.7 G/DL (ref 14–18)
HGB BLD-MCNC: 9.7 G/DL (ref 14–18)
HGB BLD-MCNC: 9.8 G/DL (ref 14–18)
HGB BLD-MCNC: 9.9 G/DL (ref 14–18)
HGB BLD-MCNC: 9.9 G/DL (ref 14–18)
HGB UR QL STRIP: ABNORMAL
HGB UR QL STRIP: NEGATIVE
HIV 1+2 AB+HIV1 P24 AG SERPL QL IA: NEGATIVE
HYALINE CASTS #/AREA URNS LPF: 0 /LPF
HYALINE CASTS #/AREA URNS LPF: 1 /LPF
IMM GRANULOCYTES # BLD AUTO: 0.04 K/UL (ref 0–0.04)
IMM GRANULOCYTES # BLD AUTO: 0.09 K/UL (ref 0–0.04)
IMM GRANULOCYTES # BLD AUTO: 0.12 K/UL (ref 0–0.04)
IMM GRANULOCYTES # BLD AUTO: 0.15 K/UL (ref 0–0.04)
IMM GRANULOCYTES # BLD AUTO: 0.18 K/UL (ref 0–0.04)
IMM GRANULOCYTES # BLD AUTO: 0.19 K/UL (ref 0–0.04)
IMM GRANULOCYTES # BLD AUTO: 0.29 K/UL (ref 0–0.04)
IMM GRANULOCYTES # BLD AUTO: 0.39 K/UL (ref 0–0.04)
IMM GRANULOCYTES # BLD AUTO: 0.43 K/UL (ref 0–0.04)
IMM GRANULOCYTES # BLD AUTO: 0.81 K/UL (ref 0–0.04)
IMM GRANULOCYTES # BLD AUTO: ABNORMAL K/UL (ref 0–0.04)
IMM GRANULOCYTES NFR BLD AUTO: 0.5 % (ref 0–0.5)
IMM GRANULOCYTES NFR BLD AUTO: 0.7 % (ref 0–0.5)
IMM GRANULOCYTES NFR BLD AUTO: 1 % (ref 0–0.5)
IMM GRANULOCYTES NFR BLD AUTO: 1 % (ref 0–0.5)
IMM GRANULOCYTES NFR BLD AUTO: 1.1 % (ref 0–0.5)
IMM GRANULOCYTES NFR BLD AUTO: 1.3 % (ref 0–0.5)
IMM GRANULOCYTES NFR BLD AUTO: 1.8 % (ref 0–0.5)
IMM GRANULOCYTES NFR BLD AUTO: 2.1 % (ref 0–0.5)
IMM GRANULOCYTES NFR BLD AUTO: 3.8 % (ref 0–0.5)
IMM GRANULOCYTES NFR BLD AUTO: 4.7 % (ref 0–0.5)
IMM GRANULOCYTES NFR BLD AUTO: ABNORMAL % (ref 0–0.5)
INR PPP: 1 (ref 0.8–1.2)
INR PPP: 1.1 (ref 0.8–1.2)
INR PPP: 1.1 (ref 0.8–1.2)
INTERVENTRICULAR SEPTUM: 1.48 CM (ref 0.6–1.1)
IP: 22
IP: 24
IT: 0.5
IT: 0.53
IT: 0.63
KETONES UR QL STRIP: NEGATIVE
KETONES UR QL STRIP: NEGATIVE
LACTATE SERPL-SCNC: 1.1 MMOL/L (ref 0.5–2.2)
LACTATE SERPL-SCNC: 1.2 MMOL/L (ref 0.5–2.2)
LACTATE SERPL-SCNC: 1.2 MMOL/L (ref 0.5–2.2)
LACTATE SERPL-SCNC: 12 MMOL/L (ref 0.5–2.2)
LACTATE SERPL-SCNC: 3 MMOL/L (ref 0.5–2.2)
LEFT ATRIUM SIZE: 3.58 CM
LEFT INTERNAL DIMENSION IN SYSTOLE: 6.72 CM (ref 2.1–4)
LEFT VENTRICLE DIASTOLIC VOLUME INDEX: 93.78 ML/M2
LEFT VENTRICLE DIASTOLIC VOLUME: 273.85 ML
LEFT VENTRICLE MASS INDEX: 206 G/M2
LEFT VENTRICLE SYSTOLIC VOLUME INDEX: 79.8 ML/M2
LEFT VENTRICLE SYSTOLIC VOLUME: 233.12 ML
LEFT VENTRICULAR INTERNAL DIMENSION IN DIASTOLE: 7.22 CM (ref 3.5–6)
LEFT VENTRICULAR MASS: 601.92 G
LEUKOCYTE ESTERASE UR QL STRIP: ABNORMAL
LEUKOCYTE ESTERASE UR QL STRIP: ABNORMAL
LV LATERAL E/E' RATIO: 18.5 M/S
LV SEPTAL E/E' RATIO: 14.8 M/S
LYMPHOCYTES # BLD AUTO: 0.7 K/UL (ref 1–4.8)
LYMPHOCYTES # BLD AUTO: 0.7 K/UL (ref 1–4.8)
LYMPHOCYTES # BLD AUTO: 0.8 K/UL (ref 1–4.8)
LYMPHOCYTES # BLD AUTO: 0.9 K/UL (ref 1–4.8)
LYMPHOCYTES # BLD AUTO: 1.1 K/UL (ref 1–4.8)
LYMPHOCYTES # BLD AUTO: 1.3 K/UL (ref 1–4.8)
LYMPHOCYTES # BLD AUTO: 1.4 K/UL (ref 1–4.8)
LYMPHOCYTES # BLD AUTO: 1.7 K/UL (ref 1–4.8)
LYMPHOCYTES # BLD AUTO: 2.5 K/UL (ref 1–4.8)
LYMPHOCYTES # BLD AUTO: 3.2 K/UL (ref 1–4.8)
LYMPHOCYTES # BLD AUTO: ABNORMAL K/UL (ref 1–4.8)
LYMPHOCYTES NFR BLD: 10.2 % (ref 18–48)
LYMPHOCYTES NFR BLD: 11 % (ref 18–48)
LYMPHOCYTES NFR BLD: 12 % (ref 18–48)
LYMPHOCYTES NFR BLD: 13.4 % (ref 18–48)
LYMPHOCYTES NFR BLD: 14 % (ref 18–48)
LYMPHOCYTES NFR BLD: 15 % (ref 18–48)
LYMPHOCYTES NFR BLD: 24.5 % (ref 18–48)
LYMPHOCYTES NFR BLD: 29.9 % (ref 18–48)
LYMPHOCYTES NFR BLD: 3.2 % (ref 18–48)
LYMPHOCYTES NFR BLD: 4 % (ref 18–48)
LYMPHOCYTES NFR BLD: 4.4 % (ref 18–48)
LYMPHOCYTES NFR BLD: 5.5 % (ref 18–48)
LYMPHOCYTES NFR BLD: 7 % (ref 18–48)
LYMPHOCYTES NFR BLD: 7 % (ref 18–48)
LYMPHOCYTES NFR BLD: 7.2 % (ref 18–48)
LYMPHOCYTES NFR BLD: 9 % (ref 18–48)
MAGNESIUM SERPL-MCNC: 1.5 MG/DL (ref 1.6–2.6)
MAGNESIUM SERPL-MCNC: 1.6 MG/DL (ref 1.6–2.6)
MAGNESIUM SERPL-MCNC: 1.7 MG/DL (ref 1.6–2.6)
MAGNESIUM SERPL-MCNC: 1.7 MG/DL (ref 1.6–2.6)
MAGNESIUM SERPL-MCNC: 1.8 MG/DL (ref 1.6–2.6)
MAGNESIUM SERPL-MCNC: 1.9 MG/DL (ref 1.6–2.6)
MAGNESIUM SERPL-MCNC: 2 MG/DL (ref 1.6–2.6)
MAGNESIUM SERPL-MCNC: 2.1 MG/DL (ref 1.6–2.6)
MAGNESIUM SERPL-MCNC: 2.1 MG/DL (ref 1.6–2.6)
MAGNESIUM SERPL-MCNC: 2.3 MG/DL (ref 1.6–2.6)
MAGNESIUM SERPL-MCNC: 2.4 MG/DL (ref 1.6–2.6)
MAGNESIUM SERPL-MCNC: 2.5 MG/DL (ref 1.6–2.6)
MAGNESIUM SERPL-MCNC: 2.6 MG/DL (ref 1.6–2.6)
MAGNESIUM SERPL-MCNC: 2.6 MG/DL (ref 1.6–2.6)
MAGNESIUM SERPL-MCNC: 2.8 MG/DL (ref 1.6–2.6)
MCH RBC QN AUTO: 29.5 PG (ref 27–31)
MCH RBC QN AUTO: 29.8 PG (ref 27–31)
MCH RBC QN AUTO: 30.1 PG (ref 27–31)
MCH RBC QN AUTO: 30.2 PG (ref 27–31)
MCH RBC QN AUTO: 30.3 PG (ref 27–31)
MCH RBC QN AUTO: 30.3 PG (ref 27–31)
MCH RBC QN AUTO: 30.4 PG (ref 27–31)
MCH RBC QN AUTO: 30.7 PG (ref 27–31)
MCH RBC QN AUTO: 30.7 PG (ref 27–31)
MCH RBC QN AUTO: 30.8 PG (ref 27–31)
MCHC RBC AUTO-ENTMCNC: 30.3 G/DL (ref 32–36)
MCHC RBC AUTO-ENTMCNC: 30.4 G/DL (ref 32–36)
MCHC RBC AUTO-ENTMCNC: 30.5 G/DL (ref 32–36)
MCHC RBC AUTO-ENTMCNC: 30.7 G/DL (ref 32–36)
MCHC RBC AUTO-ENTMCNC: 30.7 G/DL (ref 32–36)
MCHC RBC AUTO-ENTMCNC: 30.9 G/DL (ref 32–36)
MCHC RBC AUTO-ENTMCNC: 30.9 G/DL (ref 32–36)
MCHC RBC AUTO-ENTMCNC: 31.1 G/DL (ref 32–36)
MCHC RBC AUTO-ENTMCNC: 31.5 G/DL (ref 32–36)
MCHC RBC AUTO-ENTMCNC: 31.5 G/DL (ref 32–36)
MCHC RBC AUTO-ENTMCNC: 31.6 G/DL (ref 32–36)
MCHC RBC AUTO-ENTMCNC: 31.8 G/DL (ref 32–36)
MCHC RBC AUTO-ENTMCNC: 32 G/DL (ref 32–36)
MCHC RBC AUTO-ENTMCNC: 32.6 G/DL (ref 32–36)
MCHC RBC AUTO-ENTMCNC: 32.8 G/DL (ref 32–36)
MCHC RBC AUTO-ENTMCNC: 32.9 G/DL (ref 32–36)
MCHC RBC AUTO-ENTMCNC: 32.9 G/DL (ref 32–36)
MCHC RBC AUTO-ENTMCNC: 33.2 G/DL (ref 32–36)
MCV RBC AUTO: 100 FL (ref 82–98)
MCV RBC AUTO: 91 FL (ref 82–98)
MCV RBC AUTO: 92 FL (ref 82–98)
MCV RBC AUTO: 93 FL (ref 82–98)
MCV RBC AUTO: 93 FL (ref 82–98)
MCV RBC AUTO: 94 FL (ref 82–98)
MCV RBC AUTO: 94 FL (ref 82–98)
MCV RBC AUTO: 95 FL (ref 82–98)
MCV RBC AUTO: 95 FL (ref 82–98)
MCV RBC AUTO: 96 FL (ref 82–98)
MCV RBC AUTO: 97 FL (ref 82–98)
MCV RBC AUTO: 98 FL (ref 82–98)
MCV RBC AUTO: 99 FL (ref 82–98)
METAMYELOCYTES NFR BLD MANUAL: 1 %
METAMYELOCYTES NFR BLD MANUAL: 1 %
METAMYELOCYTES NFR BLD MANUAL: 2 %
METAMYELOCYTES NFR BLD MANUAL: 2 %
METAMYELOCYTES NFR BLD MANUAL: 3 %
METAMYELOCYTES NFR BLD MANUAL: 3 %
METAMYELOCYTES NFR BLD MANUAL: 5 %
METHADONE UR QL SCN>300 NG/ML: NEGATIVE
MICROSCOPIC COMMENT: ABNORMAL
MICROSCOPIC COMMENT: ABNORMAL
MODE: ABNORMAL
MONOCYTES # BLD AUTO: 0.4 K/UL (ref 0.3–1)
MONOCYTES # BLD AUTO: 0.6 K/UL (ref 0.3–1)
MONOCYTES # BLD AUTO: 0.7 K/UL (ref 0.3–1)
MONOCYTES # BLD AUTO: 0.7 K/UL (ref 0.3–1)
MONOCYTES # BLD AUTO: 0.9 K/UL (ref 0.3–1)
MONOCYTES # BLD AUTO: 1 K/UL (ref 0.3–1)
MONOCYTES # BLD AUTO: 1.4 K/UL (ref 0.3–1)
MONOCYTES # BLD AUTO: 1.8 K/UL (ref 0.3–1)
MONOCYTES # BLD AUTO: ABNORMAL K/UL (ref 0.3–1)
MONOCYTES NFR BLD: 0 % (ref 4–15)
MONOCYTES NFR BLD: 10 % (ref 4–15)
MONOCYTES NFR BLD: 3 % (ref 4–15)
MONOCYTES NFR BLD: 3.2 % (ref 4–15)
MONOCYTES NFR BLD: 3.6 % (ref 4–15)
MONOCYTES NFR BLD: 4.7 % (ref 4–15)
MONOCYTES NFR BLD: 5 % (ref 4–15)
MONOCYTES NFR BLD: 6 % (ref 4–15)
MONOCYTES NFR BLD: 6.5 % (ref 4–15)
MONOCYTES NFR BLD: 6.9 % (ref 4–15)
MONOCYTES NFR BLD: 7 % (ref 4–15)
MONOCYTES NFR BLD: 7.4 % (ref 4–15)
MONOCYTES NFR BLD: 8 % (ref 4–15)
MONOCYTES NFR BLD: 8.2 % (ref 4–15)
MONOCYTES NFR BLD: 8.8 % (ref 4–15)
MV PEAK A VEL: 0.3 M/S
MV PEAK E VEL: 0.74 M/S
MV STENOSIS PRESSURE HALF TIME: 37.58 MS
MV VALVE AREA P 1/2 METHOD: 5.85 CM2
MYELOCYTES NFR BLD MANUAL: 1 %
MYELOCYTES NFR BLD MANUAL: 1 %
MYELOCYTES NFR BLD MANUAL: 2 %
MYELOCYTES NFR BLD MANUAL: 4 %
MYELOCYTES NFR BLD MANUAL: 4 %
MYELOCYTES NFR BLD MANUAL: 5 %
NEUTROPHILS # BLD AUTO: 10.4 K/UL (ref 1.8–7.7)
NEUTROPHILS # BLD AUTO: 11.2 K/UL (ref 1.8–7.7)
NEUTROPHILS # BLD AUTO: 12.6 K/UL (ref 1.8–7.7)
NEUTROPHILS # BLD AUTO: 13.2 K/UL (ref 1.8–7.7)
NEUTROPHILS # BLD AUTO: 15.8 K/UL (ref 1.8–7.7)
NEUTROPHILS # BLD AUTO: 17.4 K/UL (ref 1.8–7.7)
NEUTROPHILS # BLD AUTO: 18.2 K/UL (ref 1.8–7.7)
NEUTROPHILS # BLD AUTO: 5.1 K/UL (ref 1.8–7.7)
NEUTROPHILS # BLD AUTO: 6.2 K/UL (ref 1.8–7.7)
NEUTROPHILS # BLD AUTO: 8.8 K/UL (ref 1.8–7.7)
NEUTROPHILS NFR BLD: 60 % (ref 38–73)
NEUTROPHILS NFR BLD: 67.6 % (ref 38–73)
NEUTROPHILS NFR BLD: 68 % (ref 38–73)
NEUTROPHILS NFR BLD: 69 % (ref 38–73)
NEUTROPHILS NFR BLD: 76 % (ref 38–73)
NEUTROPHILS NFR BLD: 76 % (ref 38–73)
NEUTROPHILS NFR BLD: 77 % (ref 38–73)
NEUTROPHILS NFR BLD: 78 % (ref 38–73)
NEUTROPHILS NFR BLD: 79 % (ref 38–73)
NEUTROPHILS NFR BLD: 82 % (ref 38–73)
NEUTROPHILS NFR BLD: 82.9 % (ref 38–73)
NEUTROPHILS NFR BLD: 83 % (ref 38–73)
NEUTROPHILS NFR BLD: 83.8 % (ref 38–73)
NEUTROPHILS NFR BLD: 84.2 % (ref 38–73)
NEUTROPHILS NFR BLD: 85.7 % (ref 38–73)
NEUTROPHILS NFR BLD: 85.8 % (ref 38–73)
NEUTROPHILS NFR BLD: 86.4 % (ref 38–73)
NEUTROPHILS NFR BLD: 86.9 % (ref 38–73)
NEUTS BAND NFR BLD MANUAL: 1 %
NEUTS BAND NFR BLD MANUAL: 4 %
NEUTS BAND NFR BLD MANUAL: 9 %
NITRITE UR QL STRIP: NEGATIVE
NITRITE UR QL STRIP: NEGATIVE
NRBC BLD-RTO: 0 /100 WBC
NRBC BLD-RTO: 1 /100 WBC
NRBC BLD-RTO: 2 /100 WBC
NRBC BLD-RTO: 2 /100 WBC
NRBC BLD-RTO: 4 /100 WBC
NRBC BLD-RTO: 4 /100 WBC
OPIATES UR QL SCN: NEGATIVE
PCO2 BLDA: 43.9 MMHG (ref 35–45)
PCO2 BLDA: 46.9 MMHG (ref 35–45)
PCO2 BLDA: 48.7 MMHG (ref 35–45)
PCO2 BLDA: 49.3 MMHG (ref 35–45)
PCO2 BLDA: 53 MMHG (ref 35–45)
PCO2 BLDA: 53.5 MMHG (ref 35–45)
PCO2 BLDA: 53.7 MMHG (ref 35–45)
PCO2 BLDA: 54 MMHG (ref 35–45)
PCO2 BLDA: 54.1 MMHG (ref 35–45)
PCO2 BLDA: 55.2 MMHG (ref 35–45)
PCO2 BLDA: 56.2 MMHG (ref 35–45)
PCO2 BLDA: 56.8 MMHG (ref 35–45)
PCO2 BLDA: 57 MMHG (ref 35–45)
PCO2 BLDA: 58.9 MMHG (ref 35–45)
PCO2 BLDA: 60.2 MMHG (ref 35–45)
PCO2 BLDA: 60.6 MMHG (ref 35–45)
PCO2 BLDA: 60.8 MMHG (ref 35–45)
PCO2 BLDA: 60.9 MMHG (ref 35–45)
PCO2 BLDA: 65.9 MMHG (ref 35–45)
PCO2 BLDA: 66 MMHG (ref 35–45)
PCO2 BLDA: 71.4 MMHG (ref 35–45)
PCO2 BLDA: 91.3 MMHG (ref 35–45)
PCP UR QL SCN>25 NG/ML: NEGATIVE
PEEP: 12
PEEP: 13
PEEP: 14
PEEP: 16
PEEP: 18
PEEP: 8
PH SMN: 7.08 [PH] (ref 7.35–7.45)
PH SMN: 7.15 [PH] (ref 7.35–7.45)
PH SMN: 7.16 [PH] (ref 7.35–7.45)
PH SMN: 7.17 [PH] (ref 7.35–7.45)
PH SMN: 7.18 [PH] (ref 7.35–7.45)
PH SMN: 7.2 [PH] (ref 7.35–7.45)
PH SMN: 7.21 [PH] (ref 7.35–7.45)
PH SMN: 7.22 [PH] (ref 7.35–7.45)
PH SMN: 7.24 [PH] (ref 7.35–7.45)
PH SMN: 7.24 [PH] (ref 7.35–7.45)
PH SMN: 7.27 [PH] (ref 7.35–7.45)
PH SMN: 7.28 [PH] (ref 7.35–7.45)
PH SMN: 7.28 [PH] (ref 7.35–7.45)
PH SMN: 7.29 [PH] (ref 7.35–7.45)
PH SMN: 7.3 [PH] (ref 7.35–7.45)
PH SMN: 7.31 [PH] (ref 7.35–7.45)
PH SMN: 7.32 [PH] (ref 7.35–7.45)
PH SMN: 7.33 [PH] (ref 7.35–7.45)
PH SMN: 7.36 [PH] (ref 7.35–7.45)
PH UR STRIP: 5 [PH] (ref 5–8)
PH UR STRIP: 5 [PH] (ref 5–8)
PHOSPHATE SERPL-MCNC: 10 MG/DL (ref 2.7–4.5)
PHOSPHATE SERPL-MCNC: 4.3 MG/DL (ref 2.7–4.5)
PHOSPHATE SERPL-MCNC: 4.9 MG/DL (ref 2.7–4.5)
PHOSPHATE SERPL-MCNC: 5.3 MG/DL (ref 2.7–4.5)
PHOSPHATE SERPL-MCNC: 5.5 MG/DL (ref 2.7–4.5)
PHOSPHATE SERPL-MCNC: 5.7 MG/DL (ref 2.7–4.5)
PHOSPHATE SERPL-MCNC: 6.1 MG/DL (ref 2.7–4.5)
PHOSPHATE SERPL-MCNC: 6.3 MG/DL (ref 2.7–4.5)
PHOSPHATE SERPL-MCNC: 6.7 MG/DL (ref 2.7–4.5)
PHOSPHATE SERPL-MCNC: 6.9 MG/DL (ref 2.7–4.5)
PHOSPHATE SERPL-MCNC: 7 MG/DL (ref 2.7–4.5)
PHOSPHATE SERPL-MCNC: 7.3 MG/DL (ref 2.7–4.5)
PHOSPHATE SERPL-MCNC: 7.4 MG/DL (ref 2.7–4.5)
PHOSPHATE SERPL-MCNC: 7.4 MG/DL (ref 2.7–4.5)
PHOSPHATE SERPL-MCNC: 7.7 MG/DL (ref 2.7–4.5)
PHOSPHATE SERPL-MCNC: 7.8 MG/DL (ref 2.7–4.5)
PHOSPHATE SERPL-MCNC: 8 MG/DL (ref 2.7–4.5)
PHOSPHATE SERPL-MCNC: 8.1 MG/DL (ref 2.7–4.5)
PHOSPHATE SERPL-MCNC: 8.4 MG/DL (ref 2.7–4.5)
PHOSPHATE SERPL-MCNC: 8.6 MG/DL (ref 2.7–4.5)
PHOSPHATE SERPL-MCNC: 9.7 MG/DL (ref 2.7–4.5)
PISA TR MAX VEL: 3.08 M/S
PLATELET # BLD AUTO: 151 K/UL (ref 150–350)
PLATELET # BLD AUTO: 151 K/UL (ref 150–350)
PLATELET # BLD AUTO: 153 K/UL (ref 150–350)
PLATELET # BLD AUTO: 153 K/UL (ref 150–350)
PLATELET # BLD AUTO: 155 K/UL (ref 150–350)
PLATELET # BLD AUTO: 155 K/UL (ref 150–350)
PLATELET # BLD AUTO: 165 K/UL (ref 150–350)
PLATELET # BLD AUTO: 183 K/UL (ref 150–350)
PLATELET # BLD AUTO: 183 K/UL (ref 150–350)
PLATELET # BLD AUTO: 186 K/UL (ref 150–350)
PLATELET # BLD AUTO: 188 K/UL (ref 150–350)
PLATELET # BLD AUTO: 189 K/UL (ref 150–350)
PLATELET # BLD AUTO: 195 K/UL (ref 150–350)
PLATELET # BLD AUTO: 217 K/UL (ref 150–350)
PLATELET # BLD AUTO: 227 K/UL (ref 150–350)
PLATELET # BLD AUTO: 232 K/UL (ref 150–350)
PLATELET # BLD AUTO: 235 K/UL (ref 150–350)
PLATELET # BLD AUTO: 240 K/UL (ref 150–350)
PLATELET BLD QL SMEAR: ABNORMAL
PMV BLD AUTO: 10.9 FL (ref 9.2–12.9)
PMV BLD AUTO: 11 FL (ref 9.2–12.9)
PMV BLD AUTO: 11 FL (ref 9.2–12.9)
PMV BLD AUTO: 11.1 FL (ref 9.2–12.9)
PMV BLD AUTO: 11.2 FL (ref 9.2–12.9)
PMV BLD AUTO: 11.2 FL (ref 9.2–12.9)
PMV BLD AUTO: 11.4 FL (ref 9.2–12.9)
PMV BLD AUTO: 11.4 FL (ref 9.2–12.9)
PMV BLD AUTO: 11.5 FL (ref 9.2–12.9)
PMV BLD AUTO: 11.7 FL (ref 9.2–12.9)
PMV BLD AUTO: 11.9 FL (ref 9.2–12.9)
PMV BLD AUTO: 12 FL (ref 9.2–12.9)
PO2 BLDA: 106 MMHG (ref 80–100)
PO2 BLDA: 109 MMHG (ref 80–100)
PO2 BLDA: 127 MMHG (ref 80–100)
PO2 BLDA: 138 MMHG (ref 80–100)
PO2 BLDA: 138 MMHG (ref 80–100)
PO2 BLDA: 141 MMHG (ref 80–100)
PO2 BLDA: 349 MMHG (ref 80–100)
PO2 BLDA: 37 MMHG (ref 40–60)
PO2 BLDA: 41 MMHG (ref 40–60)
PO2 BLDA: 42 MMHG (ref 40–60)
PO2 BLDA: 42 MMHG (ref 80–100)
PO2 BLDA: 43 MMHG (ref 40–60)
PO2 BLDA: 53 MMHG (ref 80–100)
PO2 BLDA: 54 MMHG (ref 80–100)
PO2 BLDA: 66 MMHG (ref 80–100)
PO2 BLDA: 70 MMHG (ref 80–100)
PO2 BLDA: 71 MMHG (ref 80–100)
PO2 BLDA: 75 MMHG (ref 80–100)
PO2 BLDA: 75 MMHG (ref 80–100)
PO2 BLDA: 80 MMHG (ref 80–100)
PO2 BLDA: 81 MMHG (ref 80–100)
PO2 BLDA: 88 MMHG (ref 80–100)
POC BE: -1 MMOL/L
POC BE: -13 MMOL/L
POC BE: -2 MMOL/L
POC BE: -2 MMOL/L
POC BE: -3 MMOL/L
POC BE: -4 MMOL/L
POC BE: -4 MMOL/L
POC BE: -5 MMOL/L
POC BE: -6 MMOL/L
POC BE: -6 MMOL/L
POC BE: -8 MMOL/L
POC BE: -9 MMOL/L
POC BE: 0 MMOL/L
POC BE: 0 MMOL/L
POC BE: 1 MMOL/L
POC BE: 1 MMOL/L
POC BE: 2 MMOL/L
POC IONIZED CALCIUM: 1.08 MMOL/L (ref 1.06–1.42)
POC IONIZED CALCIUM: 1.12 MMOL/L (ref 1.06–1.42)
POC IONIZED CALCIUM: 1.16 MMOL/L (ref 1.06–1.42)
POC IONIZED CALCIUM: 1.34 MMOL/L (ref 1.06–1.42)
POC SATURATED O2: 100 % (ref 95–100)
POC SATURATED O2: 64 % (ref 95–100)
POC SATURATED O2: 67 % (ref 95–100)
POC SATURATED O2: 67 % (ref 95–100)
POC SATURATED O2: 69 % (ref 95–100)
POC SATURATED O2: 71 % (ref 95–100)
POC SATURATED O2: 78 % (ref 95–100)
POC SATURATED O2: 79 % (ref 95–100)
POC SATURATED O2: 89 % (ref 95–100)
POC SATURATED O2: 89 % (ref 95–100)
POC SATURATED O2: 90 % (ref 95–100)
POC SATURATED O2: 91 % (ref 95–100)
POC SATURATED O2: 92 % (ref 95–100)
POC SATURATED O2: 92 % (ref 95–100)
POC SATURATED O2: 93 % (ref 95–100)
POC SATURATED O2: 95 % (ref 95–100)
POC SATURATED O2: 97 % (ref 95–100)
POC SATURATED O2: 98 % (ref 95–100)
POC SATURATED O2: 99 % (ref 95–100)
POC SATURATED O2: 99 % (ref 95–100)
POCT GLUCOSE: 142 MG/DL (ref 70–110)
POCT GLUCOSE: 150 MG/DL (ref 70–110)
POCT GLUCOSE: 151 MG/DL (ref 70–110)
POCT GLUCOSE: 158 MG/DL (ref 70–110)
POCT GLUCOSE: 161 MG/DL (ref 70–110)
POCT GLUCOSE: 165 MG/DL (ref 70–110)
POCT GLUCOSE: 170 MG/DL (ref 70–110)
POCT GLUCOSE: 170 MG/DL (ref 70–110)
POCT GLUCOSE: 171 MG/DL (ref 70–110)
POCT GLUCOSE: 172 MG/DL (ref 70–110)
POCT GLUCOSE: 175 MG/DL (ref 70–110)
POCT GLUCOSE: 177 MG/DL (ref 70–110)
POCT GLUCOSE: 179 MG/DL (ref 70–110)
POCT GLUCOSE: 182 MG/DL (ref 70–110)
POCT GLUCOSE: 185 MG/DL (ref 70–110)
POCT GLUCOSE: 188 MG/DL (ref 70–110)
POCT GLUCOSE: 196 MG/DL (ref 70–110)
POCT GLUCOSE: 196 MG/DL (ref 70–110)
POCT GLUCOSE: 202 MG/DL (ref 70–110)
POCT GLUCOSE: 204 MG/DL (ref 70–110)
POCT GLUCOSE: 206 MG/DL (ref 70–110)
POCT GLUCOSE: 208 MG/DL (ref 70–110)
POCT GLUCOSE: 210 MG/DL (ref 70–110)
POCT GLUCOSE: 211 MG/DL (ref 70–110)
POCT GLUCOSE: 213 MG/DL (ref 70–110)
POCT GLUCOSE: 216 MG/DL (ref 70–110)
POCT GLUCOSE: 217 MG/DL (ref 70–110)
POCT GLUCOSE: 218 MG/DL (ref 70–110)
POCT GLUCOSE: 220 MG/DL (ref 70–110)
POCT GLUCOSE: 221 MG/DL (ref 70–110)
POCT GLUCOSE: 227 MG/DL (ref 70–110)
POCT GLUCOSE: 229 MG/DL (ref 70–110)
POCT GLUCOSE: 229 MG/DL (ref 70–110)
POCT GLUCOSE: 231 MG/DL (ref 70–110)
POCT GLUCOSE: 233 MG/DL (ref 70–110)
POCT GLUCOSE: 233 MG/DL (ref 70–110)
POCT GLUCOSE: 234 MG/DL (ref 70–110)
POCT GLUCOSE: 235 MG/DL (ref 70–110)
POCT GLUCOSE: 238 MG/DL (ref 70–110)
POCT GLUCOSE: 239 MG/DL (ref 70–110)
POCT GLUCOSE: 240 MG/DL (ref 70–110)
POCT GLUCOSE: 240 MG/DL (ref 70–110)
POCT GLUCOSE: 241 MG/DL (ref 70–110)
POCT GLUCOSE: 245 MG/DL (ref 70–110)
POCT GLUCOSE: 245 MG/DL (ref 70–110)
POCT GLUCOSE: 247 MG/DL (ref 70–110)
POCT GLUCOSE: 247 MG/DL (ref 70–110)
POCT GLUCOSE: 248 MG/DL (ref 70–110)
POCT GLUCOSE: 251 MG/DL (ref 70–110)
POCT GLUCOSE: 252 MG/DL (ref 70–110)
POCT GLUCOSE: 254 MG/DL (ref 70–110)
POCT GLUCOSE: 257 MG/DL (ref 70–110)
POCT GLUCOSE: 262 MG/DL (ref 70–110)
POCT GLUCOSE: 264 MG/DL (ref 70–110)
POCT GLUCOSE: 270 MG/DL (ref 70–110)
POCT GLUCOSE: 272 MG/DL (ref 70–110)
POCT GLUCOSE: 272 MG/DL (ref 70–110)
POCT GLUCOSE: 273 MG/DL (ref 70–110)
POCT GLUCOSE: 276 MG/DL (ref 70–110)
POCT GLUCOSE: 278 MG/DL (ref 70–110)
POCT GLUCOSE: 283 MG/DL (ref 70–110)
POCT GLUCOSE: 291 MG/DL (ref 70–110)
POCT GLUCOSE: 297 MG/DL (ref 70–110)
POCT GLUCOSE: 320 MG/DL (ref 70–110)
POCT GLUCOSE: 321 MG/DL (ref 70–110)
POCT GLUCOSE: 337 MG/DL (ref 70–110)
POCT GLUCOSE: 51 MG/DL (ref 70–110)
POIKILOCYTOSIS BLD QL SMEAR: SLIGHT
POLYCHROMASIA BLD QL SMEAR: ABNORMAL
POTASSIUM BLD-SCNC: 4.3 MMOL/L (ref 3.5–5.1)
POTASSIUM BLD-SCNC: 4.8 MMOL/L (ref 3.5–5.1)
POTASSIUM SERPL-SCNC: 3.5 MMOL/L (ref 3.5–5.1)
POTASSIUM SERPL-SCNC: 3.8 MMOL/L (ref 3.5–5.1)
POTASSIUM SERPL-SCNC: 3.9 MMOL/L (ref 3.5–5.1)
POTASSIUM SERPL-SCNC: 3.9 MMOL/L (ref 3.5–5.1)
POTASSIUM SERPL-SCNC: 4.1 MMOL/L (ref 3.5–5.1)
POTASSIUM SERPL-SCNC: 4.2 MMOL/L (ref 3.5–5.1)
POTASSIUM SERPL-SCNC: 4.3 MMOL/L (ref 3.5–5.1)
POTASSIUM SERPL-SCNC: 4.4 MMOL/L (ref 3.5–5.1)
POTASSIUM SERPL-SCNC: 4.5 MMOL/L (ref 3.5–5.1)
POTASSIUM SERPL-SCNC: 4.6 MMOL/L (ref 3.5–5.1)
POTASSIUM SERPL-SCNC: 4.7 MMOL/L (ref 3.5–5.1)
POTASSIUM SERPL-SCNC: 4.9 MMOL/L (ref 3.5–5.1)
POTASSIUM SERPL-SCNC: 5.1 MMOL/L (ref 3.5–5.1)
POTASSIUM SERPL-SCNC: 5.2 MMOL/L (ref 3.5–5.1)
POTASSIUM SERPL-SCNC: 5.2 MMOL/L (ref 3.5–5.1)
POTASSIUM SERPL-SCNC: 5.3 MMOL/L (ref 3.5–5.1)
POTASSIUM SERPL-SCNC: 5.4 MMOL/L (ref 3.5–5.1)
POTASSIUM SERPL-SCNC: 5.6 MMOL/L (ref 3.5–5.1)
POTASSIUM SERPL-SCNC: 5.6 MMOL/L (ref 3.5–5.1)
POTASSIUM SERPL-SCNC: 5.9 MMOL/L (ref 3.5–5.1)
POTASSIUM SERPL-SCNC: 6 MMOL/L (ref 3.5–5.1)
PROCALCITONIN SERPL IA-MCNC: 12.27 NG/ML
PROCALCITONIN SERPL IA-MCNC: 7.22 NG/ML
PROT SERPL-MCNC: 6.4 G/DL (ref 6–8.4)
PROT SERPL-MCNC: 6.5 G/DL (ref 6–8.4)
PROT SERPL-MCNC: 6.6 G/DL (ref 6–8.4)
PROT SERPL-MCNC: 6.7 G/DL (ref 6–8.4)
PROT SERPL-MCNC: 6.7 G/DL (ref 6–8.4)
PROT SERPL-MCNC: 6.8 G/DL (ref 6–8.4)
PROT SERPL-MCNC: 6.8 G/DL (ref 6–8.4)
PROT SERPL-MCNC: 6.9 G/DL (ref 6–8.4)
PROT SERPL-MCNC: 7.1 G/DL (ref 6–8.4)
PROT SERPL-MCNC: 7.2 G/DL (ref 6–8.4)
PROT SERPL-MCNC: 7.3 G/DL (ref 6–8.4)
PROT SERPL-MCNC: 7.3 G/DL (ref 6–8.4)
PROT SERPL-MCNC: 8 G/DL (ref 6–8.4)
PROT SERPL-MCNC: 8.1 G/DL (ref 6–8.4)
PROT SERPL-MCNC: 8.2 G/DL (ref 6–8.4)
PROT SERPL-MCNC: 8.2 G/DL (ref 6–8.4)
PROT UR QL STRIP: ABNORMAL
PROT UR QL STRIP: ABNORMAL
PROTHROMBIN TIME: 10.9 SEC (ref 9–12.5)
PROTHROMBIN TIME: 11.4 SEC (ref 9–12.5)
PROTHROMBIN TIME: 11.6 SEC (ref 9–12.5)
PROTHROMBIN TIME: 12 SEC (ref 9–12.5)
PROTHROMBIN TIME: 12.3 SEC (ref 9–12.5)
PV MEAN GRADIENT: 0 MMHG
RBC # BLD AUTO: 2.99 M/UL (ref 4.6–6.2)
RBC # BLD AUTO: 3.01 M/UL (ref 4.6–6.2)
RBC # BLD AUTO: 3.02 M/UL (ref 4.6–6.2)
RBC # BLD AUTO: 3.06 M/UL (ref 4.6–6.2)
RBC # BLD AUTO: 3.1 M/UL (ref 4.6–6.2)
RBC # BLD AUTO: 3.25 M/UL (ref 4.6–6.2)
RBC # BLD AUTO: 3.29 M/UL (ref 4.6–6.2)
RBC # BLD AUTO: 3.39 M/UL (ref 4.6–6.2)
RBC # BLD AUTO: 3.44 M/UL (ref 4.6–6.2)
RBC # BLD AUTO: 3.62 M/UL (ref 4.6–6.2)
RBC # BLD AUTO: 3.79 M/UL (ref 4.6–6.2)
RBC # BLD AUTO: 3.92 M/UL (ref 4.6–6.2)
RBC # BLD AUTO: 3.97 M/UL (ref 4.6–6.2)
RBC # BLD AUTO: 4.05 M/UL (ref 4.6–6.2)
RBC # BLD AUTO: 4.16 M/UL (ref 4.6–6.2)
RBC # BLD AUTO: 4.27 M/UL (ref 4.6–6.2)
RBC #/AREA URNS HPF: 10 /HPF (ref 0–4)
RBC #/AREA URNS HPF: 2 /HPF (ref 0–4)
SAMPLE: ABNORMAL
SARS-COV-2 RDRP RESP QL NAA+PROBE: NEGATIVE
SINUS: 3.19 CM
SITE: ABNORMAL
SODIUM BLD-SCNC: 132 MMOL/L (ref 136–145)
SODIUM BLD-SCNC: 134 MMOL/L (ref 136–145)
SODIUM BLD-SCNC: 135 MMOL/L (ref 136–145)
SODIUM BLD-SCNC: 137 MMOL/L (ref 136–145)
SODIUM SERPL-SCNC: 128 MMOL/L (ref 136–145)
SODIUM SERPL-SCNC: 129 MMOL/L (ref 136–145)
SODIUM SERPL-SCNC: 130 MMOL/L (ref 136–145)
SODIUM SERPL-SCNC: 131 MMOL/L (ref 136–145)
SODIUM SERPL-SCNC: 132 MMOL/L (ref 136–145)
SODIUM SERPL-SCNC: 133 MMOL/L (ref 136–145)
SODIUM SERPL-SCNC: 134 MMOL/L (ref 136–145)
SODIUM SERPL-SCNC: 135 MMOL/L (ref 136–145)
SODIUM SERPL-SCNC: 136 MMOL/L (ref 136–145)
SP GR UR STRIP: >=1.03 (ref 1–1.03)
SP GR UR STRIP: >=1.03 (ref 1–1.03)
STJ: 3.02 CM
TDI LATERAL: 0.04 M/S
TDI SEPTAL: 0.05 M/S
TDI: 0.05 M/S
TOXICOLOGY INFORMATION: NORMAL
TR MAX PG: 38 MMHG
TRIGL SERPL-MCNC: 209 MG/DL (ref 30–150)
TRIGL SERPL-MCNC: 97 MG/DL (ref 30–150)
TROPONIN I SERPL DL<=0.01 NG/ML-MCNC: 0.03 NG/ML (ref 0–0.03)
TROPONIN I SERPL DL<=0.01 NG/ML-MCNC: 0.04 NG/ML (ref 0–0.03)
TROPONIN I SERPL DL<=0.01 NG/ML-MCNC: 0.05 NG/ML (ref 0–0.03)
TROPONIN I SERPL DL<=0.01 NG/ML-MCNC: 0.06 NG/ML (ref 0–0.03)
TROPONIN I SERPL DL<=0.01 NG/ML-MCNC: 0.08 NG/ML (ref 0–0.03)
TROPONIN I SERPL DL<=0.01 NG/ML-MCNC: 0.09 NG/ML (ref 0–0.03)
TROPONIN I SERPL DL<=0.01 NG/ML-MCNC: 0.67 NG/ML (ref 0–0.03)
URN SPEC COLLECT METH UR: ABNORMAL
URN SPEC COLLECT METH UR: ABNORMAL
UROBILINOGEN UR STRIP-ACNC: NEGATIVE EU/DL
UROBILINOGEN UR STRIP-ACNC: NEGATIVE EU/DL
VT: 420
VT: 450
VT: 550
WBC # BLD AUTO: 12.57 K/UL (ref 3.9–12.7)
WBC # BLD AUTO: 13.02 K/UL (ref 3.9–12.7)
WBC # BLD AUTO: 13.06 K/UL (ref 3.9–12.7)
WBC # BLD AUTO: 14.65 K/UL (ref 3.9–12.7)
WBC # BLD AUTO: 15.73 K/UL (ref 3.9–12.7)
WBC # BLD AUTO: 16.34 K/UL (ref 3.9–12.7)
WBC # BLD AUTO: 18.29 K/UL (ref 3.9–12.7)
WBC # BLD AUTO: 20.07 K/UL (ref 3.9–12.7)
WBC # BLD AUTO: 20.31 K/UL (ref 3.9–12.7)
WBC # BLD AUTO: 21.59 K/UL (ref 3.9–12.7)
WBC # BLD AUTO: 24.67 K/UL (ref 3.9–12.7)
WBC # BLD AUTO: 27.01 K/UL (ref 3.9–12.7)
WBC # BLD AUTO: 27.46 K/UL (ref 3.9–12.7)
WBC # BLD AUTO: 29.78 K/UL (ref 3.9–12.7)
WBC # BLD AUTO: 33.43 K/UL (ref 3.9–12.7)
WBC # BLD AUTO: 37.84 K/UL (ref 3.9–12.7)
WBC # BLD AUTO: 8.22 K/UL (ref 3.9–12.7)
WBC # BLD AUTO: 8.43 K/UL (ref 3.9–12.7)
WBC #/AREA URNS HPF: 25 /HPF (ref 0–5)
WBC #/AREA URNS HPF: 5 /HPF (ref 0–5)

## 2021-01-01 PROCEDURE — 85025 COMPLETE CBC W/AUTO DIFF WBC: CPT | Performed by: EMERGENCY MEDICINE

## 2021-01-01 PROCEDURE — A4217 STERILE WATER/SALINE, 500 ML: HCPCS | Performed by: INTERNAL MEDICINE

## 2021-01-01 PROCEDURE — C9113 INJ PANTOPRAZOLE SODIUM, VIA: HCPCS | Performed by: INTERNAL MEDICINE

## 2021-01-01 PROCEDURE — 83735 ASSAY OF MAGNESIUM: CPT | Mod: 91 | Performed by: INTERNAL MEDICINE

## 2021-01-01 PROCEDURE — 25000003 PHARM REV CODE 250: Performed by: NURSE PRACTITIONER

## 2021-01-01 PROCEDURE — 96375 TX/PRO/DX INJ NEW DRUG ADDON: CPT

## 2021-01-01 PROCEDURE — 80307 DRUG TEST PRSMV CHEM ANLYZR: CPT | Performed by: NURSE PRACTITIONER

## 2021-01-01 PROCEDURE — 95822 EEG COMA OR SLEEP ONLY: CPT | Mod: 26,,, | Performed by: PSYCHIATRY & NEUROLOGY

## 2021-01-01 PROCEDURE — 82803 BLOOD GASES ANY COMBINATION: CPT

## 2021-01-01 PROCEDURE — 80053 COMPREHEN METABOLIC PANEL: CPT | Performed by: NURSE PRACTITIONER

## 2021-01-01 PROCEDURE — 90937 PR HEMODIALYSIS, REPEATED EVAL.: ICD-10-PCS | Mod: ,,, | Performed by: INTERNAL MEDICINE

## 2021-01-01 PROCEDURE — 63600175 PHARM REV CODE 636 W HCPCS: Performed by: NURSE PRACTITIONER

## 2021-01-01 PROCEDURE — 99214 PR OFFICE/OUTPT VISIT, EST, LEVL IV, 30-39 MIN: ICD-10-PCS | Mod: S$PBB,,, | Performed by: INTERNAL MEDICINE

## 2021-01-01 PROCEDURE — 99223 PR INITIAL HOSPITAL CARE,LEVL III: ICD-10-PCS | Mod: ,,, | Performed by: PHYSICIAN ASSISTANT

## 2021-01-01 PROCEDURE — 92950 HEART/LUNG RESUSCITATION CPR: CPT

## 2021-01-01 PROCEDURE — 63600175 PHARM REV CODE 636 W HCPCS: Performed by: INTERNAL MEDICINE

## 2021-01-01 PROCEDURE — 63600175 PHARM REV CODE 636 W HCPCS: Performed by: SURGERY

## 2021-01-01 PROCEDURE — 87205 SMEAR GRAM STAIN: CPT | Performed by: INTERNAL MEDICINE

## 2021-01-01 PROCEDURE — 99291 PR CRITICAL CARE, E/M 30-74 MINUTES: ICD-10-PCS | Mod: ,,, | Performed by: INTERNAL MEDICINE

## 2021-01-01 PROCEDURE — 99292 PR CRITICAL CARE, ADDL 30 MIN: ICD-10-PCS | Mod: 25,,, | Performed by: NURSE PRACTITIONER

## 2021-01-01 PROCEDURE — 96374 THER/PROPH/DIAG INJ IV PUSH: CPT

## 2021-01-01 PROCEDURE — 25000003 PHARM REV CODE 250: Performed by: INTERNAL MEDICINE

## 2021-01-01 PROCEDURE — 25000003 PHARM REV CODE 250: Performed by: FAMILY MEDICINE

## 2021-01-01 PROCEDURE — 85610 PROTHROMBIN TIME: CPT | Performed by: NURSE PRACTITIONER

## 2021-01-01 PROCEDURE — 85730 THROMBOPLASTIN TIME PARTIAL: CPT | Performed by: INTERNAL MEDICINE

## 2021-01-01 PROCEDURE — 25000003 PHARM REV CODE 250: Performed by: HOSPITALIST

## 2021-01-01 PROCEDURE — S0030 INJECTION, METRONIDAZOLE: HCPCS | Performed by: NURSE PRACTITIONER

## 2021-01-01 PROCEDURE — 25000003 PHARM REV CODE 250

## 2021-01-01 PROCEDURE — 36620 INSERTION CATHETER ARTERY: CPT

## 2021-01-01 PROCEDURE — 99291 CRITICAL CARE FIRST HOUR: CPT | Mod: ,,, | Performed by: INTERNAL MEDICINE

## 2021-01-01 PROCEDURE — 20000000 HC ICU ROOM

## 2021-01-01 PROCEDURE — 84295 ASSAY OF SERUM SODIUM: CPT

## 2021-01-01 PROCEDURE — 90945 DIALYSIS ONE EVALUATION: CPT

## 2021-01-01 PROCEDURE — 99900026 HC AIRWAY MAINTENANCE (STAT)

## 2021-01-01 PROCEDURE — 25000242 PHARM REV CODE 250 ALT 637 W/ HCPCS: Performed by: INTERNAL MEDICINE

## 2021-01-01 PROCEDURE — 80048 BASIC METABOLIC PNL TOTAL CA: CPT | Performed by: INTERNAL MEDICINE

## 2021-01-01 PROCEDURE — 27202415 HC CARTRIDGE, CRRT

## 2021-01-01 PROCEDURE — 83735 ASSAY OF MAGNESIUM: CPT | Performed by: HOSPITALIST

## 2021-01-01 PROCEDURE — 94003 VENT MGMT INPAT SUBQ DAY: CPT

## 2021-01-01 PROCEDURE — 90937 HEMODIALYSIS REPEATED EVAL: CPT | Mod: ,,, | Performed by: INTERNAL MEDICINE

## 2021-01-01 PROCEDURE — 83735 ASSAY OF MAGNESIUM: CPT | Performed by: INTERNAL MEDICINE

## 2021-01-01 PROCEDURE — C9399 UNCLASSIFIED DRUGS OR BIOLOG: HCPCS | Performed by: NURSE PRACTITIONER

## 2021-01-01 PROCEDURE — 11000001 HC ACUTE MED/SURG PRIVATE ROOM

## 2021-01-01 PROCEDURE — 63600175 PHARM REV CODE 636 W HCPCS: Performed by: HOSPITALIST

## 2021-01-01 PROCEDURE — 99900035 HC TECH TIME PER 15 MIN (STAT)

## 2021-01-01 PROCEDURE — 85730 THROMBOPLASTIN TIME PARTIAL: CPT | Mod: 91 | Performed by: FAMILY MEDICINE

## 2021-01-01 PROCEDURE — 63600175 PHARM REV CODE 636 W HCPCS: Performed by: EMERGENCY MEDICINE

## 2021-01-01 PROCEDURE — 85018 HEMOGLOBIN: CPT | Mod: 91 | Performed by: FAMILY MEDICINE

## 2021-01-01 PROCEDURE — 85025 COMPLETE CBC W/AUTO DIFF WBC: CPT | Performed by: NURSE PRACTITIONER

## 2021-01-01 PROCEDURE — 87070 CULTURE OTHR SPECIMN AEROBIC: CPT | Performed by: INTERNAL MEDICINE

## 2021-01-01 PROCEDURE — 99291 CRITICAL CARE FIRST HOUR: CPT | Mod: 25,,, | Performed by: NURSE PRACTITIONER

## 2021-01-01 PROCEDURE — 80053 COMPREHEN METABOLIC PANEL: CPT | Mod: 91 | Performed by: FAMILY MEDICINE

## 2021-01-01 PROCEDURE — 87340 HEPATITIS B SURFACE AG IA: CPT | Performed by: HOSPITALIST

## 2021-01-01 PROCEDURE — 96376 TX/PRO/DX INJ SAME DRUG ADON: CPT

## 2021-01-01 PROCEDURE — 85014 HEMATOCRIT: CPT

## 2021-01-01 PROCEDURE — 96365 THER/PROPH/DIAG IV INF INIT: CPT | Mod: 59

## 2021-01-01 PROCEDURE — 93005 ELECTROCARDIOGRAM TRACING: CPT

## 2021-01-01 PROCEDURE — 51702 INSERT TEMP BLADDER CATH: CPT

## 2021-01-01 PROCEDURE — 25000003 PHARM REV CODE 250: Performed by: STUDENT IN AN ORGANIZED HEALTH CARE EDUCATION/TRAINING PROGRAM

## 2021-01-01 PROCEDURE — 63600175 PHARM REV CODE 636 W HCPCS: Performed by: FAMILY MEDICINE

## 2021-01-01 PROCEDURE — 87040 BLOOD CULTURE FOR BACTERIA: CPT | Mod: 59 | Performed by: INTERNAL MEDICINE

## 2021-01-01 PROCEDURE — 99233 PR SUBSEQUENT HOSPITAL CARE,LEVL III: ICD-10-PCS | Mod: ,,, | Performed by: INTERNAL MEDICINE

## 2021-01-01 PROCEDURE — 85379 FIBRIN DEGRADATION QUANT: CPT | Performed by: FAMILY MEDICINE

## 2021-01-01 PROCEDURE — 87103 BLOOD FUNGUS CULTURE: CPT | Performed by: INTERNAL MEDICINE

## 2021-01-01 PROCEDURE — 25500020 PHARM REV CODE 255: Performed by: INTERNAL MEDICINE

## 2021-01-01 PROCEDURE — 80053 COMPREHEN METABOLIC PANEL: CPT | Performed by: EMERGENCY MEDICINE

## 2021-01-01 PROCEDURE — 87186 SC STD MICRODIL/AGAR DIL: CPT | Performed by: INTERNAL MEDICINE

## 2021-01-01 PROCEDURE — 36415 COLL VENOUS BLD VENIPUNCTURE: CPT | Performed by: EMERGENCY MEDICINE

## 2021-01-01 PROCEDURE — 94760 N-INVAS EAR/PLS OXIMETRY 1: CPT

## 2021-01-01 PROCEDURE — 36415 COLL VENOUS BLD VENIPUNCTURE: CPT | Performed by: INTERNAL MEDICINE

## 2021-01-01 PROCEDURE — 94761 N-INVAS EAR/PLS OXIMETRY MLT: CPT

## 2021-01-01 PROCEDURE — 99215 OFFICE O/P EST HI 40 MIN: CPT | Mod: PBBFAC | Performed by: INTERNAL MEDICINE

## 2021-01-01 PROCEDURE — 93010 ELECTROCARDIOGRAM REPORT: CPT | Mod: 59,,, | Performed by: INTERNAL MEDICINE

## 2021-01-01 PROCEDURE — 37799 UNLISTED PX VASCULAR SURGERY: CPT

## 2021-01-01 PROCEDURE — 87077 CULTURE AEROBIC IDENTIFY: CPT | Performed by: INTERNAL MEDICINE

## 2021-01-01 PROCEDURE — 83735 ASSAY OF MAGNESIUM: CPT | Performed by: NURSE PRACTITIONER

## 2021-01-01 PROCEDURE — 85730 THROMBOPLASTIN TIME PARTIAL: CPT | Mod: 91 | Performed by: INTERNAL MEDICINE

## 2021-01-01 PROCEDURE — 99291 PR CRITICAL CARE, E/M 30-74 MINUTES: ICD-10-PCS | Mod: ,,, | Performed by: NURSE PRACTITIONER

## 2021-01-01 PROCEDURE — 99233 SBSQ HOSP IP/OBS HIGH 50: CPT | Mod: ,,, | Performed by: INTERNAL MEDICINE

## 2021-01-01 PROCEDURE — S0030 INJECTION, METRONIDAZOLE: HCPCS | Performed by: FAMILY MEDICINE

## 2021-01-01 PROCEDURE — 85730 THROMBOPLASTIN TIME PARTIAL: CPT | Performed by: NURSE PRACTITIONER

## 2021-01-01 PROCEDURE — 99213 OFFICE O/P EST LOW 20 MIN: CPT | Mod: PBBFAC | Performed by: PODIATRIST

## 2021-01-01 PROCEDURE — 84132 ASSAY OF SERUM POTASSIUM: CPT

## 2021-01-01 PROCEDURE — 85610 PROTHROMBIN TIME: CPT | Mod: 91 | Performed by: HOSPITALIST

## 2021-01-01 PROCEDURE — 63600175 PHARM REV CODE 636 W HCPCS

## 2021-01-01 PROCEDURE — 85014 HEMATOCRIT: CPT | Performed by: FAMILY MEDICINE

## 2021-01-01 PROCEDURE — 99999 PR PBB SHADOW E&M-EST. PATIENT-LVL III: ICD-10-PCS | Mod: PBBFAC,,, | Performed by: PODIATRIST

## 2021-01-01 PROCEDURE — 80100008 HC CRRT DAILY MAINTENANCE

## 2021-01-01 PROCEDURE — 84100 ASSAY OF PHOSPHORUS: CPT | Performed by: INTERNAL MEDICINE

## 2021-01-01 PROCEDURE — 85025 COMPLETE CBC W/AUTO DIFF WBC: CPT | Mod: 91 | Performed by: FAMILY MEDICINE

## 2021-01-01 PROCEDURE — 80069 RENAL FUNCTION PANEL: CPT | Mod: 91 | Performed by: INTERNAL MEDICINE

## 2021-01-01 PROCEDURE — 85730 THROMBOPLASTIN TIME PARTIAL: CPT | Mod: 91 | Performed by: STUDENT IN AN ORGANIZED HEALTH CARE EDUCATION/TRAINING PROGRAM

## 2021-01-01 PROCEDURE — 99291 PR CRITICAL CARE, E/M 30-74 MINUTES: ICD-10-PCS | Mod: 25,,, | Performed by: INTERNAL MEDICINE

## 2021-01-01 PROCEDURE — 85007 BL SMEAR W/DIFF WBC COUNT: CPT | Performed by: NURSE PRACTITIONER

## 2021-01-01 PROCEDURE — 81000 URINALYSIS NONAUTO W/SCOPE: CPT | Performed by: INTERNAL MEDICINE

## 2021-01-01 PROCEDURE — 96374 THER/PROPH/DIAG INJ IV PUSH: CPT | Mod: 59

## 2021-01-01 PROCEDURE — C9399 UNCLASSIFIED DRUGS OR BIOLOG: HCPCS | Performed by: FAMILY MEDICINE

## 2021-01-01 PROCEDURE — 82800 BLOOD PH: CPT

## 2021-01-01 PROCEDURE — 84484 ASSAY OF TROPONIN QUANT: CPT | Performed by: HOSPITALIST

## 2021-01-01 PROCEDURE — 85730 THROMBOPLASTIN TIME PARTIAL: CPT | Performed by: FAMILY MEDICINE

## 2021-01-01 PROCEDURE — 96367 TX/PROPH/DG ADDL SEQ IV INF: CPT

## 2021-01-01 PROCEDURE — 99291 CRITICAL CARE FIRST HOUR: CPT | Mod: ,,, | Performed by: NURSE PRACTITIONER

## 2021-01-01 PROCEDURE — 84478 ASSAY OF TRIGLYCERIDES: CPT | Performed by: NURSE PRACTITIONER

## 2021-01-01 PROCEDURE — 99291 CRITICAL CARE FIRST HOUR: CPT | Mod: 95,,, | Performed by: INTERNAL MEDICINE

## 2021-01-01 PROCEDURE — 84484 ASSAY OF TROPONIN QUANT: CPT | Mod: 91 | Performed by: NURSE PRACTITIONER

## 2021-01-01 PROCEDURE — 99214 OFFICE O/P EST MOD 30 MIN: CPT | Mod: S$PBB,,, | Performed by: INTERNAL MEDICINE

## 2021-01-01 PROCEDURE — 86706 HEP B SURFACE ANTIBODY: CPT | Performed by: HOSPITALIST

## 2021-01-01 PROCEDURE — 84484 ASSAY OF TROPONIN QUANT: CPT | Performed by: EMERGENCY MEDICINE

## 2021-01-01 PROCEDURE — 99223 1ST HOSP IP/OBS HIGH 75: CPT | Mod: ,,, | Performed by: PHYSICIAN ASSISTANT

## 2021-01-01 PROCEDURE — 80069 RENAL FUNCTION PANEL: CPT | Performed by: INTERNAL MEDICINE

## 2021-01-01 PROCEDURE — 86803 HEPATITIS C AB TEST: CPT | Performed by: EMERGENCY MEDICINE

## 2021-01-01 PROCEDURE — 80069 RENAL FUNCTION PANEL: CPT | Mod: 91 | Performed by: HOSPITALIST

## 2021-01-01 PROCEDURE — G0378 HOSPITAL OBSERVATION PER HR: HCPCS

## 2021-01-01 PROCEDURE — 80162 ASSAY OF DIGOXIN TOTAL: CPT | Performed by: EMERGENCY MEDICINE

## 2021-01-01 PROCEDURE — 36415 COLL VENOUS BLD VENIPUNCTURE: CPT | Performed by: HOSPITALIST

## 2021-01-01 PROCEDURE — 82550 ASSAY OF CK (CPK): CPT | Performed by: NURSE PRACTITIONER

## 2021-01-01 PROCEDURE — 87086 URINE CULTURE/COLONY COUNT: CPT | Performed by: INTERNAL MEDICINE

## 2021-01-01 PROCEDURE — 96368 THER/DIAG CONCURRENT INF: CPT

## 2021-01-01 PROCEDURE — 11721 DEBRIDE NAIL 6 OR MORE: CPT | Mod: PBBFAC | Performed by: PODIATRIST

## 2021-01-01 PROCEDURE — 85730 THROMBOPLASTIN TIME PARTIAL: CPT | Performed by: HOSPITALIST

## 2021-01-01 PROCEDURE — 25000003 PHARM REV CODE 250: Performed by: EMERGENCY MEDICINE

## 2021-01-01 PROCEDURE — 63600175 PHARM REV CODE 636 W HCPCS: Mod: JG | Performed by: INTERNAL MEDICINE

## 2021-01-01 PROCEDURE — 86706 HEP B SURFACE ANTIBODY: CPT | Mod: 91 | Performed by: HOSPITALIST

## 2021-01-01 PROCEDURE — 93010 EKG 12-LEAD: ICD-10-PCS | Mod: ,,, | Performed by: INTERNAL MEDICINE

## 2021-01-01 PROCEDURE — 85027 COMPLETE CBC AUTOMATED: CPT | Performed by: NURSE PRACTITIONER

## 2021-01-01 PROCEDURE — 85018 HEMOGLOBIN: CPT | Performed by: FAMILY MEDICINE

## 2021-01-01 PROCEDURE — 36620 INSERTION CATHETER ARTERY: CPT | Mod: ,,, | Performed by: NURSE PRACTITIONER

## 2021-01-01 PROCEDURE — 84145 PROCALCITONIN (PCT): CPT | Performed by: INTERNAL MEDICINE

## 2021-01-01 PROCEDURE — 83605 ASSAY OF LACTIC ACID: CPT | Performed by: NURSE PRACTITIONER

## 2021-01-01 PROCEDURE — 85730 THROMBOPLASTIN TIME PARTIAL: CPT | Mod: 91 | Performed by: HOSPITALIST

## 2021-01-01 PROCEDURE — 80048 BASIC METABOLIC PNL TOTAL CA: CPT | Performed by: NURSE PRACTITIONER

## 2021-01-01 PROCEDURE — 25000242 PHARM REV CODE 250 ALT 637 W/ HCPCS: Performed by: NURSE PRACTITIONER

## 2021-01-01 PROCEDURE — 87040 BLOOD CULTURE FOR BACTERIA: CPT | Performed by: INTERNAL MEDICINE

## 2021-01-01 PROCEDURE — 82550 ASSAY OF CK (CPK): CPT | Performed by: INTERNAL MEDICINE

## 2021-01-01 PROCEDURE — 96366 THER/PROPH/DIAG IV INF ADDON: CPT

## 2021-01-01 PROCEDURE — 87106 FUNGI IDENTIFICATION YEAST: CPT | Performed by: INTERNAL MEDICINE

## 2021-01-01 PROCEDURE — C9399 UNCLASSIFIED DRUGS OR BIOLOG: HCPCS | Performed by: INTERNAL MEDICINE

## 2021-01-01 PROCEDURE — 31500 PR INSERT, EMERGENCY ENDOTRACH AIRWAY: ICD-10-PCS | Mod: ,,, | Performed by: NURSE PRACTITIONER

## 2021-01-01 PROCEDURE — 84100 ASSAY OF PHOSPHORUS: CPT | Performed by: HOSPITALIST

## 2021-01-01 PROCEDURE — 80162 ASSAY OF DIGOXIN TOTAL: CPT | Performed by: INTERNAL MEDICINE

## 2021-01-01 PROCEDURE — 96372 THER/PROPH/DIAG INJ SC/IM: CPT | Mod: 59

## 2021-01-01 PROCEDURE — 80100014 HC HEMODIALYSIS 1:1

## 2021-01-01 PROCEDURE — 85025 COMPLETE CBC W/AUTO DIFF WBC: CPT | Mod: 91 | Performed by: NURSE PRACTITIONER

## 2021-01-01 PROCEDURE — 80048 BASIC METABOLIC PNL TOTAL CA: CPT | Performed by: HOSPITALIST

## 2021-01-01 PROCEDURE — 86703 HIV-1/HIV-2 1 RESULT ANTBDY: CPT | Performed by: EMERGENCY MEDICINE

## 2021-01-01 PROCEDURE — 99291 CRITICAL CARE FIRST HOUR: CPT | Mod: 25

## 2021-01-01 PROCEDURE — 83605 ASSAY OF LACTIC ACID: CPT | Performed by: INTERNAL MEDICINE

## 2021-01-01 PROCEDURE — 99999 PR PBB SHADOW E&M-EST. PATIENT-LVL V: ICD-10-PCS | Mod: PBBFAC,,, | Performed by: INTERNAL MEDICINE

## 2021-01-01 PROCEDURE — 85610 PROTHROMBIN TIME: CPT | Performed by: EMERGENCY MEDICINE

## 2021-01-01 PROCEDURE — 11721 PR DEBRIDEMENT OF NAILS, 6 OR MORE: ICD-10-PCS | Mod: S$PBB,,, | Performed by: PODIATRIST

## 2021-01-01 PROCEDURE — 80053 COMPREHEN METABOLIC PANEL: CPT | Performed by: HOSPITALIST

## 2021-01-01 PROCEDURE — 36620 PR INSERT CATH,ART,PERCUT,SHORTTERM: ICD-10-PCS | Mod: ,,, | Performed by: NURSE PRACTITIONER

## 2021-01-01 PROCEDURE — 82330 ASSAY OF CALCIUM: CPT

## 2021-01-01 PROCEDURE — 93010 ELECTROCARDIOGRAM REPORT: CPT | Mod: ,,, | Performed by: INTERNAL MEDICINE

## 2021-01-01 PROCEDURE — 36593 DECLOT VASCULAR DEVICE: CPT

## 2021-01-01 PROCEDURE — 80162 ASSAY OF DIGOXIN TOTAL: CPT | Performed by: NURSE PRACTITIONER

## 2021-01-01 PROCEDURE — 36556 INSERT NON-TUNNEL CV CATH: CPT

## 2021-01-01 PROCEDURE — 85610 PROTHROMBIN TIME: CPT | Performed by: FAMILY MEDICINE

## 2021-01-01 PROCEDURE — 99999 PR PBB SHADOW E&M-EST. PATIENT-LVL V: CPT | Mod: PBBFAC,,, | Performed by: INTERNAL MEDICINE

## 2021-01-01 PROCEDURE — 99291 PR CRITICAL CARE, E/M 30-74 MINUTES: ICD-10-PCS | Mod: 25,,, | Performed by: NURSE PRACTITIONER

## 2021-01-01 PROCEDURE — 85730 THROMBOPLASTIN TIME PARTIAL: CPT | Performed by: STUDENT IN AN ORGANIZED HEALTH CARE EDUCATION/TRAINING PROGRAM

## 2021-01-01 PROCEDURE — 99291 PR CRITICAL CARE, E/M 30-74 MINUTES: ICD-10-PCS | Mod: 95,,, | Performed by: INTERNAL MEDICINE

## 2021-01-01 PROCEDURE — 83880 ASSAY OF NATRIURETIC PEPTIDE: CPT | Performed by: EMERGENCY MEDICINE

## 2021-01-01 PROCEDURE — 99291 CRITICAL CARE FIRST HOUR: CPT | Mod: 25,,, | Performed by: INTERNAL MEDICINE

## 2021-01-01 PROCEDURE — U0002 COVID-19 LAB TEST NON-CDC: HCPCS | Performed by: EMERGENCY MEDICINE

## 2021-01-01 PROCEDURE — 99292 CRITICAL CARE ADDL 30 MIN: CPT | Mod: 25,,, | Performed by: NURSE PRACTITIONER

## 2021-01-01 PROCEDURE — 84484 ASSAY OF TROPONIN QUANT: CPT | Mod: 91 | Performed by: INTERNAL MEDICINE

## 2021-01-01 PROCEDURE — 95822 PR EEG,COMA/SLEEP RECORD ONLY: ICD-10-PCS | Mod: 26,,, | Performed by: PSYCHIATRY & NEUROLOGY

## 2021-01-01 PROCEDURE — 99499 NO LOS: ICD-10-PCS | Mod: S$PBB,,, | Performed by: PODIATRIST

## 2021-01-01 PROCEDURE — 85007 BL SMEAR W/DIFF WBC COUNT: CPT | Performed by: INTERNAL MEDICINE

## 2021-01-01 PROCEDURE — 80069 RENAL FUNCTION PANEL: CPT | Performed by: HOSPITALIST

## 2021-01-01 PROCEDURE — 96365 THER/PROPH/DIAG IV INF INIT: CPT

## 2021-01-01 PROCEDURE — 11721 DEBRIDE NAIL 6 OR MORE: CPT | Mod: S$PBB,,, | Performed by: PODIATRIST

## 2021-01-01 PROCEDURE — 31500 INSERT EMERGENCY AIRWAY: CPT | Mod: ,,, | Performed by: NURSE PRACTITIONER

## 2021-01-01 PROCEDURE — 85027 COMPLETE CBC AUTOMATED: CPT | Performed by: INTERNAL MEDICINE

## 2021-01-01 PROCEDURE — 36620 INSERTION CATHETER ARTERY: CPT | Mod: 59,,, | Performed by: NURSE PRACTITIONER

## 2021-01-01 PROCEDURE — 83735 ASSAY OF MAGNESIUM: CPT | Mod: 91 | Performed by: HOSPITALIST

## 2021-01-01 PROCEDURE — 94002 VENT MGMT INPAT INIT DAY: CPT

## 2021-01-01 PROCEDURE — 84484 ASSAY OF TROPONIN QUANT: CPT | Mod: 91 | Performed by: HOSPITALIST

## 2021-01-01 PROCEDURE — 36556 PR INSERT NON-TUNNEL CV CATH 5+ YRS OLD: ICD-10-PCS | Mod: 59,,, | Performed by: NURSE PRACTITIONER

## 2021-01-01 PROCEDURE — 80053 COMPREHEN METABOLIC PANEL: CPT | Mod: 91 | Performed by: NURSE PRACTITIONER

## 2021-01-01 PROCEDURE — 99499 UNLISTED E&M SERVICE: CPT | Mod: S$PBB,,, | Performed by: PODIATRIST

## 2021-01-01 PROCEDURE — 85384 FIBRINOGEN ACTIVITY: CPT | Performed by: FAMILY MEDICINE

## 2021-01-01 PROCEDURE — 99223 1ST HOSP IP/OBS HIGH 75: CPT | Mod: 95,,, | Performed by: PSYCHIATRY & NEUROLOGY

## 2021-01-01 PROCEDURE — 84145 PROCALCITONIN (PCT): CPT | Performed by: FAMILY MEDICINE

## 2021-01-01 PROCEDURE — 85025 COMPLETE CBC W/AUTO DIFF WBC: CPT | Performed by: HOSPITALIST

## 2021-01-01 PROCEDURE — 99223 PR INITIAL HOSPITAL CARE,LEVL III: ICD-10-PCS | Mod: 95,,, | Performed by: PSYCHIATRY & NEUROLOGY

## 2021-01-01 PROCEDURE — 85025 COMPLETE CBC W/AUTO DIFF WBC: CPT | Performed by: FAMILY MEDICINE

## 2021-01-01 PROCEDURE — 80069 RENAL FUNCTION PANEL: CPT | Performed by: NURSE PRACTITIONER

## 2021-01-01 PROCEDURE — 95822 EEG COMA OR SLEEP ONLY: CPT

## 2021-01-01 PROCEDURE — 63600175 PHARM REV CODE 636 W HCPCS: Performed by: STUDENT IN AN ORGANIZED HEALTH CARE EDUCATION/TRAINING PROGRAM

## 2021-01-01 PROCEDURE — 99233 SBSQ HOSP IP/OBS HIGH 50: CPT | Mod: ,,, | Performed by: PSYCHIATRY & NEUROLOGY

## 2021-01-01 PROCEDURE — 99999 PR PBB SHADOW E&M-EST. PATIENT-LVL III: CPT | Mod: PBBFAC,,, | Performed by: PODIATRIST

## 2021-01-01 PROCEDURE — 99233 PR SUBSEQUENT HOSPITAL CARE,LEVL III: ICD-10-PCS | Mod: ,,, | Performed by: PSYCHIATRY & NEUROLOGY

## 2021-01-01 PROCEDURE — 86704 HEP B CORE ANTIBODY TOTAL: CPT | Performed by: HOSPITALIST

## 2021-01-01 PROCEDURE — 31500 INSERT EMERGENCY AIRWAY: CPT

## 2021-01-01 PROCEDURE — 85014 HEMATOCRIT: CPT | Mod: 91 | Performed by: FAMILY MEDICINE

## 2021-01-01 PROCEDURE — 81000 URINALYSIS NONAUTO W/SCOPE: CPT | Performed by: NURSE PRACTITIONER

## 2021-01-01 PROCEDURE — 36556 INSERT NON-TUNNEL CV CATH: CPT | Mod: 59,,, | Performed by: NURSE PRACTITIONER

## 2021-01-01 PROCEDURE — 36620 PR INSERT CATH,ART,PERCUT,SHORTTERM: ICD-10-PCS | Mod: 59,,, | Performed by: NURSE PRACTITIONER

## 2021-01-01 RX ORDER — BISACODYL 10 MG
10 SUPPOSITORY, RECTAL RECTAL DAILY PRN
Status: DISCONTINUED | OUTPATIENT
Start: 2021-01-01 | End: 2021-01-01

## 2021-01-01 RX ORDER — SENNOSIDES 8.6 MG/1
8.6 TABLET ORAL 2 TIMES DAILY
Status: DISCONTINUED | OUTPATIENT
Start: 2021-01-01 | End: 2021-03-17 | Stop reason: HOSPADM

## 2021-01-01 RX ORDER — LEVOTHYROXINE SODIUM 50 UG/1
50 TABLET ORAL
Status: DISCONTINUED | OUTPATIENT
Start: 2021-01-01 | End: 2021-01-01

## 2021-01-01 RX ORDER — HYDROCODONE BITARTRATE AND ACETAMINOPHEN 500; 5 MG/1; MG/1
TABLET ORAL CONTINUOUS
Status: DISCONTINUED | OUTPATIENT
Start: 2021-01-01 | End: 2021-01-01

## 2021-01-01 RX ORDER — HEPARIN SODIUM,PORCINE/D5W 25000/250
0-40 INTRAVENOUS SOLUTION INTRAVENOUS CONTINUOUS
Status: DISCONTINUED | OUTPATIENT
Start: 2021-01-01 | End: 2021-03-17 | Stop reason: HOSPADM

## 2021-01-01 RX ORDER — HYDROCODONE BITARTRATE AND ACETAMINOPHEN 500; 5 MG/1; MG/1
TABLET ORAL CONTINUOUS
Status: ACTIVE | OUTPATIENT
Start: 2021-01-01 | End: 2021-01-01

## 2021-01-01 RX ORDER — EPINEPHRINE 0.1 MG/ML
INJECTION INTRAVENOUS CODE/TRAUMA/SEDATION MEDICATION
Status: COMPLETED | OUTPATIENT
Start: 2021-01-01 | End: 2021-01-01

## 2021-01-01 RX ORDER — HEPARIN SODIUM 10000 [USP'U]/100ML
200 INJECTION, SOLUTION INTRAVENOUS CONTINUOUS
Status: DISPENSED | OUTPATIENT
Start: 2021-01-01 | End: 2021-01-01

## 2021-01-01 RX ORDER — FUROSEMIDE 10 MG/ML
60 INJECTION INTRAMUSCULAR; INTRAVENOUS ONCE
Status: COMPLETED | OUTPATIENT
Start: 2021-01-01 | End: 2021-01-01

## 2021-01-01 RX ORDER — LEVETIRACETAM 10 MG/ML
1000 INJECTION INTRAVASCULAR EVERY 12 HOURS
Status: DISCONTINUED | OUTPATIENT
Start: 2021-01-01 | End: 2021-03-17 | Stop reason: HOSPADM

## 2021-01-01 RX ORDER — FUROSEMIDE 10 MG/ML
120 INJECTION INTRAMUSCULAR; INTRAVENOUS ONCE
Status: COMPLETED | OUTPATIENT
Start: 2021-01-01 | End: 2021-01-01

## 2021-01-01 RX ORDER — LACTULOSE 10 G/15ML
20 SOLUTION ORAL 3 TIMES DAILY
Status: DISCONTINUED | OUTPATIENT
Start: 2021-01-01 | End: 2021-03-17 | Stop reason: HOSPADM

## 2021-01-01 RX ORDER — METRONIDAZOLE 500 MG/100ML
500 INJECTION, SOLUTION INTRAVENOUS
Status: DISCONTINUED | OUTPATIENT
Start: 2021-01-01 | End: 2021-01-01

## 2021-01-01 RX ORDER — HEPARIN SODIUM,PORCINE/D5W 25000/250
0-40 INTRAVENOUS SOLUTION INTRAVENOUS CONTINUOUS
Status: DISCONTINUED | OUTPATIENT
Start: 2021-01-01 | End: 2021-01-01

## 2021-01-01 RX ORDER — PANTOPRAZOLE SODIUM 40 MG/10ML
40 INJECTION, POWDER, LYOPHILIZED, FOR SOLUTION INTRAVENOUS 2 TIMES DAILY
Status: DISCONTINUED | OUTPATIENT
Start: 2021-01-01 | End: 2021-03-17 | Stop reason: HOSPADM

## 2021-01-01 RX ORDER — MIDAZOLAM IN 0.9 % SOD.CHLORID 1 MG/ML
2 PLASTIC BAG, INJECTION (ML) INTRAVENOUS CONTINUOUS
Status: DISCONTINUED | OUTPATIENT
Start: 2021-01-01 | End: 2021-03-17 | Stop reason: HOSPADM

## 2021-01-01 RX ORDER — DILTIAZEM HYDROCHLORIDE 5 MG/ML
20 INJECTION INTRAVENOUS
Status: COMPLETED | OUTPATIENT
Start: 2021-01-01 | End: 2021-01-01

## 2021-01-01 RX ORDER — PROPOFOL 10 MG/ML
0-50 INJECTION, EMULSION INTRAVENOUS CONTINUOUS
Status: DISCONTINUED | OUTPATIENT
Start: 2021-01-01 | End: 2021-01-01

## 2021-01-01 RX ORDER — HEPARIN SODIUM 10000 [USP'U]/100ML
500 INJECTION, SOLUTION INTRAVENOUS CONTINUOUS
Status: DISCONTINUED | OUTPATIENT
Start: 2021-01-01 | End: 2021-01-01

## 2021-01-01 RX ORDER — GLUCAGON 1 MG
1 KIT INJECTION
Status: DISCONTINUED | OUTPATIENT
Start: 2021-01-01 | End: 2021-03-17 | Stop reason: HOSPADM

## 2021-01-01 RX ORDER — ROCURONIUM BROMIDE 10 MG/ML
50 INJECTION, SOLUTION INTRAVENOUS ONCE
Status: COMPLETED | OUTPATIENT
Start: 2021-01-01 | End: 2021-01-01

## 2021-01-01 RX ORDER — DOPAMINE HCL IN DEXTROSE 5 % 400MG/.25L
INFUSION BOTTLE (ML) INTRAVENOUS
Status: DISCONTINUED | OUTPATIENT
Start: 2021-01-01 | End: 2021-01-01

## 2021-01-01 RX ORDER — AMIODARONE HYDROCHLORIDE 200 MG/1
200 TABLET ORAL 2 TIMES DAILY
Status: DISCONTINUED | OUTPATIENT
Start: 2021-01-01 | End: 2021-01-01

## 2021-01-01 RX ORDER — ATROPINE SULFATE 0.1 MG/ML
INJECTION INTRAVENOUS CODE/TRAUMA/SEDATION MEDICATION
Status: COMPLETED | OUTPATIENT
Start: 2021-01-01 | End: 2021-01-01

## 2021-01-01 RX ORDER — FUROSEMIDE 10 MG/ML
80 INJECTION INTRAMUSCULAR; INTRAVENOUS EVERY 8 HOURS
Status: DISCONTINUED | OUTPATIENT
Start: 2021-01-01 | End: 2021-01-01

## 2021-01-01 RX ORDER — DILTIAZEM HYDROCHLORIDE 60 MG/1
60 TABLET, FILM COATED ORAL EVERY 6 HOURS
Status: DISCONTINUED | OUTPATIENT
Start: 2021-01-01 | End: 2021-01-01

## 2021-01-01 RX ORDER — LEVOTHYROXINE SODIUM 50 UG/1
50 TABLET ORAL
Status: DISCONTINUED | OUTPATIENT
Start: 2021-01-01 | End: 2021-03-17 | Stop reason: HOSPADM

## 2021-01-01 RX ORDER — FUROSEMIDE 10 MG/ML
80 INJECTION INTRAMUSCULAR; INTRAVENOUS ONCE
Status: COMPLETED | OUTPATIENT
Start: 2021-01-01 | End: 2021-01-01

## 2021-01-01 RX ORDER — NITROGLYCERIN 0.4 MG/1
0.4 TABLET SUBLINGUAL EVERY 5 MIN PRN
Status: DISCONTINUED | OUTPATIENT
Start: 2021-01-01 | End: 2021-03-17 | Stop reason: HOSPADM

## 2021-01-01 RX ORDER — MAGNESIUM SULFATE HEPTAHYDRATE 40 MG/ML
2 INJECTION, SOLUTION INTRAVENOUS
Status: ACTIVE | OUTPATIENT
Start: 2021-01-01 | End: 2021-01-01

## 2021-01-01 RX ORDER — DIGOXIN 0.05 MG/ML
62.5 SOLUTION ORAL DAILY
Status: DISCONTINUED | OUTPATIENT
Start: 2021-01-01 | End: 2021-01-01

## 2021-01-01 RX ORDER — MAGNESIUM SULFATE HEPTAHYDRATE 40 MG/ML
2 INJECTION, SOLUTION INTRAVENOUS
Status: DISCONTINUED | OUTPATIENT
Start: 2021-01-01 | End: 2021-01-01

## 2021-01-01 RX ORDER — FENTANYL CITRATE-0.9 % NACL/PF 10 MCG/ML
100 PLASTIC BAG, INJECTION (ML) INTRAVENOUS CONTINUOUS
Status: DISCONTINUED | OUTPATIENT
Start: 2021-01-01 | End: 2021-01-01

## 2021-01-01 RX ORDER — INDOMETHACIN 25 MG/1
50 CAPSULE ORAL ONCE
Status: COMPLETED | OUTPATIENT
Start: 2021-01-01 | End: 2021-01-01

## 2021-01-01 RX ORDER — METOPROLOL TARTRATE 1 MG/ML
5 INJECTION, SOLUTION INTRAVENOUS EVERY 5 MIN PRN
Status: DISCONTINUED | OUTPATIENT
Start: 2021-01-01 | End: 2021-03-17 | Stop reason: HOSPADM

## 2021-01-01 RX ORDER — ISOSORBIDE DINITRATE 20 MG/1
40 TABLET ORAL 2 TIMES DAILY
Status: DISCONTINUED | OUTPATIENT
Start: 2021-01-01 | End: 2021-01-01

## 2021-01-01 RX ORDER — HEPARIN SODIUM 10000 [USP'U]/100ML
500 INJECTION, SOLUTION INTRAVENOUS CONTINUOUS
Status: DISCONTINUED | OUTPATIENT
Start: 2021-01-01 | End: 2021-01-01 | Stop reason: SDUPTHER

## 2021-01-01 RX ORDER — ONDANSETRON 2 MG/ML
8 INJECTION INTRAMUSCULAR; INTRAVENOUS EVERY 8 HOURS PRN
Status: DISCONTINUED | OUTPATIENT
Start: 2021-01-01 | End: 2021-03-17 | Stop reason: HOSPADM

## 2021-01-01 RX ORDER — FENTANYL CITRATE-0.9 % NACL/PF 10 MCG/ML
0-300 PLASTIC BAG, INJECTION (ML) INTRAVENOUS CONTINUOUS
Status: DISCONTINUED | OUTPATIENT
Start: 2021-01-01 | End: 2021-03-17 | Stop reason: HOSPADM

## 2021-01-01 RX ORDER — ACETAMINOPHEN 650 MG/20.3ML
650 LIQUID ORAL EVERY 6 HOURS PRN
Status: DISCONTINUED | OUTPATIENT
Start: 2021-01-01 | End: 2021-03-17 | Stop reason: HOSPADM

## 2021-01-01 RX ORDER — POLYETHYLENE GLYCOL 3350 17 G/17G
17 POWDER, FOR SOLUTION ORAL 2 TIMES DAILY
Status: DISCONTINUED | OUTPATIENT
Start: 2021-01-01 | End: 2021-03-17 | Stop reason: HOSPADM

## 2021-01-01 RX ORDER — GLUCAGON 1 MG
1 KIT INJECTION
Status: DISCONTINUED | OUTPATIENT
Start: 2021-01-01 | End: 2021-01-01 | Stop reason: DRUGHIGH

## 2021-01-01 RX ORDER — POLYETHYLENE GLYCOL 3350 17 G/17G
17 POWDER, FOR SOLUTION ORAL DAILY
Status: DISCONTINUED | OUTPATIENT
Start: 2021-01-01 | End: 2021-01-01

## 2021-01-01 RX ORDER — SODIUM BICARBONATE 1 MEQ/ML
SYRINGE (ML) INTRAVENOUS CODE/TRAUMA/SEDATION MEDICATION
Status: COMPLETED | OUTPATIENT
Start: 2021-01-01 | End: 2021-01-01

## 2021-01-01 RX ORDER — SODIUM CHLORIDE 0.9 % (FLUSH) 0.9 %
10 SYRINGE (ML) INJECTION
Status: DISCONTINUED | OUTPATIENT
Start: 2021-01-01 | End: 2021-03-17 | Stop reason: HOSPADM

## 2021-01-01 RX ORDER — INSULIN ASPART 100 [IU]/ML
1-10 INJECTION, SOLUTION INTRAVENOUS; SUBCUTANEOUS EVERY 4 HOURS PRN
Status: DISCONTINUED | OUTPATIENT
Start: 2021-01-01 | End: 2021-03-17 | Stop reason: HOSPADM

## 2021-01-01 RX ORDER — LORAZEPAM 2 MG/ML
INJECTION INTRAMUSCULAR
Status: DISCONTINUED
Start: 2021-01-01 | End: 2021-01-01 | Stop reason: WASHOUT

## 2021-01-01 RX ORDER — HEPARIN SODIUM,PORCINE/D5W 25000/250
0-40 INTRAVENOUS SOLUTION INTRAVENOUS CONTINUOUS
Status: DISCONTINUED | OUTPATIENT
Start: 2021-01-01 | End: 2021-01-01 | Stop reason: DRUGHIGH

## 2021-01-01 RX ORDER — MUPIROCIN 20 MG/G
OINTMENT TOPICAL 2 TIMES DAILY
Status: COMPLETED | OUTPATIENT
Start: 2021-01-01 | End: 2021-01-01

## 2021-01-01 RX ORDER — FAMOTIDINE 10 MG/ML
20 INJECTION INTRAVENOUS 2 TIMES DAILY
Status: DISCONTINUED | OUTPATIENT
Start: 2021-01-01 | End: 2021-01-01

## 2021-01-01 RX ORDER — CARVEDILOL 12.5 MG/1
25 TABLET ORAL 2 TIMES DAILY WITH MEALS
Status: DISCONTINUED | OUTPATIENT
Start: 2021-01-01 | End: 2021-01-01

## 2021-01-01 RX ORDER — METOPROLOL TARTRATE 1 MG/ML
INJECTION, SOLUTION INTRAVENOUS
Status: DISCONTINUED
Start: 2021-01-01 | End: 2021-01-01 | Stop reason: HOSPADM

## 2021-01-01 RX ORDER — DOBUTAMINE HYDROCHLORIDE 400 MG/100ML
5 INJECTION INTRAVENOUS CONTINUOUS
Status: DISCONTINUED | OUTPATIENT
Start: 2021-01-01 | End: 2021-03-17 | Stop reason: HOSPADM

## 2021-01-01 RX ORDER — NOREPINEPHRINE BITARTRATE/D5W 4MG/250ML
0-3 PLASTIC BAG, INJECTION (ML) INTRAVENOUS CONTINUOUS
Status: DISCONTINUED | OUTPATIENT
Start: 2021-01-01 | End: 2021-01-01

## 2021-01-01 RX ORDER — ACETAMINOPHEN 650 MG/20.3ML
650 LIQUID ORAL EVERY 6 HOURS PRN
Status: DISCONTINUED | OUTPATIENT
Start: 2021-01-01 | End: 2021-01-01

## 2021-01-01 RX ORDER — HEPARIN SODIUM 1000 [USP'U]/ML
4000 INJECTION, SOLUTION INTRAVENOUS; SUBCUTANEOUS
Status: DISCONTINUED | OUTPATIENT
Start: 2021-01-01 | End: 2021-01-01

## 2021-01-01 RX ORDER — FUROSEMIDE 10 MG/ML
40 INJECTION INTRAMUSCULAR; INTRAVENOUS DAILY
Status: DISCONTINUED | OUTPATIENT
Start: 2021-01-01 | End: 2021-01-01

## 2021-01-01 RX ORDER — LORAZEPAM 2 MG/ML
INJECTION INTRAMUSCULAR
Status: COMPLETED
Start: 2021-01-01 | End: 2021-01-01

## 2021-01-01 RX ORDER — IBUPROFEN 200 MG
16 TABLET ORAL
Status: DISCONTINUED | OUTPATIENT
Start: 2021-01-01 | End: 2021-01-01 | Stop reason: DRUGHIGH

## 2021-01-01 RX ORDER — SODIUM CHLORIDE 9 MG/ML
INJECTION, SOLUTION INTRAVENOUS ONCE
Status: COMPLETED | OUTPATIENT
Start: 2021-01-01 | End: 2021-01-01

## 2021-01-01 RX ORDER — FUROSEMIDE 10 MG/ML
40 INJECTION INTRAMUSCULAR; INTRAVENOUS
Status: DISCONTINUED | OUTPATIENT
Start: 2021-01-01 | End: 2021-01-01

## 2021-01-01 RX ORDER — ATORVASTATIN CALCIUM 10 MG/1
20 TABLET, FILM COATED ORAL DAILY
Status: DISCONTINUED | OUTPATIENT
Start: 2021-01-01 | End: 2021-03-17 | Stop reason: HOSPADM

## 2021-01-01 RX ORDER — CEFEPIME HYDROCHLORIDE 1 G/50ML
2 INJECTION, SOLUTION INTRAVENOUS
Status: DISCONTINUED | OUTPATIENT
Start: 2021-01-01 | End: 2021-01-01

## 2021-01-01 RX ORDER — HYDRALAZINE HYDROCHLORIDE 50 MG/1
50 TABLET, FILM COATED ORAL EVERY 12 HOURS
Status: DISCONTINUED | OUTPATIENT
Start: 2021-01-01 | End: 2021-01-01

## 2021-01-01 RX ORDER — ATORVASTATIN CALCIUM 10 MG/1
20 TABLET, FILM COATED ORAL DAILY
Status: DISCONTINUED | OUTPATIENT
Start: 2021-01-01 | End: 2021-01-01

## 2021-01-01 RX ORDER — DEXTROSE MONOHYDRATE 100 MG/ML
INJECTION, SOLUTION INTRAVENOUS CONTINUOUS PRN
Status: DISCONTINUED | OUTPATIENT
Start: 2021-01-01 | End: 2021-03-17 | Stop reason: HOSPADM

## 2021-01-01 RX ORDER — IBUPROFEN 200 MG
24 TABLET ORAL
Status: DISCONTINUED | OUTPATIENT
Start: 2021-01-01 | End: 2021-01-01 | Stop reason: DRUGHIGH

## 2021-01-01 RX ORDER — CEFEPIME HYDROCHLORIDE 1 G/50ML
2 INJECTION, SOLUTION INTRAVENOUS
Status: DISCONTINUED | OUTPATIENT
Start: 2021-01-01 | End: 2021-01-01 | Stop reason: ALTCHOICE

## 2021-01-01 RX ORDER — CALCIUM CHLORIDE INJECTION 100 MG/ML
INJECTION, SOLUTION INTRAVENOUS CODE/TRAUMA/SEDATION MEDICATION
Status: COMPLETED | OUTPATIENT
Start: 2021-01-01 | End: 2021-01-01

## 2021-01-01 RX ORDER — FENTANYL CITRATE-0.9 % NACL/PF 10 MCG/ML
0-250 PLASTIC BAG, INJECTION (ML) INTRAVENOUS CONTINUOUS
Status: DISCONTINUED | OUTPATIENT
Start: 2021-01-01 | End: 2021-01-01

## 2021-01-01 RX ORDER — METOCLOPRAMIDE HYDROCHLORIDE 5 MG/ML
10 INJECTION INTRAMUSCULAR; INTRAVENOUS ONCE
Status: COMPLETED | OUTPATIENT
Start: 2021-01-01 | End: 2021-01-01

## 2021-01-01 RX ORDER — FAMOTIDINE 10 MG/ML
20 INJECTION INTRAVENOUS DAILY
Status: DISCONTINUED | OUTPATIENT
Start: 2021-01-01 | End: 2021-01-01

## 2021-01-01 RX ORDER — INSULIN ASPART 100 [IU]/ML
1-10 INJECTION, SOLUTION INTRAVENOUS; SUBCUTANEOUS EVERY 4 HOURS PRN
Status: DISCONTINUED | OUTPATIENT
Start: 2021-01-01 | End: 2021-01-01 | Stop reason: DRUGHIGH

## 2021-01-01 RX ORDER — DIGOXIN 0.05 MG/ML
62.5 SOLUTION ORAL DAILY
Status: DISCONTINUED | OUTPATIENT
Start: 2021-01-01 | End: 2021-03-17 | Stop reason: HOSPADM

## 2021-01-01 RX ORDER — CEFAZOLIN SODIUM 2 G/50ML
2 SOLUTION INTRAVENOUS
Status: DISCONTINUED | OUTPATIENT
Start: 2021-01-01 | End: 2021-03-17 | Stop reason: HOSPADM

## 2021-01-01 RX ORDER — SUCCINYLCHOLINE CHLORIDE 20 MG/ML
100 INJECTION INTRAMUSCULAR; INTRAVENOUS ONCE
Status: COMPLETED | OUTPATIENT
Start: 2021-01-01 | End: 2021-01-01

## 2021-01-01 RX ORDER — MANNITOL 250 MG/ML
25 INJECTION, SOLUTION INTRAVENOUS
Status: DISCONTINUED | OUTPATIENT
Start: 2021-01-01 | End: 2021-03-17 | Stop reason: HOSPADM

## 2021-01-01 RX ORDER — ATROPINE SULFATE 0.1 MG/ML
1 INJECTION INTRAVENOUS ONCE
Status: COMPLETED | OUTPATIENT
Start: 2021-01-01 | End: 2021-01-01

## 2021-01-01 RX ORDER — CISATRACURIUM BESYLATE 2 MG/ML
0.1 INJECTION, SOLUTION INTRAVENOUS ONCE
Status: DISCONTINUED | OUTPATIENT
Start: 2021-01-01 | End: 2021-01-01

## 2021-01-01 RX ORDER — CHLORHEXIDINE GLUCONATE ORAL RINSE 1.2 MG/ML
15 SOLUTION DENTAL 2 TIMES DAILY
Status: DISCONTINUED | OUTPATIENT
Start: 2021-01-01 | End: 2021-03-17 | Stop reason: HOSPADM

## 2021-01-01 RX ORDER — SPIRONOLACTONE 25 MG/1
50 TABLET ORAL DAILY
Status: DISCONTINUED | OUTPATIENT
Start: 2021-01-01 | End: 2021-01-01

## 2021-01-01 RX ORDER — FUROSEMIDE 40 MG/1
80 TABLET ORAL DAILY
Status: DISCONTINUED | OUTPATIENT
Start: 2021-01-01 | End: 2021-01-01

## 2021-01-01 RX ORDER — LABETALOL HYDROCHLORIDE 5 MG/ML
10 INJECTION, SOLUTION INTRAVENOUS ONCE
Status: COMPLETED | OUTPATIENT
Start: 2021-01-01 | End: 2021-01-01

## 2021-01-01 RX ORDER — MIDAZOLAM HYDROCHLORIDE 1 MG/ML
2 INJECTION INTRAMUSCULAR; INTRAVENOUS ONCE
Status: COMPLETED | OUTPATIENT
Start: 2021-01-01 | End: 2021-01-01

## 2021-01-01 RX ORDER — HEPARIN SODIUM 1000 [USP'U]/ML
5000 INJECTION, SOLUTION INTRAVENOUS; SUBCUTANEOUS
Status: DISCONTINUED | OUTPATIENT
Start: 2021-01-01 | End: 2021-03-17 | Stop reason: HOSPADM

## 2021-01-01 RX ORDER — DOPAMINE HYDROCHLORIDE 160 MG/100ML
0-20 INJECTION, SOLUTION INTRAVENOUS CONTINUOUS
Status: DISCONTINUED | OUTPATIENT
Start: 2021-01-01 | End: 2021-01-01

## 2021-01-01 RX ORDER — AMIODARONE HYDROCHLORIDE 150 MG/3ML
150 INJECTION, SOLUTION INTRAVENOUS
Status: COMPLETED | OUTPATIENT
Start: 2021-01-01 | End: 2021-01-01

## 2021-01-01 RX ORDER — INSULIN ASPART 100 [IU]/ML
1-10 INJECTION, SOLUTION INTRAVENOUS; SUBCUTANEOUS
Status: DISCONTINUED | OUTPATIENT
Start: 2021-01-01 | End: 2021-01-01

## 2021-01-01 RX ORDER — LINEZOLID 2 MG/ML
600 INJECTION, SOLUTION INTRAVENOUS
Status: DISCONTINUED | OUTPATIENT
Start: 2021-01-01 | End: 2021-01-01 | Stop reason: ALTCHOICE

## 2021-01-01 RX ADMIN — CHLORHEXIDINE GLUCONATE 0.12% ORAL RINSE 15 ML: 1.2 LIQUID ORAL at 09:03

## 2021-01-01 RX ADMIN — DIGOXIN 62.5 MCG: 0.05 SOLUTION ORAL at 09:03

## 2021-01-01 RX ADMIN — NOREPINEPHRINE BITARTRATE 0.16 MCG/KG/MIN: 1 INJECTION, SOLUTION, CONCENTRATE INTRAVENOUS at 03:03

## 2021-01-01 RX ADMIN — INSULIN ASPART 8 UNITS: 100 INJECTION, SOLUTION INTRAVENOUS; SUBCUTANEOUS at 05:03

## 2021-01-01 RX ADMIN — NOREPINEPHRINE BITARTRATE 0.02 MCG/KG/MIN: 1 INJECTION, SOLUTION, CONCENTRATE INTRAVENOUS at 03:03

## 2021-01-01 RX ADMIN — EPINEPHRINE 1 MG: 0.1 INJECTION, SOLUTION ENDOTRACHEAL; INTRACARDIAC; INTRAVENOUS at 02:03

## 2021-01-01 RX ADMIN — LEVETIRACETAM 1000 MG: 10 INJECTION INTRAVENOUS at 08:03

## 2021-01-01 RX ADMIN — CHLORHEXIDINE GLUCONATE 0.12% ORAL RINSE 15 ML: 1.2 LIQUID ORAL at 08:03

## 2021-01-01 RX ADMIN — MINERAL OIL AND WHITE PETROLATUM: 150; 830 OINTMENT OPHTHALMIC at 02:03

## 2021-01-01 RX ADMIN — EPINEPHRINE 1 MG: 0.1 INJECTION, SOLUTION ENDOTRACHEAL; INTRACARDIAC; INTRAVENOUS at 03:03

## 2021-01-01 RX ADMIN — LACTULOSE 20 G: 20 SOLUTION ORAL at 02:03

## 2021-01-01 RX ADMIN — METRONIDAZOLE 500 MG: 500 INJECTION, SOLUTION INTRAVENOUS at 09:03

## 2021-01-01 RX ADMIN — INSULIN ASPART 1 UNITS: 100 INJECTION, SOLUTION INTRAVENOUS; SUBCUTANEOUS at 01:03

## 2021-01-01 RX ADMIN — SENNOSIDES 8.6 MG: 8.6 TABLET, FILM COATED ORAL at 08:03

## 2021-01-01 RX ADMIN — AMIODARONE HYDROCHLORIDE 200 MG: 200 TABLET ORAL at 09:03

## 2021-01-01 RX ADMIN — METRONIDAZOLE 500 MG: 500 INJECTION, SOLUTION INTRAVENOUS at 10:03

## 2021-01-01 RX ADMIN — CISATRACURIUM BESYLATE 5 MCG/KG/MIN: 10 INJECTION INTRAVENOUS at 09:03

## 2021-01-01 RX ADMIN — CEFEPIME HYDROCHLORIDE 2 G: 2 INJECTION, SOLUTION INTRAVENOUS at 07:03

## 2021-01-01 RX ADMIN — DIGOXIN 62.5 MCG: 0.05 SOLUTION ORAL at 08:03

## 2021-01-01 RX ADMIN — PANTOPRAZOLE SODIUM 40 MG: 40 INJECTION, POWDER, FOR SOLUTION INTRAVENOUS at 08:03

## 2021-01-01 RX ADMIN — CEFEPIME HYDROCHLORIDE 2 G: 2 INJECTION, SOLUTION INTRAVENOUS at 08:03

## 2021-01-01 RX ADMIN — CISATRACURIUM BESYLATE 5 MCG/KG/MIN: 10 INJECTION INTRAVENOUS at 01:03

## 2021-01-01 RX ADMIN — MUPIROCIN: 20 OINTMENT TOPICAL at 08:03

## 2021-01-01 RX ADMIN — Medication 200 MCG/HR: at 01:03

## 2021-01-01 RX ADMIN — PANTOPRAZOLE SODIUM 40 MG: 40 INJECTION, POWDER, FOR SOLUTION INTRAVENOUS at 05:03

## 2021-01-01 RX ADMIN — AMIODARONE HYDROCHLORIDE 0.5 MG/MIN: 1.8 INJECTION, SOLUTION INTRAVENOUS at 07:03

## 2021-01-01 RX ADMIN — METOCLOPRAMIDE 10 MG: 5 INJECTION, SOLUTION INTRAMUSCULAR; INTRAVENOUS at 11:03

## 2021-01-01 RX ADMIN — LORAZEPAM 4 MG: 2 INJECTION INTRAMUSCULAR; INTRAVENOUS at 02:03

## 2021-01-01 RX ADMIN — INSULIN HUMAN 10 UNITS: 100 INJECTION, SOLUTION PARENTERAL at 06:03

## 2021-01-01 RX ADMIN — LEVOTHYROXINE SODIUM 50 MCG: 50 TABLET ORAL at 05:03

## 2021-01-01 RX ADMIN — INSULIN ASPART 2 UNITS: 100 INJECTION, SOLUTION INTRAVENOUS; SUBCUTANEOUS at 11:03

## 2021-01-01 RX ADMIN — MIDAZOLAM HYDROCHLORIDE 2 MG/HR: 5 INJECTION, SOLUTION INTRAMUSCULAR; INTRAVENOUS at 10:03

## 2021-01-01 RX ADMIN — FUROSEMIDE 80 MG: 10 INJECTION, SOLUTION INTRAMUSCULAR; INTRAVENOUS at 02:03

## 2021-01-01 RX ADMIN — HEPARIN SODIUM 25 UNITS/KG/HR: 10000 INJECTION, SOLUTION INTRAVENOUS at 11:03

## 2021-01-01 RX ADMIN — CISATRACURIUM BESYLATE 5 MCG/KG/MIN: 10 INJECTION INTRAVENOUS at 10:03

## 2021-01-01 RX ADMIN — SODIUM ZIRCONIUM CYCLOSILICATE 10 G: 5 POWDER, FOR SUSPENSION ORAL at 06:03

## 2021-01-01 RX ADMIN — FUROSEMIDE 20 MG/HR: 10 INJECTION, SOLUTION INTRAMUSCULAR; INTRAVENOUS at 10:03

## 2021-01-01 RX ADMIN — HUMAN ALBUMIN MICROSPHERES AND PERFLUTREN 0.11 MG: 10; .22 INJECTION, SOLUTION INTRAVENOUS at 08:03

## 2021-01-01 RX ADMIN — DOPAMINE HYDROCHLORIDE 8 MCG/KG/MIN: 160 INJECTION, SOLUTION INTRAVENOUS at 02:03

## 2021-01-01 RX ADMIN — EPINEPHRINE 0.08 MCG/KG/MIN: 1 INJECTION PARENTERAL at 03:03

## 2021-01-01 RX ADMIN — ATORVASTATIN CALCIUM 20 MG: 10 TABLET, FILM COATED ORAL at 08:03

## 2021-01-01 RX ADMIN — MINERAL OIL AND WHITE PETROLATUM: 150; 830 OINTMENT OPHTHALMIC at 05:03

## 2021-01-01 RX ADMIN — ATORVASTATIN CALCIUM 20 MG: 10 TABLET, FILM COATED ORAL at 09:03

## 2021-01-01 RX ADMIN — Medication 200 MCG/HR: at 07:03

## 2021-01-01 RX ADMIN — ACETAMINOPHEN 650 MG: 160 SOLUTION ORAL at 11:03

## 2021-01-01 RX ADMIN — EPINEPHRINE 0.18 MCG/KG/MIN: 1 INJECTION PARENTERAL at 05:03

## 2021-01-01 RX ADMIN — SENNOSIDES 8.6 MG: 8.6 TABLET, FILM COATED ORAL at 12:03

## 2021-01-01 RX ADMIN — INSULIN DETEMIR 23 UNITS: 100 INJECTION, SOLUTION SUBCUTANEOUS at 10:03

## 2021-01-01 RX ADMIN — EPINEPHRINE 0.18 MCG/KG/MIN: 1 INJECTION PARENTERAL at 01:03

## 2021-01-01 RX ADMIN — HEPARIN SODIUM 4000 UNITS: 1000 INJECTION, SOLUTION INTRAVENOUS; SUBCUTANEOUS at 05:03

## 2021-01-01 RX ADMIN — CEFAZOLIN SODIUM 2 G: 2 SOLUTION INTRAVENOUS at 03:03

## 2021-01-01 RX ADMIN — PROPOFOL 45 MCG/KG/MIN: 10 INJECTION, EMULSION INTRAVENOUS at 08:03

## 2021-01-01 RX ADMIN — EPINEPHRINE 0.1 MCG/KG/MIN: 1 INJECTION PARENTERAL at 09:03

## 2021-01-01 RX ADMIN — PROPOFOL 50 MCG/KG/MIN: 10 INJECTION, EMULSION INTRAVENOUS at 07:03

## 2021-01-01 RX ADMIN — SODIUM BICARBONATE: 84 INJECTION, SOLUTION INTRAVENOUS at 08:03

## 2021-01-01 RX ADMIN — INSULIN ASPART 2 UNITS: 100 INJECTION, SOLUTION INTRAVENOUS; SUBCUTANEOUS at 06:03

## 2021-01-01 RX ADMIN — CEFAZOLIN SODIUM 2 G: 2 SOLUTION INTRAVENOUS at 02:03

## 2021-01-01 RX ADMIN — HYDRALAZINE HYDROCHLORIDE 50 MG: 50 TABLET, FILM COATED ORAL at 08:03

## 2021-01-01 RX ADMIN — NOREPINEPHRINE BITARTRATE 0.36 MCG/KG/MIN: 1 INJECTION, SOLUTION, CONCENTRATE INTRAVENOUS at 01:03

## 2021-01-01 RX ADMIN — INSULIN DETEMIR 15 UNITS: 100 INJECTION, SOLUTION SUBCUTANEOUS at 08:03

## 2021-01-01 RX ADMIN — LEVETIRACETAM 1000 MG: 10 INJECTION INTRAVENOUS at 09:03

## 2021-01-01 RX ADMIN — NOREPINEPHRINE BITARTRATE 0.06 MCG/KG/MIN: 1 INJECTION, SOLUTION, CONCENTRATE INTRAVENOUS at 04:03

## 2021-01-01 RX ADMIN — AMIODARONE HYDROCHLORIDE 200 MG: 200 TABLET ORAL at 02:03

## 2021-01-01 RX ADMIN — Medication 100 MCG/HR: at 07:03

## 2021-01-01 RX ADMIN — MIDAZOLAM HYDROCHLORIDE 2 MG/HR: 5 INJECTION, SOLUTION INTRAMUSCULAR; INTRAVENOUS at 05:03

## 2021-01-01 RX ADMIN — MINERAL OIL AND WHITE PETROLATUM: 150; 830 OINTMENT OPHTHALMIC at 09:03

## 2021-01-01 RX ADMIN — INSULIN ASPART 1 UNITS: 100 INJECTION, SOLUTION INTRAVENOUS; SUBCUTANEOUS at 12:03

## 2021-01-01 RX ADMIN — MINERAL OIL AND WHITE PETROLATUM: 150; 830 OINTMENT OPHTHALMIC at 06:03

## 2021-01-01 RX ADMIN — DOBUTAMINE HYDROCHLORIDE 5 MCG/KG/MIN: 400 INJECTION INTRAVENOUS at 02:03

## 2021-01-01 RX ADMIN — EPINEPHRINE 1 MG: 0.1 INJECTION, SOLUTION ENDOTRACHEAL; INTRACARDIAC; INTRAVENOUS at 06:03

## 2021-01-01 RX ADMIN — INSULIN ASPART 4 UNITS: 100 INJECTION, SOLUTION INTRAVENOUS; SUBCUTANEOUS at 06:03

## 2021-01-01 RX ADMIN — PROPOFOL 35 MCG/KG/MIN: 10 INJECTION, EMULSION INTRAVENOUS at 05:03

## 2021-01-01 RX ADMIN — Medication 250 MCG/HR: at 04:03

## 2021-01-01 RX ADMIN — METRONIDAZOLE 500 MG: 500 INJECTION, SOLUTION INTRAVENOUS at 03:03

## 2021-01-01 RX ADMIN — INSULIN ASPART 4 UNITS: 100 INJECTION, SOLUTION INTRAVENOUS; SUBCUTANEOUS at 02:03

## 2021-01-01 RX ADMIN — INSULIN HUMAN 10 UNITS: 100 INJECTION, SOLUTION PARENTERAL at 03:03

## 2021-01-01 RX ADMIN — NOREPINEPHRINE BITARTRATE 0.05 MCG/KG/MIN: 1 INJECTION, SOLUTION, CONCENTRATE INTRAVENOUS at 02:03

## 2021-01-01 RX ADMIN — INSULIN ASPART 6 UNITS: 100 INJECTION, SOLUTION INTRAVENOUS; SUBCUTANEOUS at 07:03

## 2021-01-01 RX ADMIN — CISATRACURIUM BESYLATE 2.5 MCG/KG/MIN: 10 INJECTION INTRAVENOUS at 04:03

## 2021-01-01 RX ADMIN — LINEZOLID 600 MG: 600 INJECTION, SOLUTION INTRAVENOUS at 03:03

## 2021-01-01 RX ADMIN — FUROSEMIDE 80 MG: 10 INJECTION, SOLUTION INTRAMUSCULAR; INTRAVENOUS at 11:03

## 2021-01-01 RX ADMIN — NOREPINEPHRINE BITARTRATE 0.2 MCG/KG/MIN: 1 INJECTION, SOLUTION, CONCENTRATE INTRAVENOUS at 04:03

## 2021-01-01 RX ADMIN — EPINEPHRINE 0.02 MCG/KG/MIN: 1 INJECTION INTRAMUSCULAR; INTRAVENOUS; SUBCUTANEOUS at 07:03

## 2021-01-01 RX ADMIN — EPINEPHRINE 0.18 MCG/KG/MIN: 1 INJECTION PARENTERAL at 06:03

## 2021-01-01 RX ADMIN — SODIUM BICARBONATE 50 MEQ: 84 INJECTION, SOLUTION INTRAVENOUS at 03:03

## 2021-01-01 RX ADMIN — INSULIN ASPART 4 UNITS: 100 INJECTION, SOLUTION INTRAVENOUS; SUBCUTANEOUS at 03:03

## 2021-01-01 RX ADMIN — Medication 300 MCG/HR: at 01:03

## 2021-01-01 RX ADMIN — CISATRACURIUM BESYLATE 5 MCG/KG/MIN: 10 INJECTION INTRAVENOUS at 06:03

## 2021-01-01 RX ADMIN — AMIODARONE HYDROCHLORIDE 0.5 MG/MIN: 1.8 INJECTION, SOLUTION INTRAVENOUS at 06:03

## 2021-01-01 RX ADMIN — PANTOPRAZOLE SODIUM 40 MG: 40 INJECTION, POWDER, FOR SOLUTION INTRAVENOUS at 09:03

## 2021-01-01 RX ADMIN — POLYETHYLENE GLYCOL 3350 17 G: 17 POWDER, FOR SOLUTION ORAL at 08:03

## 2021-01-01 RX ADMIN — LABETALOL HYDROCHLORIDE 10 MG: 5 INJECTION, SOLUTION INTRAVENOUS at 01:03

## 2021-01-01 RX ADMIN — LACTULOSE 20 G: 20 SOLUTION ORAL at 08:03

## 2021-01-01 RX ADMIN — INSULIN ASPART 2 UNITS: 100 INJECTION, SOLUTION INTRAVENOUS; SUBCUTANEOUS at 08:03

## 2021-01-01 RX ADMIN — CISATRACURIUM BESYLATE 5 MCG/KG/MIN: 10 INJECTION INTRAVENOUS at 12:03

## 2021-01-01 RX ADMIN — FUROSEMIDE 80 MG: 10 INJECTION, SOLUTION INTRAMUSCULAR; INTRAVENOUS at 05:03

## 2021-01-01 RX ADMIN — INSULIN ASPART 4 UNITS: 100 INJECTION, SOLUTION INTRAVENOUS; SUBCUTANEOUS at 07:03

## 2021-01-01 RX ADMIN — ROCURONIUM BROMIDE 50 MG: 10 INJECTION, SOLUTION INTRAVENOUS at 08:03

## 2021-01-01 RX ADMIN — AMIODARONE HYDROCHLORIDE 150 MG: 50 INJECTION, SOLUTION INTRAVENOUS at 03:03

## 2021-01-01 RX ADMIN — Medication 150 MCG/HR: at 10:03

## 2021-01-01 RX ADMIN — LEVOTHYROXINE SODIUM 50 MCG: 50 TABLET ORAL at 06:03

## 2021-01-01 RX ADMIN — INSULIN DETEMIR 20 UNITS: 100 INJECTION, SOLUTION SUBCUTANEOUS at 09:03

## 2021-01-01 RX ADMIN — AMIODARONE HYDROCHLORIDE 1 MG/MIN: 1.8 INJECTION, SOLUTION INTRAVENOUS at 04:03

## 2021-01-01 RX ADMIN — CARVEDILOL 25 MG: 12.5 TABLET, FILM COATED ORAL at 08:03

## 2021-01-01 RX ADMIN — INSULIN ASPART 2 UNITS: 100 INJECTION, SOLUTION INTRAVENOUS; SUBCUTANEOUS at 02:03

## 2021-01-01 RX ADMIN — INSULIN ASPART 2 UNITS: 100 INJECTION, SOLUTION INTRAVENOUS; SUBCUTANEOUS at 04:03

## 2021-01-01 RX ADMIN — EPINEPHRINE 0.18 MCG/KG/MIN: 1 INJECTION PARENTERAL at 08:03

## 2021-01-01 RX ADMIN — SODIUM BICARBONATE 50 MEQ: 84 INJECTION, SOLUTION INTRAVENOUS at 06:03

## 2021-01-01 RX ADMIN — HEPARIN SODIUM 25 UNITS/KG/HR: 10000 INJECTION, SOLUTION INTRAVENOUS at 10:03

## 2021-01-01 RX ADMIN — HEPARIN SODIUM 12 UNITS/KG/HR: 10000 INJECTION, SOLUTION INTRAVENOUS at 08:03

## 2021-01-01 RX ADMIN — HEPARIN SODIUM 25 UNITS/KG/HR: 10000 INJECTION, SOLUTION INTRAVENOUS at 03:03

## 2021-01-01 RX ADMIN — NOREPINEPHRINE BITARTRATE 0.4 MCG/KG/MIN: 1 INJECTION, SOLUTION, CONCENTRATE INTRAVENOUS at 09:03

## 2021-01-01 RX ADMIN — INSULIN ASPART 2 UNITS: 100 INJECTION, SOLUTION INTRAVENOUS; SUBCUTANEOUS at 09:03

## 2021-01-01 RX ADMIN — MIDAZOLAM 2 MG/HR: 5 INJECTION INTRAMUSCULAR; INTRAVENOUS at 02:03

## 2021-01-01 RX ADMIN — INSULIN ASPART 2 UNITS: 100 INJECTION, SOLUTION INTRAVENOUS; SUBCUTANEOUS at 05:03

## 2021-01-01 RX ADMIN — METRONIDAZOLE 500 MG: 500 INJECTION, SOLUTION INTRAVENOUS at 07:03

## 2021-01-01 RX ADMIN — SODIUM BICARBONATE 50 MEQ: 84 INJECTION, SOLUTION INTRAVENOUS at 07:03

## 2021-01-01 RX ADMIN — Medication 200 MCG/HR: at 04:03

## 2021-01-01 RX ADMIN — MIDAZOLAM 2 MG/HR: 5 INJECTION INTRAMUSCULAR; INTRAVENOUS at 09:03

## 2021-01-01 RX ADMIN — NOREPINEPHRINE BITARTRATE 0.58 MCG/KG/MIN: 1 INJECTION, SOLUTION, CONCENTRATE INTRAVENOUS at 11:03

## 2021-01-01 RX ADMIN — PROPOFOL 40 MCG/KG/MIN: 10 INJECTION, EMULSION INTRAVENOUS at 12:03

## 2021-01-01 RX ADMIN — HEPARIN SODIUM 21 UNITS/KG/HR: 10000 INJECTION, SOLUTION INTRAVENOUS at 06:03

## 2021-01-01 RX ADMIN — DOPAMINE HYDROCHLORIDE 6 MCG/KG/MIN: 160 INJECTION, SOLUTION INTRAVENOUS at 05:03

## 2021-01-01 RX ADMIN — METRONIDAZOLE 500 MG: 500 INJECTION, SOLUTION INTRAVENOUS at 02:03

## 2021-01-01 RX ADMIN — AMIODARONE HYDROCHLORIDE 0.5 MG/MIN: 1.8 INJECTION, SOLUTION INTRAVENOUS at 01:03

## 2021-01-01 RX ADMIN — NOREPINEPHRINE BITARTRATE 0.8 MCG/KG/MIN: 1 INJECTION, SOLUTION, CONCENTRATE INTRAVENOUS at 11:03

## 2021-01-01 RX ADMIN — ONDANSETRON 8 MG: 2 INJECTION INTRAMUSCULAR; INTRAVENOUS at 01:03

## 2021-01-01 RX ADMIN — NOREPINEPHRINE BITARTRATE 0.02 MCG/KG/MIN: 1 INJECTION, SOLUTION, CONCENTRATE INTRAVENOUS at 09:03

## 2021-01-01 RX ADMIN — MIDAZOLAM HYDROCHLORIDE 2 MG/HR: 5 INJECTION, SOLUTION INTRAMUSCULAR; INTRAVENOUS at 08:03

## 2021-01-01 RX ADMIN — INSULIN ASPART 6 UNITS: 100 INJECTION, SOLUTION INTRAVENOUS; SUBCUTANEOUS at 05:03

## 2021-01-01 RX ADMIN — CALCIUM CHLORIDE 1 G: 100 INJECTION, SOLUTION INTRAVENOUS at 07:03

## 2021-01-01 RX ADMIN — CISATRACURIUM BESYLATE 3 MCG/KG/MIN: 10 INJECTION INTRAVENOUS at 01:03

## 2021-01-01 RX ADMIN — HEPARIN SODIUM 500 UNITS/HR: 10000 INJECTION, SOLUTION INTRAVENOUS at 06:03

## 2021-01-01 RX ADMIN — DEXTROSE MONOHYDRATE 25 G: 25 INJECTION, SOLUTION INTRAVENOUS at 06:03

## 2021-01-01 RX ADMIN — AMIODARONE HYDROCHLORIDE 0.5 MG/MIN: 1.8 INJECTION, SOLUTION INTRAVENOUS at 04:03

## 2021-01-01 RX ADMIN — CISATRACURIUM BESYLATE 5 MCG/KG/MIN: 10 INJECTION INTRAVENOUS at 05:03

## 2021-01-01 RX ADMIN — FAMOTIDINE 20 MG: 10 INJECTION INTRAVENOUS at 08:03

## 2021-01-01 RX ADMIN — MINERAL OIL AND WHITE PETROLATUM: 150; 830 OINTMENT OPHTHALMIC at 10:03

## 2021-01-01 RX ADMIN — HEPARIN SODIUM 21 UNITS/KG/HR: 10000 INJECTION, SOLUTION INTRAVENOUS at 12:03

## 2021-01-01 RX ADMIN — HEPARIN SODIUM 12 UNITS/KG/HR: 10000 INJECTION, SOLUTION INTRAVENOUS at 12:03

## 2021-01-01 RX ADMIN — HEPARIN SODIUM 25 UNITS/KG/HR: 10000 INJECTION, SOLUTION INTRAVENOUS at 07:03

## 2021-01-01 RX ADMIN — LEVETIRACETAM 1000 MG: 10 INJECTION INTRAVENOUS at 05:03

## 2021-01-01 RX ADMIN — EPINEPHRINE 1 MG: 0.1 INJECTION, SOLUTION ENDOTRACHEAL; INTRACARDIAC; INTRAVENOUS at 07:03

## 2021-01-01 RX ADMIN — MINERAL OIL, PETROLATUM: 425; 568 OINTMENT OPHTHALMIC at 02:03

## 2021-01-01 RX ADMIN — INSULIN ASPART 4 UNITS: 100 INJECTION, SOLUTION INTRAVENOUS; SUBCUTANEOUS at 11:03

## 2021-01-01 RX ADMIN — PROPOFOL 30 MCG/KG/MIN: 10 INJECTION, EMULSION INTRAVENOUS at 12:03

## 2021-01-01 RX ADMIN — HEPARIN SODIUM 20 UNITS/KG/HR: 10000 INJECTION, SOLUTION INTRAVENOUS at 07:03

## 2021-01-01 RX ADMIN — DOBUTAMINE HYDROCHLORIDE 5 MCG/KG/MIN: 400 INJECTION INTRAVENOUS at 08:03

## 2021-01-01 RX ADMIN — CISATRACURIUM BESYLATE 3 MCG/KG/MIN: 10 INJECTION INTRAVENOUS at 05:03

## 2021-01-01 RX ADMIN — CISATRACURIUM BESYLATE 3.5 MCG/KG/MIN: 10 INJECTION INTRAVENOUS at 12:03

## 2021-01-01 RX ADMIN — INSULIN DETEMIR 15 UNITS: 100 INJECTION, SOLUTION SUBCUTANEOUS at 09:03

## 2021-01-01 RX ADMIN — MIDAZOLAM HYDROCHLORIDE 2 MG/HR: 5 INJECTION, SOLUTION INTRAMUSCULAR; INTRAVENOUS at 06:03

## 2021-01-01 RX ADMIN — METOROPROLOL TARTRATE 5 MG: 5 INJECTION, SOLUTION INTRAVENOUS at 02:03

## 2021-01-01 RX ADMIN — CISATRACURIUM BESYLATE 4 MCG/KG/MIN: 10 INJECTION INTRAVENOUS at 02:03

## 2021-01-01 RX ADMIN — MIDAZOLAM 2 MG/HR: 5 INJECTION INTRAMUSCULAR; INTRAVENOUS at 01:03

## 2021-01-01 RX ADMIN — MINERAL OIL AND WHITE PETROLATUM: 150; 830 OINTMENT OPHTHALMIC at 03:03

## 2021-01-01 RX ADMIN — METRONIDAZOLE 500 MG: 500 INJECTION, SOLUTION INTRAVENOUS at 05:03

## 2021-01-01 RX ADMIN — INSULIN ASPART 4 UNITS: 100 INJECTION, SOLUTION INTRAVENOUS; SUBCUTANEOUS at 04:03

## 2021-01-01 RX ADMIN — FUROSEMIDE 40 MG: 10 INJECTION, SOLUTION INTRAMUSCULAR; INTRAVENOUS at 06:03

## 2021-01-01 RX ADMIN — HEPARIN SODIUM 5000 UNITS: 1000 INJECTION, SOLUTION INTRAVENOUS; SUBCUTANEOUS at 12:03

## 2021-01-01 RX ADMIN — Medication 50 MCG/HR: at 10:03

## 2021-01-01 RX ADMIN — PROPOFOL 35 MCG/KG/MIN: 10 INJECTION, EMULSION INTRAVENOUS at 02:03

## 2021-01-01 RX ADMIN — CALCIUM GLUCONATE 1 G: 98 INJECTION, SOLUTION INTRAVENOUS at 06:03

## 2021-01-01 RX ADMIN — EPINEPHRINE 0.2 MCG/KG/MIN: 1 INJECTION PARENTERAL at 07:03

## 2021-01-01 RX ADMIN — FUROSEMIDE 120 MG: 10 INJECTION, SOLUTION INTRAMUSCULAR; INTRAVENOUS at 02:03

## 2021-01-01 RX ADMIN — FAMOTIDINE 20 MG: 10 INJECTION INTRAVENOUS at 09:03

## 2021-01-01 RX ADMIN — EPINEPHRINE 0.22 MCG/KG/MIN: 1 INJECTION PARENTERAL at 07:03

## 2021-01-01 RX ADMIN — INSULIN DETEMIR 23 UNITS: 100 INJECTION, SOLUTION SUBCUTANEOUS at 09:03

## 2021-01-01 RX ADMIN — AMIODARONE HYDROCHLORIDE 0.5 MG/MIN: 1.8 INJECTION, SOLUTION INTRAVENOUS at 05:03

## 2021-01-01 RX ADMIN — EPINEPHRINE 0.07 MCG/KG/MIN: 1 INJECTION PARENTERAL at 07:03

## 2021-01-01 RX ADMIN — Medication 200 MCG/HR: at 11:03

## 2021-01-01 RX ADMIN — FUROSEMIDE 80 MG: 10 INJECTION, SOLUTION INTRAMUSCULAR; INTRAVENOUS at 09:03

## 2021-01-01 RX ADMIN — PROPOFOL 35 MCG/KG/MIN: 10 INJECTION, EMULSION INTRAVENOUS at 07:03

## 2021-01-01 RX ADMIN — PROPOFOL 20 MCG/KG/MIN: 10 INJECTION, EMULSION INTRAVENOUS at 04:03

## 2021-01-01 RX ADMIN — INSULIN ASPART 3 UNITS: 100 INJECTION, SOLUTION INTRAVENOUS; SUBCUTANEOUS at 10:03

## 2021-01-01 RX ADMIN — DOBUTAMINE HYDROCHLORIDE 5 MCG/KG/MIN: 400 INJECTION INTRAVENOUS at 07:03

## 2021-01-01 RX ADMIN — INSULIN DETEMIR 20 UNITS: 100 INJECTION, SOLUTION SUBCUTANEOUS at 08:03

## 2021-01-01 RX ADMIN — NITROGLYCERIN 0.4 MG: 0.4 TABLET, ORALLY DISINTEGRATING SUBLINGUAL at 01:03

## 2021-01-01 RX ADMIN — DOBUTAMINE HYDROCHLORIDE 5 MCG/KG/MIN: 400 INJECTION INTRAVENOUS at 05:03

## 2021-01-01 RX ADMIN — SODIUM CHLORIDE: 0.9 INJECTION, SOLUTION INTRAVENOUS at 09:03

## 2021-01-01 RX ADMIN — DOBUTAMINE HYDROCHLORIDE 5 MCG/KG/MIN: 400 INJECTION INTRAVENOUS at 09:03

## 2021-01-01 RX ADMIN — HEPARIN SODIUM 21 UNITS/KG/HR: 10000 INJECTION, SOLUTION INTRAVENOUS at 01:03

## 2021-01-01 RX ADMIN — POLYETHYLENE GLYCOL 3350 17 G: 17 POWDER, FOR SOLUTION ORAL at 09:03

## 2021-01-01 RX ADMIN — SODIUM CHLORIDE: 0.9 INJECTION, SOLUTION INTRAVENOUS at 12:03

## 2021-01-01 RX ADMIN — HEPARIN SODIUM 25 UNITS/KG/HR: 10000 INJECTION, SOLUTION INTRAVENOUS at 02:03

## 2021-01-01 RX ADMIN — Medication 100 MCG/HR: at 08:03

## 2021-01-01 RX ADMIN — DOPAMINE HYDROCHLORIDE 10 MCG/KG/MIN: 160 INJECTION, SOLUTION INTRAVENOUS at 04:03

## 2021-01-01 RX ADMIN — SODIUM CHLORIDE: 0.9 INJECTION, SOLUTION INTRAVENOUS at 07:03

## 2021-01-01 RX ADMIN — DOPAMINE HYDROCHLORIDE 8 MCG/KG/MIN: 160 INJECTION, SOLUTION INTRAVENOUS at 05:03

## 2021-01-01 RX ADMIN — METOROPROLOL TARTRATE 5 MG: 5 INJECTION, SOLUTION INTRAVENOUS at 01:03

## 2021-01-01 RX ADMIN — HEPARIN SODIUM 16 UNITS/KG/HR: 10000 INJECTION, SOLUTION INTRAVENOUS at 06:03

## 2021-01-01 RX ADMIN — MINERAL OIL AND WHITE PETROLATUM: 150; 830 OINTMENT OPHTHALMIC at 04:03

## 2021-01-01 RX ADMIN — AMIODARONE HYDROCHLORIDE 0.5 MG/MIN: 1.8 INJECTION, SOLUTION INTRAVENOUS at 12:03

## 2021-01-01 RX ADMIN — AMIODARONE HYDROCHLORIDE 200 MG: 200 TABLET ORAL at 08:03

## 2021-01-01 RX ADMIN — LIDOCAINE HYDROCHLORIDE 50 ML: 20 SOLUTION ORAL; TOPICAL at 06:03

## 2021-01-01 RX ADMIN — NOREPINEPHRINE BITARTRATE 0.4 MCG/KG/MIN: 1 INJECTION, SOLUTION, CONCENTRATE INTRAVENOUS at 05:03

## 2021-01-01 RX ADMIN — SENNOSIDES 8.6 MG: 8.6 TABLET, FILM COATED ORAL at 09:03

## 2021-01-01 RX ADMIN — PROPOFOL 40 MCG/KG/MIN: 10 INJECTION, EMULSION INTRAVENOUS at 10:03

## 2021-01-01 RX ADMIN — SODIUM CHLORIDE: 0.9 INJECTION, SOLUTION INTRAVENOUS at 05:03

## 2021-01-01 RX ADMIN — VASOPRESSIN 0.04 UNITS/MIN: 20 INJECTION INTRAVENOUS at 04:03

## 2021-01-01 RX ADMIN — HEPARIN SODIUM 500 UNITS/HR: 10000 INJECTION, SOLUTION INTRAVENOUS at 02:03

## 2021-01-01 RX ADMIN — HEPARIN SODIUM 25 UNITS/KG/HR: 10000 INJECTION, SOLUTION INTRAVENOUS at 12:03

## 2021-01-01 RX ADMIN — MINERAL OIL, PETROLATUM: 425; 568 OINTMENT OPHTHALMIC at 08:03

## 2021-01-01 RX ADMIN — AMIODARONE HYDROCHLORIDE 0.5 MG/MIN: 1.8 INJECTION, SOLUTION INTRAVENOUS at 11:03

## 2021-01-01 RX ADMIN — EPINEPHRINE 0.08 MCG/KG/MIN: 1 INJECTION PARENTERAL at 05:03

## 2021-01-01 RX ADMIN — NOREPINEPHRINE BITARTRATE 0.5 MCG/KG/MIN: 1 INJECTION, SOLUTION, CONCENTRATE INTRAVENOUS at 07:03

## 2021-01-01 RX ADMIN — MIDAZOLAM 3 MG/HR: 5 INJECTION INTRAMUSCULAR; INTRAVENOUS at 11:03

## 2021-01-01 RX ADMIN — CISATRACURIUM BESYLATE 4 MCG/KG/MIN: 10 INJECTION INTRAVENOUS at 07:03

## 2021-01-01 RX ADMIN — AMIODARONE HYDROCHLORIDE 1 MG/MIN: 1.8 INJECTION, SOLUTION INTRAVENOUS at 10:03

## 2021-01-01 RX ADMIN — AMIODARONE HYDROCHLORIDE 0.5 MG/MIN: 1.8 INJECTION, SOLUTION INTRAVENOUS at 08:03

## 2021-01-01 RX ADMIN — GLYCOPYRROLATE 0.1 MG: 0.2 INJECTION, SOLUTION INTRAMUSCULAR; INTRAVENOUS at 11:03

## 2021-01-01 RX ADMIN — EPINEPHRINE 0.1 MCG/KG/MIN: 1 INJECTION PARENTERAL at 11:03

## 2021-01-01 RX ADMIN — HEPARIN SODIUM 500 UNITS/HR: 10000 INJECTION, SOLUTION INTRAVENOUS at 10:03

## 2021-01-01 RX ADMIN — CISATRACURIUM BESYLATE 4 MCG/KG/MIN: 10 INJECTION INTRAVENOUS at 05:03

## 2021-01-01 RX ADMIN — INSULIN ASPART 4 UNITS: 100 INJECTION, SOLUTION INTRAVENOUS; SUBCUTANEOUS at 10:03

## 2021-01-01 RX ADMIN — ACETAMINOPHEN 650 MG: 160 SOLUTION ORAL at 04:03

## 2021-01-01 RX ADMIN — INSULIN ASPART 4 UNITS: 100 INJECTION, SOLUTION INTRAVENOUS; SUBCUTANEOUS at 05:03

## 2021-01-01 RX ADMIN — SODIUM BICARBONATE: 84 INJECTION, SOLUTION INTRAVENOUS at 07:03

## 2021-01-01 RX ADMIN — Medication 200 MCG/HR: at 05:03

## 2021-01-01 RX ADMIN — NOREPINEPHRINE BITARTRATE 0.62 MCG/KG/MIN: 1 INJECTION, SOLUTION, CONCENTRATE INTRAVENOUS at 06:03

## 2021-01-01 RX ADMIN — SUCCINYLCHOLINE CHLORIDE 100 MG: 20 INJECTION, SOLUTION INTRAMUSCULAR; INTRAVENOUS; PARENTERAL at 12:03

## 2021-01-01 RX ADMIN — SODIUM CHLORIDE: 0.9 INJECTION, SOLUTION INTRAVENOUS at 06:03

## 2021-01-01 RX ADMIN — CISATRACURIUM BESYLATE 2.5 MCG/KG/MIN: 10 INJECTION INTRAVENOUS at 11:03

## 2021-01-01 RX ADMIN — DOPAMINE HYDROCHLORIDE 8 MCG/KG/MIN: 160 INJECTION, SOLUTION INTRAVENOUS at 11:03

## 2021-01-01 RX ADMIN — DOPAMINE HYDROCHLORIDE 2.22 MCG/KG/MIN: 160 INJECTION, SOLUTION INTRAVENOUS at 02:03

## 2021-01-01 RX ADMIN — LORAZEPAM 4 MG: 2 INJECTION INTRAMUSCULAR; INTRAVENOUS at 05:03

## 2021-01-01 RX ADMIN — EPINEPHRINE 1 MG: 0.1 INJECTION, SOLUTION ENDOTRACHEAL; INTRACARDIAC; INTRAVENOUS at 04:03

## 2021-01-01 RX ADMIN — HEPARIN SODIUM 15 UNITS/KG/HR: 10000 INJECTION, SOLUTION INTRAVENOUS at 09:03

## 2021-01-01 RX ADMIN — VASOPRESSIN 0.02 UNITS/MIN: 20 INJECTION INTRAVENOUS at 06:03

## 2021-01-01 RX ADMIN — INSULIN DETEMIR 3 UNITS: 100 INJECTION, SOLUTION SUBCUTANEOUS at 10:03

## 2021-01-01 RX ADMIN — DILTIAZEM HYDROCHLORIDE 60 MG: 60 TABLET, FILM COATED ORAL at 09:03

## 2021-01-01 RX ADMIN — HEPARIN SODIUM 18 UNITS/KG/HR: 10000 INJECTION, SOLUTION INTRAVENOUS at 05:03

## 2021-01-01 RX ADMIN — Medication 200 MCG/HR: at 08:03

## 2021-01-01 RX ADMIN — HEPARIN SODIUM 200 UNITS/HR: 10000 INJECTION, SOLUTION INTRAVENOUS at 02:03

## 2021-01-01 RX ADMIN — EPINEPHRINE 0.08 MCG/KG/MIN: 1 INJECTION PARENTERAL at 06:03

## 2021-01-01 RX ADMIN — CISATRACURIUM BESYLATE 3.5 MCG/KG/MIN: 10 INJECTION INTRAVENOUS at 02:03

## 2021-01-01 RX ADMIN — AMIODARONE HYDROCHLORIDE 0.5 MG/MIN: 1.8 INJECTION, SOLUTION INTRAVENOUS at 10:03

## 2021-01-01 RX ADMIN — VASOPRESSIN 0.04 UNITS/MIN: 20 INJECTION INTRAVENOUS at 10:03

## 2021-01-01 RX ADMIN — CISATRACURIUM BESYLATE 4 MCG/KG/MIN: 10 INJECTION INTRAVENOUS at 11:03

## 2021-01-01 RX ADMIN — SODIUM CHLORIDE: 0.9 INJECTION, SOLUTION INTRAVENOUS at 11:03

## 2021-01-01 RX ADMIN — EPINEPHRINE 0.08 MCG/KG/MIN: 1 INJECTION PARENTERAL at 07:03

## 2021-01-01 RX ADMIN — ACETAMINOPHEN 650 MG: 160 SOLUTION ORAL at 08:03

## 2021-01-01 RX ADMIN — CISATRACURIUM BESYLATE 2.5 MCG/KG/MIN: 10 INJECTION INTRAVENOUS at 09:03

## 2021-01-01 RX ADMIN — HEPARIN SODIUM 4000 UNITS: 1000 INJECTION, SOLUTION INTRAVENOUS; SUBCUTANEOUS at 06:03

## 2021-01-01 RX ADMIN — NOREPINEPHRINE BITARTRATE 0.64 MCG/KG/MIN: 1 INJECTION, SOLUTION, CONCENTRATE INTRAVENOUS at 03:03

## 2021-01-01 RX ADMIN — FUROSEMIDE 20 MG/HR: 10 INJECTION, SOLUTION INTRAMUSCULAR; INTRAVENOUS at 06:03

## 2021-01-01 RX ADMIN — ALTEPLASE 2 MG: 2.2 INJECTION, POWDER, LYOPHILIZED, FOR SOLUTION INTRAVENOUS at 08:03

## 2021-01-01 RX ADMIN — INSULIN ASPART 1 UNITS: 100 INJECTION, SOLUTION INTRAVENOUS; SUBCUTANEOUS at 08:03

## 2021-01-01 RX ADMIN — DOBUTAMINE HYDROCHLORIDE 5 MCG/KG/MIN: 400 INJECTION INTRAVENOUS at 12:03

## 2021-01-01 RX ADMIN — POTASSIUM BICARBONATE 25 MEQ: 978 TABLET, EFFERVESCENT ORAL at 11:03

## 2021-01-01 RX ADMIN — ATROPINE SULFATE 1 MG: 0.1 INJECTION, SOLUTION ENDOTRACHEAL; INTRAMUSCULAR; INTRAVENOUS; SUBCUTANEOUS at 06:03

## 2021-01-01 RX ADMIN — HEPARIN SODIUM 24 UNITS/KG/HR: 10000 INJECTION, SOLUTION INTRAVENOUS at 12:03

## 2021-01-01 RX ADMIN — MINERAL OIL, PETROLATUM: 425; 568 OINTMENT OPHTHALMIC at 11:03

## 2021-01-01 RX ADMIN — CARVEDILOL 25 MG: 12.5 TABLET, FILM COATED ORAL at 10:03

## 2021-01-01 RX ADMIN — INSULIN DETEMIR 23 UNITS: 100 INJECTION, SOLUTION SUBCUTANEOUS at 08:03

## 2021-01-01 RX ADMIN — CISATRACURIUM BESYLATE 5 MCG/KG/MIN: 10 INJECTION INTRAVENOUS at 03:03

## 2021-01-01 RX ADMIN — CISATRACURIUM BESYLATE 5 MCG/KG/MIN: 10 INJECTION INTRAVENOUS at 07:03

## 2021-01-01 RX ADMIN — NOREPINEPHRINE BITARTRATE 0.01 MCG/KG/MIN: 1 INJECTION, SOLUTION, CONCENTRATE INTRAVENOUS at 09:03

## 2021-01-01 RX ADMIN — AMIODARONE HYDROCHLORIDE 0.5 MG/MIN: 1.8 INJECTION, SOLUTION INTRAVENOUS at 03:03

## 2021-01-01 RX ADMIN — ISOSORBIDE DINITRATE 40 MG: 20 TABLET ORAL at 08:03

## 2021-01-01 RX ADMIN — INSULIN ASPART 3 UNITS: 100 INJECTION, SOLUTION INTRAVENOUS; SUBCUTANEOUS at 02:03

## 2021-01-01 RX ADMIN — DOPAMINE HYDROCHLORIDE 9 MCG/KG/MIN: 160 INJECTION, SOLUTION INTRAVENOUS at 10:03

## 2021-01-01 RX ADMIN — EPINEPHRINE 0.1 MCG/KG/MIN: 1 INJECTION PARENTERAL at 12:03

## 2021-01-01 RX ADMIN — CISATRACURIUM BESYLATE 2 MCG/KG/MIN: 10 INJECTION INTRAVENOUS at 01:03

## 2021-01-01 RX ADMIN — SODIUM CHLORIDE: 0.9 INJECTION, SOLUTION INTRAVENOUS at 03:03

## 2021-01-01 RX ADMIN — LACTULOSE 20 G: 20 SOLUTION ORAL at 03:03

## 2021-01-01 RX ADMIN — DOPAMINE HYDROCHLORIDE 8 MCG/KG/MIN: 160 INJECTION, SOLUTION INTRAVENOUS at 04:03

## 2021-01-01 RX ADMIN — MINERAL OIL AND WHITE PETROLATUM: 150; 830 OINTMENT OPHTHALMIC at 01:03

## 2021-01-01 RX ADMIN — CISATRACURIUM BESYLATE 3 MCG/KG/MIN: 10 INJECTION INTRAVENOUS at 08:03

## 2021-01-01 RX ADMIN — DOPAMINE HYDROCHLORIDE 6 MCG/KG/MIN: 160 INJECTION, SOLUTION INTRAVENOUS at 09:03

## 2021-01-01 RX ADMIN — INSULIN ASPART 6 UNITS: 100 INJECTION, SOLUTION INTRAVENOUS; SUBCUTANEOUS at 12:03

## 2021-01-01 RX ADMIN — CISATRACURIUM BESYLATE 3 MCG/KG/MIN: 10 INJECTION INTRAVENOUS at 06:03

## 2021-01-01 RX ADMIN — DOBUTAMINE HYDROCHLORIDE 5 MCG/KG/MIN: 400 INJECTION INTRAVENOUS at 04:03

## 2021-01-01 RX ADMIN — LINEZOLID 600 MG: 600 INJECTION, SOLUTION INTRAVENOUS at 02:03

## 2021-01-01 RX ADMIN — METRONIDAZOLE 500 MG: 500 INJECTION, SOLUTION INTRAVENOUS at 11:03

## 2021-01-01 RX ADMIN — Medication 200 MCG/HR: at 06:03

## 2021-01-01 RX ADMIN — DOPAMINE HYDROCHLORIDE 8 MCG/KG/MIN: 160 INJECTION, SOLUTION INTRAVENOUS at 06:03

## 2021-01-01 RX ADMIN — DOBUTAMINE HYDROCHLORIDE 5 MCG/KG/MIN: 400 INJECTION INTRAVENOUS at 01:03

## 2021-01-01 RX ADMIN — MIDAZOLAM HYDROCHLORIDE 2 MG: 1 INJECTION, SOLUTION INTRAMUSCULAR; INTRAVENOUS at 03:03

## 2021-01-01 RX ADMIN — SODIUM BICARBONATE 50 MEQ: 84 INJECTION, SOLUTION INTRAVENOUS at 04:03

## 2021-01-01 RX ADMIN — CISATRACURIUM BESYLATE 3.5 MCG/KG/MIN: 10 INJECTION INTRAVENOUS at 07:03

## 2021-01-01 RX ADMIN — Medication 200 MCG/HR: at 03:03

## 2021-01-01 RX ADMIN — CISATRACURIUM BESYLATE 3 MCG/KG/MIN: 10 INJECTION INTRAVENOUS at 12:03

## 2021-01-01 RX ADMIN — ACETAMINOPHEN 650 MG: 160 SOLUTION ORAL at 05:03

## 2021-01-01 RX ADMIN — MINERAL OIL AND WHITE PETROLATUM: 150; 830 OINTMENT OPHTHALMIC at 08:03

## 2021-01-01 RX ADMIN — MIDAZOLAM 5 MG/HR: 5 INJECTION INTRAMUSCULAR; INTRAVENOUS at 05:03

## 2021-01-01 RX ADMIN — SODIUM CHLORIDE 250 ML: 0.9 INJECTION, SOLUTION INTRAVENOUS at 02:03

## 2021-01-01 RX ADMIN — DOBUTAMINE HYDROCHLORIDE 5 MCG/KG/MIN: 400 INJECTION INTRAVENOUS at 03:03

## 2021-01-01 RX ADMIN — HEPARIN SODIUM 15 UNITS/KG/HR: 10000 INJECTION, SOLUTION INTRAVENOUS at 01:03

## 2021-01-01 RX ADMIN — EPINEPHRINE 0.22 MCG/KG/MIN: 1 INJECTION PARENTERAL at 11:03

## 2021-01-01 RX ADMIN — METRONIDAZOLE 500 MG: 500 INJECTION, SOLUTION INTRAVENOUS at 06:03

## 2021-01-01 RX ADMIN — INSULIN ASPART 1 UNITS: 100 INJECTION, SOLUTION INTRAVENOUS; SUBCUTANEOUS at 11:03

## 2021-01-01 RX ADMIN — ACETAMINOPHEN 650 MG: 160 SOLUTION ORAL at 03:03

## 2021-01-01 RX ADMIN — HEPARIN SODIUM 15 UNITS/KG/HR: 10000 INJECTION, SOLUTION INTRAVENOUS at 03:03

## 2021-01-01 RX ADMIN — SODIUM CHLORIDE: 0.9 INJECTION, SOLUTION INTRAVENOUS at 04:03

## 2021-01-01 RX ADMIN — VASOPRESSIN 0.02 UNITS/MIN: 20 INJECTION INTRAVENOUS at 10:03

## 2021-01-01 RX ADMIN — AMIODARONE HYDROCHLORIDE 0.5 MG/MIN: 1.8 INJECTION, SOLUTION INTRAVENOUS at 02:03

## 2021-01-01 RX ADMIN — SPIRONOLACTONE 50 MG: 25 TABLET ORAL at 08:03

## 2021-01-01 RX ADMIN — HEPARIN SODIUM 17 UNITS/KG/HR: 10000 INJECTION, SOLUTION INTRAVENOUS at 06:03

## 2021-01-01 RX ADMIN — EPINEPHRINE 0.18 MCG/KG/MIN: 1 INJECTION PARENTERAL at 09:03

## 2021-01-01 RX ADMIN — NOREPINEPHRINE BITARTRATE 0.5 MCG/KG/MIN: 1 INJECTION, SOLUTION, CONCENTRATE INTRAVENOUS at 11:03

## 2021-01-01 RX ADMIN — CISATRACURIUM BESYLATE 4 MCG/KG/MIN: 10 INJECTION INTRAVENOUS at 12:03

## 2021-01-01 RX ADMIN — LORAZEPAM 2 MG: 2 INJECTION INTRAMUSCULAR; INTRAVENOUS at 03:03

## 2021-01-01 RX ADMIN — SODIUM CHLORIDE: 0.9 INJECTION, SOLUTION INTRAVENOUS at 02:03

## 2021-01-01 RX ADMIN — HEPARIN SODIUM 14 UNITS/KG/HR: 10000 INJECTION, SOLUTION INTRAVENOUS at 03:03

## 2021-01-01 RX ADMIN — SODIUM CHLORIDE: 0.9 INJECTION, SOLUTION INTRAVENOUS at 01:03

## 2021-01-01 RX ADMIN — NOREPINEPHRINE BITARTRATE 0.06 MCG/KG/MIN: 1 INJECTION, SOLUTION, CONCENTRATE INTRAVENOUS at 10:03

## 2021-01-01 RX ADMIN — Medication 100 MCG/HR: at 01:03

## 2021-01-01 RX ADMIN — MUPIROCIN: 20 OINTMENT TOPICAL at 09:03

## 2021-01-01 RX ADMIN — ATROPINE SULFATE 1 MG: 0.1 INJECTION PARENTERAL at 02:03

## 2021-01-01 RX ADMIN — SODIUM CHLORIDE: 0.9 INJECTION, SOLUTION INTRAVENOUS at 08:03

## 2021-01-01 RX ADMIN — LORAZEPAM 4 MG: 2 INJECTION INTRAMUSCULAR; INTRAVENOUS at 11:03

## 2021-01-01 RX ADMIN — DOBUTAMINE HYDROCHLORIDE 7.5 MCG/KG/MIN: 400 INJECTION INTRAVENOUS at 09:03

## 2021-01-01 RX ADMIN — INSULIN DETEMIR 12 UNITS: 100 INJECTION, SOLUTION SUBCUTANEOUS at 10:03

## 2021-01-01 RX ADMIN — HEPARIN SODIUM 15 UNITS/KG/HR: 10000 INJECTION, SOLUTION INTRAVENOUS at 05:03

## 2021-01-01 RX ADMIN — HEPARIN SODIUM 25 UNITS/KG/HR: 10000 INJECTION, SOLUTION INTRAVENOUS at 05:03

## 2021-01-01 RX ADMIN — NOREPINEPHRINE BITARTRATE 0.12 MCG/KG/MIN: 1 INJECTION, SOLUTION, CONCENTRATE INTRAVENOUS at 02:03

## 2021-01-01 RX ADMIN — CALCIUM CHLORIDE 1 G: 100 INJECTION, SOLUTION INTRAVENOUS at 06:03

## 2021-01-01 RX ADMIN — NOREPINEPHRINE BITARTRATE 0.01 MCG/KG/MIN: 1 INJECTION, SOLUTION, CONCENTRATE INTRAVENOUS at 04:03

## 2021-01-01 RX ADMIN — DOPAMINE HYDROCHLORIDE 8 MCG/KG/MIN: 160 INJECTION, SOLUTION INTRAVENOUS at 09:03

## 2021-01-01 RX ADMIN — EPINEPHRINE 0.18 MCG/KG/MIN: 1 INJECTION PARENTERAL at 04:03

## 2021-01-01 RX ADMIN — INSULIN ASPART 1 UNITS: 100 INJECTION, SOLUTION INTRAVENOUS; SUBCUTANEOUS at 04:03

## 2021-01-01 RX ADMIN — DOPAMINE HYDROCHLORIDE 20 MCG/KG/MIN: 160 INJECTION, SOLUTION INTRAVENOUS at 03:03

## 2021-01-01 RX ADMIN — DOPAMINE HYDROCHLORIDE 10 MCG/KG/MIN: 160 INJECTION, SOLUTION INTRAVENOUS at 09:03

## 2021-01-01 RX ADMIN — EPINEPHRINE 0.16 MCG/KG/MIN: 1 INJECTION PARENTERAL at 03:03

## 2021-01-01 RX ADMIN — INSULIN ASPART 6 UNITS: 100 INJECTION, SOLUTION INTRAVENOUS; SUBCUTANEOUS at 10:03

## 2021-01-01 RX ADMIN — CISATRACURIUM BESYLATE 3.5 MCG/KG/MIN: 10 INJECTION INTRAVENOUS at 06:03

## 2021-01-01 RX ADMIN — MIDAZOLAM HYDROCHLORIDE 2 MG/HR: 5 INJECTION, SOLUTION INTRAMUSCULAR; INTRAVENOUS at 12:03

## 2021-01-01 RX ADMIN — CISATRACURIUM BESYLATE 3 MCG/KG/MIN: 10 INJECTION INTRAVENOUS at 11:03

## 2021-01-01 RX ADMIN — FUROSEMIDE 60 MG: 10 INJECTION, SOLUTION INTRAMUSCULAR; INTRAVENOUS at 11:03

## 2021-01-01 RX ADMIN — CISATRACURIUM BESYLATE 1.5 MCG/KG/MIN: 10 INJECTION INTRAVENOUS at 03:03

## 2021-01-01 RX ADMIN — INSULIN ASPART 6 UNITS: 100 INJECTION, SOLUTION INTRAVENOUS; SUBCUTANEOUS at 02:03

## 2021-01-01 RX ADMIN — LACTULOSE 20 G: 20 SOLUTION ORAL at 09:03

## 2021-01-01 RX ADMIN — DOBUTAMINE HYDROCHLORIDE 5 MCG/KG/MIN: 400 INJECTION INTRAVENOUS at 10:03

## 2021-01-01 RX ADMIN — DILTIAZEM HYDROCHLORIDE 20 MG: 5 INJECTION INTRAVENOUS at 12:03

## 2021-01-01 RX ADMIN — DIGOXIN 62.5 MCG: 0.05 SOLUTION ORAL at 10:03

## 2021-01-01 RX ADMIN — HEPARIN SODIUM 24 UNITS/KG/HR: 10000 INJECTION, SOLUTION INTRAVENOUS at 02:03

## 2021-01-01 RX ADMIN — MICAFUNGIN SODIUM 50 MG: 10 INJECTION, POWDER, LYOPHILIZED, FOR SOLUTION INTRAVENOUS at 10:03

## 2021-03-03 PROBLEM — I48.3 TYPICAL ATRIAL FLUTTER: Status: ACTIVE | Noted: 2021-01-01

## 2021-03-04 PROBLEM — R56.9 SEIZURE-LIKE ACTIVITY: Status: ACTIVE | Noted: 2021-01-01

## 2021-03-04 PROBLEM — R57.0 CARDIOGENIC SHOCK: Status: ACTIVE | Noted: 2021-01-01

## 2021-03-04 PROBLEM — J69.0 ASPIRATION PNEUMONIA: Status: ACTIVE | Noted: 2021-01-01

## 2021-03-04 PROBLEM — I48.3 TYPICAL ATRIAL FLUTTER: Chronic | Status: ACTIVE | Noted: 2021-01-01

## 2021-03-04 PROBLEM — N17.9 AKI (ACUTE KIDNEY INJURY): Status: ACTIVE | Noted: 2021-01-01

## 2021-03-04 PROBLEM — E11.65 UNCONTROLLED TYPE 2 DIABETES MELLITUS WITH HYPERGLYCEMIA, WITH LONG-TERM CURRENT USE OF INSULIN: Chronic | Status: ACTIVE | Noted: 2020-01-01

## 2021-03-04 PROBLEM — Z79.4 UNCONTROLLED TYPE 2 DIABETES MELLITUS WITH HYPERGLYCEMIA, WITH LONG-TERM CURRENT USE OF INSULIN: Chronic | Status: ACTIVE | Noted: 2020-01-01

## 2021-03-04 PROBLEM — E66.01 MORBID OBESITY WITH BMI OF 60.0-69.9, ADULT: Chronic | Status: ACTIVE | Noted: 2019-03-18

## 2021-03-04 PROBLEM — J96.21 ACUTE ON CHRONIC RESPIRATORY FAILURE WITH HYPOXIA AND HYPERCAPNIA: Status: ACTIVE | Noted: 2021-01-01

## 2021-03-04 PROBLEM — J96.22 ACUTE ON CHRONIC RESPIRATORY FAILURE WITH HYPOXIA AND HYPERCAPNIA: Status: ACTIVE | Noted: 2021-01-01

## 2021-03-05 PROBLEM — I50.1 CARDIOGENIC PULMONARY EDEMA: Status: ACTIVE | Noted: 2021-01-01

## 2021-03-05 PROBLEM — K72.00 SHOCK LIVER: Status: ACTIVE | Noted: 2021-01-01

## 2021-03-06 PROBLEM — I50.1 CARDIOGENIC PULMONARY EDEMA: Status: RESOLVED | Noted: 2021-01-01 | Resolved: 2021-01-01

## 2021-03-09 PROBLEM — K92.2 UGIB (UPPER GASTROINTESTINAL BLEED): Status: ACTIVE | Noted: 2021-01-01

## 2021-03-09 PROBLEM — Z51.5 PALLIATIVE CARE ENCOUNTER: Status: ACTIVE | Noted: 2021-01-01

## 2021-03-11 PROBLEM — R50.9 FEVER: Status: ACTIVE | Noted: 2021-01-01

## 2021-03-13 PROBLEM — I47.20 VT (VENTRICULAR TACHYCARDIA): Status: ACTIVE | Noted: 2021-01-01

## 2021-03-14 PROBLEM — I50.9 CHF (CONGESTIVE HEART FAILURE): Status: ACTIVE | Noted: 2019-03-22

## 2021-03-15 PROBLEM — E44.0 MODERATE MALNUTRITION: Status: ACTIVE | Noted: 2021-01-01

## 2021-03-15 PROBLEM — J15.211 MSSA (METHICILLIN SUSCEPTIBLE STAPHYLOCOCCUS AUREUS) PNEUMONIA: Status: ACTIVE | Noted: 2021-01-01

## 2021-03-17 LAB
BACTERIA BLD CULT: NORMAL
BACTERIA BLD CULT: NORMAL
BACTERIA SPEC AEROBE CULT: ABNORMAL
GRAM STN SPEC: ABNORMAL

## 2021-03-19 LAB
BACTERIA SPEC AEROBE CULT: ABNORMAL
BACTERIA SPEC AEROBE CULT: ABNORMAL
GRAM STN SPEC: ABNORMAL

## 2021-03-21 LAB
BACTERIA BLD CULT: NORMAL
BACTERIA BLD CULT: NORMAL

## 2021-04-14 LAB — FUNGUS BLD CULT: NORMAL

## 2022-11-02 NOTE — ASSESSMENT & PLAN NOTE
- hold statin  - likely due to hepatic congestion due to CHF exacerbation  - treat CHF  - monitor daily CMP, if not improved after improvement of exacerbation then will evaluate for other causes       PAST MEDICAL HISTORY:  History of dislocation of shoulder